# Patient Record
Sex: FEMALE | Race: WHITE | NOT HISPANIC OR LATINO | Employment: OTHER | URBAN - METROPOLITAN AREA
[De-identification: names, ages, dates, MRNs, and addresses within clinical notes are randomized per-mention and may not be internally consistent; named-entity substitution may affect disease eponyms.]

---

## 2017-01-01 ENCOUNTER — CONSULT (OUTPATIENT)
Dept: RADIATION ONCOLOGY | Facility: CLINIC | Age: 82
End: 2017-01-01

## 2017-01-01 ENCOUNTER — APPOINTMENT (OUTPATIENT)
Dept: ONCOLOGY | Facility: HOSPITAL | Age: 82
End: 2017-01-01

## 2017-01-01 ENCOUNTER — INFUSION (OUTPATIENT)
Dept: ONCOLOGY | Facility: HOSPITAL | Age: 82
End: 2017-01-01

## 2017-01-01 ENCOUNTER — TELEPHONE (OUTPATIENT)
Dept: INTERVENTIONAL RADIOLOGY/VASCULAR | Facility: HOSPITAL | Age: 82
End: 2017-01-01

## 2017-01-01 ENCOUNTER — OFFICE VISIT (OUTPATIENT)
Dept: PSYCHIATRY | Facility: HOSPITAL | Age: 82
End: 2017-01-01

## 2017-01-01 ENCOUNTER — OFFICE VISIT (OUTPATIENT)
Dept: ONCOLOGY | Facility: CLINIC | Age: 82
End: 2017-01-01

## 2017-01-01 ENCOUNTER — LAB (OUTPATIENT)
Dept: LAB | Facility: HOSPITAL | Age: 82
End: 2017-01-01

## 2017-01-01 ENCOUNTER — APPOINTMENT (OUTPATIENT)
Dept: GENERAL RADIOLOGY | Facility: HOSPITAL | Age: 82
End: 2017-01-01

## 2017-01-01 ENCOUNTER — TELEPHONE (OUTPATIENT)
Dept: ONCOLOGY | Facility: HOSPITAL | Age: 82
End: 2017-01-01

## 2017-01-01 ENCOUNTER — LAB (OUTPATIENT)
Dept: OTHER | Facility: HOSPITAL | Age: 82
End: 2017-01-01

## 2017-01-01 ENCOUNTER — APPOINTMENT (OUTPATIENT)
Dept: ONCOLOGY | Facility: CLINIC | Age: 82
End: 2017-01-01

## 2017-01-01 ENCOUNTER — HOSPITAL ENCOUNTER (OUTPATIENT)
Dept: ULTRASOUND IMAGING | Facility: HOSPITAL | Age: 82
Discharge: HOME | End: 2017-10-19
Attending: INTERNAL MEDICINE | Admitting: INTERNAL MEDICINE

## 2017-01-01 ENCOUNTER — TELEPHONE (OUTPATIENT)
Dept: PSYCHIATRY | Facility: HOSPITAL | Age: 82
End: 2017-01-01

## 2017-01-01 ENCOUNTER — HOSPITAL ENCOUNTER (OUTPATIENT)
Dept: ULTRASOUND IMAGING | Facility: HOSPITAL | Age: 82
Discharge: HOME | End: 2017-09-21
Attending: INTERNAL MEDICINE

## 2017-01-01 ENCOUNTER — TELEPHONE (OUTPATIENT)
Dept: GENERAL RADIOLOGY | Facility: HOSPITAL | Age: 82
End: 2017-01-01

## 2017-01-01 ENCOUNTER — DOCUMENTATION (OUTPATIENT)
Dept: ONCOLOGY | Facility: CLINIC | Age: 82
End: 2017-01-01

## 2017-01-01 ENCOUNTER — OFFICE VISIT (OUTPATIENT)
Dept: INTERNAL MEDICINE | Facility: CLINIC | Age: 82
End: 2017-01-01

## 2017-01-01 ENCOUNTER — APPOINTMENT (OUTPATIENT)
Dept: CT IMAGING | Facility: HOSPITAL | Age: 82
End: 2017-01-01

## 2017-01-01 ENCOUNTER — APPOINTMENT (OUTPATIENT)
Dept: RADIATION ONCOLOGY | Facility: HOSPITAL | Age: 82
End: 2017-01-01

## 2017-01-01 ENCOUNTER — APPOINTMENT (OUTPATIENT)
Dept: ULTRASOUND IMAGING | Facility: HOSPITAL | Age: 82
End: 2017-01-01

## 2017-01-01 ENCOUNTER — TELEPHONE (OUTPATIENT)
Dept: ONCOLOGY | Facility: CLINIC | Age: 82
End: 2017-01-01

## 2017-01-01 ENCOUNTER — ANESTHESIA EVENT (OUTPATIENT)
Dept: PERIOP | Facility: HOSPITAL | Age: 82
End: 2017-01-01

## 2017-01-01 ENCOUNTER — APPOINTMENT (OUTPATIENT)
Dept: OTHER | Facility: HOSPITAL | Age: 82
End: 2017-01-01

## 2017-01-01 ENCOUNTER — APPOINTMENT (OUTPATIENT)
Dept: ULTRASOUND IMAGING | Facility: HOSPITAL | Age: 82
End: 2017-01-01
Attending: UROLOGY

## 2017-01-01 ENCOUNTER — HOSPITAL ENCOUNTER (INPATIENT)
Facility: HOSPITAL | Age: 82
LOS: 3 days | End: 2017-12-21
Attending: EMERGENCY MEDICINE | Admitting: HOSPITALIST

## 2017-01-01 ENCOUNTER — HOSPITAL ENCOUNTER (OUTPATIENT)
Dept: ULTRASOUND IMAGING | Facility: HOSPITAL | Age: 82
Discharge: HOME | End: 2017-09-12
Attending: INTERNAL MEDICINE

## 2017-01-01 ENCOUNTER — RADIATION ONCOLOGY WEEKLY ASSESSMENT (OUTPATIENT)
Dept: RADIATION ONCOLOGY | Facility: CLINIC | Age: 82
End: 2017-01-01

## 2017-01-01 ENCOUNTER — LAB (OUTPATIENT)
Dept: ONCOLOGY | Facility: HOSPITAL | Age: 82
End: 2017-01-01

## 2017-01-01 ENCOUNTER — APPOINTMENT (OUTPATIENT)
Dept: CARDIOLOGY | Facility: HOSPITAL | Age: 82
End: 2017-01-01
Attending: INTERNAL MEDICINE

## 2017-01-01 ENCOUNTER — HOSPITAL ENCOUNTER (OUTPATIENT)
Dept: NUCLEAR MEDICINE | Facility: HOSPITAL | Age: 82
Discharge: HOME | End: 2017-03-17
Attending: INTERNAL MEDICINE

## 2017-01-01 ENCOUNTER — APPOINTMENT (OUTPATIENT)
Dept: GENERAL RADIOLOGY | Facility: HOSPITAL | Age: 82
End: 2017-01-01
Attending: UROLOGY

## 2017-01-01 ENCOUNTER — HOSPITAL ENCOUNTER (OUTPATIENT)
Dept: NUCLEAR MEDICINE | Facility: HOSPITAL | Age: 82
Discharge: HOME | End: 2017-06-07
Attending: INTERNAL MEDICINE

## 2017-01-01 ENCOUNTER — APPOINTMENT (OUTPATIENT)
Dept: ULTRASOUND IMAGING | Facility: HOSPITAL | Age: 82
End: 2017-01-01
Attending: INTERNAL MEDICINE

## 2017-01-01 ENCOUNTER — APPOINTMENT (OUTPATIENT)
Dept: LAB | Facility: HOSPITAL | Age: 82
End: 2017-01-01

## 2017-01-01 ENCOUNTER — ANESTHESIA (OUTPATIENT)
Dept: PERIOP | Facility: HOSPITAL | Age: 82
End: 2017-01-01

## 2017-01-01 ENCOUNTER — APPOINTMENT (OUTPATIENT)
Dept: MAMMOGRAPHY | Facility: HOSPITAL | Age: 82
End: 2017-01-01
Attending: INTERNAL MEDICINE

## 2017-01-01 ENCOUNTER — APPOINTMENT (OUTPATIENT)
Dept: GENERAL RADIOLOGY | Facility: HOSPITAL | Age: 82
End: 2017-01-01
Attending: INTERNAL MEDICINE

## 2017-01-01 ENCOUNTER — DOCUMENTATION (OUTPATIENT)
Dept: PSYCHIATRY | Facility: HOSPITAL | Age: 82
End: 2017-01-01

## 2017-01-01 ENCOUNTER — HOSPITAL ENCOUNTER (OUTPATIENT)
Dept: BONE DENSITY | Facility: HOSPITAL | Age: 82
Discharge: HOME | End: 2017-01-17
Attending: INTERNAL MEDICINE | Admitting: INTERNAL MEDICINE

## 2017-01-01 ENCOUNTER — HOSPITAL ENCOUNTER (OUTPATIENT)
Dept: ULTRASOUND IMAGING | Facility: HOSPITAL | Age: 82
Discharge: HOME | End: 2017-08-15
Attending: INTERNAL MEDICINE

## 2017-01-01 ENCOUNTER — TRANSCRIBE ORDERS (OUTPATIENT)
Dept: ADMINISTRATIVE | Facility: HOSPITAL | Age: 82
End: 2017-01-01

## 2017-01-01 ENCOUNTER — HOSPITAL ENCOUNTER (OUTPATIENT)
Dept: ULTRASOUND IMAGING | Facility: HOSPITAL | Age: 82
Discharge: HOME | End: 2017-08-22
Attending: INTERNAL MEDICINE | Admitting: INTERNAL MEDICINE

## 2017-01-01 ENCOUNTER — RESULTS ENCOUNTER (OUTPATIENT)
Dept: ONCOLOGY | Facility: CLINIC | Age: 82
End: 2017-01-01

## 2017-01-01 ENCOUNTER — OFFICE VISIT (OUTPATIENT)
Dept: PSYCHIATRY | Facility: HOSPITAL | Age: 82
End: 2017-01-01
Attending: INTERNAL MEDICINE

## 2017-01-01 ENCOUNTER — HOSPITAL ENCOUNTER (OUTPATIENT)
Dept: MRI IMAGING | Facility: HOSPITAL | Age: 82
Discharge: HOME | End: 2017-11-17
Attending: INTERNAL MEDICINE | Admitting: INTERNAL MEDICINE

## 2017-01-01 ENCOUNTER — HOSPITAL ENCOUNTER (OUTPATIENT)
Dept: ULTRASOUND IMAGING | Facility: HOSPITAL | Age: 82
Discharge: HOME | End: 2017-08-09
Attending: INTERNAL MEDICINE | Admitting: INTERNAL MEDICINE

## 2017-01-01 ENCOUNTER — HOSPITAL ENCOUNTER (OUTPATIENT)
Dept: ULTRASOUND IMAGING | Facility: HOSPITAL | Age: 82
Discharge: HOME | End: 2017-08-29
Attending: INTERNAL MEDICINE | Admitting: INTERNAL MEDICINE

## 2017-01-01 ENCOUNTER — HOSPITAL ENCOUNTER (OUTPATIENT)
Dept: ULTRASOUND IMAGING | Facility: HOSPITAL | Age: 82
Discharge: HOME | End: 2017-09-05
Attending: INTERNAL MEDICINE

## 2017-01-01 ENCOUNTER — APPOINTMENT (OUTPATIENT)
Dept: CARDIOLOGY | Facility: HOSPITAL | Age: 82
End: 2017-01-01
Attending: EMERGENCY MEDICINE

## 2017-01-01 ENCOUNTER — HOSPITAL ENCOUNTER (OUTPATIENT)
Dept: GENERAL RADIOLOGY | Facility: HOSPITAL | Age: 82
Discharge: HOME | End: 2017-08-15
Attending: INTERNAL MEDICINE | Admitting: INTERNAL MEDICINE

## 2017-01-01 ENCOUNTER — APPOINTMENT (OUTPATIENT)
Dept: CT IMAGING | Facility: HOSPITAL | Age: 82
End: 2017-01-01
Attending: INTERNAL MEDICINE

## 2017-01-01 ENCOUNTER — HOSPITAL ENCOUNTER (INPATIENT)
Facility: HOSPITAL | Age: 82
LOS: 10 days | Discharge: HOSPICE | End: 2017-09-11
Attending: INTERNAL MEDICINE | Admitting: INTERNAL MEDICINE

## 2017-01-01 ENCOUNTER — HOSPITAL ENCOUNTER (INPATIENT)
Facility: HOSPITAL | Age: 82
LOS: 4 days | Discharge: HOME | End: 2017-08-05
Attending: EMERGENCY MEDICINE | Admitting: INTERNAL MEDICINE

## 2017-01-01 ENCOUNTER — HOSPITAL ENCOUNTER (OUTPATIENT)
Dept: MRI IMAGING | Facility: HOSPITAL | Age: 82
Discharge: HOME | End: 2017-11-15
Attending: INTERNAL MEDICINE | Admitting: INTERNAL MEDICINE

## 2017-01-01 ENCOUNTER — HOSPITAL ENCOUNTER (OUTPATIENT)
Dept: ULTRASOUND IMAGING | Facility: HOSPITAL | Age: 82
Discharge: HOME | End: 2017-12-13
Attending: INTERNAL MEDICINE | Admitting: INTERNAL MEDICINE

## 2017-01-01 VITALS
TEMPERATURE: 97.7 F | DIASTOLIC BLOOD PRESSURE: 77 MMHG | BODY MASS INDEX: 23.39 KG/M2 | SYSTOLIC BLOOD PRESSURE: 149 MMHG | RESPIRATION RATE: 20 BRPM | HEART RATE: 100 BPM | WEIGHT: 132 LBS | HEIGHT: 63 IN | OXYGEN SATURATION: 97 %

## 2017-01-01 VITALS
WEIGHT: 127 LBS | BODY MASS INDEX: 23.37 KG/M2 | HEIGHT: 62 IN | SYSTOLIC BLOOD PRESSURE: 146 MMHG | DIASTOLIC BLOOD PRESSURE: 75 MMHG | HEART RATE: 84 BPM | OXYGEN SATURATION: 97 % | RESPIRATION RATE: 16 BRPM | TEMPERATURE: 98.6 F

## 2017-01-01 VITALS — WEIGHT: 127.4 LBS | BODY MASS INDEX: 23.15 KG/M2

## 2017-01-01 VITALS
TEMPERATURE: 97 F | DIASTOLIC BLOOD PRESSURE: 72 MMHG | HEIGHT: 63 IN | BODY MASS INDEX: 22.5 KG/M2 | OXYGEN SATURATION: 95 % | WEIGHT: 127 LBS | HEART RATE: 114 BPM | RESPIRATION RATE: 16 BRPM | SYSTOLIC BLOOD PRESSURE: 136 MMHG

## 2017-01-01 VITALS
OXYGEN SATURATION: 96 % | WEIGHT: 133.1 LBS | HEIGHT: 62 IN | SYSTOLIC BLOOD PRESSURE: 126 MMHG | RESPIRATION RATE: 16 BRPM | HEART RATE: 84 BPM | BODY MASS INDEX: 24.49 KG/M2 | TEMPERATURE: 98.5 F | DIASTOLIC BLOOD PRESSURE: 68 MMHG

## 2017-01-01 VITALS
TEMPERATURE: 97.2 F | SYSTOLIC BLOOD PRESSURE: 137 MMHG | WEIGHT: 121 LBS | DIASTOLIC BLOOD PRESSURE: 79 MMHG | BODY MASS INDEX: 21.44 KG/M2 | HEIGHT: 63 IN | OXYGEN SATURATION: 94 % | HEART RATE: 112 BPM | RESPIRATION RATE: 18 BRPM

## 2017-01-01 VITALS
HEART RATE: 112 BPM | SYSTOLIC BLOOD PRESSURE: 148 MMHG | OXYGEN SATURATION: 97 % | TEMPERATURE: 98 F | DIASTOLIC BLOOD PRESSURE: 83 MMHG | RESPIRATION RATE: 18 BRPM

## 2017-01-01 VITALS
SYSTOLIC BLOOD PRESSURE: 128 MMHG | OXYGEN SATURATION: 98 % | BODY MASS INDEX: 23.92 KG/M2 | TEMPERATURE: 98.4 F | WEIGHT: 130 LBS | RESPIRATION RATE: 16 BRPM | DIASTOLIC BLOOD PRESSURE: 68 MMHG | HEART RATE: 96 BPM | HEIGHT: 62 IN

## 2017-01-01 VITALS
TEMPERATURE: 97.8 F | HEIGHT: 63 IN | OXYGEN SATURATION: 95 % | HEART RATE: 110 BPM | RESPIRATION RATE: 18 BRPM | SYSTOLIC BLOOD PRESSURE: 143 MMHG | WEIGHT: 135 LBS | BODY MASS INDEX: 23.92 KG/M2 | DIASTOLIC BLOOD PRESSURE: 70 MMHG

## 2017-01-01 VITALS
HEART RATE: 96 BPM | TEMPERATURE: 98.1 F | TEMPERATURE: 95.7 F | SYSTOLIC BLOOD PRESSURE: 143 MMHG | WEIGHT: 120 LBS | HEART RATE: 114 BPM | WEIGHT: 127.8 LBS | RESPIRATION RATE: 16 BRPM | RESPIRATION RATE: 16 BRPM | OXYGEN SATURATION: 98 % | HEIGHT: 62 IN | BODY MASS INDEX: 22.08 KG/M2 | SYSTOLIC BLOOD PRESSURE: 110 MMHG | DIASTOLIC BLOOD PRESSURE: 60 MMHG | BODY MASS INDEX: 23.52 KG/M2 | HEIGHT: 62 IN | DIASTOLIC BLOOD PRESSURE: 87 MMHG

## 2017-01-01 VITALS
BODY MASS INDEX: 23.69 KG/M2 | SYSTOLIC BLOOD PRESSURE: 131 MMHG | HEIGHT: 63 IN | DIASTOLIC BLOOD PRESSURE: 68 MMHG | OXYGEN SATURATION: 96 % | TEMPERATURE: 98.3 F | RESPIRATION RATE: 16 BRPM | HEART RATE: 92 BPM | WEIGHT: 133.7 LBS

## 2017-01-01 VITALS
TEMPERATURE: 97.4 F | SYSTOLIC BLOOD PRESSURE: 120 MMHG | HEIGHT: 63 IN | RESPIRATION RATE: 16 BRPM | HEART RATE: 113 BPM | DIASTOLIC BLOOD PRESSURE: 75 MMHG | BODY MASS INDEX: 19.21 KG/M2 | WEIGHT: 108.4 LBS | OXYGEN SATURATION: 93 %

## 2017-01-01 VITALS
SYSTOLIC BLOOD PRESSURE: 162 MMHG | HEART RATE: 102 BPM | OXYGEN SATURATION: 96 % | TEMPERATURE: 97.4 F | WEIGHT: 114 LBS | DIASTOLIC BLOOD PRESSURE: 71 MMHG | BODY MASS INDEX: 20.72 KG/M2

## 2017-01-01 VITALS
OXYGEN SATURATION: 75 % | TEMPERATURE: 99.5 F | DIASTOLIC BLOOD PRESSURE: 50 MMHG | HEIGHT: 64 IN | SYSTOLIC BLOOD PRESSURE: 72 MMHG | WEIGHT: 115.5 LBS | BODY MASS INDEX: 19.72 KG/M2

## 2017-01-01 VITALS
HEART RATE: 88 BPM | SYSTOLIC BLOOD PRESSURE: 188 MMHG | DIASTOLIC BLOOD PRESSURE: 98 MMHG | BODY MASS INDEX: 21.05 KG/M2 | WEIGHT: 118.8 LBS | TEMPERATURE: 97.5 F | HEIGHT: 63 IN | RESPIRATION RATE: 12 BRPM | OXYGEN SATURATION: 96 %

## 2017-01-01 VITALS
BODY MASS INDEX: 24.48 KG/M2 | DIASTOLIC BLOOD PRESSURE: 78 MMHG | RESPIRATION RATE: 18 BRPM | TEMPERATURE: 98.3 F | OXYGEN SATURATION: 96 % | SYSTOLIC BLOOD PRESSURE: 145 MMHG | OXYGEN SATURATION: 97 % | SYSTOLIC BLOOD PRESSURE: 132 MMHG | HEART RATE: 126 BPM | DIASTOLIC BLOOD PRESSURE: 73 MMHG | HEART RATE: 102 BPM | WEIGHT: 133 LBS | HEIGHT: 62 IN | RESPIRATION RATE: 12 BRPM

## 2017-01-01 VITALS
TEMPERATURE: 98.3 F | BODY MASS INDEX: 23 KG/M2 | WEIGHT: 125 LBS | HEART RATE: 109 BPM | SYSTOLIC BLOOD PRESSURE: 135 MMHG | OXYGEN SATURATION: 96 % | DIASTOLIC BLOOD PRESSURE: 56 MMHG | HEIGHT: 62 IN | RESPIRATION RATE: 16 BRPM

## 2017-01-01 VITALS
RESPIRATION RATE: 18 BRPM | OXYGEN SATURATION: 97 % | SYSTOLIC BLOOD PRESSURE: 133 MMHG | DIASTOLIC BLOOD PRESSURE: 65 MMHG | TEMPERATURE: 97.5 F | HEART RATE: 98 BPM

## 2017-01-01 VITALS
OXYGEN SATURATION: 97 % | WEIGHT: 109.2 LBS | HEART RATE: 121 BPM | SYSTOLIC BLOOD PRESSURE: 136 MMHG | BODY MASS INDEX: 19.59 KG/M2 | DIASTOLIC BLOOD PRESSURE: 83 MMHG | TEMPERATURE: 98.5 F

## 2017-01-01 VITALS
RESPIRATION RATE: 18 BRPM | HEART RATE: 92 BPM | DIASTOLIC BLOOD PRESSURE: 76 MMHG | SYSTOLIC BLOOD PRESSURE: 141 MMHG | TEMPERATURE: 97.8 F

## 2017-01-01 VITALS
DIASTOLIC BLOOD PRESSURE: 70 MMHG | HEART RATE: 103 BPM | HEIGHT: 62 IN | BODY MASS INDEX: 21.35 KG/M2 | OXYGEN SATURATION: 94 % | TEMPERATURE: 98.3 F | SYSTOLIC BLOOD PRESSURE: 156 MMHG | WEIGHT: 116 LBS

## 2017-01-01 VITALS
OXYGEN SATURATION: 99 % | RESPIRATION RATE: 16 BRPM | WEIGHT: 133.6 LBS | SYSTOLIC BLOOD PRESSURE: 126 MMHG | HEIGHT: 62 IN | TEMPERATURE: 98.3 F | DIASTOLIC BLOOD PRESSURE: 72 MMHG | HEART RATE: 108 BPM | BODY MASS INDEX: 24.59 KG/M2

## 2017-01-01 VITALS
HEIGHT: 62 IN | HEART RATE: 99 BPM | OXYGEN SATURATION: 99 % | DIASTOLIC BLOOD PRESSURE: 68 MMHG | WEIGHT: 114 LBS | SYSTOLIC BLOOD PRESSURE: 167 MMHG | BODY MASS INDEX: 20.98 KG/M2 | TEMPERATURE: 97.9 F

## 2017-01-01 VITALS
RESPIRATION RATE: 16 BRPM | SYSTOLIC BLOOD PRESSURE: 130 MMHG | WEIGHT: 114.8 LBS | HEIGHT: 62 IN | DIASTOLIC BLOOD PRESSURE: 70 MMHG | HEART RATE: 96 BPM | TEMPERATURE: 99.1 F | BODY MASS INDEX: 21.12 KG/M2

## 2017-01-01 VITALS
WEIGHT: 128.8 LBS | RESPIRATION RATE: 18 BRPM | BODY MASS INDEX: 22.82 KG/M2 | DIASTOLIC BLOOD PRESSURE: 72 MMHG | TEMPERATURE: 97.6 F | HEIGHT: 63 IN | HEART RATE: 97 BPM | OXYGEN SATURATION: 100 % | SYSTOLIC BLOOD PRESSURE: 142 MMHG

## 2017-01-01 VITALS — BODY MASS INDEX: 20.66 KG/M2 | WEIGHT: 114.8 LBS

## 2017-01-01 VITALS
BODY MASS INDEX: 27.54 KG/M2 | HEART RATE: 110 BPM | WEIGHT: 119 LBS | TEMPERATURE: 97.7 F | HEIGHT: 55 IN | OXYGEN SATURATION: 97 % | SYSTOLIC BLOOD PRESSURE: 153 MMHG | DIASTOLIC BLOOD PRESSURE: 75 MMHG

## 2017-01-01 VITALS
OXYGEN SATURATION: 98 % | BODY MASS INDEX: 23.74 KG/M2 | TEMPERATURE: 98.3 F | HEIGHT: 62 IN | WEIGHT: 129 LBS | SYSTOLIC BLOOD PRESSURE: 143 MMHG | RESPIRATION RATE: 18 BRPM | HEART RATE: 89 BPM | DIASTOLIC BLOOD PRESSURE: 75 MMHG

## 2017-01-01 VITALS
TEMPERATURE: 97.9 F | OXYGEN SATURATION: 92 % | WEIGHT: 124 LBS | HEIGHT: 62 IN | BODY MASS INDEX: 22.82 KG/M2 | SYSTOLIC BLOOD PRESSURE: 143 MMHG | HEART RATE: 103 BPM | DIASTOLIC BLOOD PRESSURE: 98 MMHG | RESPIRATION RATE: 18 BRPM

## 2017-01-01 VITALS
HEIGHT: 63 IN | BODY MASS INDEX: 21.97 KG/M2 | DIASTOLIC BLOOD PRESSURE: 85 MMHG | WEIGHT: 124 LBS | RESPIRATION RATE: 16 BRPM | OXYGEN SATURATION: 97 % | SYSTOLIC BLOOD PRESSURE: 137 MMHG | TEMPERATURE: 97.4 F | HEART RATE: 121 BPM

## 2017-01-01 VITALS
HEIGHT: 62 IN | RESPIRATION RATE: 16 BRPM | HEART RATE: 86 BPM | DIASTOLIC BLOOD PRESSURE: 70 MMHG | BODY MASS INDEX: 23.48 KG/M2 | WEIGHT: 127.6 LBS | SYSTOLIC BLOOD PRESSURE: 122 MMHG | TEMPERATURE: 97.4 F

## 2017-01-01 VITALS
WEIGHT: 118 LBS | TEMPERATURE: 98.2 F | BODY MASS INDEX: 21.71 KG/M2 | HEART RATE: 105 BPM | OXYGEN SATURATION: 96 % | HEIGHT: 62 IN | RESPIRATION RATE: 16 BRPM | DIASTOLIC BLOOD PRESSURE: 72 MMHG | SYSTOLIC BLOOD PRESSURE: 146 MMHG

## 2017-01-01 VITALS — WEIGHT: 129 LBS | HEIGHT: 62 IN | WEIGHT: 133.8 LBS | BODY MASS INDEX: 23.74 KG/M2 | BODY MASS INDEX: 23.7 KG/M2

## 2017-01-01 VITALS
OXYGEN SATURATION: 98 % | RESPIRATION RATE: 20 BRPM | TEMPERATURE: 97 F | DIASTOLIC BLOOD PRESSURE: 76 MMHG | SYSTOLIC BLOOD PRESSURE: 148 MMHG | HEART RATE: 91 BPM

## 2017-01-01 VITALS
BODY MASS INDEX: 23.57 KG/M2 | SYSTOLIC BLOOD PRESSURE: 145 MMHG | DIASTOLIC BLOOD PRESSURE: 63 MMHG | WEIGHT: 133 LBS | TEMPERATURE: 97.6 F | HEART RATE: 96 BPM | OXYGEN SATURATION: 93 % | HEIGHT: 63 IN

## 2017-01-01 VITALS
TEMPERATURE: 96.1 F | DIASTOLIC BLOOD PRESSURE: 72 MMHG | OXYGEN SATURATION: 96 % | HEIGHT: 63 IN | RESPIRATION RATE: 20 BRPM | WEIGHT: 106 LBS | BODY MASS INDEX: 18.78 KG/M2 | HEART RATE: 120 BPM | SYSTOLIC BLOOD PRESSURE: 118 MMHG

## 2017-01-01 VITALS
WEIGHT: 108.6 LBS | TEMPERATURE: 97.7 F | DIASTOLIC BLOOD PRESSURE: 69 MMHG | SYSTOLIC BLOOD PRESSURE: 122 MMHG | HEART RATE: 128 BPM | BODY MASS INDEX: 19.48 KG/M2

## 2017-01-01 VITALS
BODY MASS INDEX: 20.91 KG/M2 | HEIGHT: 62 IN | DIASTOLIC BLOOD PRESSURE: 78 MMHG | WEIGHT: 113.6 LBS | SYSTOLIC BLOOD PRESSURE: 140 MMHG | OXYGEN SATURATION: 97 % | TEMPERATURE: 97.6 F | HEART RATE: 103 BPM

## 2017-01-01 VITALS — SYSTOLIC BLOOD PRESSURE: 130 MMHG | HEART RATE: 144 BPM | TEMPERATURE: 102 F | DIASTOLIC BLOOD PRESSURE: 63 MMHG

## 2017-01-01 VITALS — HEART RATE: 112 BPM | SYSTOLIC BLOOD PRESSURE: 151 MMHG | DIASTOLIC BLOOD PRESSURE: 93 MMHG

## 2017-01-01 VITALS — DIASTOLIC BLOOD PRESSURE: 71 MMHG | HEART RATE: 83 BPM | TEMPERATURE: 98.4 F | SYSTOLIC BLOOD PRESSURE: 114 MMHG

## 2017-01-01 VITALS — DIASTOLIC BLOOD PRESSURE: 83 MMHG | SYSTOLIC BLOOD PRESSURE: 146 MMHG | HEART RATE: 66 BPM | TEMPERATURE: 97.5 F

## 2017-01-01 VITALS — WEIGHT: 132.6 LBS | BODY MASS INDEX: 23.49 KG/M2

## 2017-01-01 DIAGNOSIS — C80.0 CARCINOMATOSIS (HCC): ICD-10-CM

## 2017-01-01 DIAGNOSIS — T45.1X5A ANEMIA ASSOCIATED WITH CHEMOTHERAPY: ICD-10-CM

## 2017-01-01 DIAGNOSIS — J18.9 PNEUMONIA OF BOTH LOWER LOBES DUE TO INFECTIOUS ORGANISM: ICD-10-CM

## 2017-01-01 DIAGNOSIS — R18.0 ASCITES, MALIGNANT: ICD-10-CM

## 2017-01-01 DIAGNOSIS — C50.919 BREAST CANCER METASTASIZED TO BONE, UNSPECIFIED LATERALITY (HCC): Primary | ICD-10-CM

## 2017-01-01 DIAGNOSIS — Z78.0 POST-MENOPAUSE: ICD-10-CM

## 2017-01-01 DIAGNOSIS — D64.81 ANEMIA ASSOCIATED WITH CHEMOTHERAPY: ICD-10-CM

## 2017-01-01 DIAGNOSIS — D63.1 ANEMIA OF CHRONIC RENAL FAILURE, STAGE 3 (MODERATE) (HCC): Primary | ICD-10-CM

## 2017-01-01 DIAGNOSIS — C50.919 BREAST CANCER METASTASIZED TO BONE, UNSPECIFIED LATERALITY (HCC): ICD-10-CM

## 2017-01-01 DIAGNOSIS — C79.51 BREAST CANCER METASTASIZED TO BONE, UNSPECIFIED LATERALITY (HCC): Primary | ICD-10-CM

## 2017-01-01 DIAGNOSIS — C76.2 ABDOMINAL CARCINOMATOSIS (HCC): ICD-10-CM

## 2017-01-01 DIAGNOSIS — D63.0 ANEMIA IN NEOPLASTIC DISEASE: ICD-10-CM

## 2017-01-01 DIAGNOSIS — N18.30 ANEMIA OF CHRONIC RENAL FAILURE, STAGE 3 (MODERATE) (HCC): Primary | ICD-10-CM

## 2017-01-01 DIAGNOSIS — C50.912 BREAST CANCER METASTASIZED TO BONE, LEFT (HCC): ICD-10-CM

## 2017-01-01 DIAGNOSIS — C79.51 BREAST CANCER METASTASIZED TO BONE, UNSPECIFIED LATERALITY (HCC): ICD-10-CM

## 2017-01-01 DIAGNOSIS — R11.10 VOMITING, INTRACTABILITY OF VOMITING NOT SPECIFIED, PRESENCE OF NAUSEA NOT SPECIFIED, UNSPECIFIED VOMITING TYPE: ICD-10-CM

## 2017-01-01 DIAGNOSIS — C79.51 BREAST CANCER METASTASIZED TO BONE, LEFT (HCC): Primary | ICD-10-CM

## 2017-01-01 DIAGNOSIS — E87.0 HYPERNATREMIA: ICD-10-CM

## 2017-01-01 DIAGNOSIS — E86.0 DEHYDRATION: Primary | ICD-10-CM

## 2017-01-01 DIAGNOSIS — D64.81 ANEMIA ASSOCIATED WITH CHEMOTHERAPY: Primary | ICD-10-CM

## 2017-01-01 DIAGNOSIS — C50.912 BREAST CANCER METASTASIZED TO BONE, LEFT (HCC): Primary | ICD-10-CM

## 2017-01-01 DIAGNOSIS — D63.1 ANEMIA OF CHRONIC RENAL FAILURE, STAGE 3 (MODERATE) (HCC): ICD-10-CM

## 2017-01-01 DIAGNOSIS — N18.30 ANEMIA OF CHRONIC RENAL FAILURE, STAGE 3 (MODERATE) (HCC): ICD-10-CM

## 2017-01-01 DIAGNOSIS — T45.1X5A CHEMOTHERAPY INDUCED NEUTROPENIA (HCC): ICD-10-CM

## 2017-01-01 DIAGNOSIS — C79.51 CARCINOMA OF BREAST METASTATIC TO BONE, UNSPECIFIED LATERALITY (HCC): Primary | ICD-10-CM

## 2017-01-01 DIAGNOSIS — E86.0 MILD DEHYDRATION: ICD-10-CM

## 2017-01-01 DIAGNOSIS — D63.0 ANEMIA IN NEOPLASTIC DISEASE: Primary | ICD-10-CM

## 2017-01-01 DIAGNOSIS — C79.51 CARCINOMA OF BREAST METASTATIC TO BONE, UNSPECIFIED LATERALITY (HCC): ICD-10-CM

## 2017-01-01 DIAGNOSIS — C50.919 CARCINOMA OF BREAST METASTATIC TO BONE, UNSPECIFIED LATERALITY (HCC): ICD-10-CM

## 2017-01-01 DIAGNOSIS — C50.919 CARCINOMA OF BREAST METASTATIC TO BONE, UNSPECIFIED LATERALITY (HCC): Primary | ICD-10-CM

## 2017-01-01 DIAGNOSIS — R18.0 MALIGNANT ASCITES: ICD-10-CM

## 2017-01-01 DIAGNOSIS — E86.0 MILD DEHYDRATION: Primary | ICD-10-CM

## 2017-01-01 DIAGNOSIS — I10 ESSENTIAL HYPERTENSION: ICD-10-CM

## 2017-01-01 DIAGNOSIS — H53.8 BLURRY VISION, RIGHT EYE: ICD-10-CM

## 2017-01-01 DIAGNOSIS — R53.1 GENERALIZED WEAKNESS: ICD-10-CM

## 2017-01-01 DIAGNOSIS — N18.9 RENAL FAILURE (ARF), ACUTE ON CHRONIC (HCC): ICD-10-CM

## 2017-01-01 DIAGNOSIS — C50.919 METASTATIC BREAST CANCER: ICD-10-CM

## 2017-01-01 DIAGNOSIS — N17.9 ACUTE KIDNEY INJURY (HCC): ICD-10-CM

## 2017-01-01 DIAGNOSIS — G89.3 CANCER ASSOCIATED PAIN: ICD-10-CM

## 2017-01-01 DIAGNOSIS — E86.0 DEHYDRATION: ICD-10-CM

## 2017-01-01 DIAGNOSIS — R63.0 POOR APPETITE: ICD-10-CM

## 2017-01-01 DIAGNOSIS — R18.0 ASCITES, MALIGNANT: Primary | ICD-10-CM

## 2017-01-01 DIAGNOSIS — R51.9 HEADACHE AROUND THE EYES: ICD-10-CM

## 2017-01-01 DIAGNOSIS — T50.903A: ICD-10-CM

## 2017-01-01 DIAGNOSIS — Z12.31 VISIT FOR SCREENING MAMMOGRAM: Primary | ICD-10-CM

## 2017-01-01 DIAGNOSIS — F32.1 MODERATE SINGLE CURRENT EPISODE OF MAJOR DEPRESSIVE DISORDER (HCC): ICD-10-CM

## 2017-01-01 DIAGNOSIS — N18.30 CHRONIC KIDNEY DISEASE, STAGE 3 (HCC): ICD-10-CM

## 2017-01-01 DIAGNOSIS — Z45.2 ENCOUNTER FOR ADJUSTMENT OR MANAGEMENT OF VASCULAR ACCESS DEVICE: Primary | ICD-10-CM

## 2017-01-01 DIAGNOSIS — R60.1 GENERALIZED EDEMA: ICD-10-CM

## 2017-01-01 DIAGNOSIS — C79.51 CARCINOMA OF LEFT BREAST METASTATIC TO BONE (HCC): ICD-10-CM

## 2017-01-01 DIAGNOSIS — C79.51 CARCINOMA OF LEFT BREAST METASTATIC TO BONE (HCC): Primary | ICD-10-CM

## 2017-01-01 DIAGNOSIS — Z45.2 ENCOUNTER FOR FITTING AND ADJUSTMENT OF VASCULAR CATHETER: Primary | ICD-10-CM

## 2017-01-01 DIAGNOSIS — T45.1X5A ANEMIA ASSOCIATED WITH CHEMOTHERAPY: Primary | ICD-10-CM

## 2017-01-01 DIAGNOSIS — D64.9 ANEMIA, UNSPECIFIED TYPE: ICD-10-CM

## 2017-01-01 DIAGNOSIS — F33.1 MODERATE EPISODE OF RECURRENT MAJOR DEPRESSIVE DISORDER (HCC): ICD-10-CM

## 2017-01-01 DIAGNOSIS — R53.0 NEOPLASTIC MALIGNANT RELATED FATIGUE: ICD-10-CM

## 2017-01-01 DIAGNOSIS — E78.49 OTHER HYPERLIPIDEMIA: ICD-10-CM

## 2017-01-01 DIAGNOSIS — F33.1 MAJOR DEPRESSIVE DISORDER, RECURRENT EPISODE, MODERATE (HCC): ICD-10-CM

## 2017-01-01 DIAGNOSIS — Z71.89 ENCOUNTER FOR MEDICATION REVIEW AND COUNSELING: Primary | ICD-10-CM

## 2017-01-01 DIAGNOSIS — N17.9 ACUTE KIDNEY INJURY (HCC): Primary | ICD-10-CM

## 2017-01-01 DIAGNOSIS — C50.912 CARCINOMA OF LEFT BREAST METASTATIC TO BONE (HCC): ICD-10-CM

## 2017-01-01 DIAGNOSIS — D64.9 ANEMIA, UNSPECIFIED TYPE: Primary | ICD-10-CM

## 2017-01-01 DIAGNOSIS — C79.51 BREAST CANCER METASTASIZED TO BONE, LEFT (HCC): ICD-10-CM

## 2017-01-01 DIAGNOSIS — M79.605 PAIN IN BOTH LOWER EXTREMITIES: ICD-10-CM

## 2017-01-01 DIAGNOSIS — E87.6 HYPOKALEMIA: ICD-10-CM

## 2017-01-01 DIAGNOSIS — R53.0 NEOPLASTIC MALIGNANT RELATED FATIGUE: Primary | ICD-10-CM

## 2017-01-01 DIAGNOSIS — H53.8 BLURRY VISION, RIGHT EYE: Primary | ICD-10-CM

## 2017-01-01 DIAGNOSIS — N18.30 STAGE 3 CHRONIC KIDNEY DISEASE (HCC): ICD-10-CM

## 2017-01-01 DIAGNOSIS — M79.604 PAIN IN BOTH LOWER EXTREMITIES: ICD-10-CM

## 2017-01-01 DIAGNOSIS — N17.9 RENAL FAILURE (ARF), ACUTE ON CHRONIC (HCC): ICD-10-CM

## 2017-01-01 DIAGNOSIS — R53.81 PHYSICAL DECONDITIONING: ICD-10-CM

## 2017-01-01 DIAGNOSIS — R50.9 FEVER, UNSPECIFIED FEVER CAUSE: ICD-10-CM

## 2017-01-01 DIAGNOSIS — D70.1 CHEMOTHERAPY INDUCED NEUTROPENIA (HCC): ICD-10-CM

## 2017-01-01 DIAGNOSIS — R68.89 RIGORS: Primary | ICD-10-CM

## 2017-01-01 DIAGNOSIS — B34.9 VIRAL ILLNESS: ICD-10-CM

## 2017-01-01 DIAGNOSIS — R11.2 NAUSEA AND VOMITING, INTRACTABILITY OF VOMITING NOT SPECIFIED, UNSPECIFIED VOMITING TYPE: ICD-10-CM

## 2017-01-01 DIAGNOSIS — K56.600 PARTIAL SMALL BOWEL OBSTRUCTION (HCC): Primary | ICD-10-CM

## 2017-01-01 DIAGNOSIS — B37.0 ORAL CANDIDA: ICD-10-CM

## 2017-01-01 DIAGNOSIS — C50.912 CARCINOMA OF LEFT BREAST METASTATIC TO BONE (HCC): Primary | ICD-10-CM

## 2017-01-01 DIAGNOSIS — R00.0 TACHYCARDIA: ICD-10-CM

## 2017-01-01 LAB
ABO + RH BLD: NORMAL
ABO GROUP BLD: NORMAL
ACANTHOCYTES BLD QL SMEAR: ABNORMAL
ALBUMIN SERPL-MCNC: 1.2 G/DL (ref 3.5–5.2)
ALBUMIN SERPL-MCNC: 1.3 G/DL (ref 3.5–5.2)
ALBUMIN SERPL-MCNC: 1.5 G/DL (ref 3.5–5.2)
ALBUMIN SERPL-MCNC: 1.6 G/DL (ref 3.5–5.2)
ALBUMIN SERPL-MCNC: 1.6 G/DL (ref 3.5–5.2)
ALBUMIN SERPL-MCNC: 1.7 G/DL (ref 3.5–5.2)
ALBUMIN SERPL-MCNC: 1.8 G/DL (ref 3.5–5.2)
ALBUMIN SERPL-MCNC: 1.9 G/DL (ref 3.5–5.2)
ALBUMIN SERPL-MCNC: 2 G/DL (ref 3.5–5.2)
ALBUMIN SERPL-MCNC: 2.3 G/DL (ref 3.5–5.2)
ALBUMIN SERPL-MCNC: 2.4 G/DL (ref 3.5–5.2)
ALBUMIN SERPL-MCNC: 2.4 G/DL (ref 3.5–5.2)
ALBUMIN SERPL-MCNC: 2.5 G/DL (ref 3.5–5.2)
ALBUMIN SERPL-MCNC: 2.5 G/DL (ref 3.5–5.2)
ALBUMIN SERPL-MCNC: 2.9 G/DL (ref 3.5–5.2)
ALBUMIN SERPL-MCNC: 3 G/DL (ref 3.5–5.2)
ALBUMIN SERPL-MCNC: 3.3 G/DL (ref 3.5–5.2)
ALBUMIN SERPL-MCNC: 3.4 G/DL (ref 3.5–5.2)
ALBUMIN SERPL-MCNC: 3.5 G/DL (ref 3.5–5.2)
ALBUMIN SERPL-MCNC: 3.8 G/DL (ref 3.5–5.2)
ALBUMIN SERPL-MCNC: 3.8 G/DL (ref 3.5–5.2)
ALBUMIN SERPL-MCNC: 4 G/DL (ref 3.5–5.2)
ALBUMIN SERPL-MCNC: 4 G/DL (ref 3.5–5.2)
ALBUMIN SERPL-MCNC: 4.1 G/DL (ref 3.5–5.2)
ALBUMIN SERPL-MCNC: 4.2 G/DL (ref 3.5–5.2)
ALBUMIN/GLOB SERPL: 0.4 G/DL
ALBUMIN/GLOB SERPL: 0.6 G/DL (ref 1.1–2.4)
ALBUMIN/GLOB SERPL: 0.7 G/DL
ALBUMIN/GLOB SERPL: 0.7 G/DL (ref 1.1–2.4)
ALBUMIN/GLOB SERPL: 0.8 G/DL
ALBUMIN/GLOB SERPL: 0.8 G/DL (ref 1.1–2.4)
ALBUMIN/GLOB SERPL: 0.8 G/DL (ref 1.1–2.4)
ALBUMIN/GLOB SERPL: 0.9 G/DL
ALBUMIN/GLOB SERPL: 0.9 G/DL
ALBUMIN/GLOB SERPL: 0.9 G/DL (ref 1.1–2.4)
ALBUMIN/GLOB SERPL: 1 G/DL
ALBUMIN/GLOB SERPL: 1.1 G/DL
ALBUMIN/GLOB SERPL: 1.1 G/DL
ALBUMIN/GLOB SERPL: 1.4 G/DL
ALBUMIN/GLOB SERPL: 1.5 G/DL
ALBUMIN/GLOB SERPL: 1.5 G/DL
ALBUMIN/GLOB SERPL: 1.6 G/DL
ALP SERPL-CCNC: 104 U/L (ref 39–117)
ALP SERPL-CCNC: 105 U/L (ref 38–116)
ALP SERPL-CCNC: 111 U/L (ref 38–116)
ALP SERPL-CCNC: 125 U/L (ref 39–117)
ALP SERPL-CCNC: 140 U/L (ref 39–117)
ALP SERPL-CCNC: 166 U/L (ref 39–117)
ALP SERPL-CCNC: 207 U/L (ref 39–117)
ALP SERPL-CCNC: 214 U/L (ref 39–117)
ALP SERPL-CCNC: 216 U/L (ref 39–117)
ALP SERPL-CCNC: 245 U/L (ref 39–117)
ALP SERPL-CCNC: 255 U/L (ref 39–117)
ALP SERPL-CCNC: 322 U/L (ref 39–117)
ALP SERPL-CCNC: 66 U/L (ref 39–117)
ALP SERPL-CCNC: 72 U/L (ref 39–117)
ALP SERPL-CCNC: 79 U/L (ref 39–117)
ALP SERPL-CCNC: 81 U/L (ref 39–117)
ALP SERPL-CCNC: 83 U/L (ref 38–116)
ALP SERPL-CCNC: 87 U/L (ref 39–117)
ALP SERPL-CCNC: 92 U/L (ref 39–117)
ALP SERPL-CCNC: 94 U/L (ref 38–116)
ALP SERPL-CCNC: 95 U/L (ref 39–117)
ALP SERPL-CCNC: 99 U/L (ref 38–116)
ALT SERPL W P-5'-P-CCNC: 10 U/L (ref 1–33)
ALT SERPL W P-5'-P-CCNC: 11 U/L (ref 1–33)
ALT SERPL W P-5'-P-CCNC: 12 U/L (ref 1–33)
ALT SERPL W P-5'-P-CCNC: 13 U/L (ref 1–33)
ALT SERPL W P-5'-P-CCNC: 14 U/L (ref 0–33)
ALT SERPL W P-5'-P-CCNC: 14 U/L (ref 0–33)
ALT SERPL W P-5'-P-CCNC: 14 U/L (ref 1–33)
ALT SERPL W P-5'-P-CCNC: 14 U/L (ref 1–33)
ALT SERPL W P-5'-P-CCNC: 15 U/L (ref 1–33)
ALT SERPL W P-5'-P-CCNC: 15 U/L (ref 1–33)
ALT SERPL W P-5'-P-CCNC: 16 U/L (ref 0–33)
ALT SERPL W P-5'-P-CCNC: 16 U/L (ref 1–33)
ALT SERPL W P-5'-P-CCNC: 18 U/L (ref 1–33)
ALT SERPL W P-5'-P-CCNC: 27 U/L (ref 0–33)
ALT SERPL W P-5'-P-CCNC: 8 U/L (ref 1–33)
ALT SERPL W P-5'-P-CCNC: 9 U/L (ref 0–33)
ALT SERPL W P-5'-P-CCNC: 9 U/L (ref 1–33)
ALT SERPL W P-5'-P-CCNC: 9 U/L (ref 1–33)
AMORPH URATE CRY URNS QL MICRO: ABNORMAL /HPF
AMORPH URATE CRY URNS QL MICRO: ABNORMAL /HPF
ANION GAP SERPL CALCULATED.3IONS-SCNC: 10 MMOL/L
ANION GAP SERPL CALCULATED.3IONS-SCNC: 10.3 MMOL/L
ANION GAP SERPL CALCULATED.3IONS-SCNC: 10.5 MMOL/L
ANION GAP SERPL CALCULATED.3IONS-SCNC: 10.6 MMOL/L
ANION GAP SERPL CALCULATED.3IONS-SCNC: 11 MMOL/L
ANION GAP SERPL CALCULATED.3IONS-SCNC: 11.4 MMOL/L
ANION GAP SERPL CALCULATED.3IONS-SCNC: 11.6 MMOL/L
ANION GAP SERPL CALCULATED.3IONS-SCNC: 12.3 MMOL/L
ANION GAP SERPL CALCULATED.3IONS-SCNC: 12.4 MMOL/L
ANION GAP SERPL CALCULATED.3IONS-SCNC: 12.4 MMOL/L
ANION GAP SERPL CALCULATED.3IONS-SCNC: 12.5 MMOL/L
ANION GAP SERPL CALCULATED.3IONS-SCNC: 12.6 MMOL/L
ANION GAP SERPL CALCULATED.3IONS-SCNC: 12.9 MMOL/L
ANION GAP SERPL CALCULATED.3IONS-SCNC: 12.9 MMOL/L
ANION GAP SERPL CALCULATED.3IONS-SCNC: 13 MMOL/L
ANION GAP SERPL CALCULATED.3IONS-SCNC: 13.1 MMOL/L
ANION GAP SERPL CALCULATED.3IONS-SCNC: 13.3 MMOL/L
ANION GAP SERPL CALCULATED.3IONS-SCNC: 13.4 MMOL/L
ANION GAP SERPL CALCULATED.3IONS-SCNC: 13.6 MMOL/L
ANION GAP SERPL CALCULATED.3IONS-SCNC: 13.7 MMOL/L
ANION GAP SERPL CALCULATED.3IONS-SCNC: 14.2 MMOL/L
ANION GAP SERPL CALCULATED.3IONS-SCNC: 14.3 MMOL/L
ANION GAP SERPL CALCULATED.3IONS-SCNC: 14.3 MMOL/L
ANION GAP SERPL CALCULATED.3IONS-SCNC: 14.7 MMOL/L
ANION GAP SERPL CALCULATED.3IONS-SCNC: 15 MMOL/L
ANION GAP SERPL CALCULATED.3IONS-SCNC: 15.1 MMOL/L
ANION GAP SERPL CALCULATED.3IONS-SCNC: 15.1 MMOL/L
ANION GAP SERPL CALCULATED.3IONS-SCNC: 15.3 MMOL/L
ANION GAP SERPL CALCULATED.3IONS-SCNC: 15.3 MMOL/L
ANION GAP SERPL CALCULATED.3IONS-SCNC: 15.5 MMOL/L
ANION GAP SERPL CALCULATED.3IONS-SCNC: 15.5 MMOL/L
ANION GAP SERPL CALCULATED.3IONS-SCNC: 15.6 MMOL/L
ANION GAP SERPL CALCULATED.3IONS-SCNC: 15.9 MMOL/L
ANION GAP SERPL CALCULATED.3IONS-SCNC: 16.4 MMOL/L
ANION GAP SERPL CALCULATED.3IONS-SCNC: 16.5 MMOL/L
ANION GAP SERPL CALCULATED.3IONS-SCNC: 18 MMOL/L
ANION GAP SERPL CALCULATED.3IONS-SCNC: 8.6 MMOL/L
ANION GAP SERPL CALCULATED.3IONS-SCNC: 9.2 MMOL/L
ANISOCYTOSIS BLD QL: ABNORMAL
ANISOCYTOSIS BLD QL: NORMAL
APTT PPP: 26.3 SECONDS (ref 22.7–35.4)
AST SERPL-CCNC: 31 U/L (ref 0–32)
AST SERPL-CCNC: 41 U/L (ref 1–32)
AST SERPL-CCNC: 45 U/L (ref 0–32)
AST SERPL-CCNC: 47 U/L (ref 1–32)
AST SERPL-CCNC: 48 U/L (ref 1–32)
AST SERPL-CCNC: 49 U/L (ref 1–32)
AST SERPL-CCNC: 49 U/L (ref 1–32)
AST SERPL-CCNC: 51 U/L (ref 1–32)
AST SERPL-CCNC: 53 U/L (ref 1–32)
AST SERPL-CCNC: 54 U/L (ref 0–32)
AST SERPL-CCNC: 56 U/L (ref 0–32)
AST SERPL-CCNC: 57 U/L (ref 1–32)
AST SERPL-CCNC: 59 U/L (ref 1–32)
AST SERPL-CCNC: 61 U/L (ref 0–32)
AST SERPL-CCNC: 65 U/L (ref 1–32)
AST SERPL-CCNC: 65 U/L (ref 1–32)
AST SERPL-CCNC: 67 U/L (ref 1–32)
AST SERPL-CCNC: 68 U/L (ref 1–32)
AST SERPL-CCNC: 70 U/L (ref 1–32)
AST SERPL-CCNC: 80 U/L (ref 1–32)
AST SERPL-CCNC: 83 U/L (ref 1–32)
AST SERPL-CCNC: 84 U/L (ref 1–32)
BACTERIA SPEC AEROBE CULT: ABNORMAL
BACTERIA SPEC AEROBE CULT: ABNORMAL
BACTERIA SPEC AEROBE CULT: NORMAL
BACTERIA UR QL AUTO: ABNORMAL /HPF
BASOPHILS # BLD AUTO: 0 10*3/MM3 (ref 0–0.2)
BASOPHILS # BLD AUTO: 0.01 10*3/MM3 (ref 0–0.1)
BASOPHILS # BLD AUTO: 0.01 10*3/MM3 (ref 0–0.2)
BASOPHILS # BLD AUTO: 0.02 10*3/MM3 (ref 0–0.1)
BASOPHILS # BLD AUTO: 0.02 10*3/MM3 (ref 0–0.2)
BASOPHILS # BLD AUTO: 0.03 10*3/MM3 (ref 0–0.1)
BASOPHILS # BLD AUTO: 0.03 10*3/MM3 (ref 0–0.2)
BASOPHILS # BLD AUTO: 0.04 10*3/MM3 (ref 0–0.1)
BASOPHILS # BLD AUTO: 0.04 10*3/MM3 (ref 0–0.2)
BASOPHILS # BLD AUTO: 0.07 10*3/MM3 (ref 0–0.2)
BASOPHILS # BLD MANUAL: 0.06 10*3/MM3 (ref 0–0.2)
BASOPHILS NFR BLD AUTO: 0 % (ref 0–1.5)
BASOPHILS NFR BLD AUTO: 0.1 % (ref 0–1.1)
BASOPHILS NFR BLD AUTO: 0.1 % (ref 0–1.5)
BASOPHILS NFR BLD AUTO: 0.1 % (ref 0–1.5)
BASOPHILS NFR BLD AUTO: 0.2 % (ref 0–1.1)
BASOPHILS NFR BLD AUTO: 0.2 % (ref 0–1.5)
BASOPHILS NFR BLD AUTO: 0.2 % (ref 0–1.5)
BASOPHILS NFR BLD AUTO: 0.3 % (ref 0–1.1)
BASOPHILS NFR BLD AUTO: 0.3 % (ref 0–1.5)
BASOPHILS NFR BLD AUTO: 0.4 % (ref 0–1.1)
BASOPHILS NFR BLD AUTO: 0.4 % (ref 0–1.5)
BASOPHILS NFR BLD AUTO: 0.5 % (ref 0–1.1)
BASOPHILS NFR BLD AUTO: 0.5 % (ref 0–1.1)
BASOPHILS NFR BLD AUTO: 0.5 % (ref 0–1.5)
BASOPHILS NFR BLD AUTO: 0.6 % (ref 0–1.5)
BASOPHILS NFR BLD AUTO: 0.6 % (ref 0–1.5)
BASOPHILS NFR BLD AUTO: 0.7 % (ref 0–1.1)
BASOPHILS NFR BLD AUTO: 0.7 % (ref 0–1.5)
BASOPHILS NFR BLD AUTO: 0.8 % (ref 0–1.5)
BASOPHILS NFR BLD AUTO: 0.9 % (ref 0–1.5)
BASOPHILS NFR BLD AUTO: 0.9 % (ref 0–1.5)
BASOPHILS NFR BLD AUTO: 1 % (ref 0–2)
BASOPHILS NFR BLD AUTO: 1.6 % (ref 0–1.1)
BH BB BLOOD EXPIRATION DATE: NORMAL
BH BB BLOOD TYPE BARCODE: 5100
BH BB DISPENSE STATUS: NORMAL
BH BB PRODUCT CODE: NORMAL
BH BB UNIT NUMBER: NORMAL
BH CV ECHO MEAS - ACS: 1.4 CM
BH CV ECHO MEAS - AO MEAN PG (FULL): 3 MMHG
BH CV ECHO MEAS - AO MEAN PG: 6 MMHG
BH CV ECHO MEAS - AO ROOT AREA (BSA CORRECTED): 1.6
BH CV ECHO MEAS - AO ROOT AREA: 5.3 CM^2
BH CV ECHO MEAS - AO ROOT DIAM: 2.6 CM
BH CV ECHO MEAS - AO V2 MEAN: 113 CM/SEC
BH CV ECHO MEAS - AO V2 VTI: 28 CM
BH CV ECHO MEAS - AVA(I,A): 2.3 CM^2
BH CV ECHO MEAS - AVA(I,D): 2.3 CM^2
BH CV ECHO MEAS - BSA(HAYCOCK): 1.6 M^2
BH CV ECHO MEAS - BSA: 1.6 M^2
BH CV ECHO MEAS - BZI_BMI: 24.5 KILOGRAMS/M^2
BH CV ECHO MEAS - BZI_METRIC_HEIGHT: 157.5 CM
BH CV ECHO MEAS - BZI_METRIC_WEIGHT: 60.8 KG
BH CV ECHO MEAS - CONTRAST EF (2CH): 75 ML/M^2
BH CV ECHO MEAS - CONTRAST EF 4CH: 63.5 ML/M^2
BH CV ECHO MEAS - EDV(CUBED): 74.1 ML
BH CV ECHO MEAS - EDV(MOD-SP2): 84 ML
BH CV ECHO MEAS - EDV(MOD-SP4): 85 ML
BH CV ECHO MEAS - EDV(TEICH): 78.6 ML
BH CV ECHO MEAS - EF(CUBED): 87.5 %
BH CV ECHO MEAS - EF(MOD-SP2): 75 %
BH CV ECHO MEAS - EF(MOD-SP4): 63.5 %
BH CV ECHO MEAS - EF(TEICH): 81.7 %
BH CV ECHO MEAS - ESV(CUBED): 9.3 ML
BH CV ECHO MEAS - ESV(MOD-SP2): 21 ML
BH CV ECHO MEAS - ESV(MOD-SP4): 31 ML
BH CV ECHO MEAS - ESV(TEICH): 14.4 ML
BH CV ECHO MEAS - FS: 50 %
BH CV ECHO MEAS - IVS/LVPW: 1.1
BH CV ECHO MEAS - IVSD: 0.9 CM
BH CV ECHO MEAS - LAT PEAK E' VEL: 13 CM/SEC
BH CV ECHO MEAS - LV DIASTOLIC VOL/BSA (35-75): 52.7 ML/M^2
BH CV ECHO MEAS - LV MASS(C)D: 109.8 GRAMS
BH CV ECHO MEAS - LV MASS(C)DI: 68.1 GRAMS/M^2
BH CV ECHO MEAS - LV MEAN PG: 3 MMHG
BH CV ECHO MEAS - LV SYSTOLIC VOL/BSA (12-30): 19.2 ML/M^2
BH CV ECHO MEAS - LV V1 MEAN: 79.5 CM/SEC
BH CV ECHO MEAS - LV V1 VTI: 22.9 CM
BH CV ECHO MEAS - LVIDD: 4.2 CM
BH CV ECHO MEAS - LVIDS: 2.1 CM
BH CV ECHO MEAS - LVLD AP2: 7.5 CM
BH CV ECHO MEAS - LVLD AP4: 7.2 CM
BH CV ECHO MEAS - LVLS AP2: 6 CM
BH CV ECHO MEAS - LVLS AP4: 5.8 CM
BH CV ECHO MEAS - LVOT AREA (M): 2.8 CM^2
BH CV ECHO MEAS - LVOT AREA: 2.8 CM^2
BH CV ECHO MEAS - LVOT DIAM: 1.9 CM
BH CV ECHO MEAS - LVPWD: 0.8 CM
BH CV ECHO MEAS - MED PEAK E' VEL: 6 CM/SEC
BH CV ECHO MEAS - MV A DUR: 0.08 SEC
BH CV ECHO MEAS - MV A MAX VEL: 113 CM/SEC
BH CV ECHO MEAS - MV DEC SLOPE: 356 CM/SEC^2
BH CV ECHO MEAS - MV DEC TIME: 0.14 SEC
BH CV ECHO MEAS - MV E MAX VEL: 75.5 CM/SEC
BH CV ECHO MEAS - MV E/A: 0.67
BH CV ECHO MEAS - MV MEAN PG: 3 MMHG
BH CV ECHO MEAS - MV P1/2T MAX VEL: 84.2 CM/SEC
BH CV ECHO MEAS - MV P1/2T: 69.3 MSEC
BH CV ECHO MEAS - MV V2 MEAN: 73.6 CM/SEC
BH CV ECHO MEAS - MV V2 VTI: 16.8 CM
BH CV ECHO MEAS - MVA P1/2T LCG: 2.6 CM^2
BH CV ECHO MEAS - MVA(P1/2T): 3.2 CM^2
BH CV ECHO MEAS - MVA(VTI): 3.9 CM^2
BH CV ECHO MEAS - PA ACC SLOPE: 0 CM/SEC^2
BH CV ECHO MEAS - PA ACC TIME: 0.06 SEC
BH CV ECHO MEAS - PA MAX PG (FULL): 2.6 MMHG
BH CV ECHO MEAS - PA MAX PG: 5.2 MMHG
BH CV ECHO MEAS - PA PR(ACCEL): 53.8 MMHG
BH CV ECHO MEAS - PA V2 MAX: 114 CM/SEC
BH CV ECHO MEAS - PULM A REVS DUR: 0.07 SEC
BH CV ECHO MEAS - PULM A REVS VEL: 44.9 CM/SEC
BH CV ECHO MEAS - PULM DIAS VEL: 88.4 CM/SEC
BH CV ECHO MEAS - PULM S/D: 0.77
BH CV ECHO MEAS - PULM SYS VEL: 68.1 CM/SEC
BH CV ECHO MEAS - PVA(V,A): 1.1 CM^2
BH CV ECHO MEAS - PVA(V,D): 1.1 CM^2
BH CV ECHO MEAS - QP/QS: 0.35
BH CV ECHO MEAS - RAP SYSTOLE: 3 MMHG
BH CV ECHO MEAS - RV MAX PG: 2.6 MMHG
BH CV ECHO MEAS - RV MEAN PG: 1 MMHG
BH CV ECHO MEAS - RV V1 MAX: 81.2 CM/SEC
BH CV ECHO MEAS - RV V1 MEAN: 53.3 CM/SEC
BH CV ECHO MEAS - RV V1 VTI: 14.8 CM
BH CV ECHO MEAS - RVOT AREA: 1.5 CM^2
BH CV ECHO MEAS - RVOT DIAM: 1.4 CM
BH CV ECHO MEAS - RVSP: 33 MMHG
BH CV ECHO MEAS - SI(AO): 92.2 ML/M^2
BH CV ECHO MEAS - SI(CUBED): 40.2 ML/M^2
BH CV ECHO MEAS - SI(LVOT): 40.3 ML/M^2
BH CV ECHO MEAS - SI(MOD-SP2): 39.1 ML/M^2
BH CV ECHO MEAS - SI(MOD-SP4): 33.5 ML/M^2
BH CV ECHO MEAS - SI(TEICH): 39.8 ML/M^2
BH CV ECHO MEAS - SUP REN AO DIAM: 1.55 CM
BH CV ECHO MEAS - SV(AO): 148.7 ML
BH CV ECHO MEAS - SV(CUBED): 64.8 ML
BH CV ECHO MEAS - SV(LVOT): 64.9 ML
BH CV ECHO MEAS - SV(MOD-SP2): 63 ML
BH CV ECHO MEAS - SV(MOD-SP4): 54 ML
BH CV ECHO MEAS - SV(RVOT): 22.8 ML
BH CV ECHO MEAS - SV(TEICH): 64.2 ML
BH CV ECHO MEAS - TAPSE (>1.6): 1.2 CM2
BH CV ECHO MEAS - TR MAX VEL: 288 CM/SEC
BH CV LOWER ARTERIAL LEFT ABI RATIO: 1.05
BH CV LOWER ARTERIAL LEFT DORSALIS PEDIS SYS MAX: 136 MMHG
BH CV LOWER ARTERIAL LEFT GREAT TOE SYS MAX: 85 MMHG
BH CV LOWER ARTERIAL LEFT POST TIBIAL SYS MAX: 134 MMHG
BH CV LOWER ARTERIAL LEFT TBI RATIO: 0.66
BH CV LOWER ARTERIAL RIGHT ABI RATIO: 1.18
BH CV LOWER ARTERIAL RIGHT DORSALIS PEDIS SYS MAX: 142 MMHG
BH CV LOWER ARTERIAL RIGHT GREAT TOE SYS MAX: 73 MMHG
BH CV LOWER ARTERIAL RIGHT POST TIBIAL SYS MAX: 152 MMHG
BH CV LOWER ARTERIAL RIGHT TBI RATIO: 0.57
BH CV LOWER VASCULAR LEFT COMMON FEMORAL AUGMENT: NORMAL
BH CV LOWER VASCULAR LEFT COMMON FEMORAL COMPETENT: NORMAL
BH CV LOWER VASCULAR LEFT COMMON FEMORAL COMPRESS: NORMAL
BH CV LOWER VASCULAR LEFT COMMON FEMORAL PHASIC: NORMAL
BH CV LOWER VASCULAR LEFT COMMON FEMORAL SPONT: NORMAL
BH CV LOWER VASCULAR LEFT DISTAL FEMORAL COMPRESS: NORMAL
BH CV LOWER VASCULAR LEFT GASTRONEMIUS COMPRESS: NORMAL
BH CV LOWER VASCULAR LEFT GREATER SAPH AK COMPRESS: NORMAL
BH CV LOWER VASCULAR LEFT GREATER SAPH BK COMPRESS: NORMAL
BH CV LOWER VASCULAR LEFT MID FEMORAL AUGMENT: NORMAL
BH CV LOWER VASCULAR LEFT MID FEMORAL COMPETENT: NORMAL
BH CV LOWER VASCULAR LEFT MID FEMORAL COMPRESS: NORMAL
BH CV LOWER VASCULAR LEFT MID FEMORAL PHASIC: NORMAL
BH CV LOWER VASCULAR LEFT MID FEMORAL SPONT: NORMAL
BH CV LOWER VASCULAR LEFT PERONEAL COMPRESS: NORMAL
BH CV LOWER VASCULAR LEFT POPLITEAL AUGMENT: NORMAL
BH CV LOWER VASCULAR LEFT POPLITEAL COMPETENT: NORMAL
BH CV LOWER VASCULAR LEFT POPLITEAL COMPRESS: NORMAL
BH CV LOWER VASCULAR LEFT POPLITEAL PHASIC: NORMAL
BH CV LOWER VASCULAR LEFT POPLITEAL SPONT: NORMAL
BH CV LOWER VASCULAR LEFT POSTERIOR TIBIAL COMPRESS: NORMAL
BH CV LOWER VASCULAR LEFT PROXIMAL FEMORAL COMPRESS: NORMAL
BH CV LOWER VASCULAR LEFT SAPHENOFEMORAL JUNCTION AUGMENT: NORMAL
BH CV LOWER VASCULAR LEFT SAPHENOFEMORAL JUNCTION COMPETENT: NORMAL
BH CV LOWER VASCULAR LEFT SAPHENOFEMORAL JUNCTION COMPRESS: NORMAL
BH CV LOWER VASCULAR LEFT SAPHENOFEMORAL JUNCTION PHASIC: NORMAL
BH CV LOWER VASCULAR LEFT SAPHENOFEMORAL JUNCTION SPONT: NORMAL
BH CV LOWER VASCULAR RIGHT COMMON FEMORAL AUGMENT: NORMAL
BH CV LOWER VASCULAR RIGHT COMMON FEMORAL COMPETENT: NORMAL
BH CV LOWER VASCULAR RIGHT COMMON FEMORAL COMPRESS: NORMAL
BH CV LOWER VASCULAR RIGHT COMMON FEMORAL PHASIC: NORMAL
BH CV LOWER VASCULAR RIGHT COMMON FEMORAL SPONT: NORMAL
BH CV LOWER VASCULAR RIGHT DISTAL FEMORAL COMPRESS: NORMAL
BH CV LOWER VASCULAR RIGHT GASTRONEMIUS COMPRESS: NORMAL
BH CV LOWER VASCULAR RIGHT GREATER SAPH AK COMPRESS: NORMAL
BH CV LOWER VASCULAR RIGHT GREATER SAPH BK COMPRESS: NORMAL
BH CV LOWER VASCULAR RIGHT MID FEMORAL AUGMENT: NORMAL
BH CV LOWER VASCULAR RIGHT MID FEMORAL COMPETENT: NORMAL
BH CV LOWER VASCULAR RIGHT MID FEMORAL COMPRESS: NORMAL
BH CV LOWER VASCULAR RIGHT MID FEMORAL PHASIC: NORMAL
BH CV LOWER VASCULAR RIGHT MID FEMORAL SPONT: NORMAL
BH CV LOWER VASCULAR RIGHT PERONEAL COMPRESS: NORMAL
BH CV LOWER VASCULAR RIGHT POPLITEAL AUGMENT: NORMAL
BH CV LOWER VASCULAR RIGHT POPLITEAL COMPETENT: NORMAL
BH CV LOWER VASCULAR RIGHT POPLITEAL COMPRESS: NORMAL
BH CV LOWER VASCULAR RIGHT POPLITEAL PHASIC: NORMAL
BH CV LOWER VASCULAR RIGHT POPLITEAL SPONT: NORMAL
BH CV LOWER VASCULAR RIGHT POSTERIOR TIBIAL COMPRESS: NORMAL
BH CV LOWER VASCULAR RIGHT PROXIMAL FEMORAL COMPRESS: NORMAL
BH CV LOWER VASCULAR RIGHT SAPHENOFEMORAL JUNCTION AUGMENT: NORMAL
BH CV LOWER VASCULAR RIGHT SAPHENOFEMORAL JUNCTION COMPETENT: NORMAL
BH CV LOWER VASCULAR RIGHT SAPHENOFEMORAL JUNCTION COMPRESS: NORMAL
BH CV LOWER VASCULAR RIGHT SAPHENOFEMORAL JUNCTION PHASIC: NORMAL
BH CV LOWER VASCULAR RIGHT SAPHENOFEMORAL JUNCTION SPONT: NORMAL
BH CV XLRA - RV BASE: 2.5 CM
BH CV XLRA - TDI S': 17 CM/SEC
BILIRUB SERPL-MCNC: 0.2 MG/DL (ref 0.1–1.2)
BILIRUB SERPL-MCNC: 0.3 MG/DL (ref 0.1–1.2)
BILIRUB SERPL-MCNC: 0.5 MG/DL (ref 0.1–1.2)
BILIRUB SERPL-MCNC: 0.8 MG/DL (ref 0.1–1.2)
BILIRUB UR QL STRIP: NEGATIVE
BLD GP AB SCN SERPL QL: NEGATIVE
BUN BLD-MCNC: 14 MG/DL (ref 8–23)
BUN BLD-MCNC: 15 MG/DL (ref 8–23)
BUN BLD-MCNC: 17 MG/DL (ref 8–23)
BUN BLD-MCNC: 17 MG/DL (ref 8–23)
BUN BLD-MCNC: 18 MG/DL (ref 8–23)
BUN BLD-MCNC: 19 MG/DL (ref 6–20)
BUN BLD-MCNC: 20 MG/DL (ref 6–20)
BUN BLD-MCNC: 20 MG/DL (ref 8–23)
BUN BLD-MCNC: 21 MG/DL (ref 8–23)
BUN BLD-MCNC: 21 MG/DL (ref 8–23)
BUN BLD-MCNC: 22 MG/DL (ref 8–23)
BUN BLD-MCNC: 23 MG/DL (ref 8–23)
BUN BLD-MCNC: 24 MG/DL (ref 8–23)
BUN BLD-MCNC: 26 MG/DL (ref 6–20)
BUN BLD-MCNC: 26 MG/DL (ref 8–23)
BUN BLD-MCNC: 27 MG/DL (ref 8–23)
BUN BLD-MCNC: 27 MG/DL (ref 8–23)
BUN BLD-MCNC: 28 MG/DL (ref 8–23)
BUN BLD-MCNC: 29 MG/DL (ref 8–23)
BUN BLD-MCNC: 29 MG/DL (ref 8–23)
BUN BLD-MCNC: 30 MG/DL (ref 8–23)
BUN BLD-MCNC: 31 MG/DL (ref 8–23)
BUN BLD-MCNC: 31 MG/DL (ref 8–23)
BUN BLD-MCNC: 32 MG/DL (ref 8–23)
BUN BLD-MCNC: 34 MG/DL (ref 6–20)
BUN BLD-MCNC: 35 MG/DL (ref 8–23)
BUN BLD-MCNC: 37 MG/DL (ref 6–20)
BUN BLD-MCNC: 37 MG/DL (ref 8–23)
BUN BLD-MCNC: 40 MG/DL (ref 8–23)
BUN BLD-MCNC: 41 MG/DL (ref 8–23)
BUN BLD-MCNC: 43 MG/DL (ref 8–23)
BUN BLD-MCNC: 43 MG/DL (ref 8–23)
BUN BLD-MCNC: 44 MG/DL (ref 8–23)
BUN BLD-MCNC: 46 MG/DL (ref 8–23)
BUN BLD-MCNC: 49 MG/DL (ref 8–23)
BUN BLD-MCNC: 52 MG/DL (ref 8–23)
BUN BLD-MCNC: 53 MG/DL (ref 8–23)
BUN BLD-MCNC: 54 MG/DL (ref 8–23)
BUN/CREAT SERPL: 10.2 (ref 7.3–30)
BUN/CREAT SERPL: 10.7 (ref 7–25)
BUN/CREAT SERPL: 12.7 (ref 7.3–30)
BUN/CREAT SERPL: 13.5 (ref 7–25)
BUN/CREAT SERPL: 13.6 (ref 7–25)
BUN/CREAT SERPL: 14.3 (ref 7–25)
BUN/CREAT SERPL: 14.5 (ref 7–25)
BUN/CREAT SERPL: 14.5 (ref 7–25)
BUN/CREAT SERPL: 14.6 (ref 7–25)
BUN/CREAT SERPL: 14.7 (ref 7–25)
BUN/CREAT SERPL: 14.8 (ref 7–25)
BUN/CREAT SERPL: 14.8 (ref 7–25)
BUN/CREAT SERPL: 15 (ref 7–25)
BUN/CREAT SERPL: 15.5 (ref 7–25)
BUN/CREAT SERPL: 16 (ref 7–25)
BUN/CREAT SERPL: 16.1 (ref 7–25)
BUN/CREAT SERPL: 16.2 (ref 7–25)
BUN/CREAT SERPL: 16.3 (ref 7–25)
BUN/CREAT SERPL: 16.4 (ref 7–25)
BUN/CREAT SERPL: 16.5 (ref 7.3–30)
BUN/CREAT SERPL: 16.9 (ref 7–25)
BUN/CREAT SERPL: 16.9 (ref 7–25)
BUN/CREAT SERPL: 17.2 (ref 7–25)
BUN/CREAT SERPL: 17.2 (ref 7–25)
BUN/CREAT SERPL: 17.4 (ref 7–25)
BUN/CREAT SERPL: 17.4 (ref 7–25)
BUN/CREAT SERPL: 17.5 (ref 7–25)
BUN/CREAT SERPL: 17.7 (ref 7.3–30)
BUN/CREAT SERPL: 18.1 (ref 7–25)
BUN/CREAT SERPL: 18.7 (ref 7–25)
BUN/CREAT SERPL: 18.8 (ref 7–25)
BUN/CREAT SERPL: 20.3 (ref 7–25)
BUN/CREAT SERPL: 21.9 (ref 7.3–30)
BUN/CREAT SERPL: 22.2 (ref 7–25)
BUN/CREAT SERPL: 27.3 (ref 7–25)
BUN/CREAT SERPL: 28 (ref 7–25)
BUN/CREAT SERPL: 8 (ref 7–25)
BUN/CREAT SERPL: 8.4 (ref 7–25)
BUN/CREAT SERPL: 9.7 (ref 7–25)
BUN/CREAT SERPL: 9.8 (ref 7–25)
BUN/CREAT SERPL: 9.9 (ref 7–25)
C DIFF TOX GENS STL QL NAA+PROBE: NEGATIVE
CALCIUM SPEC-SCNC: 5.9 MG/DL (ref 8.6–10.5)
CALCIUM SPEC-SCNC: 6.4 MG/DL (ref 8.6–10.5)
CALCIUM SPEC-SCNC: 6.4 MG/DL (ref 8.6–10.5)
CALCIUM SPEC-SCNC: 6.5 MG/DL (ref 8.6–10.5)
CALCIUM SPEC-SCNC: 6.7 MG/DL (ref 8.6–10.5)
CALCIUM SPEC-SCNC: 6.8 MG/DL (ref 8.6–10.5)
CALCIUM SPEC-SCNC: 6.9 MG/DL (ref 8.6–10.5)
CALCIUM SPEC-SCNC: 6.9 MG/DL (ref 8.6–10.5)
CALCIUM SPEC-SCNC: 7.1 MG/DL (ref 8.6–10.5)
CALCIUM SPEC-SCNC: 7.2 MG/DL (ref 8.5–10.2)
CALCIUM SPEC-SCNC: 7.2 MG/DL (ref 8.6–10.5)
CALCIUM SPEC-SCNC: 7.4 MG/DL (ref 8.6–10.5)
CALCIUM SPEC-SCNC: 7.5 MG/DL (ref 8.6–10.5)
CALCIUM SPEC-SCNC: 7.6 MG/DL (ref 8.5–10.2)
CALCIUM SPEC-SCNC: 7.6 MG/DL (ref 8.6–10.5)
CALCIUM SPEC-SCNC: 7.7 MG/DL (ref 8.6–10.5)
CALCIUM SPEC-SCNC: 7.8 MG/DL (ref 8.5–10.2)
CALCIUM SPEC-SCNC: 7.8 MG/DL (ref 8.6–10.5)
CALCIUM SPEC-SCNC: 7.8 MG/DL (ref 8.6–10.5)
CALCIUM SPEC-SCNC: 7.9 MG/DL (ref 8.5–10.2)
CALCIUM SPEC-SCNC: 8 MG/DL (ref 8.6–10.5)
CALCIUM SPEC-SCNC: 8.2 MG/DL (ref 8.6–10.5)
CALCIUM SPEC-SCNC: 8.5 MG/DL (ref 8.6–10.5)
CALCIUM SPEC-SCNC: 8.6 MG/DL (ref 8.6–10.5)
CALCIUM SPEC-SCNC: 8.8 MG/DL (ref 8.6–10.5)
CALCIUM SPEC-SCNC: 8.9 MG/DL (ref 8.6–10.5)
CALCIUM SPEC-SCNC: 8.9 MG/DL (ref 8.6–10.5)
CALCIUM SPEC-SCNC: 9 MG/DL (ref 8.5–10.2)
CALCIUM SPEC-SCNC: 9 MG/DL (ref 8.6–10.5)
CALCIUM SPEC-SCNC: 9 MG/DL (ref 8.6–10.5)
CALCIUM SPEC-SCNC: 9.1 MG/DL (ref 8.6–10.5)
CANCER AG15-3 SERPL-ACNC: 36.8 U/ML (ref 0–25)
CANCER AG15-3 SERPL-ACNC: 39.3 U/ML (ref 0–25)
CANCER AG15-3 SERPL-ACNC: 53.4 U/ML (ref 0–25)
CANCER AG15-3 SERPL-ACNC: 62.8 U/ML
CANCER AG15-3 SERPL-ACNC: 68.6 U/ML
CANCER AG15-3 SERPL-ACNC: 68.7 U/ML (ref 0–25)
CANCER AG15-3 SERPL-ACNC: 77.7 U/ML
CANCER AG15-3 SERPL-ACNC: 79.3 U/ML (ref 0–25)
CHLORIDE SERPL-SCNC: 100 MMOL/L (ref 98–107)
CHLORIDE SERPL-SCNC: 100 MMOL/L (ref 98–107)
CHLORIDE SERPL-SCNC: 102 MMOL/L (ref 98–107)
CHLORIDE SERPL-SCNC: 103 MMOL/L (ref 98–107)
CHLORIDE SERPL-SCNC: 104 MMOL/L (ref 98–107)
CHLORIDE SERPL-SCNC: 105 MMOL/L (ref 98–107)
CHLORIDE SERPL-SCNC: 105 MMOL/L (ref 98–107)
CHLORIDE SERPL-SCNC: 106 MMOL/L (ref 98–107)
CHLORIDE SERPL-SCNC: 108 MMOL/L (ref 98–107)
CHLORIDE SERPL-SCNC: 108 MMOL/L (ref 98–107)
CHLORIDE SERPL-SCNC: 109 MMOL/L (ref 98–107)
CHLORIDE SERPL-SCNC: 109 MMOL/L (ref 98–107)
CHLORIDE SERPL-SCNC: 110 MMOL/L (ref 98–107)
CHLORIDE SERPL-SCNC: 111 MMOL/L (ref 98–107)
CHLORIDE SERPL-SCNC: 111 MMOL/L (ref 98–107)
CHLORIDE SERPL-SCNC: 112 MMOL/L (ref 98–107)
CHLORIDE SERPL-SCNC: 113 MMOL/L (ref 98–107)
CHLORIDE SERPL-SCNC: 113 MMOL/L (ref 98–107)
CHLORIDE SERPL-SCNC: 114 MMOL/L (ref 98–107)
CHLORIDE SERPL-SCNC: 114 MMOL/L (ref 98–107)
CHLORIDE SERPL-SCNC: 115 MMOL/L (ref 98–107)
CHLORIDE SERPL-SCNC: 115 MMOL/L (ref 98–107)
CHLORIDE SERPL-SCNC: 117 MMOL/L (ref 98–107)
CHLORIDE SERPL-SCNC: 119 MMOL/L (ref 98–107)
CHLORIDE SERPL-SCNC: 120 MMOL/L (ref 98–107)
CHLORIDE SERPL-SCNC: 120 MMOL/L (ref 98–107)
CHLORIDE SERPL-SCNC: 121 MMOL/L (ref 98–107)
CHLORIDE SERPL-SCNC: 121 MMOL/L (ref 98–107)
CHLORIDE SERPL-SCNC: 122 MMOL/L (ref 98–107)
CHLORIDE SERPL-SCNC: 123 MMOL/L (ref 98–107)
CHLORIDE SERPL-SCNC: 124 MMOL/L (ref 98–107)
CHLORIDE SERPL-SCNC: 126 MMOL/L (ref 98–107)
CHLORIDE SERPL-SCNC: 129 MMOL/L (ref 98–107)
CHLORIDE SERPL-SCNC: 98 MMOL/L (ref 98–107)
CHLORIDE SERPL-SCNC: 99 MMOL/L (ref 98–107)
CHLORIDE UR-SCNC: 23 MMOL/L
CK SERPL-CCNC: 101 U/L (ref 20–180)
CLARITY UR: ABNORMAL
CLARITY UR: CLEAR
CLUMPED PLATELETS: PRESENT
CO2 SERPL-SCNC: 14.3 MMOL/L (ref 22–29)
CO2 SERPL-SCNC: 15.4 MMOL/L (ref 22–29)
CO2 SERPL-SCNC: 17 MMOL/L (ref 22–29)
CO2 SERPL-SCNC: 17.1 MMOL/L (ref 22–29)
CO2 SERPL-SCNC: 17.1 MMOL/L (ref 22–29)
CO2 SERPL-SCNC: 17.3 MMOL/L (ref 22–29)
CO2 SERPL-SCNC: 17.3 MMOL/L (ref 22–29)
CO2 SERPL-SCNC: 17.4 MMOL/L (ref 22–29)
CO2 SERPL-SCNC: 17.4 MMOL/L (ref 22–29)
CO2 SERPL-SCNC: 17.5 MMOL/L (ref 22–29)
CO2 SERPL-SCNC: 18.1 MMOL/L (ref 22–29)
CO2 SERPL-SCNC: 18.4 MMOL/L (ref 22–29)
CO2 SERPL-SCNC: 18.4 MMOL/L (ref 22–29)
CO2 SERPL-SCNC: 18.7 MMOL/L (ref 22–29)
CO2 SERPL-SCNC: 18.7 MMOL/L (ref 22–29)
CO2 SERPL-SCNC: 19 MMOL/L (ref 22–29)
CO2 SERPL-SCNC: 19.4 MMOL/L (ref 22–29)
CO2 SERPL-SCNC: 19.5 MMOL/L (ref 22–29)
CO2 SERPL-SCNC: 19.6 MMOL/L (ref 22–29)
CO2 SERPL-SCNC: 19.9 MMOL/L (ref 22–29)
CO2 SERPL-SCNC: 21.3 MMOL/L (ref 22–29)
CO2 SERPL-SCNC: 22 MMOL/L (ref 22–29)
CO2 SERPL-SCNC: 22.4 MMOL/L (ref 22–29)
CO2 SERPL-SCNC: 22.5 MMOL/L (ref 22–29)
CO2 SERPL-SCNC: 22.5 MMOL/L (ref 22–29)
CO2 SERPL-SCNC: 22.8 MMOL/L (ref 22–29)
CO2 SERPL-SCNC: 22.9 MMOL/L (ref 22–29)
CO2 SERPL-SCNC: 23.4 MMOL/L (ref 22–29)
CO2 SERPL-SCNC: 23.5 MMOL/L (ref 22–29)
CO2 SERPL-SCNC: 24.7 MMOL/L (ref 22–29)
CO2 SERPL-SCNC: 24.7 MMOL/L (ref 22–29)
CO2 SERPL-SCNC: 24.9 MMOL/L (ref 22–29)
CO2 SERPL-SCNC: 25.4 MMOL/L (ref 22–29)
CO2 SERPL-SCNC: 25.7 MMOL/L (ref 22–29)
CO2 SERPL-SCNC: 25.8 MMOL/L (ref 22–29)
CO2 SERPL-SCNC: 26 MMOL/L (ref 22–29)
CO2 SERPL-SCNC: 26.4 MMOL/L (ref 22–29)
CO2 SERPL-SCNC: 26.6 MMOL/L (ref 22–29)
CO2 SERPL-SCNC: 26.7 MMOL/L (ref 22–29)
CO2 SERPL-SCNC: 26.7 MMOL/L (ref 22–29)
CO2 SERPL-SCNC: 27 MMOL/L (ref 22–29)
COLOR UR: YELLOW
CREAT BLD-MCNC: 0.99 MG/DL (ref 0.57–1)
CREAT BLD-MCNC: 1.07 MG/DL (ref 0.57–1)
CREAT BLD-MCNC: 1.17 MG/DL (ref 0.57–1)
CREAT BLD-MCNC: 1.17 MG/DL (ref 0.57–1)
CREAT BLD-MCNC: 1.18 MG/DL (ref 0.57–1)
CREAT BLD-MCNC: 1.23 MG/DL (ref 0.57–1)
CREAT BLD-MCNC: 1.24 MG/DL (ref 0.57–1)
CREAT BLD-MCNC: 1.25 MG/DL (ref 0.57–1)
CREAT BLD-MCNC: 1.47 MG/DL (ref 0.6–1.1)
CREAT BLD-MCNC: 1.51 MG/DL (ref 0.57–1)
CREAT BLD-MCNC: 1.55 MG/DL (ref 0.6–1.1)
CREAT BLD-MCNC: 1.57 MG/DL (ref 0.6–1.1)
CREAT BLD-MCNC: 1.67 MG/DL (ref 0.57–1)
CREAT BLD-MCNC: 1.72 MG/DL (ref 0.57–1)
CREAT BLD-MCNC: 1.76 MG/DL (ref 0.57–1)
CREAT BLD-MCNC: 1.76 MG/DL (ref 0.57–1)
CREAT BLD-MCNC: 1.78 MG/DL (ref 0.57–1)
CREAT BLD-MCNC: 1.79 MG/DL (ref 0.57–1)
CREAT BLD-MCNC: 1.8 MG/DL (ref 0.57–1)
CREAT BLD-MCNC: 1.83 MG/DL (ref 0.57–1)
CREAT BLD-MCNC: 1.87 MG/DL (ref 0.57–1)
CREAT BLD-MCNC: 1.87 MG/DL (ref 0.6–1.1)
CREAT BLD-MCNC: 1.93 MG/DL (ref 0.57–1)
CREAT BLD-MCNC: 1.96 MG/DL (ref 0.57–1)
CREAT BLD-MCNC: 1.99 MG/DL (ref 0.57–1)
CREAT BLD-MCNC: 2.11 MG/DL (ref 0.57–1)
CREAT BLD-MCNC: 2.21 MG/DL (ref 0.57–1)
CREAT BLD-MCNC: 2.24 MG/DL (ref 0.6–1.1)
CREAT BLD-MCNC: 2.29 MG/DL (ref 0.57–1)
CREAT BLD-MCNC: 2.3 MG/DL (ref 0.57–1)
CREAT BLD-MCNC: 2.35 MG/DL (ref 0.57–1)
CREAT BLD-MCNC: 2.37 MG/DL (ref 0.57–1)
CREAT BLD-MCNC: 2.55 MG/DL (ref 0.57–1)
CREAT BLD-MCNC: 2.58 MG/DL (ref 0.57–1)
CREAT BLD-MCNC: 2.58 MG/DL (ref 0.57–1)
CREAT BLD-MCNC: 2.87 MG/DL (ref 0.57–1)
CREAT BLD-MCNC: 2.94 MG/DL (ref 0.57–1)
CREAT BLD-MCNC: 2.96 MG/DL (ref 0.57–1)
CREAT BLD-MCNC: 2.96 MG/DL (ref 0.57–1)
CREAT BLD-MCNC: 2.98 MG/DL (ref 0.57–1)
CREAT BLD-MCNC: 3.04 MG/DL (ref 0.57–1)
CREAT BLD-MCNC: 3.13 MG/DL (ref 0.57–1)
CREAT BLD-MCNC: 3.17 MG/DL (ref 0.57–1)
CREAT BLD-MCNC: 3.32 MG/DL (ref 0.57–1)
CREAT UR-MCNC: 28.5 MG/DL
CYTO UR: NORMAL
D-LACTATE SERPL-SCNC: 1.3 MMOL/L (ref 0.5–2)
D-LACTATE SERPL-SCNC: 1.6 MMOL/L (ref 0.5–2)
DACRYOCYTES BLD QL SMEAR: ABNORMAL
DACRYOCYTES BLD QL SMEAR: NORMAL
DEPRECATED RDW RBC AUTO: 59.7 FL (ref 37–49)
DEPRECATED RDW RBC AUTO: 60.3 FL (ref 37–49)
DEPRECATED RDW RBC AUTO: 60.8 FL (ref 37–49)
DEPRECATED RDW RBC AUTO: 64.6 FL (ref 37–54)
DEPRECATED RDW RBC AUTO: 69 FL (ref 37–54)
DEPRECATED RDW RBC AUTO: 69.1 FL (ref 37–54)
DEPRECATED RDW RBC AUTO: 69.5 FL (ref 37–49)
DEPRECATED RDW RBC AUTO: 69.9 FL (ref 37–49)
DEPRECATED RDW RBC AUTO: 70.1 FL (ref 37–54)
DEPRECATED RDW RBC AUTO: 70.6 FL (ref 37–54)
DEPRECATED RDW RBC AUTO: 71.5 FL (ref 37–54)
DEPRECATED RDW RBC AUTO: 71.5 FL (ref 37–54)
DEPRECATED RDW RBC AUTO: 72 FL (ref 37–54)
DEPRECATED RDW RBC AUTO: 73.5 FL (ref 37–54)
DEPRECATED RDW RBC AUTO: 73.9 FL (ref 37–54)
DEPRECATED RDW RBC AUTO: 73.9 FL (ref 37–54)
DEPRECATED RDW RBC AUTO: 75.2 FL (ref 37–54)
DEPRECATED RDW RBC AUTO: 75.4 FL (ref 37–54)
DEPRECATED RDW RBC AUTO: 76.3 FL (ref 37–54)
DEPRECATED RDW RBC AUTO: 77.7 FL (ref 37–54)
DEPRECATED RDW RBC AUTO: 78.1 FL (ref 37–54)
DEPRECATED RDW RBC AUTO: 79.7 FL (ref 37–54)
DEPRECATED RDW RBC AUTO: 80.1 FL (ref 37–54)
DEPRECATED RDW RBC AUTO: 80.8 FL (ref 37–54)
DEPRECATED RDW RBC AUTO: 80.8 FL (ref 37–54)
DEPRECATED RDW RBC AUTO: 81 FL (ref 37–54)
DEPRECATED RDW RBC AUTO: 81.1 FL (ref 37–54)
DEPRECATED RDW RBC AUTO: 82.2 FL (ref 37–54)
DEPRECATED RDW RBC AUTO: 82.3 FL (ref 37–49)
DEPRECATED RDW RBC AUTO: 82.6 FL (ref 37–54)
DEPRECATED RDW RBC AUTO: 83.3 FL (ref 37–54)
DEPRECATED RDW RBC AUTO: 84.8 FL (ref 37–54)
DEPRECATED RDW RBC AUTO: 85.5 FL (ref 37–54)
DEPRECATED RDW RBC AUTO: 86 FL (ref 37–54)
DEPRECATED RDW RBC AUTO: 87.1 FL (ref 37–54)
DEPRECATED RDW RBC AUTO: 87.4 FL (ref 37–49)
DEPRECATED RDW RBC AUTO: 88 FL (ref 37–54)
DEPRECATED RDW RBC AUTO: 88.2 FL (ref 37–49)
DEPRECATED RDW RBC AUTO: 88.2 FL (ref 37–54)
E/E' RATIO: 9
ELLIPTOCYTES BLD QL SMEAR: ABNORMAL
ELLIPTOCYTES BLD QL SMEAR: NORMAL
ELLIPTOCYTES BLD QL SMEAR: NORMAL
EOSINOPHIL # BLD AUTO: 0 10*3/MM3 (ref 0–0.7)
EOSINOPHIL # BLD AUTO: 0.01 10*3/MM3 (ref 0–0.36)
EOSINOPHIL # BLD AUTO: 0.02 10*3/MM3 (ref 0–0.36)
EOSINOPHIL # BLD AUTO: 0.02 10*3/MM3 (ref 0–0.7)
EOSINOPHIL # BLD AUTO: 0.02 10*3/MM3 (ref 0–0.7)
EOSINOPHIL # BLD AUTO: 0.03 10*3/MM3 (ref 0–0.7)
EOSINOPHIL # BLD AUTO: 0.03 10*3/MM3 (ref 0–0.7)
EOSINOPHIL # BLD AUTO: 0.04 10*3/MM3 (ref 0–0.7)
EOSINOPHIL # BLD AUTO: 0.05 10*3/MM3 (ref 0–0.7)
EOSINOPHIL # BLD AUTO: 0.06 10*3/MM3 (ref 0–0.7)
EOSINOPHIL # BLD AUTO: 0.07 10*3/MM3 (ref 0–0.7)
EOSINOPHIL # BLD AUTO: 0.09 10*3/MM3 (ref 0–0.36)
EOSINOPHIL # BLD AUTO: 0.09 10*3/MM3 (ref 0–0.36)
EOSINOPHIL # BLD AUTO: 0.09 10*3/MM3 (ref 0–0.7)
EOSINOPHIL # BLD AUTO: 0.11 10*3/MM3 (ref 0–0.36)
EOSINOPHIL # BLD AUTO: 0.12 10*3/MM3 (ref 0–0.7)
EOSINOPHIL # BLD AUTO: 0.14 10*3/MM3 (ref 0–0.7)
EOSINOPHIL # BLD AUTO: 0.14 10*3/MM3 (ref 0–0.7)
EOSINOPHIL # BLD AUTO: 0.15 10*3/MM3 (ref 0–0.7)
EOSINOPHIL # BLD AUTO: 0.16 10*3/MM3 (ref 0–0.7)
EOSINOPHIL # BLD AUTO: 0.18 10*3/MM3 (ref 0–0.7)
EOSINOPHIL # BLD AUTO: 0.19 10*3/MM3 (ref 0–0.7)
EOSINOPHIL # BLD AUTO: 0.19 10*3/MM3 (ref 0–0.7)
EOSINOPHIL # BLD AUTO: 0.2 10*3/MM3 (ref 0–0.36)
EOSINOPHIL # BLD AUTO: 0.31 10*3/MM3 (ref 0–0.7)
EOSINOPHIL # BLD MANUAL: 0.03 10*3/MM3 (ref 0–0.7)
EOSINOPHIL NFR BLD AUTO: 0 % (ref 0.3–6.2)
EOSINOPHIL NFR BLD AUTO: 0.1 % (ref 1–5)
EOSINOPHIL NFR BLD AUTO: 0.2 % (ref 1–5)
EOSINOPHIL NFR BLD AUTO: 0.3 % (ref 0.3–6.2)
EOSINOPHIL NFR BLD AUTO: 0.3 % (ref 1–5)
EOSINOPHIL NFR BLD AUTO: 0.4 % (ref 0.3–6.2)
EOSINOPHIL NFR BLD AUTO: 0.4 % (ref 0.3–6.2)
EOSINOPHIL NFR BLD AUTO: 0.5 % (ref 0.3–6.2)
EOSINOPHIL NFR BLD AUTO: 0.5 % (ref 0.3–6.2)
EOSINOPHIL NFR BLD AUTO: 0.5 % (ref 1–5)
EOSINOPHIL NFR BLD AUTO: 0.7 % (ref 0.3–6.2)
EOSINOPHIL NFR BLD AUTO: 0.8 % (ref 0.3–6.2)
EOSINOPHIL NFR BLD AUTO: 0.8 % (ref 1–5)
EOSINOPHIL NFR BLD AUTO: 0.9 % (ref 0.3–6.2)
EOSINOPHIL NFR BLD AUTO: 1 % (ref 0.3–6.2)
EOSINOPHIL NFR BLD AUTO: 1.3 % (ref 0.3–6.2)
EOSINOPHIL NFR BLD AUTO: 1.4 % (ref 1–5)
EOSINOPHIL NFR BLD AUTO: 1.6 % (ref 0.3–6.2)
EOSINOPHIL NFR BLD AUTO: 1.7 % (ref 0.3–6.2)
EOSINOPHIL NFR BLD AUTO: 1.9 % (ref 1–5)
EOSINOPHIL NFR BLD AUTO: 2 % (ref 0.3–6.2)
EOSINOPHIL NFR BLD AUTO: 2.5 % (ref 0.3–6.2)
EOSINOPHIL NFR BLD AUTO: 2.5 % (ref 0.3–6.2)
EOSINOPHIL NFR BLD AUTO: 2.6 % (ref 0.3–6.2)
EOSINOPHIL NFR BLD AUTO: 3.1 % (ref 0.3–6.2)
EOSINOPHIL NFR BLD AUTO: 3.3 % (ref 0.3–6.2)
EOSINOPHIL NFR BLD AUTO: 3.3 % (ref 1–5)
EOSINOPHIL NFR BLD AUTO: 3.8 % (ref 0.3–6.2)
EOSINOPHIL NFR BLD AUTO: 3.8 % (ref 0.3–6.2)
EOSINOPHIL NFR BLD AUTO: 4.2 % (ref 0.3–6.2)
EOSINOPHIL NFR BLD AUTO: 4.6 % (ref 0.3–6.2)
EOSINOPHIL NFR BLD MANUAL: 1 % (ref 0–7)
ERYTHROCYTE [DISTWIDTH] IN BLOOD BY AUTOMATED COUNT: 16.9 % (ref 11.7–14.5)
ERYTHROCYTE [DISTWIDTH] IN BLOOD BY AUTOMATED COUNT: 17.3 % (ref 11.7–13)
ERYTHROCYTE [DISTWIDTH] IN BLOOD BY AUTOMATED COUNT: 17.4 % (ref 11.7–14.5)
ERYTHROCYTE [DISTWIDTH] IN BLOOD BY AUTOMATED COUNT: 17.9 % (ref 11.7–14.5)
ERYTHROCYTE [DISTWIDTH] IN BLOOD BY AUTOMATED COUNT: 18.7 % (ref 11.7–14.5)
ERYTHROCYTE [DISTWIDTH] IN BLOOD BY AUTOMATED COUNT: 19.7 % (ref 11.7–13)
ERYTHROCYTE [DISTWIDTH] IN BLOOD BY AUTOMATED COUNT: 19.9 % (ref 11.7–13)
ERYTHROCYTE [DISTWIDTH] IN BLOOD BY AUTOMATED COUNT: 20.2 % (ref 11.7–13)
ERYTHROCYTE [DISTWIDTH] IN BLOOD BY AUTOMATED COUNT: 20.5 % (ref 11.7–13)
ERYTHROCYTE [DISTWIDTH] IN BLOOD BY AUTOMATED COUNT: 20.6 % (ref 11.7–13)
ERYTHROCYTE [DISTWIDTH] IN BLOOD BY AUTOMATED COUNT: 20.6 % (ref 11.7–13)
ERYTHROCYTE [DISTWIDTH] IN BLOOD BY AUTOMATED COUNT: 20.7 % (ref 11.7–13)
ERYTHROCYTE [DISTWIDTH] IN BLOOD BY AUTOMATED COUNT: 20.8 % (ref 11.7–13)
ERYTHROCYTE [DISTWIDTH] IN BLOOD BY AUTOMATED COUNT: 20.9 % (ref 11.7–13)
ERYTHROCYTE [DISTWIDTH] IN BLOOD BY AUTOMATED COUNT: 20.9 % (ref 11.7–14.5)
ERYTHROCYTE [DISTWIDTH] IN BLOOD BY AUTOMATED COUNT: 21 % (ref 11.7–13)
ERYTHROCYTE [DISTWIDTH] IN BLOOD BY AUTOMATED COUNT: 21 % (ref 11.7–13)
ERYTHROCYTE [DISTWIDTH] IN BLOOD BY AUTOMATED COUNT: 21.2 % (ref 11.7–13)
ERYTHROCYTE [DISTWIDTH] IN BLOOD BY AUTOMATED COUNT: 21.3 % (ref 11.7–13)
ERYTHROCYTE [DISTWIDTH] IN BLOOD BY AUTOMATED COUNT: 21.3 % (ref 11.7–13)
ERYTHROCYTE [DISTWIDTH] IN BLOOD BY AUTOMATED COUNT: 21.6 % (ref 11.7–13)
ERYTHROCYTE [DISTWIDTH] IN BLOOD BY AUTOMATED COUNT: 21.7 % (ref 11.7–13)
ERYTHROCYTE [DISTWIDTH] IN BLOOD BY AUTOMATED COUNT: 21.7 % (ref 11.7–13)
ERYTHROCYTE [DISTWIDTH] IN BLOOD BY AUTOMATED COUNT: 21.7 % (ref 11.7–14.5)
ERYTHROCYTE [DISTWIDTH] IN BLOOD BY AUTOMATED COUNT: 21.8 % (ref 11.7–13)
ERYTHROCYTE [DISTWIDTH] IN BLOOD BY AUTOMATED COUNT: 21.9 % (ref 11.7–13)
ERYTHROCYTE [DISTWIDTH] IN BLOOD BY AUTOMATED COUNT: 22.1 % (ref 11.7–13)
ERYTHROCYTE [DISTWIDTH] IN BLOOD BY AUTOMATED COUNT: 22.2 % (ref 11.7–13)
ERYTHROCYTE [DISTWIDTH] IN BLOOD BY AUTOMATED COUNT: 22.2 % (ref 11.7–13)
ERYTHROCYTE [DISTWIDTH] IN BLOOD BY AUTOMATED COUNT: 22.3 % (ref 11.7–13)
ERYTHROCYTE [DISTWIDTH] IN BLOOD BY AUTOMATED COUNT: 22.5 % (ref 11.7–13)
ERYTHROCYTE [DISTWIDTH] IN BLOOD BY AUTOMATED COUNT: 22.6 % (ref 11.7–13)
ERYTHROCYTE [DISTWIDTH] IN BLOOD BY AUTOMATED COUNT: 22.6 % (ref 11.7–13)
ERYTHROCYTE [DISTWIDTH] IN BLOOD BY AUTOMATED COUNT: 22.7 % (ref 11.7–13)
ERYTHROCYTE [DISTWIDTH] IN BLOOD BY AUTOMATED COUNT: 22.7 % (ref 11.7–13)
ERYTHROCYTE [DISTWIDTH] IN BLOOD BY AUTOMATED COUNT: 22.8 % (ref 11.7–13)
ERYTHROCYTE [DISTWIDTH] IN BLOOD BY AUTOMATED COUNT: 23.7 % (ref 11.7–13)
ERYTHROCYTE [DISTWIDTH] IN BLOOD BY AUTOMATED COUNT: 24 % (ref 11.7–14.5)
ERYTHROCYTE [DISTWIDTH] IN BLOOD BY AUTOMATED COUNT: 24.2 % (ref 11.7–13)
ERYTHROCYTE [DISTWIDTH] IN BLOOD BY AUTOMATED COUNT: 24.6 % (ref 11.7–13)
ERYTHROCYTE [DISTWIDTH] IN BLOOD BY AUTOMATED COUNT: 25 % (ref 11.7–14.5)
FERRITIN SERPL-MCNC: 3011.4 NG/ML (ref 13–150)
FERRITIN SERPL-MCNC: 3555.5 NG/ML (ref 13–150)
FOLATE SERPL-MCNC: >20 NG/ML (ref 4.78–24.2)
FOLATE SERPL-MCNC: >20 NG/ML (ref 4.78–24.2)
GFR SERPL CREATININE-BSD FRML MDRD: 13 ML/MIN/1.73
GFR SERPL CREATININE-BSD FRML MDRD: 14 ML/MIN/1.73
GFR SERPL CREATININE-BSD FRML MDRD: 14 ML/MIN/1.73
GFR SERPL CREATININE-BSD FRML MDRD: 15 ML/MIN/1.73
GFR SERPL CREATININE-BSD FRML MDRD: 16 ML/MIN/1.73
GFR SERPL CREATININE-BSD FRML MDRD: 18 ML/MIN/1.73
GFR SERPL CREATININE-BSD FRML MDRD: 20 ML/MIN/1.73
GFR SERPL CREATININE-BSD FRML MDRD: 21 ML/MIN/1.73
GFR SERPL CREATININE-BSD FRML MDRD: 23 ML/MIN/1.73
GFR SERPL CREATININE-BSD FRML MDRD: 24 ML/MIN/1.73
GFR SERPL CREATININE-BSD FRML MDRD: 25 ML/MIN/1.73
GFR SERPL CREATININE-BSD FRML MDRD: 25 ML/MIN/1.73
GFR SERPL CREATININE-BSD FRML MDRD: 26 ML/MIN/1.73
GFR SERPL CREATININE-BSD FRML MDRD: 26 ML/MIN/1.73
GFR SERPL CREATININE-BSD FRML MDRD: 27 ML/MIN/1.73
GFR SERPL CREATININE-BSD FRML MDRD: 28 ML/MIN/1.73
GFR SERPL CREATININE-BSD FRML MDRD: 28 ML/MIN/1.73
GFR SERPL CREATININE-BSD FRML MDRD: 29 ML/MIN/1.73
GFR SERPL CREATININE-BSD FRML MDRD: 30 ML/MIN/1.73
GFR SERPL CREATININE-BSD FRML MDRD: 32 ML/MIN/1.73
GFR SERPL CREATININE-BSD FRML MDRD: 32 ML/MIN/1.73
GFR SERPL CREATININE-BSD FRML MDRD: 33 ML/MIN/1.73
GFR SERPL CREATININE-BSD FRML MDRD: 34 ML/MIN/1.73
GFR SERPL CREATININE-BSD FRML MDRD: 41 ML/MIN/1.73
GFR SERPL CREATININE-BSD FRML MDRD: 42 ML/MIN/1.73
GFR SERPL CREATININE-BSD FRML MDRD: 42 ML/MIN/1.73
GFR SERPL CREATININE-BSD FRML MDRD: 44 ML/MIN/1.73
GFR SERPL CREATININE-BSD FRML MDRD: 45 ML/MIN/1.73
GFR SERPL CREATININE-BSD FRML MDRD: 45 ML/MIN/1.73
GFR SERPL CREATININE-BSD FRML MDRD: 49 ML/MIN/1.73
GFR SERPL CREATININE-BSD FRML MDRD: 54 ML/MIN/1.73
GLOBULIN UR ELPH-MCNC: 2.4 GM/DL
GLOBULIN UR ELPH-MCNC: 2.5 GM/DL
GLOBULIN UR ELPH-MCNC: 2.6 GM/DL
GLOBULIN UR ELPH-MCNC: 2.7 GM/DL
GLOBULIN UR ELPH-MCNC: 2.8 GM/DL
GLOBULIN UR ELPH-MCNC: 2.9 GM/DL
GLOBULIN UR ELPH-MCNC: 2.9 GM/DL
GLOBULIN UR ELPH-MCNC: 2.9 GM/DL (ref 1.8–3.5)
GLOBULIN UR ELPH-MCNC: 3 GM/DL
GLOBULIN UR ELPH-MCNC: 3 GM/DL (ref 1.8–3.5)
GLOBULIN UR ELPH-MCNC: 3.1 GM/DL
GLOBULIN UR ELPH-MCNC: 3.1 GM/DL (ref 1.8–3.5)
GLUCOSE BLD-MCNC: 100 MG/DL (ref 65–99)
GLUCOSE BLD-MCNC: 101 MG/DL (ref 74–124)
GLUCOSE BLD-MCNC: 102 MG/DL (ref 65–99)
GLUCOSE BLD-MCNC: 102 MG/DL (ref 65–99)
GLUCOSE BLD-MCNC: 104 MG/DL (ref 65–99)
GLUCOSE BLD-MCNC: 104 MG/DL (ref 65–99)
GLUCOSE BLD-MCNC: 105 MG/DL (ref 65–99)
GLUCOSE BLD-MCNC: 106 MG/DL (ref 65–99)
GLUCOSE BLD-MCNC: 106 MG/DL (ref 65–99)
GLUCOSE BLD-MCNC: 109 MG/DL (ref 65–99)
GLUCOSE BLD-MCNC: 110 MG/DL (ref 65–99)
GLUCOSE BLD-MCNC: 111 MG/DL (ref 65–99)
GLUCOSE BLD-MCNC: 112 MG/DL (ref 65–99)
GLUCOSE BLD-MCNC: 120 MG/DL (ref 65–99)
GLUCOSE BLD-MCNC: 120 MG/DL (ref 65–99)
GLUCOSE BLD-MCNC: 125 MG/DL (ref 65–99)
GLUCOSE BLD-MCNC: 131 MG/DL (ref 65–99)
GLUCOSE BLD-MCNC: 138 MG/DL (ref 65–99)
GLUCOSE BLD-MCNC: 139 MG/DL (ref 74–124)
GLUCOSE BLD-MCNC: 143 MG/DL (ref 65–99)
GLUCOSE BLD-MCNC: 143 MG/DL (ref 65–99)
GLUCOSE BLD-MCNC: 147 MG/DL (ref 65–99)
GLUCOSE BLD-MCNC: 162 MG/DL (ref 65–99)
GLUCOSE BLD-MCNC: 164 MG/DL (ref 65–99)
GLUCOSE BLD-MCNC: 165 MG/DL (ref 74–124)
GLUCOSE BLD-MCNC: 174 MG/DL (ref 65–99)
GLUCOSE BLD-MCNC: 177 MG/DL (ref 65–99)
GLUCOSE BLD-MCNC: 198 MG/DL (ref 65–99)
GLUCOSE BLD-MCNC: 218 MG/DL (ref 65–99)
GLUCOSE BLD-MCNC: 54 MG/DL (ref 65–99)
GLUCOSE BLD-MCNC: 73 MG/DL (ref 65–99)
GLUCOSE BLD-MCNC: 76 MG/DL (ref 65–99)
GLUCOSE BLD-MCNC: 78 MG/DL (ref 65–99)
GLUCOSE BLD-MCNC: 78 MG/DL (ref 65–99)
GLUCOSE BLD-MCNC: 80 MG/DL (ref 74–124)
GLUCOSE BLD-MCNC: 81 MG/DL (ref 65–99)
GLUCOSE BLD-MCNC: 81 MG/DL (ref 74–124)
GLUCOSE BLD-MCNC: 82 MG/DL (ref 65–99)
GLUCOSE BLD-MCNC: 86 MG/DL (ref 65–99)
GLUCOSE BLD-MCNC: 87 MG/DL (ref 65–99)
GLUCOSE BLD-MCNC: 90 MG/DL (ref 65–99)
GLUCOSE BLD-MCNC: 94 MG/DL (ref 65–99)
GLUCOSE BLDC GLUCOMTR-MCNC: 103 MG/DL (ref 70–130)
GLUCOSE BLDC GLUCOMTR-MCNC: 104 MG/DL (ref 70–130)
GLUCOSE BLDC GLUCOMTR-MCNC: 50 MG/DL (ref 70–130)
GLUCOSE BLDC GLUCOMTR-MCNC: 54 MG/DL (ref 70–130)
GLUCOSE BLDC GLUCOMTR-MCNC: 62 MG/DL (ref 70–130)
GLUCOSE BLDC GLUCOMTR-MCNC: 64 MG/DL (ref 70–130)
GLUCOSE BLDC GLUCOMTR-MCNC: 64 MG/DL (ref 70–130)
GLUCOSE BLDC GLUCOMTR-MCNC: 76 MG/DL (ref 70–130)
GLUCOSE BLDC GLUCOMTR-MCNC: 83 MG/DL (ref 70–130)
GLUCOSE BLDC GLUCOMTR-MCNC: 95 MG/DL (ref 70–130)
GLUCOSE UR STRIP-MCNC: NEGATIVE MG/DL
HCT VFR BLD AUTO: 24.4 % (ref 35.6–45.5)
HCT VFR BLD AUTO: 26.6 % (ref 35.6–45.5)
HCT VFR BLD AUTO: 26.7 % (ref 35.6–45.5)
HCT VFR BLD AUTO: 27.2 % (ref 35.6–45.5)
HCT VFR BLD AUTO: 27.3 % (ref 35.6–45.5)
HCT VFR BLD AUTO: 27.5 % (ref 35.6–45.5)
HCT VFR BLD AUTO: 27.7 % (ref 35.6–45.5)
HCT VFR BLD AUTO: 27.8 % (ref 34–45)
HCT VFR BLD AUTO: 27.9 % (ref 35.6–45.5)
HCT VFR BLD AUTO: 28.2 % (ref 34–45)
HCT VFR BLD AUTO: 28.2 % (ref 35.6–45.5)
HCT VFR BLD AUTO: 28.4 % (ref 34–45)
HCT VFR BLD AUTO: 28.4 % (ref 35.6–45.5)
HCT VFR BLD AUTO: 28.5 % (ref 35.6–45.5)
HCT VFR BLD AUTO: 28.8 % (ref 35.6–45.5)
HCT VFR BLD AUTO: 28.8 % (ref 35.6–45.5)
HCT VFR BLD AUTO: 28.9 % (ref 35.6–45.5)
HCT VFR BLD AUTO: 29.1 % (ref 35.6–45.5)
HCT VFR BLD AUTO: 29.1 % (ref 35.6–45.5)
HCT VFR BLD AUTO: 29.4 % (ref 35.6–45.5)
HCT VFR BLD AUTO: 29.7 % (ref 35.6–45.5)
HCT VFR BLD AUTO: 30.1 % (ref 35.6–45.5)
HCT VFR BLD AUTO: 30.2 % (ref 35.6–45.5)
HCT VFR BLD AUTO: 30.3 % (ref 35.6–45.5)
HCT VFR BLD AUTO: 30.4 % (ref 35.6–45.5)
HCT VFR BLD AUTO: 30.5 % (ref 35.6–45.5)
HCT VFR BLD AUTO: 30.7 % (ref 35.6–45.5)
HCT VFR BLD AUTO: 30.7 % (ref 35.6–45.5)
HCT VFR BLD AUTO: 30.8 % (ref 35.6–45.5)
HCT VFR BLD AUTO: 30.9 % (ref 35.6–45.5)
HCT VFR BLD AUTO: 31.8 % (ref 35.6–45.5)
HCT VFR BLD AUTO: 31.9 % (ref 35.6–45.5)
HCT VFR BLD AUTO: 32.1 % (ref 35.6–45.5)
HCT VFR BLD AUTO: 32.3 % (ref 35.6–45.5)
HCT VFR BLD AUTO: 33 % (ref 34–45)
HCT VFR BLD AUTO: 33 % (ref 35.6–45.5)
HCT VFR BLD AUTO: 33.3 % (ref 34–45)
HCT VFR BLD AUTO: 33.5 % (ref 35.6–45.5)
HCT VFR BLD AUTO: 34.3 % (ref 34–45)
HCT VFR BLD AUTO: 35.3 % (ref 34–45)
HCT VFR BLD AUTO: 35.7 % (ref 35.6–45.5)
HCT VFR BLD AUTO: 36.7 % (ref 35.6–45.5)
HCT VFR BLD AUTO: 37 % (ref 34–45)
HGB BLD-MCNC: 10 G/DL (ref 11.5–14.9)
HGB BLD-MCNC: 10.2 G/DL (ref 11.5–14.9)
HGB BLD-MCNC: 10.2 G/DL (ref 11.9–15.5)
HGB BLD-MCNC: 10.3 G/DL (ref 11.9–15.5)
HGB BLD-MCNC: 10.4 G/DL (ref 11.5–14.9)
HGB BLD-MCNC: 10.8 G/DL (ref 11.5–14.9)
HGB BLD-MCNC: 10.9 G/DL (ref 11.9–15.5)
HGB BLD-MCNC: 11.2 G/DL (ref 11.9–15.5)
HGB BLD-MCNC: 11.5 G/DL (ref 11.5–14.9)
HGB BLD-MCNC: 7.3 G/DL (ref 11.9–15.5)
HGB BLD-MCNC: 7.9 G/DL (ref 11.9–15.5)
HGB BLD-MCNC: 8 G/DL (ref 11.9–15.5)
HGB BLD-MCNC: 8 G/DL (ref 11.9–15.5)
HGB BLD-MCNC: 8.1 G/DL (ref 11.9–15.5)
HGB BLD-MCNC: 8.2 G/DL (ref 11.9–15.5)
HGB BLD-MCNC: 8.2 G/DL (ref 11.9–15.5)
HGB BLD-MCNC: 8.3 G/DL (ref 11.5–14.9)
HGB BLD-MCNC: 8.3 G/DL (ref 11.9–15.5)
HGB BLD-MCNC: 8.4 G/DL (ref 11.9–15.5)
HGB BLD-MCNC: 8.4 G/DL (ref 11.9–15.5)
HGB BLD-MCNC: 8.6 G/DL (ref 11.5–14.9)
HGB BLD-MCNC: 8.6 G/DL (ref 11.9–15.5)
HGB BLD-MCNC: 8.7 G/DL (ref 11.9–15.5)
HGB BLD-MCNC: 8.8 G/DL (ref 11.5–14.9)
HGB BLD-MCNC: 8.8 G/DL (ref 11.9–15.5)
HGB BLD-MCNC: 8.8 G/DL (ref 11.9–15.5)
HGB BLD-MCNC: 9 G/DL (ref 11.9–15.5)
HGB BLD-MCNC: 9.1 G/DL (ref 11.9–15.5)
HGB BLD-MCNC: 9.4 G/DL (ref 11.9–15.5)
HGB BLD-MCNC: 9.4 G/DL (ref 11.9–15.5)
HGB BLD-MCNC: 9.5 G/DL (ref 11.9–15.5)
HGB BLD-MCNC: 9.6 G/DL (ref 11.9–15.5)
HGB BLD-MCNC: 9.6 G/DL (ref 11.9–15.5)
HGB BLD-MCNC: 9.7 G/DL (ref 11.9–15.5)
HGB BLD-MCNC: 9.7 G/DL (ref 11.9–15.5)
HGB UR QL STRIP.AUTO: ABNORMAL
HOLD SPECIMEN: NORMAL
HYALINE CASTS UR QL AUTO: ABNORMAL /LPF
HYPOCHROMIA BLD QL: ABNORMAL
HYPOCHROMIA BLD QL: NORMAL
IMM GRANULOCYTES # BLD: 0.03 10*3/MM3 (ref 0–0.03)
IMM GRANULOCYTES # BLD: 0.03 10*3/MM3 (ref 0–0.03)
IMM GRANULOCYTES # BLD: 0.04 10*3/MM3 (ref 0–0.03)
IMM GRANULOCYTES # BLD: 0.04 10*3/MM3 (ref 0–0.03)
IMM GRANULOCYTES # BLD: 0.05 10*3/MM3 (ref 0–0.03)
IMM GRANULOCYTES # BLD: 0.06 10*3/MM3 (ref 0–0.03)
IMM GRANULOCYTES # BLD: 0.07 10*3/MM3 (ref 0–0.03)
IMM GRANULOCYTES # BLD: 0.08 10*3/MM3 (ref 0–0.03)
IMM GRANULOCYTES # BLD: 0.08 10*3/MM3 (ref 0–0.03)
IMM GRANULOCYTES # BLD: 0.09 10*3/MM3 (ref 0–0.03)
IMM GRANULOCYTES # BLD: 0.1 10*3/MM3 (ref 0–0.03)
IMM GRANULOCYTES # BLD: 0.11 10*3/MM3 (ref 0–0.03)
IMM GRANULOCYTES # BLD: 0.13 10*3/MM3 (ref 0–0.03)
IMM GRANULOCYTES # BLD: 0.13 10*3/MM3 (ref 0–0.03)
IMM GRANULOCYTES # BLD: 0.14 10*3/MM3 (ref 0–0.03)
IMM GRANULOCYTES # BLD: 0.15 10*3/MM3 (ref 0–0.03)
IMM GRANULOCYTES # BLD: 0.17 10*3/MM3 (ref 0–0.03)
IMM GRANULOCYTES # BLD: 0.19 10*3/MM3 (ref 0–0.03)
IMM GRANULOCYTES # BLD: 0.21 10*3/MM3 (ref 0–0.03)
IMM GRANULOCYTES # BLD: 0.28 10*3/MM3 (ref 0–0.03)
IMM GRANULOCYTES # BLD: 0.31 10*3/MM3 (ref 0–0.03)
IMM GRANULOCYTES # BLD: 0.32 10*3/MM3 (ref 0–0.03)
IMM GRANULOCYTES # BLD: 0.34 10*3/MM3 (ref 0–0.03)
IMM GRANULOCYTES NFR BLD: 0.5 % (ref 0–0.5)
IMM GRANULOCYTES NFR BLD: 0.5 % (ref 0–0.5)
IMM GRANULOCYTES NFR BLD: 0.7 % (ref 0–0.5)
IMM GRANULOCYTES NFR BLD: 0.7 % (ref 0–0.5)
IMM GRANULOCYTES NFR BLD: 0.9 % (ref 0–0.5)
IMM GRANULOCYTES NFR BLD: 0.9 % (ref 0–0.5)
IMM GRANULOCYTES NFR BLD: 1 % (ref 0–0.5)
IMM GRANULOCYTES NFR BLD: 1.1 % (ref 0–0.5)
IMM GRANULOCYTES NFR BLD: 1.1 % (ref 0–0.5)
IMM GRANULOCYTES NFR BLD: 1.2 % (ref 0–0.5)
IMM GRANULOCYTES NFR BLD: 1.2 % (ref 0–0.5)
IMM GRANULOCYTES NFR BLD: 1.3 % (ref 0–0.5)
IMM GRANULOCYTES NFR BLD: 1.4 % (ref 0–0.5)
IMM GRANULOCYTES NFR BLD: 1.5 % (ref 0–0.5)
IMM GRANULOCYTES NFR BLD: 1.6 % (ref 0–0.5)
IMM GRANULOCYTES NFR BLD: 1.7 % (ref 0–0.5)
IMM GRANULOCYTES NFR BLD: 1.8 % (ref 0–0.5)
IMM GRANULOCYTES NFR BLD: 1.9 % (ref 0–0.5)
IMM GRANULOCYTES NFR BLD: 1.9 % (ref 0–0.5)
IMM GRANULOCYTES NFR BLD: 2 % (ref 0–0.5)
IMM GRANULOCYTES NFR BLD: 2.8 % (ref 0–0.5)
IMM GRANULOCYTES NFR BLD: 3 % (ref 0–0.5)
IMM GRANULOCYTES NFR BLD: 3.3 % (ref 0–0.5)
IMM GRANULOCYTES NFR BLD: 3.4 % (ref 0–0.5)
IMM GRANULOCYTES NFR BLD: 3.6 % (ref 0–0.5)
IMM GRANULOCYTES NFR BLD: 4.3 % (ref 0–0.5)
IMM GRANULOCYTES NFR BLD: 4.5 % (ref 0–0.5)
IMM GRANULOCYTES NFR BLD: 7.5 % (ref 0–0.5)
INR PPP: 1.1 (ref 0.8–1.2)
INR PPP: 1.14 (ref 0.9–1.1)
INR PPP: 1.18 (ref 0.9–1.1)
INR PPP: 1.27 (ref 0.9–1.1)
INR PPP: 1.31 (ref 0.9–1.1)
IRON 24H UR-MRATE: 153 MCG/DL (ref 37–145)
IRON 24H UR-MRATE: 43 MCG/DL (ref 37–145)
IRON SATN MFR SERPL: 29 % (ref 20–50)
IRON SATN MFR SERPL: 45 % (ref 20–50)
KETONES UR QL STRIP: ABNORMAL
KETONES UR QL STRIP: NEGATIVE
LAB AP CASE REPORT: NORMAL
LEFT ATRIUM VOLUME INDEX: 24 ML/M2
LEUKOCYTE ESTERASE UR QL STRIP.AUTO: ABNORMAL
LEUKOCYTE ESTERASE UR QL STRIP.AUTO: NEGATIVE
LEUKOCYTE ESTERASE UR QL STRIP.AUTO: NEGATIVE
LIPASE SERPL-CCNC: 19 U/L (ref 13–60)
LYMPHOCYTES # BLD AUTO: 0.21 10*3/MM3 (ref 0.9–4.8)
LYMPHOCYTES # BLD AUTO: 0.43 10*3/MM3 (ref 1–3.5)
LYMPHOCYTES # BLD AUTO: 0.57 10*3/MM3 (ref 1–3.5)
LYMPHOCYTES # BLD AUTO: 0.59 10*3/MM3 (ref 0.9–4.8)
LYMPHOCYTES # BLD AUTO: 0.61 10*3/MM3 (ref 0.9–4.8)
LYMPHOCYTES # BLD AUTO: 0.61 10*3/MM3 (ref 0.9–4.8)
LYMPHOCYTES # BLD AUTO: 0.67 10*3/MM3 (ref 1–3.5)
LYMPHOCYTES # BLD AUTO: 0.75 10*3/MM3 (ref 0.9–4.8)
LYMPHOCYTES # BLD AUTO: 0.77 10*3/MM3 (ref 0.9–4.8)
LYMPHOCYTES # BLD AUTO: 0.77 10*3/MM3 (ref 1–3.5)
LYMPHOCYTES # BLD AUTO: 0.84 10*3/MM3 (ref 0.9–4.8)
LYMPHOCYTES # BLD AUTO: 0.86 10*3/MM3 (ref 0.9–4.8)
LYMPHOCYTES # BLD AUTO: 0.9 10*3/MM3 (ref 0.9–4.8)
LYMPHOCYTES # BLD AUTO: 0.93 10*3/MM3 (ref 0.9–4.8)
LYMPHOCYTES # BLD AUTO: 0.95 10*3/MM3 (ref 0.9–4.8)
LYMPHOCYTES # BLD AUTO: 0.95 10*3/MM3 (ref 1–3.5)
LYMPHOCYTES # BLD AUTO: 0.97 10*3/MM3 (ref 0.9–4.8)
LYMPHOCYTES # BLD AUTO: 0.97 10*3/MM3 (ref 0.9–4.8)
LYMPHOCYTES # BLD AUTO: 1.03 10*3/MM3 (ref 0.9–4.8)
LYMPHOCYTES # BLD AUTO: 1.04 10*3/MM3 (ref 0.9–4.8)
LYMPHOCYTES # BLD AUTO: 1.05 10*3/MM3 (ref 0.9–4.8)
LYMPHOCYTES # BLD AUTO: 1.06 10*3/MM3 (ref 0.9–4.8)
LYMPHOCYTES # BLD AUTO: 1.07 10*3/MM3 (ref 0.9–4.8)
LYMPHOCYTES # BLD AUTO: 1.07 10*3/MM3 (ref 0.9–4.8)
LYMPHOCYTES # BLD AUTO: 1.1 10*3/MM3 (ref 1–3.5)
LYMPHOCYTES # BLD AUTO: 1.13 10*3/MM3 (ref 0.9–4.8)
LYMPHOCYTES # BLD AUTO: 1.15 10*3/MM3 (ref 0.9–4.8)
LYMPHOCYTES # BLD AUTO: 1.28 10*3/MM3 (ref 0.9–4.8)
LYMPHOCYTES # BLD AUTO: 1.28 10*3/MM3 (ref 0.9–4.8)
LYMPHOCYTES # BLD AUTO: 1.28 10*3/MM3 (ref 1–3.5)
LYMPHOCYTES # BLD AUTO: 1.31 10*3/MM3 (ref 0.9–4.8)
LYMPHOCYTES # BLD AUTO: 1.32 10*3/MM3 (ref 0.9–4.8)
LYMPHOCYTES # BLD AUTO: 1.33 10*3/MM3 (ref 0.9–4.8)
LYMPHOCYTES # BLD AUTO: 1.33 10*3/MM3 (ref 0.9–4.8)
LYMPHOCYTES # BLD AUTO: 1.43 10*3/MM3 (ref 0.9–4.8)
LYMPHOCYTES # BLD AUTO: 1.74 10*3/MM3 (ref 1–3.5)
LYMPHOCYTES # BLD MANUAL: 0.44 10*3/MM3 (ref 0.9–4.8)
LYMPHOCYTES # BLD MANUAL: 0.57 10*3/MM3 (ref 0.9–4.8)
LYMPHOCYTES # BLD MANUAL: 0.61 10*3/MM3 (ref 0.8–7)
LYMPHOCYTES # BLD MANUAL: 0.73 10*3/MM3 (ref 0.9–4.8)
LYMPHOCYTES # BLD MANUAL: 0.96 10*3/MM3 (ref 0.8–7)
LYMPHOCYTES # BLD MANUAL: 1.38 10*3/MM3 (ref 0.9–4.8)
LYMPHOCYTES NFR BLD AUTO: 11.8 % (ref 19.6–45.3)
LYMPHOCYTES NFR BLD AUTO: 12.2 % (ref 19.6–45.3)
LYMPHOCYTES NFR BLD AUTO: 12.2 % (ref 19.6–45.3)
LYMPHOCYTES NFR BLD AUTO: 13.6 % (ref 19.6–45.3)
LYMPHOCYTES NFR BLD AUTO: 14.2 % (ref 19.6–45.3)
LYMPHOCYTES NFR BLD AUTO: 14.6 % (ref 19.6–45.3)
LYMPHOCYTES NFR BLD AUTO: 14.7 % (ref 19.6–45.3)
LYMPHOCYTES NFR BLD AUTO: 15.5 % (ref 19.6–45.3)
LYMPHOCYTES NFR BLD AUTO: 16 % (ref 19.6–45.3)
LYMPHOCYTES NFR BLD AUTO: 16.1 % (ref 20–49)
LYMPHOCYTES NFR BLD AUTO: 16.5 % (ref 20–49)
LYMPHOCYTES NFR BLD AUTO: 16.6 % (ref 19.6–45.3)
LYMPHOCYTES NFR BLD AUTO: 17.1 % (ref 19.6–45.3)
LYMPHOCYTES NFR BLD AUTO: 17.4 % (ref 19.6–45.3)
LYMPHOCYTES NFR BLD AUTO: 17.6 % (ref 19.6–45.3)
LYMPHOCYTES NFR BLD AUTO: 18 % (ref 20–49)
LYMPHOCYTES NFR BLD AUTO: 18.5 % (ref 19.6–45.3)
LYMPHOCYTES NFR BLD AUTO: 19 % (ref 19.6–45.3)
LYMPHOCYTES NFR BLD AUTO: 20.1 % (ref 19.6–45.3)
LYMPHOCYTES NFR BLD AUTO: 20.2 % (ref 19.6–45.3)
LYMPHOCYTES NFR BLD AUTO: 20.4 % (ref 20–49)
LYMPHOCYTES NFR BLD AUTO: 21 % (ref 19.6–45.3)
LYMPHOCYTES NFR BLD AUTO: 21.4 % (ref 19.6–45.3)
LYMPHOCYTES NFR BLD AUTO: 22.5 % (ref 19.6–45.3)
LYMPHOCYTES NFR BLD AUTO: 22.5 % (ref 20–49)
LYMPHOCYTES NFR BLD AUTO: 23.3 % (ref 20–49)
LYMPHOCYTES NFR BLD AUTO: 23.4 % (ref 19.6–45.3)
LYMPHOCYTES NFR BLD AUTO: 25.2 % (ref 19.6–45.3)
LYMPHOCYTES NFR BLD AUTO: 25.2 % (ref 19.6–45.3)
LYMPHOCYTES NFR BLD AUTO: 27.4 % (ref 19.6–45.3)
LYMPHOCYTES NFR BLD AUTO: 27.6 % (ref 19.6–45.3)
LYMPHOCYTES NFR BLD AUTO: 29.2 % (ref 19.6–45.3)
LYMPHOCYTES NFR BLD AUTO: 4.6 % (ref 19.6–45.3)
LYMPHOCYTES NFR BLD AUTO: 6.9 % (ref 20–49)
LYMPHOCYTES NFR BLD AUTO: 7 % (ref 20–49)
LYMPHOCYTES NFR BLD AUTO: 7.7 % (ref 19.6–45.3)
LYMPHOCYTES NFR BLD MANUAL: 10 % (ref 0–12)
LYMPHOCYTES NFR BLD MANUAL: 11 % (ref 19.6–45.3)
LYMPHOCYTES NFR BLD MANUAL: 12 % (ref 19.6–45.3)
LYMPHOCYTES NFR BLD MANUAL: 13 % (ref 0–12)
LYMPHOCYTES NFR BLD MANUAL: 15 % (ref 24–44)
LYMPHOCYTES NFR BLD MANUAL: 17 % (ref 19.6–45.3)
LYMPHOCYTES NFR BLD MANUAL: 2 % (ref 5–12)
LYMPHOCYTES NFR BLD MANUAL: 23 % (ref 24–44)
LYMPHOCYTES NFR BLD MANUAL: 3 % (ref 5–12)
LYMPHOCYTES NFR BLD MANUAL: 7 % (ref 19.6–45.3)
LYMPHOCYTES NFR BLD MANUAL: 7 % (ref 5–12)
LYMPHOCYTES NFR BLD MANUAL: 8 % (ref 5–12)
Lab: NORMAL
MACROCYTES BLD QL SMEAR: ABNORMAL
MACROCYTES BLD QL SMEAR: NORMAL
MACROCYTES BLD QL SMEAR: NORMAL
MAGNESIUM SERPL-MCNC: 1.7 MG/DL (ref 1.8–2.5)
MAGNESIUM SERPL-MCNC: 2 MG/DL (ref 1.8–2.5)
MAGNESIUM SERPL-MCNC: 2.1 MG/DL (ref 1.6–2.4)
MAGNESIUM SERPL-MCNC: 2.1 MG/DL (ref 1.6–2.4)
MAGNESIUM SERPL-MCNC: 2.1 MG/DL (ref 1.8–2.5)
MAGNESIUM SERPL-MCNC: 2.2 MG/DL (ref 1.6–2.4)
MAGNESIUM SERPL-MCNC: 2.8 MG/DL (ref 1.8–2.5)
MAGNESIUM SERPL-MCNC: 3 MG/DL (ref 1.6–2.4)
MAGNESIUM SERPL-MCNC: 3.2 MG/DL (ref 1.6–2.4)
MCH RBC QN AUTO: 28.5 PG (ref 26.9–32)
MCH RBC QN AUTO: 28.7 PG (ref 26.9–32)
MCH RBC QN AUTO: 28.8 PG (ref 26.9–32)
MCH RBC QN AUTO: 28.9 PG (ref 26.9–32)
MCH RBC QN AUTO: 29 PG (ref 26.9–32)
MCH RBC QN AUTO: 29 PG (ref 27–33)
MCH RBC QN AUTO: 29.1 PG (ref 26.9–32)
MCH RBC QN AUTO: 29.1 PG (ref 26.9–32)
MCH RBC QN AUTO: 29.1 PG (ref 27–33)
MCH RBC QN AUTO: 29.3 PG (ref 26.9–32)
MCH RBC QN AUTO: 29.3 PG (ref 26.9–32)
MCH RBC QN AUTO: 29.3 PG (ref 27–33)
MCH RBC QN AUTO: 29.3 PG (ref 27–33)
MCH RBC QN AUTO: 29.4 PG (ref 26.9–32)
MCH RBC QN AUTO: 29.4 PG (ref 26.9–32)
MCH RBC QN AUTO: 29.5 PG (ref 26.9–32)
MCH RBC QN AUTO: 29.6 PG (ref 26.9–32)
MCH RBC QN AUTO: 29.6 PG (ref 26.9–32)
MCH RBC QN AUTO: 29.7 PG (ref 27–33)
MCH RBC QN AUTO: 29.7 PG (ref 27–33)
MCH RBC QN AUTO: 29.8 PG (ref 26.9–32)
MCH RBC QN AUTO: 30 PG (ref 26.9–32)
MCH RBC QN AUTO: 30.4 PG (ref 26.9–32)
MCH RBC QN AUTO: 30.4 PG (ref 26.9–32)
MCH RBC QN AUTO: 30.7 PG (ref 26.9–32)
MCH RBC QN AUTO: 31.1 PG (ref 27–33)
MCH RBC QN AUTO: 31.3 PG (ref 26.9–32)
MCH RBC QN AUTO: 31.3 PG (ref 27–33)
MCH RBC QN AUTO: 31.5 PG (ref 26.9–32)
MCH RBC QN AUTO: 31.6 PG (ref 26.9–32)
MCH RBC QN AUTO: 31.9 PG (ref 26.9–32)
MCH RBC QN AUTO: 32 PG (ref 26.9–32)
MCH RBC QN AUTO: 32.7 PG (ref 26.9–32)
MCH RBC QN AUTO: 32.7 PG (ref 26.9–32)
MCH RBC QN AUTO: 33 PG (ref 26.9–32)
MCH RBC QN AUTO: 33.2 PG (ref 26.9–32)
MCHC RBC AUTO-ENTMCNC: 27.5 G/DL (ref 32.4–36.3)
MCHC RBC AUTO-ENTMCNC: 28.2 G/DL (ref 32.4–36.3)
MCHC RBC AUTO-ENTMCNC: 28.5 G/DL (ref 32.4–36.3)
MCHC RBC AUTO-ENTMCNC: 28.6 G/DL (ref 32.4–36.3)
MCHC RBC AUTO-ENTMCNC: 28.7 G/DL (ref 32.4–36.3)
MCHC RBC AUTO-ENTMCNC: 28.9 G/DL (ref 32–35)
MCHC RBC AUTO-ENTMCNC: 29 G/DL (ref 32.4–36.3)
MCHC RBC AUTO-ENTMCNC: 29.2 G/DL (ref 32.4–36.3)
MCHC RBC AUTO-ENTMCNC: 29.3 G/DL (ref 32.4–36.3)
MCHC RBC AUTO-ENTMCNC: 29.4 G/DL (ref 32.4–36.3)
MCHC RBC AUTO-ENTMCNC: 29.6 G/DL (ref 32.4–36.3)
MCHC RBC AUTO-ENTMCNC: 29.7 G/DL (ref 32.4–36.3)
MCHC RBC AUTO-ENTMCNC: 29.8 G/DL (ref 32.4–36.3)
MCHC RBC AUTO-ENTMCNC: 29.9 G/DL (ref 32.4–36.3)
MCHC RBC AUTO-ENTMCNC: 29.9 G/DL (ref 32–35)
MCHC RBC AUTO-ENTMCNC: 30 G/DL (ref 32.4–36.3)
MCHC RBC AUTO-ENTMCNC: 30.1 G/DL (ref 32.4–36.3)
MCHC RBC AUTO-ENTMCNC: 30.2 G/DL (ref 32.4–36.3)
MCHC RBC AUTO-ENTMCNC: 30.3 G/DL (ref 32–35)
MCHC RBC AUTO-ENTMCNC: 30.4 G/DL (ref 32.4–36.3)
MCHC RBC AUTO-ENTMCNC: 30.5 G/DL (ref 32.4–36.3)
MCHC RBC AUTO-ENTMCNC: 30.5 G/DL (ref 32–35)
MCHC RBC AUTO-ENTMCNC: 30.6 G/DL (ref 32.4–36.3)
MCHC RBC AUTO-ENTMCNC: 30.9 G/DL (ref 32.4–36.3)
MCHC RBC AUTO-ENTMCNC: 30.9 G/DL (ref 32.4–36.3)
MCHC RBC AUTO-ENTMCNC: 31 G/DL (ref 32.4–36.3)
MCHC RBC AUTO-ENTMCNC: 31 G/DL (ref 32–35)
MCHC RBC AUTO-ENTMCNC: 31.1 G/DL (ref 32.4–36.3)
MCHC RBC AUTO-ENTMCNC: 31.1 G/DL (ref 32–35)
MCHC RBC AUTO-ENTMCNC: 31.2 G/DL (ref 32.4–36.3)
MCHC RBC AUTO-ENTMCNC: 31.2 G/DL (ref 32–35)
MCHC RBC AUTO-ENTMCNC: 31.4 G/DL (ref 32.4–36.3)
MCHC RBC AUTO-ENTMCNC: 31.5 G/DL (ref 32.4–36.3)
MCHC RBC AUTO-ENTMCNC: 31.5 G/DL (ref 32–35)
MCHC RBC AUTO-ENTMCNC: 31.6 G/DL (ref 32.4–36.3)
MCHC RBC AUTO-ENTMCNC: 32.6 G/DL (ref 32.4–36.3)
MCHC RBC AUTO-ENTMCNC: 32.7 G/DL (ref 32.4–36.3)
MCV RBC AUTO: 100 FL (ref 80.5–98.2)
MCV RBC AUTO: 100.4 FL (ref 83–97)
MCV RBC AUTO: 101.1 FL (ref 80.5–98.2)
MCV RBC AUTO: 101.3 FL (ref 80.5–98.2)
MCV RBC AUTO: 101.7 FL (ref 80.5–98.2)
MCV RBC AUTO: 101.8 FL (ref 80.5–98.2)
MCV RBC AUTO: 102.4 FL (ref 80.5–98.2)
MCV RBC AUTO: 102.5 FL (ref 83–97)
MCV RBC AUTO: 102.6 FL (ref 80.5–98.2)
MCV RBC AUTO: 102.6 FL (ref 80.5–98.2)
MCV RBC AUTO: 102.6 FL (ref 83–97)
MCV RBC AUTO: 103.6 FL (ref 80.5–98.2)
MCV RBC AUTO: 103.7 FL (ref 80.5–98.2)
MCV RBC AUTO: 105.6 FL (ref 80.5–98.2)
MCV RBC AUTO: 105.9 FL (ref 80.5–98.2)
MCV RBC AUTO: 93 FL (ref 83–97)
MCV RBC AUTO: 93.3 FL (ref 83–97)
MCV RBC AUTO: 93.4 FL (ref 83–97)
MCV RBC AUTO: 93.8 FL (ref 80.5–98.2)
MCV RBC AUTO: 94.2 FL (ref 80.5–98.2)
MCV RBC AUTO: 94.4 FL (ref 80.5–98.2)
MCV RBC AUTO: 95.5 FL (ref 80.5–98.2)
MCV RBC AUTO: 96.3 FL (ref 80.5–98.2)
MCV RBC AUTO: 96.6 FL (ref 80.5–98.2)
MCV RBC AUTO: 97.1 FL (ref 80.5–98.2)
MCV RBC AUTO: 97.4 FL (ref 80.5–98.2)
MCV RBC AUTO: 97.9 FL (ref 83–97)
MCV RBC AUTO: 98 FL (ref 80.5–98.2)
MCV RBC AUTO: 98.1 FL (ref 80.5–98.2)
MCV RBC AUTO: 98.2 FL (ref 80.5–98.2)
MCV RBC AUTO: 98.2 FL (ref 83–97)
MCV RBC AUTO: 98.4 FL (ref 80.5–98.2)
MCV RBC AUTO: 98.6 FL (ref 80.5–98.2)
MCV RBC AUTO: 99 FL (ref 80.5–98.2)
MCV RBC AUTO: 99.1 FL (ref 80.5–98.2)
MCV RBC AUTO: 99.3 FL (ref 80.5–98.2)
MCV RBC AUTO: 99.6 FL (ref 80.5–98.2)
METAMYELOCYTES NFR BLD MANUAL: 2 % (ref 0–0)
METAMYELOCYTES NFR BLD MANUAL: 2 % (ref 0–0)
METAMYELOCYTES NFR BLD MANUAL: 3 % (ref 0–0)
METAMYELOCYTES NFR BLD MANUAL: 3 % (ref 0–0)
MONOCYTES # BLD AUTO: 0.1 10*3/MM3 (ref 0.25–0.8)
MONOCYTES # BLD AUTO: 0.12 10*3/MM3 (ref 0.2–1.2)
MONOCYTES # BLD AUTO: 0.24 10*3/MM3 (ref 0.2–1.2)
MONOCYTES # BLD AUTO: 0.25 10*3/MM3 (ref 0.2–1.2)
MONOCYTES # BLD AUTO: 0.33 10*3/MM3 (ref 0.2–1.2)
MONOCYTES # BLD AUTO: 0.35 10*3/MM3 (ref 0–1)
MONOCYTES # BLD AUTO: 0.38 10*3/MM3 (ref 0.2–1.2)
MONOCYTES # BLD AUTO: 0.41 10*3/MM3 (ref 0.25–0.8)
MONOCYTES # BLD AUTO: 0.44 10*3/MM3 (ref 0.2–1.2)
MONOCYTES # BLD AUTO: 0.45 10*3/MM3 (ref 0.2–1.2)
MONOCYTES # BLD AUTO: 0.47 10*3/MM3 (ref 0.2–1.2)
MONOCYTES # BLD AUTO: 0.47 10*3/MM3 (ref 0.2–1.2)
MONOCYTES # BLD AUTO: 0.48 10*3/MM3 (ref 0.2–1.2)
MONOCYTES # BLD AUTO: 0.5 10*3/MM3 (ref 0.2–1.2)
MONOCYTES # BLD AUTO: 0.52 10*3/MM3 (ref 0.2–1.2)
MONOCYTES # BLD AUTO: 0.52 10*3/MM3 (ref 0.2–1.2)
MONOCYTES # BLD AUTO: 0.54 10*3/MM3 (ref 0.25–0.8)
MONOCYTES # BLD AUTO: 0.57 10*3/MM3 (ref 0.25–0.8)
MONOCYTES # BLD AUTO: 0.57 10*3/MM3 (ref 0.25–0.8)
MONOCYTES # BLD AUTO: 0.57 10*3/MM3 (ref 0.2–1.2)
MONOCYTES # BLD AUTO: 0.58 10*3/MM3 (ref 0.25–0.8)
MONOCYTES # BLD AUTO: 0.6 10*3/MM3 (ref 0.2–1.2)
MONOCYTES # BLD AUTO: 0.61 10*3/MM3 (ref 0.2–1.2)
MONOCYTES # BLD AUTO: 0.64 10*3/MM3 (ref 0.2–1.2)
MONOCYTES # BLD AUTO: 0.64 10*3/MM3 (ref 0–1)
MONOCYTES # BLD AUTO: 0.65 10*3/MM3 (ref 0.2–1.2)
MONOCYTES # BLD AUTO: 0.65 10*3/MM3 (ref 0.2–1.2)
MONOCYTES # BLD AUTO: 0.68 10*3/MM3 (ref 0.2–1.2)
MONOCYTES # BLD AUTO: 0.69 10*3/MM3 (ref 0.2–1.2)
MONOCYTES # BLD AUTO: 0.7 10*3/MM3 (ref 0.25–0.8)
MONOCYTES # BLD AUTO: 0.73 10*3/MM3 (ref 0.2–1.2)
MONOCYTES # BLD AUTO: 0.74 10*3/MM3 (ref 0.2–1.2)
MONOCYTES # BLD AUTO: 0.8 10*3/MM3 (ref 0.2–1.2)
MONOCYTES # BLD AUTO: 0.82 10*3/MM3 (ref 0.2–1.2)
MONOCYTES # BLD AUTO: 0.83 10*3/MM3 (ref 0.2–1.2)
MONOCYTES # BLD AUTO: 0.83 10*3/MM3 (ref 0.2–1.2)
MONOCYTES # BLD AUTO: 0.85 10*3/MM3 (ref 0.2–1.2)
MONOCYTES # BLD AUTO: 0.9 10*3/MM3 (ref 0.2–1.2)
MONOCYTES # BLD AUTO: 1.01 10*3/MM3 (ref 0.25–0.8)
MONOCYTES # BLD AUTO: 1.01 10*3/MM3 (ref 0.2–1.2)
MONOCYTES NFR BLD AUTO: 10 % (ref 5–12)
MONOCYTES NFR BLD AUTO: 10.3 % (ref 5–12)
MONOCYTES NFR BLD AUTO: 10.5 % (ref 5–12)
MONOCYTES NFR BLD AUTO: 11.2 % (ref 4–12)
MONOCYTES NFR BLD AUTO: 11.4 % (ref 5–12)
MONOCYTES NFR BLD AUTO: 11.7 % (ref 5–12)
MONOCYTES NFR BLD AUTO: 12.1 % (ref 5–12)
MONOCYTES NFR BLD AUTO: 12.2 % (ref 5–12)
MONOCYTES NFR BLD AUTO: 12.3 % (ref 5–12)
MONOCYTES NFR BLD AUTO: 12.4 % (ref 5–12)
MONOCYTES NFR BLD AUTO: 12.6 % (ref 5–12)
MONOCYTES NFR BLD AUTO: 12.6 % (ref 5–12)
MONOCYTES NFR BLD AUTO: 13.4 % (ref 5–12)
MONOCYTES NFR BLD AUTO: 14.2 % (ref 5–12)
MONOCYTES NFR BLD AUTO: 14.3 % (ref 5–12)
MONOCYTES NFR BLD AUTO: 15.1 % (ref 5–12)
MONOCYTES NFR BLD AUTO: 15.4 % (ref 5–12)
MONOCYTES NFR BLD AUTO: 15.4 % (ref 5–12)
MONOCYTES NFR BLD AUTO: 17 % (ref 5–12)
MONOCYTES NFR BLD AUTO: 17.6 % (ref 5–12)
MONOCYTES NFR BLD AUTO: 17.9 % (ref 5–12)
MONOCYTES NFR BLD AUTO: 4.7 % (ref 5–12)
MONOCYTES NFR BLD AUTO: 5.2 % (ref 4–12)
MONOCYTES NFR BLD AUTO: 5.5 % (ref 5–12)
MONOCYTES NFR BLD AUTO: 6.4 % (ref 5–12)
MONOCYTES NFR BLD AUTO: 6.5 % (ref 4–12)
MONOCYTES NFR BLD AUTO: 6.8 % (ref 5–12)
MONOCYTES NFR BLD AUTO: 7.6 % (ref 4–12)
MONOCYTES NFR BLD AUTO: 8 % (ref 5–12)
MONOCYTES NFR BLD AUTO: 8 % (ref 5–12)
MONOCYTES NFR BLD AUTO: 8.4 % (ref 5–12)
MONOCYTES NFR BLD AUTO: 9.2 % (ref 4–12)
MONOCYTES NFR BLD AUTO: 9.3 % (ref 5–12)
MONOCYTES NFR BLD AUTO: 9.5 % (ref 4–12)
MONOCYTES NFR BLD AUTO: 9.9 % (ref 4–12)
MONOCYTES NFR BLD AUTO: 9.9 % (ref 4–12)
MYELOCYTES NFR BLD MANUAL: 2 % (ref 0–0)
NEUTROPHILS # BLD AUTO: 1.31 10*3/MM3 (ref 1.5–7)
NEUTROPHILS # BLD AUTO: 1.6 10*3/MM3 (ref 1.9–8.1)
NEUTROPHILS # BLD AUTO: 1.68 10*3/MM3 (ref 1.9–8.1)
NEUTROPHILS # BLD AUTO: 1.96 10*3/MM3 (ref 1.9–8.1)
NEUTROPHILS # BLD AUTO: 2.16 10*3/MM3 (ref 1.9–8.1)
NEUTROPHILS # BLD AUTO: 2.3 10*3/MM3 (ref 1.9–8.1)
NEUTROPHILS # BLD AUTO: 2.39 10*3/MM3 (ref 1.9–8.1)
NEUTROPHILS # BLD AUTO: 2.52 10*3/MM3 (ref 1.9–8.1)
NEUTROPHILS # BLD AUTO: 2.56 10*3/MM3 (ref 1.9–8.1)
NEUTROPHILS # BLD AUTO: 2.7 10*3/MM3 (ref 1.9–8.1)
NEUTROPHILS # BLD AUTO: 2.73 10*3/MM3 (ref 1.5–7)
NEUTROPHILS # BLD AUTO: 2.74 10*3/MM3 (ref 1.9–8.1)
NEUTROPHILS # BLD AUTO: 2.99 10*3/MM3 (ref 1.9–8.1)
NEUTROPHILS # BLD AUTO: 3.4 10*3/MM3 (ref 1.9–8.1)
NEUTROPHILS # BLD AUTO: 3.55 10*3/MM3 (ref 1.9–8.1)
NEUTROPHILS # BLD AUTO: 3.58 10*3/MM3 (ref 1.9–8.1)
NEUTROPHILS # BLD AUTO: 3.59 10*3/MM3 (ref 1.9–8.1)
NEUTROPHILS # BLD AUTO: 3.61 10*3/MM3 (ref 1.9–8.1)
NEUTROPHILS # BLD AUTO: 4.01 10*3/MM3 (ref 1.5–7)
NEUTROPHILS # BLD AUTO: 4.05 10*3/MM3 (ref 1.9–8.1)
NEUTROPHILS # BLD AUTO: 4.06 10*3/MM3 (ref 1.9–8.1)
NEUTROPHILS # BLD AUTO: 4.07 10*3/MM3 (ref 1.5–7)
NEUTROPHILS # BLD AUTO: 4.08 10*3/MM3 (ref 1.5–7)
NEUTROPHILS # BLD AUTO: 4.28 10*3/MM3 (ref 1.9–8.1)
NEUTROPHILS # BLD AUTO: 4.5 10*3/MM3 (ref 1.9–8.1)
NEUTROPHILS # BLD AUTO: 4.53 10*3/MM3 (ref 1.9–8.1)
NEUTROPHILS # BLD AUTO: 4.74 10*3/MM3 (ref 1.9–8.1)
NEUTROPHILS # BLD AUTO: 4.94 10*3/MM3 (ref 1.9–8.1)
NEUTROPHILS # BLD AUTO: 4.96 10*3/MM3 (ref 1.5–7)
NEUTROPHILS # BLD AUTO: 5.2 10*3/MM3 (ref 1.9–8.1)
NEUTROPHILS # BLD AUTO: 5.2 10*3/MM3 (ref 1.9–8.1)
NEUTROPHILS # BLD AUTO: 5.29 10*3/MM3 (ref 1.9–8.1)
NEUTROPHILS # BLD AUTO: 5.3 10*3/MM3 (ref 1.9–8.1)
NEUTROPHILS # BLD AUTO: 5.32 10*3/MM3 (ref 1.9–8.1)
NEUTROPHILS # BLD AUTO: 5.45 10*3/MM3 (ref 1.9–8.1)
NEUTROPHILS # BLD AUTO: 5.54 10*3/MM3 (ref 1.9–8.1)
NEUTROPHILS # BLD AUTO: 5.68 10*3/MM3 (ref 1.9–8.1)
NEUTROPHILS # BLD AUTO: 5.91 10*3/MM3 (ref 1.9–8.1)
NEUTROPHILS # BLD AUTO: 6.13 10*3/MM3 (ref 1.9–8.1)
NEUTROPHILS # BLD AUTO: 6.27 10*3/MM3 (ref 1.9–8.1)
NEUTROPHILS # BLD AUTO: 7.01 10*3/MM3 (ref 1.5–7)
NEUTROPHILS # BLD AUTO: 8.93 10*3/MM3 (ref 1.5–7)
NEUTROPHILS NFR BLD AUTO: 51.8 % (ref 42.7–76)
NEUTROPHILS NFR BLD AUTO: 52 % (ref 42.7–76)
NEUTROPHILS NFR BLD AUTO: 52.3 % (ref 42.7–76)
NEUTROPHILS NFR BLD AUTO: 54.1 % (ref 42.7–76)
NEUTROPHILS NFR BLD AUTO: 54.6 % (ref 42.7–76)
NEUTROPHILS NFR BLD AUTO: 56.1 % (ref 42.7–76)
NEUTROPHILS NFR BLD AUTO: 56.2 % (ref 42.7–76)
NEUTROPHILS NFR BLD AUTO: 58.9 % (ref 42.7–76)
NEUTROPHILS NFR BLD AUTO: 61 % (ref 42.7–76)
NEUTROPHILS NFR BLD AUTO: 61.3 % (ref 42.7–76)
NEUTROPHILS NFR BLD AUTO: 63.8 % (ref 42.7–76)
NEUTROPHILS NFR BLD AUTO: 63.9 % (ref 42.7–76)
NEUTROPHILS NFR BLD AUTO: 64.5 % (ref 42.7–76)
NEUTROPHILS NFR BLD AUTO: 65.1 % (ref 39–75)
NEUTROPHILS NFR BLD AUTO: 65.7 % (ref 39–75)
NEUTROPHILS NFR BLD AUTO: 65.8 % (ref 39–75)
NEUTROPHILS NFR BLD AUTO: 66.4 % (ref 39–75)
NEUTROPHILS NFR BLD AUTO: 68.1 % (ref 42.7–76)
NEUTROPHILS NFR BLD AUTO: 68.6 % (ref 39–75)
NEUTROPHILS NFR BLD AUTO: 68.7 % (ref 42.7–76)
NEUTROPHILS NFR BLD AUTO: 69.3 % (ref 42.7–76)
NEUTROPHILS NFR BLD AUTO: 69.3 % (ref 42.7–76)
NEUTROPHILS NFR BLD AUTO: 70.3 % (ref 42.7–76)
NEUTROPHILS NFR BLD AUTO: 70.7 % (ref 39–75)
NEUTROPHILS NFR BLD AUTO: 70.7 % (ref 42.7–76)
NEUTROPHILS NFR BLD AUTO: 71.6 % (ref 42.7–76)
NEUTROPHILS NFR BLD AUTO: 71.7 % (ref 42.7–76)
NEUTROPHILS NFR BLD AUTO: 71.9 % (ref 42.7–76)
NEUTROPHILS NFR BLD AUTO: 74.1 % (ref 42.7–76)
NEUTROPHILS NFR BLD AUTO: 75.1 % (ref 42.7–76)
NEUTROPHILS NFR BLD AUTO: 75.3 % (ref 42.7–76)
NEUTROPHILS NFR BLD AUTO: 80.5 % (ref 42.7–76)
NEUTROPHILS NFR BLD AUTO: 80.8 % (ref 42.7–76)
NEUTROPHILS NFR BLD AUTO: 81 % (ref 39–75)
NEUTROPHILS NFR BLD AUTO: 85 % (ref 39–75)
NEUTROPHILS NFR BLD AUTO: 88.4 % (ref 42.7–76)
NEUTROPHILS NFR BLD MANUAL: 50 % (ref 42.7–76)
NEUTROPHILS NFR BLD MANUAL: 74 % (ref 42.7–76)
NEUTROPHILS NFR BLD MANUAL: 75 % (ref 42.7–76)
NEUTROPHILS NFR BLD MANUAL: 77 % (ref 42.7–76)
NEUTROPHILS NFR BLD MANUAL: 80 % (ref 42.7–76)
NEUTROPHILS NFR BLD MANUAL: 89 % (ref 42.7–76)
NEUTS BAND NFR BLD MANUAL: 10 % (ref 0–5)
NITRITE UR QL STRIP: NEGATIVE
NRBC BLD MANUAL-RTO: 0 /100 WBC (ref 0–0)
NRBC BLD MANUAL-RTO: 0.3 /100 WBC (ref 0–0)
NRBC BLD MANUAL-RTO: 0.3 /100 WBC (ref 0–0)
NRBC BLD MANUAL-RTO: 0.4 /100 WBC (ref 0–0)
NRBC BLD MANUAL-RTO: 0.5 /100 WBC (ref 0–0)
NRBC BLD MANUAL-RTO: 0.6 /100 WBC (ref 0–0)
NRBC BLD MANUAL-RTO: 0.7 /100 WBC (ref 0–0)
NRBC BLD MANUAL-RTO: 0.8 /100 WBC (ref 0–0)
NRBC BLD MANUAL-RTO: 0.9 /100 WBC (ref 0–0)
NRBC BLD MANUAL-RTO: 0.9 /100 WBC (ref 0–0)
NRBC BLD MANUAL-RTO: 1 /100 WBC (ref 0–0)
NRBC BLD MANUAL-RTO: 1.1 /100 WBC (ref 0–0)
NRBC BLD MANUAL-RTO: 1.2 /100 WBC (ref 0–0)
NRBC BLD MANUAL-RTO: 2.6 /100 WBC (ref 0–0)
NRBC BLD MANUAL-RTO: 2.7 /100 WBC (ref 0–0)
NRBC BLD MANUAL-RTO: 3.9 /100 WBC (ref 0–0)
NRBC SPEC MANUAL: 1 /100 WBC (ref 0–0)
NRBC SPEC MANUAL: 1 /100 WBC (ref 0–0)
NRBC SPEC MANUAL: 2 /100 WBC (ref 0–0)
NRBC SPEC MANUAL: 3 /100 WBC (ref 0–0)
NRBC SPEC MANUAL: 4 /100 WBC (ref 0–0)
NT-PROBNP SERPL-MCNC: 1288 PG/ML (ref 0–1800)
OSMOLALITY UR: 185 MOSM/KG
OVALOCYTES BLD QL SMEAR: ABNORMAL
PATH REPORT.FINAL DX SPEC: NORMAL
PATH REPORT.GROSS SPEC: NORMAL
PH UR STRIP.AUTO: 5.5 [PH] (ref 5–8)
PH UR STRIP.AUTO: 6 [PH] (ref 5–8)
PH UR STRIP.AUTO: <=5 [PH] (ref 5–8)
PHOSPHATE SERPL-MCNC: 2.4 MG/DL (ref 2.5–4.5)
PHOSPHATE SERPL-MCNC: 2.5 MG/DL (ref 2.5–4.5)
PHOSPHATE SERPL-MCNC: 2.9 MG/DL (ref 2.5–4.5)
PHOSPHATE SERPL-MCNC: 2.9 MG/DL (ref 2.5–4.5)
PHOSPHATE SERPL-MCNC: 3 MG/DL (ref 2.5–4.5)
PHOSPHATE SERPL-MCNC: 3.1 MG/DL (ref 2.5–4.5)
PHOSPHATE SERPL-MCNC: 3.1 MG/DL (ref 2.5–4.5)
PHOSPHATE SERPL-MCNC: 3.3 MG/DL (ref 2.5–4.5)
PHOSPHATE SERPL-MCNC: 3.3 MG/DL (ref 2.5–4.5)
PHOSPHATE SERPL-MCNC: 3.4 MG/DL (ref 2.5–4.5)
PHOSPHATE SERPL-MCNC: 3.4 MG/DL (ref 2.5–4.5)
PHOSPHATE SERPL-MCNC: 3.6 MG/DL (ref 2.5–4.5)
PLAT MORPH BLD: NORMAL
PLATELET # BLD AUTO: 173 10*3/MM3 (ref 140–500)
PLATELET # BLD AUTO: 175 10*3/MM3 (ref 150–375)
PLATELET # BLD AUTO: 176 10*3/MM3 (ref 140–500)
PLATELET # BLD AUTO: 181 10*3/MM3 (ref 140–500)
PLATELET # BLD AUTO: 182 10*3/MM3 (ref 150–375)
PLATELET # BLD AUTO: 190 10*3/MM3 (ref 140–500)
PLATELET # BLD AUTO: 201 10*3/MM3 (ref 140–500)
PLATELET # BLD AUTO: 205 10*3/MM3 (ref 140–500)
PLATELET # BLD AUTO: 205 10*3/MM3 (ref 140–500)
PLATELET # BLD AUTO: 211 10*3/MM3 (ref 140–500)
PLATELET # BLD AUTO: 223 10*3/MM3 (ref 140–500)
PLATELET # BLD AUTO: 253 10*3/MM3 (ref 150–375)
PLATELET # BLD AUTO: 260 10*3/MM3 (ref 150–375)
PLATELET # BLD AUTO: 261 10*3/MM3 (ref 140–500)
PLATELET # BLD AUTO: 264 10*3/MM3 (ref 140–500)
PLATELET # BLD AUTO: 266 10*3/MM3 (ref 140–500)
PLATELET # BLD AUTO: 273 10*3/MM3 (ref 150–375)
PLATELET # BLD AUTO: 282 10*3/MM3 (ref 140–500)
PLATELET # BLD AUTO: 290 10*3/MM3 (ref 140–500)
PLATELET # BLD AUTO: 290 10*3/MM3 (ref 140–500)
PLATELET # BLD AUTO: 293 10*3/MM3 (ref 150–375)
PLATELET # BLD AUTO: 294 10*3/MM3 (ref 150–375)
PLATELET # BLD AUTO: 295 10*3/MM3 (ref 140–500)
PLATELET # BLD AUTO: 297 10*3/MM3 (ref 140–500)
PLATELET # BLD AUTO: 299 10*3/MM3 (ref 140–500)
PLATELET # BLD AUTO: 300 10*3/MM3 (ref 140–500)
PLATELET # BLD AUTO: 314 10*3/MM3 (ref 140–500)
PLATELET # BLD AUTO: 316 10*3/MM3 (ref 140–500)
PLATELET # BLD AUTO: 317 10*3/MM3 (ref 140–500)
PLATELET # BLD AUTO: 317 10*3/MM3 (ref 140–500)
PLATELET # BLD AUTO: 324 10*3/MM3 (ref 140–500)
PLATELET # BLD AUTO: 325 10*3/MM3 (ref 140–500)
PLATELET # BLD AUTO: 330 10*3/MM3 (ref 140–500)
PLATELET # BLD AUTO: 331 10*3/MM3 (ref 140–500)
PLATELET # BLD AUTO: 331 10*3/MM3 (ref 140–500)
PLATELET # BLD AUTO: 332 10*3/MM3 (ref 140–500)
PLATELET # BLD AUTO: 337 10*3/MM3 (ref 140–500)
PLATELET # BLD AUTO: 340 10*3/MM3 (ref 140–500)
PLATELET # BLD AUTO: 349 10*3/MM3 (ref 140–500)
PLATELET # BLD AUTO: 351 10*3/MM3 (ref 140–500)
PLATELET # BLD AUTO: 357 10*3/MM3 (ref 140–500)
PLATELET # BLD AUTO: 362 10*3/MM3 (ref 150–375)
PLATELET # BLD AUTO: 366 10*3/MM3 (ref 140–500)
PMV BLD AUTO: 10 FL (ref 6–12)
PMV BLD AUTO: 10.2 FL (ref 6–12)
PMV BLD AUTO: 10.4 FL (ref 6–12)
PMV BLD AUTO: 10.4 FL (ref 8.9–12.1)
PMV BLD AUTO: 10.5 FL (ref 6–12)
PMV BLD AUTO: 10.5 FL (ref 8.9–12.1)
PMV BLD AUTO: 10.6 FL (ref 6–12)
PMV BLD AUTO: 10.6 FL (ref 6–12)
PMV BLD AUTO: 10.7 FL (ref 8.9–12.1)
PMV BLD AUTO: 10.8 FL (ref 6–12)
PMV BLD AUTO: 10.9 FL (ref 6–12)
PMV BLD AUTO: 11 FL (ref 6–12)
PMV BLD AUTO: 11 FL (ref 8.9–12.1)
PMV BLD AUTO: 11.4 FL (ref 6–12)
PMV BLD AUTO: 9 FL (ref 6–12)
PMV BLD AUTO: 9.4 FL (ref 6–12)
PMV BLD AUTO: 9.4 FL (ref 6–12)
PMV BLD AUTO: 9.5 FL (ref 6–12)
PMV BLD AUTO: 9.5 FL (ref 8.9–12.1)
PMV BLD AUTO: 9.6 FL (ref 6–12)
PMV BLD AUTO: 9.7 FL (ref 6–12)
PMV BLD AUTO: 9.8 FL (ref 6–12)
PMV BLD AUTO: 9.8 FL (ref 8.9–12.1)
PMV BLD AUTO: 9.8 FL (ref 8.9–12.1)
PMV BLD AUTO: 9.9 FL (ref 6–12)
PMV BLD AUTO: 9.9 FL (ref 8.9–12.1)
POLYCHROMASIA BLD QL SMEAR: ABNORMAL
POLYCHROMASIA BLD QL SMEAR: ABNORMAL
POLYCHROMASIA BLD QL SMEAR: NORMAL
POTASSIUM BLD-SCNC: 3.6 MMOL/L (ref 3.5–5.2)
POTASSIUM BLD-SCNC: 3.7 MMOL/L (ref 3.5–5.2)
POTASSIUM BLD-SCNC: 3.7 MMOL/L (ref 3.5–5.2)
POTASSIUM BLD-SCNC: 3.8 MMOL/L (ref 3.5–5.2)
POTASSIUM BLD-SCNC: 3.9 MMOL/L (ref 3.5–5.2)
POTASSIUM BLD-SCNC: 4 MMOL/L (ref 3.5–5.2)
POTASSIUM BLD-SCNC: 4.1 MMOL/L (ref 3.5–5.2)
POTASSIUM BLD-SCNC: 4.2 MMOL/L (ref 3.5–5.2)
POTASSIUM BLD-SCNC: 4.2 MMOL/L (ref 3.5–5.2)
POTASSIUM BLD-SCNC: 4.3 MMOL/L (ref 3.5–5.2)
POTASSIUM BLD-SCNC: 4.3 MMOL/L (ref 3.5–5.2)
POTASSIUM BLD-SCNC: 4.4 MMOL/L (ref 3.5–5.2)
POTASSIUM BLD-SCNC: 4.5 MMOL/L (ref 3.5–4.7)
POTASSIUM BLD-SCNC: 4.5 MMOL/L (ref 3.5–5.2)
POTASSIUM BLD-SCNC: 4.5 MMOL/L (ref 3.5–5.2)
POTASSIUM BLD-SCNC: 4.6 MMOL/L (ref 3.5–4.7)
POTASSIUM BLD-SCNC: 4.6 MMOL/L (ref 3.5–5.2)
POTASSIUM BLD-SCNC: 4.7 MMOL/L (ref 3.5–4.7)
POTASSIUM BLD-SCNC: 4.7 MMOL/L (ref 3.5–5.2)
POTASSIUM BLD-SCNC: 4.7 MMOL/L (ref 3.5–5.2)
POTASSIUM BLD-SCNC: 4.9 MMOL/L (ref 3.5–5.2)
POTASSIUM BLD-SCNC: 5 MMOL/L (ref 3.5–5.2)
POTASSIUM BLD-SCNC: 5 MMOL/L (ref 3.5–5.2)
POTASSIUM BLD-SCNC: 5.1 MMOL/L (ref 3.5–4.7)
POTASSIUM BLD-SCNC: 5.1 MMOL/L (ref 3.5–5.2)
POTASSIUM BLD-SCNC: 5.2 MMOL/L (ref 3.5–4.7)
POTASSIUM BLD-SCNC: 5.3 MMOL/L (ref 3.5–5.2)
POTASSIUM BLD-SCNC: 5.6 MMOL/L (ref 3.5–5.2)
POTASSIUM BLD-SCNC: 6.7 MMOL/L (ref 3.5–5.2)
PROT SERPL-MCNC: 4 G/DL (ref 6–8.5)
PROT SERPL-MCNC: 4.3 G/DL (ref 6–8.5)
PROT SERPL-MCNC: 4.5 G/DL (ref 6–8.5)
PROT SERPL-MCNC: 5.1 G/DL (ref 6.3–8)
PROT SERPL-MCNC: 5.2 G/DL (ref 6.3–8)
PROT SERPL-MCNC: 5.2 G/DL (ref 6–8.5)
PROT SERPL-MCNC: 5.4 G/DL (ref 6.3–8)
PROT SERPL-MCNC: 5.4 G/DL (ref 6–8.5)
PROT SERPL-MCNC: 5.5 G/DL (ref 6.3–8)
PROT SERPL-MCNC: 5.9 G/DL (ref 6–8.5)
PROT SERPL-MCNC: 6 G/DL (ref 6.3–8)
PROT SERPL-MCNC: 6 G/DL (ref 6–8.5)
PROT SERPL-MCNC: 6.3 G/DL (ref 6–8.5)
PROT SERPL-MCNC: 6.4 G/DL (ref 6–8.5)
PROT SERPL-MCNC: 6.5 G/DL (ref 6–8.5)
PROT SERPL-MCNC: 6.6 G/DL (ref 6–8.5)
PROT SERPL-MCNC: 6.6 G/DL (ref 6–8.5)
PROT SERPL-MCNC: 6.7 G/DL (ref 6–8.5)
PROT SERPL-MCNC: 6.8 G/DL (ref 6–8.5)
PROT SERPL-MCNC: 7.1 G/DL (ref 6–8.5)
PROT UR QL STRIP: ABNORMAL
PROTHROMBIN TIME: 13.7 SECONDS (ref 12.8–15.2)
PROTHROMBIN TIME: 14.2 SECONDS (ref 11.7–14.2)
PROTHROMBIN TIME: 14.5 SECONDS (ref 11.7–14.2)
PROTHROMBIN TIME: 15.4 SECONDS (ref 11.7–14.2)
PROTHROMBIN TIME: 15.8 SECONDS (ref 11.7–14.2)
RBC # BLD AUTO: 2.48 10*6/MM3 (ref 3.9–5.2)
RBC # BLD AUTO: 2.63 10*6/MM3 (ref 3.9–5.2)
RBC # BLD AUTO: 2.64 10*6/MM3 (ref 3.9–5.2)
RBC # BLD AUTO: 2.7 10*6/MM3 (ref 3.9–5.2)
RBC # BLD AUTO: 2.74 10*6/MM3 (ref 3.9–5.2)
RBC # BLD AUTO: 2.75 10*6/MM3 (ref 3.9–5)
RBC # BLD AUTO: 2.75 10*6/MM3 (ref 3.9–5.2)
RBC # BLD AUTO: 2.76 10*6/MM3 (ref 3.9–5.2)
RBC # BLD AUTO: 2.78 10*6/MM3 (ref 3.9–5.2)
RBC # BLD AUTO: 2.81 10*6/MM3 (ref 3.9–5.2)
RBC # BLD AUTO: 2.81 10*6/MM3 (ref 3.9–5.2)
RBC # BLD AUTO: 2.83 10*6/MM3 (ref 3.9–5)
RBC # BLD AUTO: 2.83 10*6/MM3 (ref 3.9–5)
RBC # BLD AUTO: 2.83 10*6/MM3 (ref 3.9–5.2)
RBC # BLD AUTO: 2.85 10*6/MM3 (ref 3.9–5.2)
RBC # BLD AUTO: 2.86 10*6/MM3 (ref 3.9–5.2)
RBC # BLD AUTO: 2.87 10*6/MM3 (ref 3.9–5.2)
RBC # BLD AUTO: 2.87 10*6/MM3 (ref 3.9–5.2)
RBC # BLD AUTO: 2.91 10*6/MM3 (ref 3.9–5.2)
RBC # BLD AUTO: 2.91 10*6/MM3 (ref 3.9–5.2)
RBC # BLD AUTO: 2.95 10*6/MM3 (ref 3.9–5.2)
RBC # BLD AUTO: 2.97 10*6/MM3 (ref 3.9–5.2)
RBC # BLD AUTO: 2.97 10*6/MM3 (ref 3.9–5.2)
RBC # BLD AUTO: 2.99 10*6/MM3 (ref 3.9–5.2)
RBC # BLD AUTO: 3 10*6/MM3 (ref 3.9–5.2)
RBC # BLD AUTO: 3.05 10*6/MM3 (ref 3.9–5.2)
RBC # BLD AUTO: 3.05 10*6/MM3 (ref 3.9–5.2)
RBC # BLD AUTO: 3.07 10*6/MM3 (ref 3.9–5.2)
RBC # BLD AUTO: 3.13 10*6/MM3 (ref 3.9–5.2)
RBC # BLD AUTO: 3.13 10*6/MM3 (ref 3.9–5.2)
RBC # BLD AUTO: 3.19 10*6/MM3 (ref 3.9–5.2)
RBC # BLD AUTO: 3.22 10*6/MM3 (ref 3.9–5.2)
RBC # BLD AUTO: 3.23 10*6/MM3 (ref 3.9–5.2)
RBC # BLD AUTO: 3.33 10*6/MM3 (ref 3.9–5.2)
RBC # BLD AUTO: 3.37 10*6/MM3 (ref 3.9–5)
RBC # BLD AUTO: 3.44 10*6/MM3 (ref 3.9–5)
RBC # BLD AUTO: 3.45 10*6/MM3 (ref 3.9–5.2)
RBC # BLD AUTO: 3.52 10*6/MM3 (ref 3.9–5.2)
RBC # BLD AUTO: 3.57 10*6/MM3 (ref 3.9–5)
RBC # BLD AUTO: 3.69 10*6/MM3 (ref 3.9–5)
RBC # BLD AUTO: 3.79 10*6/MM3 (ref 3.9–5.2)
RBC # BLD AUTO: 3.8 10*6/MM3 (ref 3.9–5.2)
RBC # BLD AUTO: 3.96 10*6/MM3 (ref 3.9–5)
RBC # UR: ABNORMAL /HPF
REF LAB TEST METHOD: ABNORMAL
RH BLD: POSITIVE
SCAN SLIDE: NORMAL
SCHISTOCYTES BLD QL SMEAR: ABNORMAL
SCHISTOCYTES BLD QL SMEAR: NORMAL
SODIUM BLD-SCNC: 138 MMOL/L (ref 136–145)
SODIUM BLD-SCNC: 139 MMOL/L (ref 134–145)
SODIUM BLD-SCNC: 139 MMOL/L (ref 136–145)
SODIUM BLD-SCNC: 139 MMOL/L (ref 136–145)
SODIUM BLD-SCNC: 140 MMOL/L (ref 134–145)
SODIUM BLD-SCNC: 140 MMOL/L (ref 136–145)
SODIUM BLD-SCNC: 140 MMOL/L (ref 136–145)
SODIUM BLD-SCNC: 141 MMOL/L (ref 136–145)
SODIUM BLD-SCNC: 142 MMOL/L (ref 136–145)
SODIUM BLD-SCNC: 143 MMOL/L (ref 134–145)
SODIUM BLD-SCNC: 143 MMOL/L (ref 136–145)
SODIUM BLD-SCNC: 143 MMOL/L (ref 136–145)
SODIUM BLD-SCNC: 145 MMOL/L (ref 136–145)
SODIUM BLD-SCNC: 145 MMOL/L (ref 136–145)
SODIUM BLD-SCNC: 146 MMOL/L (ref 136–145)
SODIUM BLD-SCNC: 146 MMOL/L (ref 136–145)
SODIUM BLD-SCNC: 147 MMOL/L (ref 134–145)
SODIUM BLD-SCNC: 147 MMOL/L (ref 136–145)
SODIUM BLD-SCNC: 148 MMOL/L (ref 136–145)
SODIUM BLD-SCNC: 149 MMOL/L (ref 136–145)
SODIUM BLD-SCNC: 150 MMOL/L (ref 136–145)
SODIUM BLD-SCNC: 152 MMOL/L (ref 136–145)
SODIUM BLD-SCNC: 153 MMOL/L (ref 136–145)
SODIUM BLD-SCNC: 154 MMOL/L (ref 136–145)
SODIUM BLD-SCNC: 155 MMOL/L (ref 134–145)
SODIUM BLD-SCNC: 156 MMOL/L (ref 136–145)
SODIUM BLD-SCNC: 156 MMOL/L (ref 136–145)
SODIUM BLD-SCNC: 157 MMOL/L (ref 136–145)
SODIUM BLD-SCNC: 158 MMOL/L (ref 136–145)
SODIUM BLD-SCNC: 159 MMOL/L (ref 136–145)
SODIUM UR-SCNC: <20 MMOL/L
SP GR UR STRIP: 1.01 (ref 1–1.03)
SP GR UR STRIP: 1.02 (ref 1–1.03)
SPHEROCYTES BLD QL SMEAR: ABNORMAL
SPHEROCYTES BLD QL SMEAR: ABNORMAL
SQUAMOUS #/AREA URNS HPF: ABNORMAL /HPF
STOMATOCYTES BLD QL SMEAR: NORMAL
TIBC SERPL-MCNC: 146 MCG/DL (ref 298–536)
TIBC SERPL-MCNC: 341 MCG/DL (ref 298–536)
TRANSFERRIN SERPL-MCNC: 229 MG/DL (ref 200–360)
TRANSFERRIN SERPL-MCNC: 98 MG/DL (ref 200–360)
TROPONIN T SERPL-MCNC: <0.01 NG/ML (ref 0–0.03)
UNIT  ABO: NORMAL
UNIT  RH: NORMAL
UPPER ARTERIAL RIGHT ARM BRACHIAL SYS MAX: 129 MMHG
UROBILINOGEN UR QL STRIP: ABNORMAL
VARIANT LYMPHS NFR BLD MANUAL: 3 % (ref 0–5)
VIT B12 BLD-MCNC: 656 PG/ML (ref 211–946)
VIT B12 BLD-MCNC: >2000 PG/ML (ref 211–946)
WBC MORPH BLD: NORMAL
WBC NRBC COR # BLD: 1.91 10*3/MM3 (ref 4–10)
WBC NRBC COR # BLD: 11.02 10*3/MM3 (ref 4–10)
WBC NRBC COR # BLD: 2.67 10*3/MM3 (ref 4.5–10.7)
WBC NRBC COR # BLD: 2.85 10*3/MM3 (ref 4.5–10.7)
WBC NRBC COR # BLD: 3.79 10*3/MM3 (ref 4.5–10.7)
WBC NRBC COR # BLD: 4.15 10*3/MM3 (ref 4.5–10.7)
WBC NRBC COR # BLD: 4.15 10*3/MM3 (ref 4–10)
WBC NRBC COR # BLD: 4.25 10*3/MM3 (ref 4.5–10.7)
WBC NRBC COR # BLD: 4.25 10*3/MM3 (ref 4.5–10.7)
WBC NRBC COR # BLD: 4.31 10*3/MM3 (ref 4.5–10.7)
WBC NRBC COR # BLD: 4.41 10*3/MM3 (ref 4.5–10.7)
WBC NRBC COR # BLD: 4.56 10*3/MM3 (ref 4.5–10.7)
WBC NRBC COR # BLD: 4.58 10*3/MM3 (ref 4.5–10.7)
WBC NRBC COR # BLD: 4.79 10*3/MM3 (ref 4.5–10.7)
WBC NRBC COR # BLD: 4.82 10*3/MM3 (ref 4.5–10.7)
WBC NRBC COR # BLD: 5.01 10*3/MM3 (ref 4.5–10.7)
WBC NRBC COR # BLD: 5.09 10*3/MM3 (ref 4.5–10.7)
WBC NRBC COR # BLD: 5.26 10*3/MM3 (ref 4.5–10.7)
WBC NRBC COR # BLD: 5.27 10*3/MM3 (ref 4.5–10.7)
WBC NRBC COR # BLD: 5.65 10*3/MM3 (ref 4.5–10.7)
WBC NRBC COR # BLD: 5.77 10*3/MM3 (ref 4–10)
WBC NRBC COR # BLD: 5.83 10*3/MM3 (ref 4.5–10.7)
WBC NRBC COR # BLD: 5.95 10*3/MM3 (ref 4.5–10.7)
WBC NRBC COR # BLD: 6.01 10*3/MM3 (ref 4.5–10.7)
WBC NRBC COR # BLD: 6.1 10*3/MM3 (ref 4–10)
WBC NRBC COR # BLD: 6.22 10*3/MM3 (ref 4.5–10.7)
WBC NRBC COR # BLD: 6.26 10*3/MM3 (ref 4–10)
WBC NRBC COR # BLD: 6.35 10*3/MM3 (ref 4.5–10.7)
WBC NRBC COR # BLD: 6.4 10*3/MM3 (ref 4.5–10.7)
WBC NRBC COR # BLD: 6.6 10*3/MM3 (ref 4.5–10.7)
WBC NRBC COR # BLD: 6.65 10*3/MM3 (ref 4.5–10.7)
WBC NRBC COR # BLD: 6.91 10*3/MM3 (ref 4.5–10.7)
WBC NRBC COR # BLD: 7.03 10*3/MM3 (ref 4.5–10.7)
WBC NRBC COR # BLD: 7.04 10*3/MM3 (ref 4.5–10.7)
WBC NRBC COR # BLD: 7.36 10*3/MM3 (ref 4.5–10.7)
WBC NRBC COR # BLD: 7.36 10*3/MM3 (ref 4.5–10.7)
WBC NRBC COR # BLD: 7.47 10*3/MM3 (ref 4–10)
WBC NRBC COR # BLD: 7.5 10*3/MM3 (ref 4.5–10.7)
WBC NRBC COR # BLD: 7.62 10*3/MM3 (ref 4.5–10.7)
WBC NRBC COR # BLD: 7.72 10*3/MM3 (ref 4.5–10.7)
WBC NRBC COR # BLD: 8.14 10*3/MM3 (ref 4.5–10.7)
WBC NRBC COR # BLD: 8.23 10*3/MM3 (ref 4.5–10.7)
WBC NRBC COR # BLD: 8.26 10*3/MM3 (ref 4–10)
WBC UR QL AUTO: ABNORMAL /HPF

## 2017-01-01 PROCEDURE — 36430 TRANSFUSION BLD/BLD COMPNT: CPT

## 2017-01-01 PROCEDURE — 85007 BL SMEAR W/DIFF WBC COUNT: CPT | Performed by: INTERNAL MEDICINE

## 2017-01-01 PROCEDURE — 77290 THER RAD SIMULAJ FIELD CPLX: CPT | Performed by: RADIOLOGY

## 2017-01-01 PROCEDURE — 82746 ASSAY OF FOLIC ACID SERUM: CPT | Performed by: INTERNAL MEDICINE

## 2017-01-01 PROCEDURE — 85025 COMPLETE CBC W/AUTO DIFF WBC: CPT | Performed by: INTERNAL MEDICINE

## 2017-01-01 PROCEDURE — 86901 BLOOD TYPING SEROLOGIC RH(D): CPT | Performed by: NURSE PRACTITIONER

## 2017-01-01 PROCEDURE — 96365 THER/PROPH/DIAG IV INF INIT: CPT | Performed by: INTERNAL MEDICINE

## 2017-01-01 PROCEDURE — 83735 ASSAY OF MAGNESIUM: CPT | Performed by: INTERNAL MEDICINE

## 2017-01-01 PROCEDURE — G0463 HOSPITAL OUTPT CLINIC VISIT: HCPCS | Performed by: NURSE PRACTITIONER

## 2017-01-01 PROCEDURE — 99231 SBSQ HOSP IP/OBS SF/LOW 25: CPT | Performed by: INTERNAL MEDICINE

## 2017-01-01 PROCEDURE — 63710000001 PREDNISONE PER 5 MG: Performed by: INTERNAL MEDICINE

## 2017-01-01 PROCEDURE — 99214 OFFICE O/P EST MOD 30 MIN: CPT | Performed by: NURSE PRACTITIONER

## 2017-01-01 PROCEDURE — 25010000002 ZOLEDRONIC ACID PER 1 MG: Performed by: INTERNAL MEDICINE

## 2017-01-01 PROCEDURE — 0W9G3ZZ DRAINAGE OF PERITONEAL CAVITY, PERCUTANEOUS APPROACH: ICD-10-PCS | Performed by: RADIOLOGY

## 2017-01-01 PROCEDURE — 25010000002 HEPARIN (PORCINE) PER 1000 UNITS: Performed by: INTERNAL MEDICINE

## 2017-01-01 PROCEDURE — 84484 ASSAY OF TROPONIN QUANT: CPT | Performed by: EMERGENCY MEDICINE

## 2017-01-01 PROCEDURE — 25010000002 DIPHENHYDRAMINE PER 50 MG: Performed by: NURSE PRACTITIONER

## 2017-01-01 PROCEDURE — 80053 COMPREHEN METABOLIC PANEL: CPT

## 2017-01-01 PROCEDURE — 77336 RADIATION PHYSICS CONSULT: CPT | Performed by: RADIOLOGY

## 2017-01-01 PROCEDURE — 25010000002 ONDANSETRON PER 1 MG: Performed by: INTERNAL MEDICINE

## 2017-01-01 PROCEDURE — 80053 COMPREHEN METABOLIC PANEL: CPT | Performed by: INTERNAL MEDICINE

## 2017-01-01 PROCEDURE — 63710000001 DIPHENHYDRAMINE PER 50 MG: Performed by: NURSE PRACTITIONER

## 2017-01-01 PROCEDURE — 76942 ECHO GUIDE FOR BIOPSY: CPT

## 2017-01-01 PROCEDURE — 86923 COMPATIBILITY TEST ELECTRIC: CPT

## 2017-01-01 PROCEDURE — 97162 PT EVAL MOD COMPLEX 30 MIN: CPT

## 2017-01-01 PROCEDURE — 36416 COLLJ CAPILLARY BLOOD SPEC: CPT | Performed by: INTERNAL MEDICINE

## 2017-01-01 PROCEDURE — P9016 RBC LEUKOCYTES REDUCED: HCPCS

## 2017-01-01 PROCEDURE — 84466 ASSAY OF TRANSFERRIN: CPT | Performed by: INTERNAL MEDICINE

## 2017-01-01 PROCEDURE — 36415 COLL VENOUS BLD VENIPUNCTURE: CPT | Performed by: INTERNAL MEDICINE

## 2017-01-01 PROCEDURE — 77412 RADIATION TX DELIVERY LVL 3: CPT | Performed by: RADIOLOGY

## 2017-01-01 PROCEDURE — 77334 RADIATION TREATMENT AID(S): CPT | Performed by: RADIOLOGY

## 2017-01-01 PROCEDURE — A9503 TC99M MEDRONATE: HCPCS | Performed by: INTERNAL MEDICINE

## 2017-01-01 PROCEDURE — 85730 THROMBOPLASTIN TIME PARTIAL: CPT | Performed by: EMERGENCY MEDICINE

## 2017-01-01 PROCEDURE — 80048 BASIC METABOLIC PNL TOTAL CA: CPT | Performed by: INTERNAL MEDICINE

## 2017-01-01 PROCEDURE — 25010000002 ONDANSETRON PER 1 MG: Performed by: HOSPITALIST

## 2017-01-01 PROCEDURE — 99215 OFFICE O/P EST HI 40 MIN: CPT | Performed by: INTERNAL MEDICINE

## 2017-01-01 PROCEDURE — 85610 PROTHROMBIN TIME: CPT | Performed by: INTERNAL MEDICINE

## 2017-01-01 PROCEDURE — 86000 AGGLUTININS FEBRILE ANTIGEN: CPT

## 2017-01-01 PROCEDURE — BT14ZZZ FLUOROSCOPY OF KIDNEYS, URETERS AND BLADDER: ICD-10-PCS | Performed by: UROLOGY

## 2017-01-01 PROCEDURE — 86850 RBC ANTIBODY SCREEN: CPT

## 2017-01-01 PROCEDURE — 87046 STOOL CULTR AEROBIC BACT EA: CPT | Performed by: INTERNAL MEDICINE

## 2017-01-01 PROCEDURE — 81001 URINALYSIS AUTO W/SCOPE: CPT | Performed by: INTERNAL MEDICINE

## 2017-01-01 PROCEDURE — 77280 THER RAD SIMULAJ FIELD SMPL: CPT | Performed by: RADIOLOGY

## 2017-01-01 PROCEDURE — 96372 THER/PROPH/DIAG INJ SC/IM: CPT

## 2017-01-01 PROCEDURE — C8929 TTE W OR WO FOL WCON,DOPPLER: HCPCS

## 2017-01-01 PROCEDURE — 93970 EXTREMITY STUDY: CPT

## 2017-01-01 PROCEDURE — 80069 RENAL FUNCTION PANEL: CPT | Performed by: INTERNAL MEDICINE

## 2017-01-01 PROCEDURE — 82550 ASSAY OF CK (CPK): CPT | Performed by: EMERGENCY MEDICINE

## 2017-01-01 PROCEDURE — 87040 BLOOD CULTURE FOR BACTERIA: CPT | Performed by: EMERGENCY MEDICINE

## 2017-01-01 PROCEDURE — 73030 X-RAY EXAM OF SHOULDER: CPT

## 2017-01-01 PROCEDURE — 25010000002 ENOXAPARIN PER 10 MG: Performed by: INTERNAL MEDICINE

## 2017-01-01 PROCEDURE — 0 TECHNETIUM MEDRONATE KIT: Performed by: INTERNAL MEDICINE

## 2017-01-01 PROCEDURE — 78306 BONE IMAGING WHOLE BODY: CPT

## 2017-01-01 PROCEDURE — 25010000002 DEXAMETHASONE PER 1 MG: Performed by: INTERNAL MEDICINE

## 2017-01-01 PROCEDURE — 77014 CHG CT GUIDANCE RADIATION THERAPY FLDS PLACEMENT: CPT | Performed by: RADIOLOGY

## 2017-01-01 PROCEDURE — 76700 US EXAM ABDOM COMPLETE: CPT

## 2017-01-01 PROCEDURE — 25010000002 GEMCITABINE 200 MG/5.26ML SOLUTION 26.3 ML VIAL: Performed by: INTERNAL MEDICINE

## 2017-01-01 PROCEDURE — 85025 COMPLETE CBC W/AUTO DIFF WBC: CPT

## 2017-01-01 PROCEDURE — 99232 SBSQ HOSP IP/OBS MODERATE 35: CPT | Performed by: INTERNAL MEDICINE

## 2017-01-01 PROCEDURE — 93010 ELECTROCARDIOGRAM REPORT: CPT | Performed by: INTERNAL MEDICINE

## 2017-01-01 PROCEDURE — 96413 CHEMO IV INFUSION 1 HR: CPT | Performed by: NURSE PRACTITIONER

## 2017-01-01 PROCEDURE — 63510000001 EPOETIN ALFA PER 1000 UNITS: Performed by: INTERNAL MEDICINE

## 2017-01-01 PROCEDURE — 36415 COLL VENOUS BLD VENIPUNCTURE: CPT

## 2017-01-01 PROCEDURE — 85610 PROTHROMBIN TIME: CPT | Performed by: EMERGENCY MEDICINE

## 2017-01-01 PROCEDURE — 25010000002 LEVOFLOXACIN PER 250 MG: Performed by: NURSE PRACTITIONER

## 2017-01-01 PROCEDURE — 81001 URINALYSIS AUTO W/SCOPE: CPT | Performed by: EMERGENCY MEDICINE

## 2017-01-01 PROCEDURE — 84100 ASSAY OF PHOSPHORUS: CPT | Performed by: INTERNAL MEDICINE

## 2017-01-01 PROCEDURE — 0 DIATRIZOATE MEGLUMINE & SODIUM PER 1 ML: Performed by: INTERNAL MEDICINE

## 2017-01-01 PROCEDURE — A9577 INJ MULTIHANCE: HCPCS | Performed by: INTERNAL MEDICINE

## 2017-01-01 PROCEDURE — 80053 COMPREHEN METABOLIC PANEL: CPT | Performed by: HOSPITALIST

## 2017-01-01 PROCEDURE — 82436 ASSAY OF URINE CHLORIDE: CPT | Performed by: INTERNAL MEDICINE

## 2017-01-01 PROCEDURE — 0 GADOBENATE DIMEGLUMINE 529 MG/ML SOLUTION: Performed by: INTERNAL MEDICINE

## 2017-01-01 PROCEDURE — 96361 HYDRATE IV INFUSION ADD-ON: CPT | Performed by: NURSE PRACTITIONER

## 2017-01-01 PROCEDURE — 83690 ASSAY OF LIPASE: CPT | Performed by: EMERGENCY MEDICINE

## 2017-01-01 PROCEDURE — 85025 COMPLETE CBC W/AUTO DIFF WBC: CPT | Performed by: NURSE PRACTITIONER

## 2017-01-01 PROCEDURE — 86900 BLOOD TYPING SEROLOGIC ABO: CPT

## 2017-01-01 PROCEDURE — 86900 BLOOD TYPING SEROLOGIC ABO: CPT | Performed by: INTERNAL MEDICINE

## 2017-01-01 PROCEDURE — 25010000002 ONDANSETRON PER 1 MG: Performed by: ANESTHESIOLOGY

## 2017-01-01 PROCEDURE — 97116 GAIT TRAINING THERAPY: CPT | Performed by: PHYSICAL THERAPIST

## 2017-01-01 PROCEDURE — 99285 EMERGENCY DEPT VISIT HI MDM: CPT

## 2017-01-01 PROCEDURE — 86300 IMMUNOASSAY TUMOR CA 15-3: CPT | Performed by: INTERNAL MEDICINE

## 2017-01-01 PROCEDURE — 25010000002 HYDROMORPHONE 1 MG/ML SOLUTION: Performed by: HOSPITALIST

## 2017-01-01 PROCEDURE — 25010000002 DEXAMETHASONE PER 1 MG: Performed by: HOSPITALIST

## 2017-01-01 PROCEDURE — 86901 BLOOD TYPING SEROLOGIC RH(D): CPT | Performed by: EMERGENCY MEDICINE

## 2017-01-01 PROCEDURE — 77427 RADIATION TX MANAGEMENT X5: CPT | Performed by: RADIOLOGY

## 2017-01-01 PROCEDURE — 25010000002 MIDAZOLAM PER 1 MG

## 2017-01-01 PROCEDURE — 83540 ASSAY OF IRON: CPT | Performed by: INTERNAL MEDICINE

## 2017-01-01 PROCEDURE — 83880 ASSAY OF NATRIURETIC PEPTIDE: CPT | Performed by: EMERGENCY MEDICINE

## 2017-01-01 PROCEDURE — 99214 OFFICE O/P EST MOD 30 MIN: CPT | Performed by: INTERNAL MEDICINE

## 2017-01-01 PROCEDURE — 83735 ASSAY OF MAGNESIUM: CPT

## 2017-01-01 PROCEDURE — 25010000002 HYDROMORPHONE PER 4 MG: Performed by: HOSPITALIST

## 2017-01-01 PROCEDURE — 81003 URINALYSIS AUTO W/O SCOPE: CPT | Performed by: EMERGENCY MEDICINE

## 2017-01-01 PROCEDURE — 86901 BLOOD TYPING SEROLOGIC RH(D): CPT | Performed by: INTERNAL MEDICINE

## 2017-01-01 PROCEDURE — 25010000002 LORAZEPAM PER 2 MG: Performed by: HOSPITALIST

## 2017-01-01 PROCEDURE — 96375 TX/PRO/DX INJ NEW DRUG ADDON: CPT | Performed by: INTERNAL MEDICINE

## 2017-01-01 PROCEDURE — 80053 COMPREHEN METABOLIC PANEL: CPT | Performed by: EMERGENCY MEDICINE

## 2017-01-01 PROCEDURE — 96375 TX/PRO/DX INJ NEW DRUG ADDON: CPT | Performed by: NURSE PRACTITIONER

## 2017-01-01 PROCEDURE — 84300 ASSAY OF URINE SODIUM: CPT | Performed by: INTERNAL MEDICINE

## 2017-01-01 PROCEDURE — 77001 FLUOROGUIDE FOR VEIN DEVICE: CPT

## 2017-01-01 PROCEDURE — C1769 GUIDE WIRE: HCPCS | Performed by: UROLOGY

## 2017-01-01 PROCEDURE — 25010000002 DEXAMETHASONE SODIUM PHOSPHATE 10 MG/ML SOLUTION 1 ML VIAL: Performed by: INTERNAL MEDICINE

## 2017-01-01 PROCEDURE — 96360 HYDRATION IV INFUSION INIT: CPT | Performed by: INTERNAL MEDICINE

## 2017-01-01 PROCEDURE — 77300 RADIATION THERAPY DOSE PLAN: CPT | Performed by: RADIOLOGY

## 2017-01-01 PROCEDURE — 86850 RBC ANTIBODY SCREEN: CPT | Performed by: NURSE PRACTITIONER

## 2017-01-01 PROCEDURE — 96365 THER/PROPH/DIAG IV INF INIT: CPT

## 2017-01-01 PROCEDURE — 97161 PT EVAL LOW COMPLEX 20 MIN: CPT | Performed by: PHYSICAL THERAPIST

## 2017-01-01 PROCEDURE — 96375 TX/PRO/DX INJ NEW DRUG ADDON: CPT

## 2017-01-01 PROCEDURE — 80048 BASIC METABOLIC PNL TOTAL CA: CPT | Performed by: HOSPITALIST

## 2017-01-01 PROCEDURE — 25010000002 PACLITAXEL PER 30 MG: Performed by: INTERNAL MEDICINE

## 2017-01-01 PROCEDURE — 25010000002 ONDANSETRON PER 1 MG: Performed by: EMERGENCY MEDICINE

## 2017-01-01 PROCEDURE — 77417 THER RADIOLOGY PORT IMAGE(S): CPT | Performed by: RADIOLOGY

## 2017-01-01 PROCEDURE — 96367 TX/PROPH/DG ADDL SEQ IV INF: CPT

## 2017-01-01 PROCEDURE — 87899 AGENT NOS ASSAY W/OPTIC: CPT | Performed by: INTERNAL MEDICINE

## 2017-01-01 PROCEDURE — 25010000002 GEMCITABINE 200 MG/5.26ML SOLUTION 5.26 ML VIAL: Performed by: INTERNAL MEDICINE

## 2017-01-01 PROCEDURE — 99203 OFFICE O/P NEW LOW 30 MIN: CPT | Performed by: RADIOLOGY

## 2017-01-01 PROCEDURE — 88305 TISSUE EXAM BY PATHOLOGIST: CPT | Performed by: INTERNAL MEDICINE

## 2017-01-01 PROCEDURE — 86850 RBC ANTIBODY SCREEN: CPT | Performed by: INTERNAL MEDICINE

## 2017-01-01 PROCEDURE — 97110 THERAPEUTIC EXERCISES: CPT

## 2017-01-01 PROCEDURE — C1758 CATHETER, URETERAL: HCPCS | Performed by: UROLOGY

## 2017-01-01 PROCEDURE — 99231 SBSQ HOSP IP/OBS SF/LOW 25: CPT | Performed by: SURGERY

## 2017-01-01 PROCEDURE — 77080 DXA BONE DENSITY AXIAL: CPT

## 2017-01-01 PROCEDURE — G6002 STEREOSCOPIC X-RAY GUIDANCE: HCPCS | Performed by: RADIOLOGY

## 2017-01-01 PROCEDURE — 82728 ASSAY OF FERRITIN: CPT | Performed by: INTERNAL MEDICINE

## 2017-01-01 PROCEDURE — 88342 IMHCHEM/IMCYTCHM 1ST ANTB: CPT | Performed by: INTERNAL MEDICINE

## 2017-01-01 PROCEDURE — 25010000002 PIPERACILLIN SOD-TAZOBACTAM PER 1 G: Performed by: INTERNAL MEDICINE

## 2017-01-01 PROCEDURE — 96361 HYDRATE IV INFUSION ADD-ON: CPT

## 2017-01-01 PROCEDURE — 77263 THER RADIOLOGY TX PLNG CPLX: CPT | Performed by: RADIOLOGY

## 2017-01-01 PROCEDURE — 25010000002 DEXAMETHASONE SODIUM PHOSPHATE 10 MG/ML SOLUTION 1 ML VIAL: Performed by: NURSE PRACTITIONER

## 2017-01-01 PROCEDURE — 71020 HC CHEST PA AND LATERAL: CPT

## 2017-01-01 PROCEDURE — G0463 HOSPITAL OUTPT CLINIC VISIT: HCPCS | Performed by: RADIOLOGY

## 2017-01-01 PROCEDURE — 93306 TTE W/DOPPLER COMPLETE: CPT | Performed by: INTERNAL MEDICINE

## 2017-01-01 PROCEDURE — 25010000002 FENTANYL CITRATE (PF) 100 MCG/2ML SOLUTION: Performed by: ANESTHESIOLOGY

## 2017-01-01 PROCEDURE — 25010000002 DIPHENHYDRAMINE PER 50 MG: Performed by: INTERNAL MEDICINE

## 2017-01-01 PROCEDURE — 82570 ASSAY OF URINE CREATININE: CPT | Performed by: INTERNAL MEDICINE

## 2017-01-01 PROCEDURE — 76775 US EXAM ABDO BACK WALL LIM: CPT

## 2017-01-01 PROCEDURE — 82607 VITAMIN B-12: CPT | Performed by: INTERNAL MEDICINE

## 2017-01-01 PROCEDURE — 88112 CYTOPATH CELL ENHANCE TECH: CPT | Performed by: INTERNAL MEDICINE

## 2017-01-01 PROCEDURE — 86900 BLOOD TYPING SEROLOGIC ABO: CPT | Performed by: EMERGENCY MEDICINE

## 2017-01-01 PROCEDURE — 84100 ASSAY OF PHOSPHORUS: CPT

## 2017-01-01 PROCEDURE — 87040 BLOOD CULTURE FOR BACTERIA: CPT | Performed by: INTERNAL MEDICINE

## 2017-01-01 PROCEDURE — 96413 CHEMO IV INFUSION 1 HR: CPT | Performed by: INTERNAL MEDICINE

## 2017-01-01 PROCEDURE — 86850 RBC ANTIBODY SCREEN: CPT | Performed by: EMERGENCY MEDICINE

## 2017-01-01 PROCEDURE — 96523 IRRIG DRUG DELIVERY DEVICE: CPT | Performed by: NURSE PRACTITIONER

## 2017-01-01 PROCEDURE — 87045 FECES CULTURE AEROBIC BACT: CPT | Performed by: INTERNAL MEDICINE

## 2017-01-01 PROCEDURE — 85007 BL SMEAR W/DIFF WBC COUNT: CPT

## 2017-01-01 PROCEDURE — 84466 ASSAY OF TRANSFERRIN: CPT

## 2017-01-01 PROCEDURE — 83540 ASSAY OF IRON: CPT

## 2017-01-01 PROCEDURE — 25010000002 EPOETIN ALFA PER 1000 UNITS: Performed by: INTERNAL MEDICINE

## 2017-01-01 PROCEDURE — 82962 GLUCOSE BLOOD TEST: CPT

## 2017-01-01 PROCEDURE — 87086 URINE CULTURE/COLONY COUNT: CPT | Performed by: EMERGENCY MEDICINE

## 2017-01-01 PROCEDURE — 83935 ASSAY OF URINE OSMOLALITY: CPT | Performed by: INTERNAL MEDICINE

## 2017-01-01 PROCEDURE — 85025 COMPLETE CBC W/AUTO DIFF WBC: CPT | Performed by: EMERGENCY MEDICINE

## 2017-01-01 PROCEDURE — 86900 BLOOD TYPING SEROLOGIC ABO: CPT | Performed by: NURSE PRACTITIONER

## 2017-01-01 PROCEDURE — 25010000002 DEXAMETHASONE PER 1 MG: Performed by: ANESTHESIOLOGY

## 2017-01-01 PROCEDURE — 96523 IRRIG DRUG DELIVERY DEVICE: CPT | Performed by: INTERNAL MEDICINE

## 2017-01-01 PROCEDURE — 93005 ELECTROCARDIOGRAM TRACING: CPT | Performed by: EMERGENCY MEDICINE

## 2017-01-01 PROCEDURE — 96361 HYDRATE IV INFUSION ADD-ON: CPT | Performed by: INTERNAL MEDICINE

## 2017-01-01 PROCEDURE — 0 IOPAMIDOL PER 1 ML: Performed by: UROLOGY

## 2017-01-01 PROCEDURE — 0T788DZ DILATION OF BILATERAL URETERS WITH INTRALUMINAL DEVICE, VIA NATURAL OR ARTIFICIAL OPENING ENDOSCOPIC: ICD-10-PCS | Performed by: UROLOGY

## 2017-01-01 PROCEDURE — 25010000002 PERFLUTREN (DEFINITY) 8.476 MG IN SODIUM CHLORIDE 10 ML INJECTION: Performed by: INTERNAL MEDICINE

## 2017-01-01 PROCEDURE — 93000 ELECTROCARDIOGRAM COMPLETE: CPT | Performed by: INTERNAL MEDICINE

## 2017-01-01 PROCEDURE — 82728 ASSAY OF FERRITIN: CPT

## 2017-01-01 PROCEDURE — 25010000002 LORAZEPAM PER 2 MG: Performed by: INTERNAL MEDICINE

## 2017-01-01 PROCEDURE — 25010000002 PROPOFOL 10 MG/ML EMULSION: Performed by: ANESTHESIOLOGY

## 2017-01-01 PROCEDURE — 70553 MRI BRAIN STEM W/O & W/DYE: CPT

## 2017-01-01 PROCEDURE — C2617 STENT, NON-COR, TEM W/O DEL: HCPCS | Performed by: UROLOGY

## 2017-01-01 PROCEDURE — 83605 ASSAY OF LACTIC ACID: CPT | Performed by: EMERGENCY MEDICINE

## 2017-01-01 PROCEDURE — 25010000002 PACLITAXEL PER 30 MG: Performed by: NURSE PRACTITIONER

## 2017-01-01 PROCEDURE — 81003 URINALYSIS AUTO W/O SCOPE: CPT | Performed by: INTERNAL MEDICINE

## 2017-01-01 PROCEDURE — 63710000001 DIPHENHYDRAMINE PER 50 MG: Performed by: INTERNAL MEDICINE

## 2017-01-01 PROCEDURE — 74176 CT ABD & PELVIS W/O CONTRAST: CPT

## 2017-01-01 PROCEDURE — 96374 THER/PROPH/DIAG INJ IV PUSH: CPT | Performed by: INTERNAL MEDICINE

## 2017-01-01 PROCEDURE — 99215 OFFICE O/P EST HI 40 MIN: CPT | Performed by: NURSE PRACTITIONER

## 2017-01-01 PROCEDURE — 71250 CT THORAX DX C-: CPT

## 2017-01-01 PROCEDURE — 70543 MRI ORBT/FAC/NCK W/O &W/DYE: CPT

## 2017-01-01 PROCEDURE — 99221 1ST HOSP IP/OBS SF/LOW 40: CPT | Performed by: NURSE PRACTITIONER

## 2017-01-01 PROCEDURE — 88341 IMHCHEM/IMCYTCHM EA ADD ANTB: CPT | Performed by: INTERNAL MEDICINE

## 2017-01-01 PROCEDURE — 93923 UPR/LXTR ART STDY 3+ LVLS: CPT

## 2017-01-01 PROCEDURE — 97110 THERAPEUTIC EXERCISES: CPT | Performed by: PHYSICAL THERAPIST

## 2017-01-01 PROCEDURE — 99212-NC PR NO CHARGE CBC OFFICE OUTPATIENT VISIT 10 MINUTES: Performed by: NURSE PRACTITIONER

## 2017-01-01 PROCEDURE — 96360 HYDRATION IV INFUSION INIT: CPT | Performed by: NURSE PRACTITIONER

## 2017-01-01 PROCEDURE — 76937 US GUIDE VASCULAR ACCESS: CPT

## 2017-01-01 PROCEDURE — 99221 1ST HOSP IP/OBS SF/LOW 40: CPT | Performed by: SURGERY

## 2017-01-01 PROCEDURE — 83735 ASSAY OF MAGNESIUM: CPT | Performed by: HOSPITALIST

## 2017-01-01 PROCEDURE — 87493 C DIFF AMPLIFIED PROBE: CPT | Performed by: INTERNAL MEDICINE

## 2017-01-01 PROCEDURE — 77295 3-D RADIOTHERAPY PLAN: CPT | Performed by: RADIOLOGY

## 2017-01-01 PROCEDURE — 85027 COMPLETE CBC AUTOMATED: CPT | Performed by: HOSPITALIST

## 2017-01-01 PROCEDURE — 96415 CHEMO IV INFUSION ADDL HR: CPT | Performed by: INTERNAL MEDICINE

## 2017-01-01 PROCEDURE — 99284 EMERGENCY DEPT VISIT MOD MDM: CPT

## 2017-01-01 PROCEDURE — 25010000002 SUCCINYLCHOLINE PER 20 MG: Performed by: ANESTHESIOLOGY

## 2017-01-01 PROCEDURE — 93005 ELECTROCARDIOGRAM TRACING: CPT | Performed by: HOSPITALIST

## 2017-01-01 PROCEDURE — 99223 1ST HOSP IP/OBS HIGH 75: CPT | Performed by: INTERNAL MEDICINE

## 2017-01-01 PROCEDURE — 74420 UROGRAPHY RTRGR +-KUB: CPT

## 2017-01-01 PROCEDURE — 99222 1ST HOSP IP/OBS MODERATE 55: CPT | Performed by: INTERNAL MEDICINE

## 2017-01-01 PROCEDURE — 86901 BLOOD TYPING SEROLOGIC RH(D): CPT

## 2017-01-01 DEVICE — URETERAL STENT
Type: IMPLANTABLE DEVICE | Site: URETER | Status: FUNCTIONAL
Brand: CONTOUR™

## 2017-01-01 RX ORDER — SODIUM CHLORIDE 9 MG/ML
250 INJECTION, SOLUTION INTRAVENOUS ONCE
Status: CANCELLED | OUTPATIENT
Start: 2017-01-01

## 2017-01-01 RX ORDER — SODIUM CHLORIDE 450 MG/100ML
100 INJECTION, SOLUTION INTRAVENOUS CONTINUOUS
Status: DISCONTINUED | OUTPATIENT
Start: 2017-01-01 | End: 2017-01-01

## 2017-01-01 RX ORDER — LIDOCAINE HYDROCHLORIDE 10 MG/ML
20 INJECTION, SOLUTION INFILTRATION; PERINEURAL ONCE
Status: COMPLETED | OUTPATIENT
Start: 2017-01-01 | End: 2017-01-01

## 2017-01-01 RX ORDER — PREDNISONE 1 MG/1
5 TABLET ORAL DAILY
Status: ON HOLD | COMMUNITY
End: 2017-01-01 | Stop reason: SDUPTHER

## 2017-01-01 RX ORDER — BISACODYL 5 MG/1
10 TABLET, DELAYED RELEASE ORAL ONCE
Status: COMPLETED | OUTPATIENT
Start: 2017-01-01 | End: 2017-01-01

## 2017-01-01 RX ORDER — SENNA AND DOCUSATE SODIUM 50; 8.6 MG/1; MG/1
2 TABLET, FILM COATED ORAL 2 TIMES DAILY
Status: DISCONTINUED | OUTPATIENT
Start: 2017-01-01 | End: 2017-01-01 | Stop reason: HOSPADM

## 2017-01-01 RX ORDER — SENNOSIDES 8.6 MG
1 TABLET ORAL NIGHTLY
Status: DISCONTINUED | OUTPATIENT
Start: 2017-01-01 | End: 2017-01-01

## 2017-01-01 RX ORDER — PROMETHAZINE HYDROCHLORIDE 6.25 MG/5ML
12.5 SYRUP ORAL EVERY 4 HOURS PRN
Status: CANCELLED | OUTPATIENT
Start: 2017-01-01

## 2017-01-01 RX ORDER — FENTANYL CITRATE 50 UG/ML
INJECTION, SOLUTION INTRAMUSCULAR; INTRAVENOUS
Status: DISPENSED
Start: 2017-01-01 | End: 2017-01-01

## 2017-01-01 RX ORDER — PROMETHAZINE HYDROCHLORIDE 25 MG/1
25 TABLET ORAL ONCE AS NEEDED
Status: DISCONTINUED | OUTPATIENT
Start: 2017-01-01 | End: 2017-01-01

## 2017-01-01 RX ORDER — SODIUM CHLORIDE 9 MG/ML
75 INJECTION, SOLUTION INTRAVENOUS CONTINUOUS
Status: DISCONTINUED | OUTPATIENT
Start: 2017-01-01 | End: 2017-01-01

## 2017-01-01 RX ORDER — ACETAMINOPHEN 500 MG
1000 TABLET ORAL EVERY 6 HOURS PRN
COMMUNITY

## 2017-01-01 RX ORDER — LORAZEPAM 2 MG/ML
0.5 INJECTION INTRAMUSCULAR
Status: CANCELLED | OUTPATIENT
Start: 2017-01-01 | End: 2017-12-31

## 2017-01-01 RX ORDER — SODIUM CHLORIDE 9 MG/ML
500 INJECTION, SOLUTION INTRAVENOUS ONCE
Status: COMPLETED | OUTPATIENT
Start: 2017-01-01 | End: 2017-01-01

## 2017-01-01 RX ORDER — DIPHENHYDRAMINE HCL 25 MG
25 CAPSULE ORAL ONCE
Status: COMPLETED | OUTPATIENT
Start: 2017-01-01 | End: 2017-01-01

## 2017-01-01 RX ORDER — HYDROCODONE BITARTRATE AND ACETAMINOPHEN 5; 325 MG/1; MG/1
1 TABLET ORAL EVERY 6 HOURS PRN
Qty: 90 TABLET | Refills: 0 | Status: SHIPPED | OUTPATIENT
Start: 2017-01-01

## 2017-01-01 RX ORDER — PROPOFOL 10 MG/ML
VIAL (ML) INTRAVENOUS AS NEEDED
Status: DISCONTINUED | OUTPATIENT
Start: 2017-01-01 | End: 2017-01-01 | Stop reason: SURG

## 2017-01-01 RX ORDER — LOSARTAN POTASSIUM 100 MG/1
100 TABLET ORAL DAILY
COMMUNITY

## 2017-01-01 RX ORDER — LORAZEPAM 2 MG/ML
1 INJECTION INTRAMUSCULAR
Status: DISCONTINUED | OUTPATIENT
Start: 2017-01-01 | End: 2017-01-01 | Stop reason: HOSPADM

## 2017-01-01 RX ORDER — ASPIRIN 81 MG/1
81 TABLET ORAL DAILY
Status: DISCONTINUED | OUTPATIENT
Start: 2017-01-01 | End: 2017-01-01 | Stop reason: HOSPADM

## 2017-01-01 RX ORDER — LORAZEPAM 2 MG/ML
2 INJECTION INTRAMUSCULAR
Status: CANCELLED | OUTPATIENT
Start: 2017-01-01 | End: 2017-12-31

## 2017-01-01 RX ORDER — POLYETHYLENE GLYCOL 3350 17 G/17G
17 POWDER, FOR SOLUTION ORAL DAILY
Status: DISCONTINUED | OUTPATIENT
Start: 2017-01-01 | End: 2017-01-01 | Stop reason: HOSPADM

## 2017-01-01 RX ORDER — PROMETHAZINE HYDROCHLORIDE 25 MG/ML
12.5 INJECTION, SOLUTION INTRAMUSCULAR; INTRAVENOUS ONCE AS NEEDED
Status: DISCONTINUED | OUTPATIENT
Start: 2017-01-01 | End: 2017-01-01

## 2017-01-01 RX ORDER — OXYCODONE AND ACETAMINOPHEN 7.5; 325 MG/1; MG/1
1 TABLET ORAL ONCE AS NEEDED
Status: DISCONTINUED | OUTPATIENT
Start: 2017-01-01 | End: 2017-01-01

## 2017-01-01 RX ORDER — SODIUM CHLORIDE 9 MG/ML
125 INJECTION, SOLUTION INTRAVENOUS CONTINUOUS
Status: DISCONTINUED | OUTPATIENT
Start: 2017-01-01 | End: 2017-01-01

## 2017-01-01 RX ORDER — DIPHENOXYLATE HYDROCHLORIDE AND ATROPINE SULFATE 2.5; .025 MG/1; MG/1
1 TABLET ORAL 4 TIMES DAILY PRN
Qty: 30 TABLET | Refills: 0 | OUTPATIENT
Start: 2017-01-01 | End: 2017-01-01 | Stop reason: SDUPTHER

## 2017-01-01 RX ORDER — DEXAMETHASONE 4 MG/1
4 TABLET ORAL 2 TIMES DAILY WITH MEALS
Qty: 60 TABLET | Refills: 1 | Status: SHIPPED | OUTPATIENT
Start: 2017-01-01

## 2017-01-01 RX ORDER — GLYCOPYRROLATE 0.2 MG/ML
0.4 INJECTION INTRAMUSCULAR; INTRAVENOUS
Status: CANCELLED | OUTPATIENT
Start: 2017-01-01

## 2017-01-01 RX ORDER — DIPHENOXYLATE HYDROCHLORIDE AND ATROPINE SULFATE 2.5; .025 MG/1; MG/1
TABLET ORAL
Qty: 60 TABLET | Refills: 0 | OUTPATIENT
Start: 2017-01-01 | End: 2017-01-01

## 2017-01-01 RX ORDER — SODIUM CHLORIDE 9 MG/ML
1000 INJECTION, SOLUTION INTRAVENOUS ONCE
Status: CANCELLED
Start: 2017-01-01 | End: 2017-01-01

## 2017-01-01 RX ORDER — MORPHINE SULFATE 20 MG/ML
5 SOLUTION ORAL
Status: CANCELLED | OUTPATIENT
Start: 2017-01-01 | End: 2017-12-31

## 2017-01-01 RX ORDER — DEXTROSE AND SODIUM CHLORIDE 5; .45 G/100ML; G/100ML
50 INJECTION, SOLUTION INTRAVENOUS CONTINUOUS
Status: DISCONTINUED | OUTPATIENT
Start: 2017-01-01 | End: 2017-01-01

## 2017-01-01 RX ORDER — GLYCOPYRROLATE 0.2 MG/ML
0.2 INJECTION INTRAMUSCULAR; INTRAVENOUS
Status: CANCELLED | OUTPATIENT
Start: 2017-01-01

## 2017-01-01 RX ORDER — CAPECITABINE 500 MG/1
TABLET, FILM COATED ORAL
Qty: 84 TABLET | Refills: 2 | Status: SHIPPED | OUTPATIENT
Start: 2017-01-01 | End: 2017-01-01 | Stop reason: HOSPADM

## 2017-01-01 RX ORDER — PREDNISONE 10 MG/1
5 TABLET ORAL DAILY
COMMUNITY
End: 2017-01-01 | Stop reason: ALTCHOICE

## 2017-01-01 RX ORDER — SODIUM CHLORIDE 9 MG/ML
250 INJECTION, SOLUTION INTRAVENOUS AS NEEDED
Status: DISCONTINUED | OUTPATIENT
Start: 2017-01-01 | End: 2017-01-01 | Stop reason: HOSPADM

## 2017-01-01 RX ORDER — ACETAMINOPHEN 325 MG/1
325 TABLET ORAL ONCE
Status: CANCELLED | OUTPATIENT
Start: 2017-01-01 | End: 2017-01-01

## 2017-01-01 RX ORDER — SODIUM BICARBONATE 650 MG/1
650 TABLET ORAL 2 TIMES DAILY
Status: DISCONTINUED | OUTPATIENT
Start: 2017-01-01 | End: 2017-01-01

## 2017-01-01 RX ORDER — POLYETHYLENE GLYCOL 3350 17 G/17G
17 POWDER, FOR SOLUTION ORAL DAILY
Qty: 119 G | Refills: 3 | Status: SHIPPED | OUTPATIENT
Start: 2017-01-01 | End: 2017-01-01

## 2017-01-01 RX ORDER — SODIUM CHLORIDE 0.9 % (FLUSH) 0.9 %
1-10 SYRINGE (ML) INJECTION AS NEEDED
Status: DISCONTINUED | OUTPATIENT
Start: 2017-01-01 | End: 2017-01-01 | Stop reason: HOSPADM

## 2017-01-01 RX ORDER — DIPHENHYDRAMINE HCL 25 MG
25 CAPSULE ORAL ONCE
Status: CANCELLED | OUTPATIENT
Start: 2017-01-01 | End: 2017-01-01

## 2017-01-01 RX ORDER — DIPHENHYDRAMINE HYDROCHLORIDE 50 MG/ML
12.5 INJECTION INTRAMUSCULAR; INTRAVENOUS
Status: DISCONTINUED | OUTPATIENT
Start: 2017-01-01 | End: 2017-01-01

## 2017-01-01 RX ORDER — SUCRALFATE ORAL 1 G/10ML
1 SUSPENSION ORAL EVERY 6 HOURS SCHEDULED
Status: DISCONTINUED | OUTPATIENT
Start: 2017-01-01 | End: 2017-01-01 | Stop reason: HOSPADM

## 2017-01-01 RX ORDER — ACETAMINOPHEN 325 MG/1
650 TABLET ORAL EVERY 4 HOURS PRN
Status: DISCONTINUED | OUTPATIENT
Start: 2017-01-01 | End: 2017-01-01 | Stop reason: HOSPADM

## 2017-01-01 RX ORDER — MORPHINE SULFATE 2 MG/ML
2 INJECTION, SOLUTION INTRAMUSCULAR; INTRAVENOUS
Status: CANCELLED | OUTPATIENT
Start: 2017-01-01 | End: 2017-12-31

## 2017-01-01 RX ORDER — ONDANSETRON 2 MG/ML
4 INJECTION INTRAMUSCULAR; INTRAVENOUS EVERY 4 HOURS PRN
Status: DISCONTINUED | OUTPATIENT
Start: 2017-01-01 | End: 2017-01-01 | Stop reason: HOSPADM

## 2017-01-01 RX ORDER — SODIUM CHLORIDE 9 MG/ML
500 INJECTION, SOLUTION INTRAVENOUS ONCE
Status: CANCELLED
Start: 2017-01-01 | End: 2017-01-01

## 2017-01-01 RX ORDER — LOSARTAN POTASSIUM 100 MG/1
100 TABLET ORAL DAILY
Qty: 90 TABLET | Refills: 3 | Status: ON HOLD | OUTPATIENT
Start: 2017-01-01 | End: 2017-01-01

## 2017-01-01 RX ORDER — AMOXICILLIN AND CLAVULANATE POTASSIUM 500; 125 MG/1; MG/1
1 TABLET, FILM COATED ORAL EVERY 12 HOURS SCHEDULED
Status: DISCONTINUED | OUTPATIENT
Start: 2017-01-01 | End: 2017-01-01 | Stop reason: HOSPADM

## 2017-01-01 RX ORDER — PROMETHAZINE HYDROCHLORIDE 25 MG/ML
12.5 INJECTION, SOLUTION INTRAMUSCULAR; INTRAVENOUS EVERY 4 HOURS PRN
Status: CANCELLED | OUTPATIENT
Start: 2017-01-01

## 2017-01-01 RX ORDER — ARMODAFINIL 50 MG/1
50 TABLET ORAL DAILY
Qty: 30 TABLET | Refills: 1 | OUTPATIENT
Start: 2017-01-01 | End: 2017-01-01

## 2017-01-01 RX ORDER — TEMAZEPAM 15 MG/1
15 CAPSULE ORAL NIGHTLY PRN
Status: DISCONTINUED | OUTPATIENT
Start: 2017-01-01 | End: 2017-01-01 | Stop reason: HOSPADM

## 2017-01-01 RX ORDER — SODIUM CHLORIDE 9 MG/ML
250 INJECTION, SOLUTION INTRAVENOUS ONCE
Status: COMPLETED | OUTPATIENT
Start: 2017-01-01 | End: 2017-01-01

## 2017-01-01 RX ORDER — SODIUM CHLORIDE 9 MG/ML
250 INJECTION, SOLUTION INTRAVENOUS AS NEEDED
Status: CANCELLED | OUTPATIENT
Start: 2017-01-01

## 2017-01-01 RX ORDER — FAMOTIDINE 10 MG/ML
20 INJECTION, SOLUTION INTRAVENOUS EVERY 12 HOURS SCHEDULED
Status: DISCONTINUED | OUTPATIENT
Start: 2017-01-01 | End: 2017-01-01 | Stop reason: DRUGHIGH

## 2017-01-01 RX ORDER — LIDOCAINE HYDROCHLORIDE 10 MG/ML
10 INJECTION, SOLUTION INFILTRATION; PERINEURAL ONCE
Status: COMPLETED | OUTPATIENT
Start: 2017-01-01 | End: 2017-01-01

## 2017-01-01 RX ORDER — ACETAMINOPHEN 325 MG/1
650 TABLET ORAL ONCE
Status: CANCELLED | OUTPATIENT
Start: 2017-01-01 | End: 2017-01-01

## 2017-01-01 RX ORDER — AMLODIPINE BESYLATE 5 MG/1
5 TABLET ORAL
COMMUNITY
Start: 2016-03-04 | End: 2017-01-01

## 2017-01-01 RX ORDER — ACETAMINOPHEN 325 MG/1
650 TABLET ORAL ONCE
Status: DISCONTINUED | OUTPATIENT
Start: 2017-01-01 | End: 2017-01-01 | Stop reason: HOSPADM

## 2017-01-01 RX ORDER — SODIUM CHLORIDE 9 MG/ML
75 INJECTION, SOLUTION INTRAVENOUS CONTINUOUS
Status: DISCONTINUED | OUTPATIENT
Start: 2017-01-01 | End: 2017-01-01 | Stop reason: HOSPADM

## 2017-01-01 RX ORDER — SENNOSIDES 8.6 MG
1 TABLET ORAL NIGHTLY
Status: DISCONTINUED | OUTPATIENT
Start: 2017-01-01 | End: 2017-01-01 | Stop reason: HOSPADM

## 2017-01-01 RX ORDER — DIPHENHYDRAMINE HCL 25 MG
25 CAPSULE ORAL ONCE
Status: DISCONTINUED | OUTPATIENT
Start: 2017-01-01 | End: 2017-01-01 | Stop reason: HOSPADM

## 2017-01-01 RX ORDER — MORPHINE SULFATE 20 MG/ML
10 SOLUTION ORAL
Status: CANCELLED | OUTPATIENT
Start: 2017-01-01 | End: 2017-12-31

## 2017-01-01 RX ORDER — LORAZEPAM 2 MG/ML
1 CONCENTRATE ORAL
Status: CANCELLED | OUTPATIENT
Start: 2017-01-01 | End: 2017-12-31

## 2017-01-01 RX ORDER — SODIUM CHLORIDE 9 MG/ML
1000 INJECTION, SOLUTION INTRAVENOUS ONCE
Status: COMPLETED | OUTPATIENT
Start: 2017-01-01 | End: 2017-01-01

## 2017-01-01 RX ORDER — AMOXICILLIN AND CLAVULANATE POTASSIUM 875; 125 MG/1; MG/1
1 TABLET, FILM COATED ORAL 2 TIMES DAILY
Qty: 6 TABLET | Refills: 0 | Status: SHIPPED | OUTPATIENT
Start: 2017-01-01 | End: 2017-01-01

## 2017-01-01 RX ORDER — HYDROCODONE BITARTRATE AND ACETAMINOPHEN 5; 325 MG/1; MG/1
1 TABLET ORAL EVERY 6 HOURS PRN
Qty: 90 TABLET | Refills: 0 | Status: SHIPPED | OUTPATIENT
Start: 2017-01-01 | End: 2017-01-01 | Stop reason: SDUPTHER

## 2017-01-01 RX ORDER — LORAZEPAM 2 MG/ML
0.5 CONCENTRATE ORAL
Status: CANCELLED | OUTPATIENT
Start: 2017-01-01 | End: 2017-12-31

## 2017-01-01 RX ORDER — BUPROPION HYDROCHLORIDE 75 MG/1
75 TABLET ORAL 2 TIMES DAILY
Status: DISCONTINUED | OUTPATIENT
Start: 2017-01-01 | End: 2017-01-01

## 2017-01-01 RX ORDER — HYDROCODONE BITARTRATE AND ACETAMINOPHEN 7.5; 325 MG/1; MG/1
1 TABLET ORAL ONCE AS NEEDED
Status: DISCONTINUED | OUTPATIENT
Start: 2017-01-01 | End: 2017-01-01

## 2017-01-01 RX ORDER — PROMETHAZINE HYDROCHLORIDE 12.5 MG/1
12.5 TABLET ORAL EVERY 4 HOURS PRN
Status: CANCELLED | OUTPATIENT
Start: 2017-01-01

## 2017-01-01 RX ORDER — DEXAMETHASONE SODIUM PHOSPHATE 4 MG/ML
4 INJECTION, SOLUTION INTRA-ARTICULAR; INTRALESIONAL; INTRAMUSCULAR; INTRAVENOUS; SOFT TISSUE EVERY 12 HOURS
Status: DISCONTINUED | OUTPATIENT
Start: 2017-01-01 | End: 2017-01-01

## 2017-01-01 RX ORDER — DEXTROSE AND SODIUM CHLORIDE 5; .2 G/100ML; G/100ML
125 INJECTION, SOLUTION INTRAVENOUS CONTINUOUS
Status: DISCONTINUED | OUTPATIENT
Start: 2017-01-01 | End: 2017-01-01

## 2017-01-01 RX ORDER — CHLORAL HYDRATE 500 MG
1000 CAPSULE ORAL DAILY
COMMUNITY

## 2017-01-01 RX ORDER — SODIUM CHLORIDE 0.9 % (FLUSH) 0.9 %
10 SYRINGE (ML) INJECTION AS NEEDED
Status: DISCONTINUED | OUTPATIENT
Start: 2017-01-01 | End: 2017-01-01 | Stop reason: HOSPADM

## 2017-01-01 RX ORDER — CAPECITABINE 500 MG/1
TABLET, FILM COATED ORAL
Qty: 56 TABLET | Refills: 3 | Status: SHIPPED | OUTPATIENT
Start: 2017-01-01 | End: 2017-01-01 | Stop reason: DRUGHIGH

## 2017-01-01 RX ORDER — PREDNISONE 10 MG/1
TABLET ORAL
Qty: 30 TABLET | Refills: 0 | Status: ON HOLD | OUTPATIENT
Start: 2017-01-01 | End: 2017-01-01 | Stop reason: SDUPTHER

## 2017-01-01 RX ORDER — PANTOPRAZOLE SODIUM 40 MG/1
40 TABLET, DELAYED RELEASE ORAL DAILY
Qty: 30 TABLET | Refills: 5 | Status: SHIPPED | OUTPATIENT
Start: 2017-01-01

## 2017-01-01 RX ORDER — ARMODAFINIL 50 MG/1
50 TABLET ORAL DAILY
Qty: 7 TABLET | Refills: 0 | OUTPATIENT
Start: 2017-01-01 | End: 2017-01-01

## 2017-01-01 RX ORDER — HYDROCODONE BITARTRATE AND ACETAMINOPHEN 5; 325 MG/1; MG/1
1 TABLET ORAL EVERY 6 HOURS PRN
Status: DISCONTINUED | OUTPATIENT
Start: 2017-01-01 | End: 2017-01-01 | Stop reason: HOSPADM

## 2017-01-01 RX ORDER — FENTANYL CITRATE 50 UG/ML
50 INJECTION, SOLUTION INTRAMUSCULAR; INTRAVENOUS
Status: DISCONTINUED | OUTPATIENT
Start: 2017-01-01 | End: 2017-01-01

## 2017-01-01 RX ORDER — LORAZEPAM 1 MG/1
1 TABLET ORAL
Status: CANCELLED | OUTPATIENT
Start: 2017-01-01 | End: 2017-12-31

## 2017-01-01 RX ORDER — ARMODAFINIL 50 MG/1
50 TABLET ORAL DAILY
Qty: 7 TABLET | Refills: 0 | Status: CANCELLED | OUTPATIENT
Start: 2017-01-01 | End: 2017-01-01

## 2017-01-01 RX ORDER — ACETAMINOPHEN 325 MG/1
650 TABLET ORAL EVERY 4 HOURS PRN
Status: CANCELLED | OUTPATIENT
Start: 2017-01-01

## 2017-01-01 RX ORDER — FAMOTIDINE 10 MG/ML
20 INJECTION, SOLUTION INTRAVENOUS ONCE
Status: CANCELLED | OUTPATIENT
Start: 2017-01-01

## 2017-01-01 RX ORDER — CALCIUM CARBONATE 200(500)MG
2 TABLET,CHEWABLE ORAL 3 TIMES DAILY
Status: DISCONTINUED | OUTPATIENT
Start: 2017-01-01 | End: 2017-01-01 | Stop reason: HOSPADM

## 2017-01-01 RX ORDER — SODIUM BICARBONATE 650 MG/1
650 TABLET ORAL 3 TIMES DAILY
Status: DISCONTINUED | OUTPATIENT
Start: 2017-01-01 | End: 2017-01-01

## 2017-01-01 RX ORDER — CAPECITABINE 500 MG/1
TABLET, FILM COATED ORAL
Qty: 70 TABLET | Refills: 2 | Status: SHIPPED | OUTPATIENT
Start: 2017-01-01 | End: 2017-01-01 | Stop reason: SDUPTHER

## 2017-01-01 RX ORDER — DEXTROSE AND SODIUM CHLORIDE 5; .45 G/100ML; G/100ML
75 INJECTION, SOLUTION INTRAVENOUS CONTINUOUS
Status: DISCONTINUED | OUTPATIENT
Start: 2017-01-01 | End: 2017-01-01

## 2017-01-01 RX ORDER — LOSARTAN POTASSIUM 100 MG/1
TABLET ORAL
Refills: 3 | COMMUNITY
Start: 2017-01-01 | End: 2017-01-01

## 2017-01-01 RX ORDER — PROMETHAZINE HYDROCHLORIDE 25 MG/1
12.5 TABLET ORAL ONCE AS NEEDED
Status: DISCONTINUED | OUTPATIENT
Start: 2017-01-01 | End: 2017-01-01

## 2017-01-01 RX ORDER — ATORVASTATIN CALCIUM 10 MG/1
10 TABLET, FILM COATED ORAL DAILY
Status: DISCONTINUED | OUTPATIENT
Start: 2017-01-01 | End: 2017-01-01

## 2017-01-01 RX ORDER — PROMETHAZINE HYDROCHLORIDE 25 MG/1
12.5 SUPPOSITORY RECTAL EVERY 4 HOURS PRN
Status: CANCELLED | OUTPATIENT
Start: 2017-01-01

## 2017-01-01 RX ORDER — ONDANSETRON 2 MG/ML
4 INJECTION INTRAMUSCULAR; INTRAVENOUS ONCE
Status: COMPLETED | OUTPATIENT
Start: 2017-01-01 | End: 2017-01-01

## 2017-01-01 RX ORDER — LORAZEPAM 2 MG/ML
1 INJECTION INTRAMUSCULAR
Status: CANCELLED | OUTPATIENT
Start: 2017-01-01 | End: 2017-12-31

## 2017-01-01 RX ORDER — SENNOSIDES 8.6 MG
1 TABLET ORAL NIGHTLY
COMMUNITY
End: 2017-01-01 | Stop reason: HOSPADM

## 2017-01-01 RX ORDER — LEVOFLOXACIN 5 MG/ML
500 INJECTION, SOLUTION INTRAVENOUS ONCE
Status: COMPLETED | OUTPATIENT
Start: 2017-01-01 | End: 2017-01-01

## 2017-01-01 RX ORDER — PROMETHAZINE HYDROCHLORIDE 25 MG/1
25 SUPPOSITORY RECTAL ONCE AS NEEDED
Status: DISCONTINUED | OUTPATIENT
Start: 2017-01-01 | End: 2017-01-01

## 2017-01-01 RX ORDER — EPHEDRINE SULFATE 50 MG/ML
5 INJECTION, SOLUTION INTRAVENOUS ONCE AS NEEDED
Status: DISCONTINUED | OUTPATIENT
Start: 2017-01-01 | End: 2017-01-01

## 2017-01-01 RX ORDER — DEXAMETHASONE SODIUM PHOSPHATE 10 MG/ML
INJECTION INTRAMUSCULAR; INTRAVENOUS AS NEEDED
Status: DISCONTINUED | OUTPATIENT
Start: 2017-01-01 | End: 2017-01-01 | Stop reason: SURG

## 2017-01-01 RX ORDER — NALOXONE HCL 0.4 MG/ML
0.2 VIAL (ML) INJECTION AS NEEDED
Status: DISCONTINUED | OUTPATIENT
Start: 2017-01-01 | End: 2017-01-01

## 2017-01-01 RX ORDER — FAMOTIDINE 10 MG/ML
10 INJECTION, SOLUTION INTRAVENOUS EVERY 12 HOURS SCHEDULED
Status: DISCONTINUED | OUTPATIENT
Start: 2017-01-01 | End: 2017-01-01

## 2017-01-01 RX ORDER — FENTANYL CITRATE 50 UG/ML
INJECTION, SOLUTION INTRAMUSCULAR; INTRAVENOUS AS NEEDED
Status: DISCONTINUED | OUTPATIENT
Start: 2017-01-01 | End: 2017-01-01 | Stop reason: SURG

## 2017-01-01 RX ORDER — PREDNISONE 1 MG/1
5 TABLET ORAL DAILY
Status: DISCONTINUED | OUTPATIENT
Start: 2017-01-01 | End: 2017-01-01 | Stop reason: HOSPADM

## 2017-01-01 RX ORDER — MIDAZOLAM HYDROCHLORIDE 1 MG/ML
1 INJECTION INTRAMUSCULAR; INTRAVENOUS
Status: DISCONTINUED | OUTPATIENT
Start: 2017-01-01 | End: 2017-01-01 | Stop reason: HOSPADM

## 2017-01-01 RX ORDER — DIPHENOXYLATE HYDROCHLORIDE AND ATROPINE SULFATE 2.5; .025 MG/1; MG/1
1 TABLET ORAL 4 TIMES DAILY PRN
Status: DISCONTINUED | OUTPATIENT
Start: 2017-01-01 | End: 2017-01-01 | Stop reason: HOSPADM

## 2017-01-01 RX ORDER — ACETAMINOPHEN 650 MG/1
650 SUPPOSITORY RECTAL EVERY 4 HOURS PRN
Status: CANCELLED | OUTPATIENT
Start: 2017-01-01

## 2017-01-01 RX ORDER — ACETAMINOPHEN 325 MG/1
325 TABLET ORAL ONCE
Status: COMPLETED | OUTPATIENT
Start: 2017-01-01 | End: 2017-01-01

## 2017-01-01 RX ORDER — CALCITRIOL 0.5 UG/1
0.5 CAPSULE, LIQUID FILLED ORAL DAILY
Status: DISCONTINUED | OUTPATIENT
Start: 2017-01-01 | End: 2017-01-01

## 2017-01-01 RX ORDER — LORAZEPAM 0.5 MG/1
0.5 TABLET ORAL
Status: CANCELLED | OUTPATIENT
Start: 2017-01-01 | End: 2017-12-31

## 2017-01-01 RX ORDER — LORAZEPAM 1 MG/1
2 TABLET ORAL
Status: CANCELLED | OUTPATIENT
Start: 2017-01-01 | End: 2017-12-31

## 2017-01-01 RX ORDER — ONDANSETRON 4 MG/1
4 TABLET, FILM COATED ORAL EVERY 8 HOURS PRN
Status: DISCONTINUED | OUTPATIENT
Start: 2017-01-01 | End: 2017-01-01 | Stop reason: HOSPADM

## 2017-01-01 RX ORDER — SUCCINYLCHOLINE CHLORIDE 20 MG/ML
INJECTION INTRAMUSCULAR; INTRAVENOUS AS NEEDED
Status: DISCONTINUED | OUTPATIENT
Start: 2017-01-01 | End: 2017-01-01 | Stop reason: SURG

## 2017-01-01 RX ORDER — DEXTROSE, SODIUM CHLORIDE, AND POTASSIUM CHLORIDE 5; .45; .15 G/100ML; G/100ML; G/100ML
75 INJECTION INTRAVENOUS CONTINUOUS
Status: DISCONTINUED | OUTPATIENT
Start: 2017-01-01 | End: 2017-01-01

## 2017-01-01 RX ORDER — NALOXONE HCL 0.4 MG/ML
0.4 VIAL (ML) INJECTION
Status: DISCONTINUED | OUTPATIENT
Start: 2017-01-01 | End: 2017-01-01 | Stop reason: HOSPADM

## 2017-01-01 RX ORDER — ATROPINE SULFATE 10 MG/ML
2 SOLUTION/ DROPS OPHTHALMIC 2 TIMES DAILY PRN
Status: CANCELLED | OUTPATIENT
Start: 2017-01-01

## 2017-01-01 RX ORDER — TC 99M MEDRONATE 20 MG/10ML
21.8 INJECTION, POWDER, LYOPHILIZED, FOR SOLUTION INTRAVENOUS
Status: COMPLETED | OUTPATIENT
Start: 2017-01-01 | End: 2017-01-01

## 2017-01-01 RX ORDER — AMOXICILLIN AND CLAVULANATE POTASSIUM 500; 125 MG/1; MG/1
1 TABLET, FILM COATED ORAL EVERY 12 HOURS SCHEDULED
Qty: 8 TABLET | Refills: 0 | Status: SHIPPED | OUTPATIENT
Start: 2017-01-01 | End: 2017-01-01

## 2017-01-01 RX ORDER — FAMOTIDINE 10 MG/ML
INJECTION, SOLUTION INTRAVENOUS
Status: COMPLETED
Start: 2017-01-01 | End: 2017-01-01

## 2017-01-01 RX ORDER — DEXAMETHASONE 4 MG/1
4 TABLET ORAL DAILY
COMMUNITY
End: 2017-01-01

## 2017-01-01 RX ORDER — FAMOTIDINE 10 MG/ML
20 INJECTION, SOLUTION INTRAVENOUS ONCE
Status: COMPLETED | OUTPATIENT
Start: 2017-01-01 | End: 2017-01-01

## 2017-01-01 RX ORDER — DEXAMETHASONE SODIUM PHOSPHATE 4 MG/ML
2 INJECTION, SOLUTION INTRA-ARTICULAR; INTRALESIONAL; INTRAMUSCULAR; INTRAVENOUS; SOFT TISSUE EVERY 12 HOURS
Status: CANCELLED | OUTPATIENT
Start: 2017-01-01

## 2017-01-01 RX ORDER — LOSARTAN POTASSIUM AND HYDROCHLOROTHIAZIDE 12.5; 1 MG/1; MG/1
1 TABLET ORAL DAILY
COMMUNITY
End: 2017-01-01 | Stop reason: HOSPADM

## 2017-01-01 RX ORDER — LEVOFLOXACIN 500 MG/1
500 TABLET, FILM COATED ORAL DAILY
Qty: 7 TABLET | Refills: 0 | Status: SHIPPED | OUTPATIENT
Start: 2017-01-01 | End: 2017-01-01

## 2017-01-01 RX ORDER — SCOLOPAMINE TRANSDERMAL SYSTEM 1 MG/1
1 PATCH, EXTENDED RELEASE TRANSDERMAL
Status: CANCELLED | OUTPATIENT
Start: 2017-01-01

## 2017-01-01 RX ORDER — TEMAZEPAM 7.5 MG/1
7.5 CAPSULE ORAL ONCE
Status: COMPLETED | OUTPATIENT
Start: 2017-01-01 | End: 2017-01-01

## 2017-01-01 RX ORDER — HYDRALAZINE HYDROCHLORIDE 20 MG/ML
5 INJECTION INTRAMUSCULAR; INTRAVENOUS
Status: DISCONTINUED | OUTPATIENT
Start: 2017-01-01 | End: 2017-01-01

## 2017-01-01 RX ORDER — LORAZEPAM 2 MG/ML
2 CONCENTRATE ORAL
Status: CANCELLED | OUTPATIENT
Start: 2017-01-01 | End: 2017-12-31

## 2017-01-01 RX ORDER — ACETAMINOPHEN 325 MG/1
650 TABLET ORAL ONCE
Status: COMPLETED | OUTPATIENT
Start: 2017-01-01 | End: 2017-01-01

## 2017-01-01 RX ORDER — HYDROMORPHONE HYDROCHLORIDE 1 MG/ML
0.5 INJECTION, SOLUTION INTRAMUSCULAR; INTRAVENOUS; SUBCUTANEOUS
Status: DISCONTINUED | OUTPATIENT
Start: 2017-01-01 | End: 2017-01-01 | Stop reason: HOSPADM

## 2017-01-01 RX ORDER — DEXTROSE MONOHYDRATE 50 MG/ML
75 INJECTION, SOLUTION INTRAVENOUS CONTINUOUS
Status: ACTIVE | OUTPATIENT
Start: 2017-01-01 | End: 2017-01-01

## 2017-01-01 RX ORDER — LORAZEPAM 2 MG/ML
0.5 INJECTION INTRAMUSCULAR EVERY 4 HOURS PRN
Status: DISCONTINUED | OUTPATIENT
Start: 2017-01-01 | End: 2017-01-01 | Stop reason: HOSPADM

## 2017-01-01 RX ORDER — FLUMAZENIL 0.1 MG/ML
0.2 INJECTION INTRAVENOUS AS NEEDED
Status: DISCONTINUED | OUTPATIENT
Start: 2017-01-01 | End: 2017-01-01

## 2017-01-01 RX ORDER — LABETALOL HYDROCHLORIDE 5 MG/ML
5 INJECTION, SOLUTION INTRAVENOUS
Status: DISCONTINUED | OUTPATIENT
Start: 2017-01-01 | End: 2017-01-01

## 2017-01-01 RX ORDER — HEPARIN SODIUM 5000 [USP'U]/ML
5000 INJECTION, SOLUTION INTRAVENOUS; SUBCUTANEOUS EVERY 8 HOURS SCHEDULED
Status: DISCONTINUED | OUTPATIENT
Start: 2017-01-01 | End: 2017-01-01 | Stop reason: HOSPADM

## 2017-01-01 RX ORDER — OLANZAPINE 2.5 MG/1
2.5 TABLET ORAL NIGHTLY
Status: DISCONTINUED | OUTPATIENT
Start: 2017-01-01 | End: 2017-01-01 | Stop reason: HOSPADM

## 2017-01-01 RX ORDER — EPHEDRINE SULFATE 50 MG/ML
INJECTION, SOLUTION INTRAVENOUS AS NEEDED
Status: DISCONTINUED | OUTPATIENT
Start: 2017-01-01 | End: 2017-01-01 | Stop reason: SURG

## 2017-01-01 RX ORDER — DEXTROSE MONOHYDRATE 50 MG/ML
100 INJECTION, SOLUTION INTRAVENOUS CONTINUOUS
Status: DISCONTINUED | OUTPATIENT
Start: 2017-01-01 | End: 2017-01-01

## 2017-01-01 RX ORDER — LIDOCAINE HYDROCHLORIDE 20 MG/ML
INJECTION, SOLUTION INFILTRATION; PERINEURAL AS NEEDED
Status: DISCONTINUED | OUTPATIENT
Start: 2017-01-01 | End: 2017-01-01 | Stop reason: SURG

## 2017-01-01 RX ORDER — AMLODIPINE BESYLATE 2.5 MG/1
TABLET ORAL
Refills: 3 | COMMUNITY
Start: 2017-01-01 | End: 2017-01-01

## 2017-01-01 RX ORDER — MIDAZOLAM HYDROCHLORIDE 1 MG/ML
2 INJECTION INTRAMUSCULAR; INTRAVENOUS
Status: DISCONTINUED | OUTPATIENT
Start: 2017-01-01 | End: 2017-01-01 | Stop reason: HOSPADM

## 2017-01-01 RX ORDER — DIPHENOXYLATE HYDROCHLORIDE AND ATROPINE SULFATE 2.5; .025 MG/1; MG/1
1 TABLET ORAL 4 TIMES DAILY
Status: DISCONTINUED | OUTPATIENT
Start: 2017-01-01 | End: 2017-01-01

## 2017-01-01 RX ORDER — TC 99M MEDRONATE 20 MG/10ML
24 INJECTION, POWDER, LYOPHILIZED, FOR SOLUTION INTRAVENOUS
Status: COMPLETED | OUTPATIENT
Start: 2017-01-01 | End: 2017-01-01

## 2017-01-01 RX ORDER — ONDANSETRON 4 MG/1
4 TABLET, FILM COATED ORAL EVERY 8 HOURS PRN
Qty: 30 TABLET | Refills: 2 | Status: SHIPPED | OUTPATIENT
Start: 2017-01-01

## 2017-01-01 RX ORDER — BISACODYL 10 MG
10 SUPPOSITORY, RECTAL RECTAL DAILY PRN
Status: DISCONTINUED | OUTPATIENT
Start: 2017-01-01 | End: 2017-01-01 | Stop reason: HOSPADM

## 2017-01-01 RX ORDER — OLANZAPINE 2.5 MG/1
2.5 TABLET ORAL NIGHTLY
Qty: 30 TABLET | Refills: 2 | Status: SHIPPED | OUTPATIENT
Start: 2017-01-01 | End: 2017-01-01

## 2017-01-01 RX ORDER — AMLODIPINE BESYLATE 2.5 MG/1
2.5 TABLET ORAL DAILY
Qty: 90 TABLET | Refills: 3 | Status: ON HOLD | OUTPATIENT
Start: 2017-01-01 | End: 2017-01-01

## 2017-01-01 RX ORDER — ACETAMINOPHEN 160 MG/5ML
650 SOLUTION ORAL EVERY 4 HOURS PRN
Status: CANCELLED | OUTPATIENT
Start: 2017-01-01

## 2017-01-01 RX ORDER — BUPROPION HYDROCHLORIDE 75 MG/1
75 TABLET ORAL 2 TIMES DAILY
Qty: 180 TABLET | Refills: 1 | Status: SHIPPED | OUTPATIENT
Start: 2017-01-01

## 2017-01-01 RX ORDER — PROCHLORPERAZINE MALEATE 10 MG
10 TABLET ORAL EVERY 6 HOURS PRN
Status: DISCONTINUED | OUTPATIENT
Start: 2017-01-01 | End: 2017-01-01 | Stop reason: HOSPADM

## 2017-01-01 RX ORDER — ONDANSETRON 2 MG/ML
4 INJECTION INTRAMUSCULAR; INTRAVENOUS EVERY 6 HOURS PRN
Status: DISCONTINUED | OUTPATIENT
Start: 2017-01-01 | End: 2017-01-01 | Stop reason: HOSPADM

## 2017-01-01 RX ORDER — SODIUM CHLORIDE, SODIUM LACTATE, POTASSIUM CHLORIDE, CALCIUM CHLORIDE 600; 310; 30; 20 MG/100ML; MG/100ML; MG/100ML; MG/100ML
9 INJECTION, SOLUTION INTRAVENOUS CONTINUOUS
Status: DISCONTINUED | OUTPATIENT
Start: 2017-01-01 | End: 2017-01-01

## 2017-01-01 RX ORDER — SODIUM CHLORIDE 0.9 % (FLUSH) 0.9 %
20 SYRINGE (ML) INJECTION AS NEEDED
Status: DISCONTINUED | OUTPATIENT
Start: 2017-01-01 | End: 2017-01-01 | Stop reason: HOSPADM

## 2017-01-01 RX ORDER — ONDANSETRON 2 MG/ML
4 INJECTION INTRAMUSCULAR; INTRAVENOUS ONCE AS NEEDED
Status: DISCONTINUED | OUTPATIENT
Start: 2017-01-01 | End: 2017-01-01

## 2017-01-01 RX ORDER — LORAZEPAM 2 MG/ML
0.25 INJECTION INTRAMUSCULAR
Status: DISCONTINUED | OUTPATIENT
Start: 2017-01-01 | End: 2017-01-01

## 2017-01-01 RX ORDER — MIDAZOLAM HYDROCHLORIDE 1 MG/ML
INJECTION INTRAMUSCULAR; INTRAVENOUS
Status: COMPLETED
Start: 2017-01-01 | End: 2017-01-01

## 2017-01-01 RX ORDER — SODIUM CHLORIDE 9 MG/ML
250 INJECTION, SOLUTION INTRAVENOUS ONCE
Status: DISCONTINUED | OUTPATIENT
Start: 2017-01-01 | End: 2017-01-01 | Stop reason: HOSPADM

## 2017-01-01 RX ORDER — ARMODAFINIL 150 MG/1
150 TABLET ORAL DAILY
Qty: 30 TABLET | Refills: 0 | OUTPATIENT
Start: 2017-01-01 | End: 2017-01-01 | Stop reason: DRUGHIGH

## 2017-01-01 RX ORDER — PROCHLORPERAZINE MALEATE 10 MG
10 TABLET ORAL EVERY 6 HOURS PRN
Qty: 30 TABLET | Refills: 3 | Status: SHIPPED | OUTPATIENT
Start: 2017-01-01

## 2017-01-01 RX ORDER — ONDANSETRON 2 MG/ML
INJECTION INTRAMUSCULAR; INTRAVENOUS AS NEEDED
Status: DISCONTINUED | OUTPATIENT
Start: 2017-01-01 | End: 2017-01-01 | Stop reason: SURG

## 2017-01-01 RX ORDER — DEXTROSE MONOHYDRATE 50 MG/ML
125 INJECTION, SOLUTION INTRAVENOUS CONTINUOUS
Status: DISCONTINUED | OUTPATIENT
Start: 2017-01-01 | End: 2017-01-01 | Stop reason: HOSPADM

## 2017-01-01 RX ORDER — HYDROMORPHONE HYDROCHLORIDE 1 MG/ML
0.5 INJECTION, SOLUTION INTRAMUSCULAR; INTRAVENOUS; SUBCUTANEOUS
Status: DISCONTINUED | OUTPATIENT
Start: 2017-01-01 | End: 2017-01-01

## 2017-01-01 RX ORDER — SENNA AND DOCUSATE SODIUM 50; 8.6 MG/1; MG/1
2 TABLET, FILM COATED ORAL 2 TIMES DAILY
Qty: 120 TABLET | Refills: 2 | Status: SHIPPED | OUTPATIENT
Start: 2017-01-01

## 2017-01-01 RX ORDER — ACETAMINOPHEN 325 MG/1
650 TABLET ORAL EVERY 6 HOURS PRN
Status: DISCONTINUED | OUTPATIENT
Start: 2017-01-01 | End: 2017-01-01 | Stop reason: HOSPADM

## 2017-01-01 RX ADMIN — SUCCINYLCHOLINE CHLORIDE 120 MG: 20 INJECTION, SOLUTION INTRAMUSCULAR; INTRAVENOUS; PARENTERAL at 22:55

## 2017-01-01 RX ADMIN — NYSTATIN 500000 UNITS: 100000 SUSPENSION ORAL at 11:53

## 2017-01-01 RX ADMIN — NYSTATIN 500000 UNITS: 100000 SUSPENSION ORAL at 08:31

## 2017-01-01 RX ADMIN — HEPARIN SODIUM 5000 UNITS: 5000 INJECTION, SOLUTION INTRAVENOUS; SUBCUTANEOUS at 13:30

## 2017-01-01 RX ADMIN — ASPIRIN 81 MG: 81 TABLET ORAL at 08:05

## 2017-01-01 RX ADMIN — HEPARIN SODIUM 5000 UNITS: 5000 INJECTION, SOLUTION INTRAVENOUS; SUBCUTANEOUS at 05:00

## 2017-01-01 RX ADMIN — NYSTATIN 500000 UNITS: 100000 SUSPENSION ORAL at 22:26

## 2017-01-01 RX ADMIN — SUCRALFATE 1 G: 1 SUSPENSION ORAL at 02:31

## 2017-01-01 RX ADMIN — HEPARIN SODIUM 5000 UNITS: 5000 INJECTION, SOLUTION INTRAVENOUS; SUBCUTANEOUS at 07:03

## 2017-01-01 RX ADMIN — AZITHROMYCIN DIHYDRATE 500 MG: 500 INJECTION, POWDER, LYOPHILIZED, FOR SOLUTION INTRAVENOUS at 13:41

## 2017-01-01 RX ADMIN — DEXTROSE MONOHYDRATE 500 ML/HR: 5 INJECTION, SOLUTION INTRAVENOUS at 10:44

## 2017-01-01 RX ADMIN — SODIUM CHLORIDE 1000 ML: 9 INJECTION, SOLUTION INTRAVENOUS at 13:53

## 2017-01-01 RX ADMIN — HEPARIN SODIUM 5000 UNITS: 5000 INJECTION, SOLUTION INTRAVENOUS; SUBCUTANEOUS at 05:36

## 2017-01-01 RX ADMIN — DIPHENHYDRAMINE HYDROCHLORIDE 25 MG: 25 CAPSULE ORAL at 09:06

## 2017-01-01 RX ADMIN — SODIUM CHLORIDE 250 ML: 900 INJECTION, SOLUTION INTRAVENOUS at 13:30

## 2017-01-01 RX ADMIN — HYDROCODONE BITARTRATE AND ACETAMINOPHEN 1 TABLET: 5; 325 TABLET ORAL at 21:36

## 2017-01-01 RX ADMIN — SUCRALFATE 1 G: 1 SUSPENSION ORAL at 11:28

## 2017-01-01 RX ADMIN — Medication 2 TABLET: at 17:37

## 2017-01-01 RX ADMIN — PREDNISONE 5 MG: 5 TABLET ORAL at 10:37

## 2017-01-01 RX ADMIN — ERYTHROPOIETIN 20000 UNITS: 20000 INJECTION, SOLUTION INTRAVENOUS; SUBCUTANEOUS at 13:54

## 2017-01-01 RX ADMIN — ERYTHROPOIETIN 30000 UNITS: 20000 INJECTION, SOLUTION INTRAVENOUS; SUBCUTANEOUS at 14:24

## 2017-01-01 RX ADMIN — SUCRALFATE 1 G: 1 SUSPENSION ORAL at 08:05

## 2017-01-01 RX ADMIN — CALCIUM CARBONATE-VITAMIN D TAB 500 MG-200 UNIT 500 MG: 500-200 TAB at 09:55

## 2017-01-01 RX ADMIN — ONDANSETRON 4 MG: 2 INJECTION INTRAMUSCULAR; INTRAVENOUS at 21:04

## 2017-01-01 RX ADMIN — HYDROCODONE BITARTRATE AND ACETAMINOPHEN 1 TABLET: 5; 325 TABLET ORAL at 15:36

## 2017-01-01 RX ADMIN — ZOLEDRONIC ACID 3 MG: 0.8 INJECTION, SOLUTION, CONCENTRATE INTRAVENOUS at 12:17

## 2017-01-01 RX ADMIN — HEPARIN SODIUM 5000 UNITS: 5000 INJECTION, SOLUTION INTRAVENOUS; SUBCUTANEOUS at 22:53

## 2017-01-01 RX ADMIN — Medication 2 TABLET: at 20:49

## 2017-01-01 RX ADMIN — HYDROMORPHONE HYDROCHLORIDE 0.5 MG: 10 INJECTION INTRAMUSCULAR; INTRAVENOUS; SUBCUTANEOUS at 13:36

## 2017-01-01 RX ADMIN — SODIUM CHLORIDE 125 ML/HR: 9 INJECTION, SOLUTION INTRAVENOUS at 15:37

## 2017-01-01 RX ADMIN — DIPHENHYDRAMINE HYDROCHLORIDE 50 MG: 50 INJECTION, SOLUTION INTRAMUSCULAR; INTRAVENOUS at 10:13

## 2017-01-01 RX ADMIN — GEMCITABINE 1250 MG: 38 INJECTION, SOLUTION INTRAVENOUS at 14:14

## 2017-01-01 RX ADMIN — HYDROMORPHONE HYDROCHLORIDE 0.5 MG: 10 INJECTION INTRAMUSCULAR; INTRAVENOUS; SUBCUTANEOUS at 08:08

## 2017-01-01 RX ADMIN — AZITHROMYCIN DIHYDRATE 500 MG: 500 INJECTION, POWDER, LYOPHILIZED, FOR SOLUTION INTRAVENOUS at 15:58

## 2017-01-01 RX ADMIN — NYSTATIN 500000 UNITS: 100000 SUSPENSION ORAL at 09:09

## 2017-01-01 RX ADMIN — HEPARIN SODIUM 5000 UNITS: 5000 INJECTION, SOLUTION INTRAVENOUS; SUBCUTANEOUS at 05:21

## 2017-01-01 RX ADMIN — NYSTATIN 500000 UNITS: 100000 SUSPENSION ORAL at 17:17

## 2017-01-01 RX ADMIN — DEXAMETHASONE SODIUM PHOSPHATE 4 MG: 4 INJECTION, SOLUTION INTRAMUSCULAR; INTRAVENOUS at 20:32

## 2017-01-01 RX ADMIN — FAMOTIDINE 20 MG: 10 INJECTION, SOLUTION INTRAVENOUS at 10:12

## 2017-01-01 RX ADMIN — SODIUM BICARBONATE 650 MG: 650 TABLET ORAL at 20:49

## 2017-01-01 RX ADMIN — DEXTROSE MONOHYDRATE 75 ML/HR: 50 INJECTION, SOLUTION INTRAVENOUS at 10:51

## 2017-01-01 RX ADMIN — ZOLEDRONIC ACID 3.3 MG: 0.8 INJECTION, SOLUTION, CONCENTRATE INTRAVENOUS at 14:15

## 2017-01-01 RX ADMIN — SODIUM CHLORIDE 1000 ML/HR: 900 INJECTION, SOLUTION INTRAVENOUS at 10:00

## 2017-01-01 RX ADMIN — DEXAMETHASONE SODIUM PHOSPHATE 4 MG: 4 INJECTION, SOLUTION INTRAMUSCULAR; INTRAVENOUS at 21:19

## 2017-01-01 RX ADMIN — ONDANSETRON 4 MG: 2 INJECTION INTRAMUSCULAR; INTRAVENOUS at 13:15

## 2017-01-01 RX ADMIN — PREDNISONE 5 MG: 5 TABLET ORAL at 09:05

## 2017-01-01 RX ADMIN — DEXTROSE MONOHYDRATE 50 ML/HR: 5 INJECTION, SOLUTION INTRAVENOUS at 06:03

## 2017-01-01 RX ADMIN — ASPIRIN 81 MG: 81 TABLET ORAL at 08:39

## 2017-01-01 RX ADMIN — PREDNISONE 5 MG: 5 TABLET ORAL at 08:30

## 2017-01-01 RX ADMIN — SODIUM CHLORIDE 100 ML/HR: 9 INJECTION, SOLUTION INTRAVENOUS at 04:50

## 2017-01-01 RX ADMIN — SODIUM CHLORIDE 500 ML: 900 INJECTION, SOLUTION INTRAVENOUS at 14:21

## 2017-01-01 RX ADMIN — HEPARIN SODIUM 5000 UNITS: 5000 INJECTION, SOLUTION INTRAVENOUS; SUBCUTANEOUS at 14:19

## 2017-01-01 RX ADMIN — ZOLEDRONIC ACID 3 MG: 0.8 INJECTION, SOLUTION, CONCENTRATE INTRAVENOUS at 14:18

## 2017-01-01 RX ADMIN — Medication 20 ML: at 11:35

## 2017-01-01 RX ADMIN — DEXTROSE AND SODIUM CHLORIDE 125 ML/HR: 5; 200 INJECTION, SOLUTION INTRAVENOUS at 06:08

## 2017-01-01 RX ADMIN — FAMOTIDINE 20 MG: 10 INJECTION, SOLUTION INTRAVENOUS at 10:08

## 2017-01-01 RX ADMIN — SODIUM CHLORIDE 250 ML: 900 INJECTION, SOLUTION INTRAVENOUS at 13:37

## 2017-01-01 RX ADMIN — GADOBENATE DIMEGLUMINE 10 ML: 529 INJECTION, SOLUTION INTRAVENOUS at 14:02

## 2017-01-01 RX ADMIN — ASPIRIN 81 MG: 81 TABLET ORAL at 09:10

## 2017-01-01 RX ADMIN — SODIUM CHLORIDE 250 ML: 900 INJECTION, SOLUTION INTRAVENOUS at 12:17

## 2017-01-01 RX ADMIN — SODIUM BICARBONATE 650 MG: 650 TABLET ORAL at 09:54

## 2017-01-01 RX ADMIN — HEPARIN SODIUM 5000 UNITS: 5000 INJECTION, SOLUTION INTRAVENOUS; SUBCUTANEOUS at 07:35

## 2017-01-01 RX ADMIN — OLANZAPINE 2.5 MG: 2.5 TABLET, FILM COATED ORAL at 21:06

## 2017-01-01 RX ADMIN — Medication 10 ML: at 13:35

## 2017-01-01 RX ADMIN — ONDANSETRON 4 MG: 2 INJECTION INTRAMUSCULAR; INTRAVENOUS at 05:31

## 2017-01-01 RX ADMIN — SODIUM CHLORIDE 125 ML/HR: 9 INJECTION, SOLUTION INTRAVENOUS at 03:56

## 2017-01-01 RX ADMIN — ERYTHROPOIETIN 30000 UNITS: 20000 INJECTION, SOLUTION INTRAVENOUS; SUBCUTANEOUS at 13:30

## 2017-01-01 RX ADMIN — ERYTHROPOIETIN 20000 UNITS: 20000 INJECTION, SOLUTION INTRAVENOUS; SUBCUTANEOUS at 13:28

## 2017-01-01 RX ADMIN — TAZOBACTAM SODIUM AND PIPERACILLIN SODIUM 3.38 G: 375; 3 INJECTION, SOLUTION INTRAVENOUS at 00:24

## 2017-01-01 RX ADMIN — ONDANSETRON 4 MG: 2 INJECTION INTRAMUSCULAR; INTRAVENOUS at 23:01

## 2017-01-01 RX ADMIN — Medication 2 TABLET: at 21:30

## 2017-01-01 RX ADMIN — HYDROMORPHONE HYDROCHLORIDE 0.5 MG: 10 INJECTION INTRAMUSCULAR; INTRAVENOUS; SUBCUTANEOUS at 14:36

## 2017-01-01 RX ADMIN — DEXTROSE MONOHYDRATE 100 ML/HR: 5 INJECTION, SOLUTION INTRAVENOUS at 07:36

## 2017-01-01 RX ADMIN — HEPARIN SODIUM 5000 UNITS: 5000 INJECTION, SOLUTION INTRAVENOUS; SUBCUTANEOUS at 06:49

## 2017-01-01 RX ADMIN — HEPARIN SODIUM 5000 UNITS: 5000 INJECTION, SOLUTION INTRAVENOUS; SUBCUTANEOUS at 16:07

## 2017-01-01 RX ADMIN — FAMOTIDINE 20 MG: 10 INJECTION, SOLUTION INTRAVENOUS at 08:12

## 2017-01-01 RX ADMIN — HYDROMORPHONE HYDROCHLORIDE 0.5 MG: 10 INJECTION INTRAMUSCULAR; INTRAVENOUS; SUBCUTANEOUS at 21:04

## 2017-01-01 RX ADMIN — Medication 24 MILLICURIE: at 10:07

## 2017-01-01 RX ADMIN — SUCRALFATE 1 G: 1 SUSPENSION ORAL at 17:21

## 2017-01-01 RX ADMIN — ASPIRIN 81 MG: 81 TABLET ORAL at 10:37

## 2017-01-01 RX ADMIN — ACETAMINOPHEN 325 MG: 325 TABLET ORAL at 09:06

## 2017-01-01 RX ADMIN — TAZOBACTAM SODIUM AND PIPERACILLIN SODIUM 3.38 G: 375; 3 INJECTION, SOLUTION INTRAVENOUS at 16:30

## 2017-01-01 RX ADMIN — DEXTROSE AND SODIUM CHLORIDE 50 ML/HR: 5; 450 INJECTION, SOLUTION INTRAVENOUS at 21:37

## 2017-01-01 RX ADMIN — DOCUSATE SODIUM -SENNOSIDES 2 TABLET: 50; 8.6 TABLET, COATED ORAL at 09:31

## 2017-01-01 RX ADMIN — HEPARIN SODIUM 5000 UNITS: 5000 INJECTION, SOLUTION INTRAVENOUS; SUBCUTANEOUS at 14:46

## 2017-01-01 RX ADMIN — ACETAMINOPHEN 650 MG: 325 TABLET ORAL at 08:27

## 2017-01-01 RX ADMIN — CALCITRIOL 0.5 MCG: 0.5 CAPSULE, LIQUID FILLED ORAL at 09:54

## 2017-01-01 RX ADMIN — PREDNISONE 5 MG: 5 TABLET ORAL at 08:49

## 2017-01-01 RX ADMIN — NYSTATIN 500000 UNITS: 100000 SUSPENSION ORAL at 17:43

## 2017-01-01 RX ADMIN — SODIUM BICARBONATE 650 MG: 650 TABLET ORAL at 18:38

## 2017-01-01 RX ADMIN — SODIUM CHLORIDE 250 ML: 900 INJECTION, SOLUTION INTRAVENOUS at 15:16

## 2017-01-01 RX ADMIN — ASPIRIN 81 MG: 81 TABLET ORAL at 08:52

## 2017-01-01 RX ADMIN — OLANZAPINE 2.5 MG: 2.5 TABLET, FILM COATED ORAL at 22:26

## 2017-01-01 RX ADMIN — DEXTROSE MONOHYDRATE 100 ML/HR: 5 INJECTION, SOLUTION INTRAVENOUS at 21:33

## 2017-01-01 RX ADMIN — TAZOBACTAM SODIUM AND PIPERACILLIN SODIUM 3.38 G: 375; 3 INJECTION, SOLUTION INTRAVENOUS at 17:44

## 2017-01-01 RX ADMIN — DOCUSATE SODIUM -SENNOSIDES 2 TABLET: 50; 8.6 TABLET, COATED ORAL at 18:38

## 2017-01-01 RX ADMIN — SUCRALFATE 1 G: 1 SUSPENSION ORAL at 07:04

## 2017-01-01 RX ADMIN — PREDNISONE 5 MG: 5 TABLET ORAL at 11:53

## 2017-01-01 RX ADMIN — POLYETHYLENE GLYCOL 3350 17 G: 17 POWDER, FOR SOLUTION ORAL at 09:09

## 2017-01-01 RX ADMIN — SODIUM BICARBONATE 650 MG: 650 TABLET ORAL at 18:46

## 2017-01-01 RX ADMIN — FAMOTIDINE 10 MG: 10 INJECTION, SOLUTION INTRAVENOUS at 08:08

## 2017-01-01 RX ADMIN — CALCIUM CARBONATE-VITAMIN D TAB 500 MG-200 UNIT 500 MG: 500-200 TAB at 08:08

## 2017-01-01 RX ADMIN — SUCRALFATE 1 G: 1 SUSPENSION ORAL at 18:00

## 2017-01-01 RX ADMIN — AZITHROMYCIN DIHYDRATE 500 MG: 500 INJECTION, POWDER, LYOPHILIZED, FOR SOLUTION INTRAVENOUS at 16:45

## 2017-01-01 RX ADMIN — NYSTATIN 500000 UNITS: 100000 SUSPENSION ORAL at 12:48

## 2017-01-01 RX ADMIN — MIDAZOLAM 1 MG: 1 INJECTION INTRAMUSCULAR; INTRAVENOUS at 22:44

## 2017-01-01 RX ADMIN — HYDROCODONE BITARTRATE AND ACETAMINOPHEN 1 TABLET: 5; 325 TABLET ORAL at 00:39

## 2017-01-01 RX ADMIN — SODIUM BICARBONATE 650 MG: 650 TABLET ORAL at 08:49

## 2017-01-01 RX ADMIN — HYDROMORPHONE HYDROCHLORIDE 0.5 MG: 10 INJECTION INTRAMUSCULAR; INTRAVENOUS; SUBCUTANEOUS at 05:31

## 2017-01-01 RX ADMIN — NYSTATIN 500000 UNITS: 100000 SUSPENSION ORAL at 21:33

## 2017-01-01 RX ADMIN — ENOXAPARIN SODIUM 30 MG: 30 INJECTION SUBCUTANEOUS at 08:52

## 2017-01-01 RX ADMIN — CALCITRIOL 0.5 MCG: 0.5 CAPSULE, LIQUID FILLED ORAL at 08:39

## 2017-01-01 RX ADMIN — LIDOCAINE HYDROCHLORIDE 17 ML: 10 INJECTION, SOLUTION INFILTRATION; PERINEURAL at 11:30

## 2017-01-01 RX ADMIN — HEPARIN SODIUM 5000 UNITS: 5000 INJECTION, SOLUTION INTRAVENOUS; SUBCUTANEOUS at 22:26

## 2017-01-01 RX ADMIN — BUPROPION HYDROCHLORIDE 75 MG: 75 TABLET, FILM COATED ORAL at 08:57

## 2017-01-01 RX ADMIN — SUCRALFATE 1 G: 1 SUSPENSION ORAL at 06:17

## 2017-01-01 RX ADMIN — SENNOSIDES 8.6 MG: 8.6 TABLET, FILM COATED ORAL at 20:26

## 2017-01-01 RX ADMIN — SUCRALFATE 1 G: 1 SUSPENSION ORAL at 00:41

## 2017-01-01 RX ADMIN — TAZOBACTAM SODIUM AND PIPERACILLIN SODIUM 3.38 G: 375; 3 INJECTION, SOLUTION INTRAVENOUS at 08:05

## 2017-01-01 RX ADMIN — ONDANSETRON 4 MG: 4 TABLET, FILM COATED ORAL at 16:45

## 2017-01-01 RX ADMIN — ONDANSETRON 4 MG: 2 INJECTION INTRAMUSCULAR; INTRAVENOUS at 10:03

## 2017-01-01 RX ADMIN — NYSTATIN 500000 UNITS: 100000 SUSPENSION ORAL at 08:28

## 2017-01-01 RX ADMIN — NYSTATIN 500000 UNITS: 100000 SUSPENSION ORAL at 12:41

## 2017-01-01 RX ADMIN — CALCIUM CARBONATE-VITAMIN D TAB 500 MG-200 UNIT 500 MG: 500-200 TAB at 09:19

## 2017-01-01 RX ADMIN — ZOLEDRONIC ACID 3.3 MG: 0.8 INJECTION, SOLUTION, CONCENTRATE INTRAVENOUS at 13:48

## 2017-01-01 RX ADMIN — FAMOTIDINE 10 MG: 10 INJECTION, SOLUTION INTRAVENOUS at 20:55

## 2017-01-01 RX ADMIN — ERYTHROPOIETIN 27000 UNITS: 20000 INJECTION, SOLUTION INTRAVENOUS; SUBCUTANEOUS at 10:21

## 2017-01-01 RX ADMIN — SODIUM CHLORIDE 250 ML: 900 INJECTION, SOLUTION INTRAVENOUS at 10:11

## 2017-01-01 RX ADMIN — PACLITAXEL 125 MG: 6 INJECTION, SOLUTION INTRAVENOUS at 11:29

## 2017-01-01 RX ADMIN — SODIUM BICARBONATE 650 MG: 650 TABLET ORAL at 17:25

## 2017-01-01 RX ADMIN — ASPIRIN 81 MG: 81 TABLET ORAL at 08:30

## 2017-01-01 RX ADMIN — ERYTHROPOIETIN 25000 UNITS: 20000 INJECTION, SOLUTION INTRAVENOUS; SUBCUTANEOUS at 13:00

## 2017-01-01 RX ADMIN — DIPHENOXYLATE HYDROCHLORIDE AND ATROPINE SULFATE 1 TABLET: 2.5; .025 TABLET ORAL at 08:49

## 2017-01-01 RX ADMIN — DIPHENHYDRAMINE HYDROCHLORIDE 25 MG: 25 CAPSULE ORAL at 07:33

## 2017-01-01 RX ADMIN — FAMOTIDINE 10 MG: 10 INJECTION, SOLUTION INTRAVENOUS at 20:33

## 2017-01-01 RX ADMIN — SODIUM BICARBONATE 650 MG: 650 TABLET ORAL at 10:37

## 2017-01-01 RX ADMIN — DEXTROSE AND SODIUM CHLORIDE 50 ML/HR: 5; 450 INJECTION, SOLUTION INTRAVENOUS at 22:50

## 2017-01-01 RX ADMIN — LORAZEPAM 0.5 MG: 2 INJECTION, SOLUTION INTRAMUSCULAR; INTRAVENOUS at 17:25

## 2017-01-01 RX ADMIN — NYSTATIN 500000 UNITS: 100000 SUSPENSION ORAL at 21:36

## 2017-01-01 RX ADMIN — ONDANSETRON 4 MG: 2 INJECTION INTRAMUSCULAR; INTRAVENOUS at 03:06

## 2017-01-01 RX ADMIN — SENNOSIDES 8.6 MG: 8.6 TABLET, FILM COATED ORAL at 22:15

## 2017-01-01 RX ADMIN — HEPARIN SODIUM 5000 UNITS: 5000 INJECTION, SOLUTION INTRAVENOUS; SUBCUTANEOUS at 05:28

## 2017-01-01 RX ADMIN — BISACODYL 10 MG: 10 SUPPOSITORY RECTAL at 11:02

## 2017-01-01 RX ADMIN — BUPROPION HYDROCHLORIDE 75 MG: 75 TABLET, FILM COATED ORAL at 21:19

## 2017-01-01 RX ADMIN — DEXAMETHASONE SODIUM PHOSPHATE 12 MG: 10 INJECTION, SOLUTION INTRAMUSCULAR; INTRAVENOUS at 10:26

## 2017-01-01 RX ADMIN — DIPHENOXYLATE HYDROCHLORIDE AND ATROPINE SULFATE 1 TABLET: 2.5; .025 TABLET ORAL at 21:23

## 2017-01-01 RX ADMIN — AZITHROMYCIN DIHYDRATE 500 MG: 500 INJECTION, POWDER, LYOPHILIZED, FOR SOLUTION INTRAVENOUS at 15:50

## 2017-01-01 RX ADMIN — Medication 10 ML: at 10:42

## 2017-01-01 RX ADMIN — NYSTATIN 500000 UNITS: 100000 SUSPENSION ORAL at 19:24

## 2017-01-01 RX ADMIN — Medication 10 ML: at 18:55

## 2017-01-01 RX ADMIN — NYSTATIN 500000 UNITS: 100000 SUSPENSION ORAL at 14:35

## 2017-01-01 RX ADMIN — SODIUM CHLORIDE 250 ML: 900 INJECTION, SOLUTION INTRAVENOUS at 12:00

## 2017-01-01 RX ADMIN — ONDANSETRON 4 MG: 2 INJECTION INTRAMUSCULAR; INTRAVENOUS at 08:08

## 2017-01-01 RX ADMIN — ONDANSETRON 4 MG: 2 INJECTION INTRAMUSCULAR; INTRAVENOUS at 14:36

## 2017-01-01 RX ADMIN — ACETAMINOPHEN 650 MG: 325 TABLET ORAL at 16:23

## 2017-01-01 RX ADMIN — ZOLEDRONIC ACID 3.3 MG: 0.8 INJECTION, SOLUTION, CONCENTRATE INTRAVENOUS at 13:46

## 2017-01-01 RX ADMIN — TAZOBACTAM SODIUM AND PIPERACILLIN SODIUM 3.38 G: 375; 3 INJECTION, SOLUTION INTRAVENOUS at 13:35

## 2017-01-01 RX ADMIN — TAZOBACTAM SODIUM AND PIPERACILLIN SODIUM 3.38 G: 375; 3 INJECTION, SOLUTION INTRAVENOUS at 17:18

## 2017-01-01 RX ADMIN — SODIUM CHLORIDE 250 ML: 900 INJECTION, SOLUTION INTRAVENOUS at 11:10

## 2017-01-01 RX ADMIN — Medication 1 TABLET: at 17:12

## 2017-01-01 RX ADMIN — DEXAMETHASONE SODIUM PHOSPHATE 12 MG: 10 INJECTION, SOLUTION INTRAMUSCULAR; INTRAVENOUS at 11:09

## 2017-01-01 RX ADMIN — POTASSIUM CHLORIDE, DEXTROSE MONOHYDRATE AND SODIUM CHLORIDE 75 ML/HR: 150; 5; 450 INJECTION, SOLUTION INTRAVENOUS at 14:30

## 2017-01-01 RX ADMIN — ERYTHROPOIETIN 25000 UNITS: 20000 INJECTION, SOLUTION INTRAVENOUS; SUBCUTANEOUS at 13:59

## 2017-01-01 RX ADMIN — SODIUM BICARBONATE 650 MG: 650 TABLET ORAL at 17:37

## 2017-01-01 RX ADMIN — NYSTATIN 500000 UNITS: 100000 SUSPENSION ORAL at 18:38

## 2017-01-01 RX ADMIN — Medication 2 TABLET: at 22:08

## 2017-01-01 RX ADMIN — DOCUSATE SODIUM -SENNOSIDES 2 TABLET: 50; 8.6 TABLET, COATED ORAL at 18:10

## 2017-01-01 RX ADMIN — NYSTATIN 500000 UNITS: 100000 SUSPENSION ORAL at 08:49

## 2017-01-01 RX ADMIN — NYSTATIN 500000 UNITS: 100000 SUSPENSION ORAL at 21:07

## 2017-01-01 RX ADMIN — DEXAMETHASONE SODIUM PHOSPHATE 4 MG: 4 INJECTION, SOLUTION INTRAMUSCULAR; INTRAVENOUS at 07:01

## 2017-01-01 RX ADMIN — DEXTROSE AND SODIUM CHLORIDE 125 ML/HR: 5; 200 INJECTION, SOLUTION INTRAVENOUS at 21:35

## 2017-01-01 RX ADMIN — TEMAZEPAM 7.5 MG: 7.5 CAPSULE ORAL at 23:07

## 2017-01-01 RX ADMIN — ERYTHROPOIETIN 20000 UNITS: 20000 INJECTION, SOLUTION INTRAVENOUS; SUBCUTANEOUS at 11:34

## 2017-01-01 RX ADMIN — ZOLEDRONIC ACID 3.3 MG: 0.8 INJECTION, SOLUTION, CONCENTRATE INTRAVENOUS at 12:04

## 2017-01-01 RX ADMIN — PACLITAXEL 125 MG: 6 INJECTION, SOLUTION, CONCENTRATE INTRAVENOUS at 11:08

## 2017-01-01 RX ADMIN — PREDNISONE 5 MG: 5 TABLET ORAL at 09:31

## 2017-01-01 RX ADMIN — HEPARIN SODIUM 5000 UNITS: 5000 INJECTION, SOLUTION INTRAVENOUS; SUBCUTANEOUS at 15:02

## 2017-01-01 RX ADMIN — MIDAZOLAM HYDROCHLORIDE 1 MG: 1 INJECTION INTRAMUSCULAR; INTRAVENOUS at 22:44

## 2017-01-01 RX ADMIN — ERYTHROPOIETIN 20000 UNITS: 20000 INJECTION, SOLUTION INTRAVENOUS; SUBCUTANEOUS at 11:11

## 2017-01-01 RX ADMIN — HEPARIN SODIUM 5000 UNITS: 5000 INJECTION, SOLUTION INTRAVENOUS; SUBCUTANEOUS at 06:25

## 2017-01-01 RX ADMIN — NYSTATIN 500000 UNITS: 100000 SUSPENSION ORAL at 09:31

## 2017-01-01 RX ADMIN — SODIUM BICARBONATE 650 MG: 650 TABLET ORAL at 09:10

## 2017-01-01 RX ADMIN — ATORVASTATIN CALCIUM 10 MG: 10 TABLET, FILM COATED ORAL at 09:55

## 2017-01-01 RX ADMIN — ONDANSETRON 4 MG: 2 INJECTION INTRAMUSCULAR; INTRAVENOUS at 20:57

## 2017-01-01 RX ADMIN — Medication 1 TABLET: at 16:30

## 2017-01-01 RX ADMIN — SODIUM CHLORIDE 250 ML: 900 INJECTION, SOLUTION INTRAVENOUS at 15:36

## 2017-01-01 RX ADMIN — EPHEDRINE SULFATE 10 MG: 50 INJECTION INTRAMUSCULAR; INTRAVENOUS; SUBCUTANEOUS at 23:00

## 2017-01-01 RX ADMIN — SENNOSIDES 8.6 MG: 8.6 TABLET, FILM COATED ORAL at 20:01

## 2017-01-01 RX ADMIN — ONDANSETRON HYDROCHLORIDE 4 MG: 2 INJECTION, SOLUTION INTRAVENOUS at 15:01

## 2017-01-01 RX ADMIN — PREDNISONE 5 MG: 5 TABLET ORAL at 08:08

## 2017-01-01 RX ADMIN — SODIUM CHLORIDE 100 ML/HR: 4.5 INJECTION, SOLUTION INTRAVENOUS at 08:09

## 2017-01-01 RX ADMIN — CALCITRIOL 0.5 MCG: 0.5 CAPSULE, LIQUID FILLED ORAL at 08:49

## 2017-01-01 RX ADMIN — NYSTATIN 500000 UNITS: 100000 SUSPENSION ORAL at 17:13

## 2017-01-01 RX ADMIN — DEXTROSE AND SODIUM CHLORIDE 50 ML/HR: 5; 450 INJECTION, SOLUTION INTRAVENOUS at 14:33

## 2017-01-01 RX ADMIN — DOCUSATE SODIUM -SENNOSIDES 2 TABLET: 50; 8.6 TABLET, COATED ORAL at 08:31

## 2017-01-01 RX ADMIN — SODIUM CHLORIDE 125 ML/HR: 9 INJECTION, SOLUTION INTRAVENOUS at 13:30

## 2017-01-01 RX ADMIN — SUCRALFATE 1 G: 1 SUSPENSION ORAL at 06:09

## 2017-01-01 RX ADMIN — Medication 2 TABLET: at 21:33

## 2017-01-01 RX ADMIN — PREDNISONE 5 MG: 5 TABLET ORAL at 08:39

## 2017-01-01 RX ADMIN — NYSTATIN 500000 UNITS: 100000 SUSPENSION ORAL at 14:19

## 2017-01-01 RX ADMIN — POLYETHYLENE GLYCOL 3350 17 G: 17 POWDER, FOR SOLUTION ORAL at 10:36

## 2017-01-01 RX ADMIN — ERYTHROPOIETIN 30000 UNITS: 20000 INJECTION, SOLUTION INTRAVENOUS; SUBCUTANEOUS at 14:10

## 2017-01-01 RX ADMIN — CALCITRIOL 0.5 MCG: 0.5 CAPSULE, LIQUID FILLED ORAL at 08:29

## 2017-01-01 RX ADMIN — ASPIRIN 81 MG: 81 TABLET ORAL at 08:08

## 2017-01-01 RX ADMIN — FAMOTIDINE 10 MG: 10 INJECTION, SOLUTION INTRAVENOUS at 08:57

## 2017-01-01 RX ADMIN — SODIUM CHLORIDE 250 ML: 900 INJECTION, SOLUTION INTRAVENOUS at 11:35

## 2017-01-01 RX ADMIN — NYSTATIN 500000 UNITS: 100000 SUSPENSION ORAL at 09:18

## 2017-01-01 RX ADMIN — HEPARIN SODIUM 5000 UNITS: 5000 INJECTION, SOLUTION INTRAVENOUS; SUBCUTANEOUS at 21:33

## 2017-01-01 RX ADMIN — HEPARIN SODIUM 5000 UNITS: 5000 INJECTION, SOLUTION INTRAVENOUS; SUBCUTANEOUS at 22:15

## 2017-01-01 RX ADMIN — SENNOSIDES 8.6 MG: 8.6 TABLET, FILM COATED ORAL at 20:15

## 2017-01-01 RX ADMIN — Medication 2 TABLET: at 20:50

## 2017-01-01 RX ADMIN — LORAZEPAM 0.25 MG: 2 INJECTION INTRAMUSCULAR; INTRAVENOUS at 02:33

## 2017-01-01 RX ADMIN — Medication 1 TABLET: at 08:30

## 2017-01-01 RX ADMIN — SODIUM BICARBONATE 650 MG: 650 TABLET ORAL at 08:31

## 2017-01-01 RX ADMIN — NYSTATIN 500000 UNITS: 100000 SUSPENSION ORAL at 22:09

## 2017-01-01 RX ADMIN — SODIUM CHLORIDE 250 ML: 900 INJECTION, SOLUTION INTRAVENOUS at 11:31

## 2017-01-01 RX ADMIN — NYSTATIN 500000 UNITS: 100000 SUSPENSION ORAL at 18:10

## 2017-01-01 RX ADMIN — SODIUM CHLORIDE 125 ML/HR: 9 INJECTION, SOLUTION INTRAVENOUS at 19:26

## 2017-01-01 RX ADMIN — DEXTROSE AND SODIUM CHLORIDE 125 ML/HR: 5; 200 INJECTION, SOLUTION INTRAVENOUS at 12:41

## 2017-01-01 RX ADMIN — SODIUM CHLORIDE 1250 MG: 900 INJECTION, SOLUTION INTRAVENOUS at 11:32

## 2017-01-01 RX ADMIN — TAZOBACTAM SODIUM AND PIPERACILLIN SODIUM 3.38 G: 375; 3 INJECTION, SOLUTION INTRAVENOUS at 09:05

## 2017-01-01 RX ADMIN — HYDROCODONE BITARTRATE AND ACETAMINOPHEN 1 TABLET: 5; 325 TABLET ORAL at 09:37

## 2017-01-01 RX ADMIN — NYSTATIN 500000 UNITS: 100000 SUSPENSION ORAL at 17:25

## 2017-01-01 RX ADMIN — NYSTATIN 500000 UNITS: 100000 SUSPENSION ORAL at 20:50

## 2017-01-01 RX ADMIN — ERYTHROPOIETIN 37000 UNITS: 20000 INJECTION, SOLUTION INTRAVENOUS; SUBCUTANEOUS at 13:50

## 2017-01-01 RX ADMIN — SODIUM CHLORIDE 125 ML/HR: 9 INJECTION, SOLUTION INTRAVENOUS at 00:53

## 2017-01-01 RX ADMIN — LIDOCAINE HYDROCHLORIDE 20 ML: 10 INJECTION, SOLUTION INFILTRATION; PERINEURAL at 12:25

## 2017-01-01 RX ADMIN — ASPIRIN 81 MG: 81 TABLET ORAL at 08:53

## 2017-01-01 RX ADMIN — ZOLEDRONIC ACID 3 MG: 0.8 INJECTION, SOLUTION, CONCENTRATE INTRAVENOUS at 10:50

## 2017-01-01 RX ADMIN — SUCRALFATE 1 G: 1 SUSPENSION ORAL at 02:26

## 2017-01-01 RX ADMIN — Medication 2 TABLET: at 17:16

## 2017-01-01 RX ADMIN — Medication 2 TABLET: at 09:54

## 2017-01-01 RX ADMIN — NYSTATIN 500000 UNITS: 100000 SUSPENSION ORAL at 20:49

## 2017-01-01 RX ADMIN — HYDROCODONE BITARTRATE AND ACETAMINOPHEN 1 TABLET: 5; 325 TABLET ORAL at 20:20

## 2017-01-01 RX ADMIN — DEXAMETHASONE SODIUM PHOSPHATE 12 MG: 10 INJECTION, SOLUTION INTRAMUSCULAR; INTRAVENOUS at 10:30

## 2017-01-01 RX ADMIN — Medication 1 TABLET: at 09:32

## 2017-01-01 RX ADMIN — TAZOBACTAM SODIUM AND PIPERACILLIN SODIUM 3.38 G: 375; 3 INJECTION, SOLUTION INTRAVENOUS at 08:10

## 2017-01-01 RX ADMIN — NYSTATIN 500000 UNITS: 100000 SUSPENSION ORAL at 10:36

## 2017-01-01 RX ADMIN — ERYTHROPOIETIN 20000 UNITS: 20000 INJECTION, SOLUTION INTRAVENOUS; SUBCUTANEOUS at 11:55

## 2017-01-01 RX ADMIN — SODIUM BICARBONATE 650 MG: 650 TABLET ORAL at 08:40

## 2017-01-01 RX ADMIN — NYSTATIN 500000 UNITS: 100000 SUSPENSION ORAL at 17:37

## 2017-01-01 RX ADMIN — LIDOCAINE HYDROCHLORIDE 6 ML: 10 INJECTION, SOLUTION INFILTRATION; PERINEURAL at 10:59

## 2017-01-01 RX ADMIN — LORAZEPAM 0.25 MG: 2 INJECTION INTRAMUSCULAR; INTRAVENOUS at 10:08

## 2017-01-01 RX ADMIN — HEPARIN SODIUM 5000 UNITS: 5000 INJECTION, SOLUTION INTRAVENOUS; SUBCUTANEOUS at 06:08

## 2017-01-01 RX ADMIN — CALCIUM CARBONATE-VITAMIN D TAB 500 MG-200 UNIT 500 MG: 500-200 TAB at 08:49

## 2017-01-01 RX ADMIN — LIDOCAINE HYDROCHLORIDE 10 ML: 10 INJECTION, SOLUTION INFILTRATION; PERINEURAL at 15:44

## 2017-01-01 RX ADMIN — BUPROPION HYDROCHLORIDE 75 MG: 75 TABLET, FILM COATED ORAL at 08:12

## 2017-01-01 RX ADMIN — POTASSIUM CHLORIDE, DEXTROSE MONOHYDRATE AND SODIUM CHLORIDE 75 ML/HR: 150; 5; 450 INJECTION, SOLUTION INTRAVENOUS at 02:36

## 2017-01-01 RX ADMIN — DOCUSATE SODIUM -SENNOSIDES 2 TABLET: 50; 8.6 TABLET, COATED ORAL at 09:10

## 2017-01-01 RX ADMIN — SODIUM CHLORIDE 250 ML: 900 INJECTION, SOLUTION INTRAVENOUS at 13:50

## 2017-01-01 RX ADMIN — NYSTATIN 500000 UNITS: 100000 SUSPENSION ORAL at 21:30

## 2017-01-01 RX ADMIN — CALCITRIOL 0.5 MCG: 0.5 CAPSULE, LIQUID FILLED ORAL at 22:09

## 2017-01-01 RX ADMIN — TEMAZEPAM 15 MG: 15 CAPSULE ORAL at 21:51

## 2017-01-01 RX ADMIN — NYSTATIN 500000 UNITS: 100000 SUSPENSION ORAL at 13:30

## 2017-01-01 RX ADMIN — HEPARIN SODIUM 5000 UNITS: 5000 INJECTION, SOLUTION INTRAVENOUS; SUBCUTANEOUS at 21:06

## 2017-01-01 RX ADMIN — LIDOCAINE HYDROCHLORIDE 20 ML: 10 INJECTION, SOLUTION INFILTRATION; PERINEURAL at 15:55

## 2017-01-01 RX ADMIN — SODIUM CHLORIDE 75 ML/HR: 9 INJECTION, SOLUTION INTRAVENOUS at 22:04

## 2017-01-01 RX ADMIN — DIPHENOXYLATE HYDROCHLORIDE AND ATROPINE SULFATE 1 TABLET: 2.5; .025 TABLET ORAL at 21:36

## 2017-01-01 RX ADMIN — POLYETHYLENE GLYCOL 3350 17 G: 17 POWDER, FOR SOLUTION ORAL at 08:31

## 2017-01-01 RX ADMIN — FAMOTIDINE 20 MG: 10 INJECTION, SOLUTION INTRAVENOUS at 21:15

## 2017-01-01 RX ADMIN — ASPIRIN 81 MG: 81 TABLET ORAL at 08:31

## 2017-01-01 RX ADMIN — DIPHENOXYLATE HYDROCHLORIDE AND ATROPINE SULFATE 1 TABLET: 2.5; .025 TABLET ORAL at 22:52

## 2017-01-01 RX ADMIN — NYSTATIN 500000 UNITS: 100000 SUSPENSION ORAL at 22:15

## 2017-01-01 RX ADMIN — HYDROMORPHONE HYDROCHLORIDE 0.5 MG: 10 INJECTION INTRAMUSCULAR; INTRAVENOUS; SUBCUTANEOUS at 03:06

## 2017-01-01 RX ADMIN — FAMOTIDINE 20 MG: 10 INJECTION, SOLUTION INTRAVENOUS at 23:38

## 2017-01-01 RX ADMIN — LEVOFLOXACIN 500 MG: 5 INJECTION, SOLUTION INTRAVENOUS at 16:22

## 2017-01-01 RX ADMIN — SODIUM BICARBONATE 20 ML: 84 INJECTION, SOLUTION INTRAVENOUS at 09:19

## 2017-01-01 RX ADMIN — Medication 1 TABLET: at 09:10

## 2017-01-01 RX ADMIN — ASPIRIN 81 MG: 81 TABLET ORAL at 09:55

## 2017-01-01 RX ADMIN — SODIUM BICARBONATE 650 MG: 650 TABLET ORAL at 17:43

## 2017-01-01 RX ADMIN — Medication 2 TABLET: at 08:49

## 2017-01-01 RX ADMIN — DEXTROSE AND SODIUM CHLORIDE 50 ML/HR: 5; 450 INJECTION, SOLUTION INTRAVENOUS at 06:25

## 2017-01-01 RX ADMIN — Medication 1 TABLET: at 08:40

## 2017-01-01 RX ADMIN — LORAZEPAM 0.25 MG: 2 INJECTION INTRAMUSCULAR; INTRAVENOUS at 16:43

## 2017-01-01 RX ADMIN — DEXTROSE AND SODIUM CHLORIDE 75 ML/HR: 5; 450 INJECTION, SOLUTION INTRAVENOUS at 07:45

## 2017-01-01 RX ADMIN — PREDNISONE 5 MG: 5 TABLET ORAL at 08:52

## 2017-01-01 RX ADMIN — ONDANSETRON 4 MG: 2 INJECTION INTRAMUSCULAR; INTRAVENOUS at 10:46

## 2017-01-01 RX ADMIN — SODIUM CHLORIDE 125 ML/HR: 9 INJECTION, SOLUTION INTRAVENOUS at 03:23

## 2017-01-01 RX ADMIN — PREDNISONE 5 MG: 5 TABLET ORAL at 09:19

## 2017-01-01 RX ADMIN — DIPHENOXYLATE HYDROCHLORIDE AND ATROPINE SULFATE 1 TABLET: 2.5; .025 TABLET ORAL at 14:35

## 2017-01-01 RX ADMIN — HYDROCODONE BITARTRATE AND ACETAMINOPHEN 1 TABLET: 5; 325 TABLET ORAL at 10:43

## 2017-01-01 RX ADMIN — GADOBENATE DIMEGLUMINE 10 ML: 529 INJECTION, SOLUTION INTRAVENOUS at 10:15

## 2017-01-01 RX ADMIN — PREDNISONE 5 MG: 5 TABLET ORAL at 09:55

## 2017-01-01 RX ADMIN — NYSTATIN 500000 UNITS: 100000 SUSPENSION ORAL at 13:27

## 2017-01-01 RX ADMIN — HEPARIN SODIUM 5000 UNITS: 5000 INJECTION, SOLUTION INTRAVENOUS; SUBCUTANEOUS at 13:27

## 2017-01-01 RX ADMIN — HYDROMORPHONE HYDROCHLORIDE 0.5 MG: 10 INJECTION INTRAMUSCULAR; INTRAVENOUS; SUBCUTANEOUS at 11:44

## 2017-01-01 RX ADMIN — ONDANSETRON 4 MG: 2 INJECTION INTRAMUSCULAR; INTRAVENOUS at 20:32

## 2017-01-01 RX ADMIN — ATORVASTATIN CALCIUM 10 MG: 10 TABLET, FILM COATED ORAL at 09:19

## 2017-01-01 RX ADMIN — LIDOCAINE HYDROCHLORIDE 80 MG: 20 INJECTION, SOLUTION INFILTRATION; PERINEURAL at 22:55

## 2017-01-01 RX ADMIN — LIDOCAINE HYDROCHLORIDE 20 ML: 10 INJECTION, SOLUTION INFILTRATION; PERINEURAL at 11:03

## 2017-01-01 RX ADMIN — DEXAMETHASONE SODIUM PHOSPHATE 12 MG: 10 INJECTION INTRAMUSCULAR; INTRAVENOUS at 13:49

## 2017-01-01 RX ADMIN — DIPHENOXYLATE HYDROCHLORIDE AND ATROPINE SULFATE 1 TABLET: 2.5; .025 TABLET ORAL at 17:13

## 2017-01-01 RX ADMIN — Medication 10 ML: at 17:25

## 2017-01-01 RX ADMIN — HEPARIN SODIUM 5000 UNITS: 5000 INJECTION, SOLUTION INTRAVENOUS; SUBCUTANEOUS at 22:09

## 2017-01-01 RX ADMIN — HEPARIN SODIUM 5000 UNITS: 5000 INJECTION, SOLUTION INTRAVENOUS; SUBCUTANEOUS at 05:50

## 2017-01-01 RX ADMIN — DEXAMETHASONE SODIUM PHOSPHATE 4 MG: 4 INJECTION, SOLUTION INTRAMUSCULAR; INTRAVENOUS at 10:46

## 2017-01-01 RX ADMIN — SODIUM CHLORIDE, POTASSIUM CHLORIDE, SODIUM LACTATE AND CALCIUM CHLORIDE 9 ML/HR: 600; 310; 30; 20 INJECTION, SOLUTION INTRAVENOUS at 22:25

## 2017-01-01 RX ADMIN — SODIUM CHLORIDE 100 ML/HR: 9 INJECTION, SOLUTION INTRAVENOUS at 17:50

## 2017-01-01 RX ADMIN — DIPHENOXYLATE HYDROCHLORIDE AND ATROPINE SULFATE 1 TABLET: 2.5; .025 TABLET ORAL at 18:23

## 2017-01-01 RX ADMIN — HEPARIN SODIUM 5000 UNITS: 5000 INJECTION, SOLUTION INTRAVENOUS; SUBCUTANEOUS at 21:36

## 2017-01-01 RX ADMIN — NYSTATIN 500000 UNITS: 100000 SUSPENSION ORAL at 12:23

## 2017-01-01 RX ADMIN — NYSTATIN 500000 UNITS: 100000 SUSPENSION ORAL at 18:23

## 2017-01-01 RX ADMIN — HEPARIN SODIUM 5000 UNITS: 5000 INJECTION, SOLUTION INTRAVENOUS; SUBCUTANEOUS at 15:18

## 2017-01-01 RX ADMIN — ACETAMINOPHEN 650 MG: 325 TABLET ORAL at 18:54

## 2017-01-01 RX ADMIN — PERFLUTREN 3 ML: 6.52 INJECTION, SUSPENSION INTRAVENOUS at 13:44

## 2017-01-01 RX ADMIN — SODIUM CHLORIDE 1000 ML: 900 INJECTION, SOLUTION INTRAVENOUS at 13:32

## 2017-01-01 RX ADMIN — SODIUM BICARBONATE 650 MG: 650 TABLET ORAL at 08:30

## 2017-01-01 RX ADMIN — HEPARIN SODIUM 5000 UNITS: 5000 INJECTION, SOLUTION INTRAVENOUS; SUBCUTANEOUS at 21:30

## 2017-01-01 RX ADMIN — ZOLEDRONIC ACID 3 MG: 4 INJECTION, SOLUTION, CONCENTRATE INTRAVENOUS at 11:35

## 2017-01-01 RX ADMIN — DEXTROSE MONOHYDRATE 50 ML/HR: 5 INJECTION, SOLUTION INTRAVENOUS at 10:17

## 2017-01-01 RX ADMIN — SUCRALFATE 1 G: 1 SUSPENSION ORAL at 17:44

## 2017-01-01 RX ADMIN — HEPARIN SODIUM 5000 UNITS: 5000 INJECTION, SOLUTION INTRAVENOUS; SUBCUTANEOUS at 21:23

## 2017-01-01 RX ADMIN — ENOXAPARIN SODIUM 30 MG: 30 INJECTION SUBCUTANEOUS at 09:05

## 2017-01-01 RX ADMIN — DIATRIZOATE MEGLUMINE AND DIATRIZOATE SODIUM 30 ML: 600; 100 SOLUTION ORAL; RECTAL at 06:13

## 2017-01-01 RX ADMIN — DEXAMETHASONE SODIUM PHOSPHATE 4 MG: 10 INJECTION INTRAMUSCULAR; INTRAVENOUS at 23:01

## 2017-01-01 RX ADMIN — HYDROMORPHONE HYDROCHLORIDE 0.5 MG: 10 INJECTION INTRAMUSCULAR; INTRAVENOUS; SUBCUTANEOUS at 21:09

## 2017-01-01 RX ADMIN — Medication 10 ML: at 08:40

## 2017-01-01 RX ADMIN — ATORVASTATIN CALCIUM 10 MG: 10 TABLET, FILM COATED ORAL at 08:49

## 2017-01-01 RX ADMIN — DIPHENHYDRAMINE HYDROCHLORIDE 25 MG: 50 INJECTION, SOLUTION INTRAMUSCULAR; INTRAVENOUS at 10:08

## 2017-01-01 RX ADMIN — NYSTATIN 500000 UNITS: 100000 SUSPENSION ORAL at 09:55

## 2017-01-01 RX ADMIN — DIPHENOXYLATE HYDROCHLORIDE AND ATROPINE SULFATE 1 TABLET: 2.5; .025 TABLET ORAL at 09:19

## 2017-01-01 RX ADMIN — HYDROMORPHONE HYDROCHLORIDE 0.5 MG: 10 INJECTION INTRAMUSCULAR; INTRAVENOUS; SUBCUTANEOUS at 10:46

## 2017-01-01 RX ADMIN — DIPHENHYDRAMINE HYDROCHLORIDE 25 MG: 25 CAPSULE ORAL at 08:27

## 2017-01-01 RX ADMIN — TAZOBACTAM SODIUM AND PIPERACILLIN SODIUM 3.38 G: 375; 3 INJECTION, SOLUTION INTRAVENOUS at 00:41

## 2017-01-01 RX ADMIN — Medication 2 TABLET: at 21:06

## 2017-01-01 RX ADMIN — ASPIRIN 81 MG: 81 TABLET ORAL at 09:19

## 2017-01-01 RX ADMIN — Medication 1 TABLET: at 18:38

## 2017-01-01 RX ADMIN — ACETAMINOPHEN 325 MG: 325 TABLET ORAL at 07:33

## 2017-01-01 RX ADMIN — Medication 21.8 MILLICURIE: at 11:05

## 2017-01-01 RX ADMIN — ASPIRIN 81 MG: 81 TABLET ORAL at 09:05

## 2017-01-01 RX ADMIN — LIDOCAINE HYDROCHLORIDE 20 ML: 10 INJECTION, SOLUTION INFILTRATION; PERINEURAL at 13:26

## 2017-01-01 RX ADMIN — SODIUM CHLORIDE 500 ML: 900 INJECTION, SOLUTION INTRAVENOUS at 15:20

## 2017-01-01 RX ADMIN — ASPIRIN 81 MG: 81 TABLET ORAL at 09:31

## 2017-01-01 RX ADMIN — TAZOBACTAM SODIUM AND PIPERACILLIN SODIUM 3.38 G: 375; 3 INJECTION, SOLUTION INTRAVENOUS at 04:33

## 2017-01-01 RX ADMIN — SODIUM CHLORIDE 125 ML/HR: 9 INJECTION, SOLUTION INTRAVENOUS at 17:18

## 2017-01-01 RX ADMIN — OLANZAPINE 2.5 MG: 2.5 TABLET, FILM COATED ORAL at 21:30

## 2017-01-01 RX ADMIN — NYSTATIN 500000 UNITS: 100000 SUSPENSION ORAL at 08:39

## 2017-01-01 RX ADMIN — PREDNISONE 5 MG: 5 TABLET ORAL at 09:10

## 2017-01-01 RX ADMIN — Medication 1 TABLET: at 08:29

## 2017-01-01 RX ADMIN — Medication 1 TABLET: at 13:30

## 2017-01-01 RX ADMIN — SODIUM CHLORIDE 125 ML/HR: 9 INJECTION, SOLUTION INTRAVENOUS at 21:52

## 2017-01-01 RX ADMIN — SODIUM BICARBONATE 650 MG: 650 TABLET ORAL at 17:17

## 2017-01-01 RX ADMIN — ENOXAPARIN SODIUM 30 MG: 30 INJECTION SUBCUTANEOUS at 08:05

## 2017-01-01 RX ADMIN — TAZOBACTAM SODIUM AND PIPERACILLIN SODIUM 3.38 G: 375; 3 INJECTION, SOLUTION INTRAVENOUS at 00:52

## 2017-01-01 RX ADMIN — SODIUM CHLORIDE, POTASSIUM CHLORIDE, SODIUM LACTATE AND CALCIUM CHLORIDE: 600; 310; 30; 20 INJECTION, SOLUTION INTRAVENOUS at 22:53

## 2017-01-01 RX ADMIN — DIPHENOXYLATE HYDROCHLORIDE AND ATROPINE SULFATE 1 TABLET: 2.5; .025 TABLET ORAL at 09:54

## 2017-01-01 RX ADMIN — FENTANYL CITRATE 100 MCG: 50 INJECTION INTRAMUSCULAR; INTRAVENOUS at 23:01

## 2017-01-01 RX ADMIN — Medication 10 ML: at 18:46

## 2017-01-01 RX ADMIN — PREDNISONE 5 MG: 5 TABLET ORAL at 08:05

## 2017-01-01 RX ADMIN — NYSTATIN 500000 UNITS: 100000 SUSPENSION ORAL at 17:12

## 2017-01-01 RX ADMIN — NYSTATIN 500000 UNITS: 100000 SUSPENSION ORAL at 08:08

## 2017-01-01 RX ADMIN — TAZOBACTAM SODIUM AND PIPERACILLIN SODIUM 3.38 G: 375; 3 INJECTION, SOLUTION INTRAVENOUS at 17:20

## 2017-01-01 RX ADMIN — ATORVASTATIN CALCIUM 10 MG: 10 TABLET, FILM COATED ORAL at 08:08

## 2017-01-01 RX ADMIN — BISACODYL 10 MG: 5 TABLET, COATED ORAL at 15:02

## 2017-01-01 RX ADMIN — HYDROCODONE BITARTRATE AND ACETAMINOPHEN 1 TABLET: 5; 325 TABLET ORAL at 22:41

## 2017-01-01 RX ADMIN — PROPOFOL 120 MG: 10 INJECTION, EMULSION INTRAVENOUS at 22:55

## 2017-01-10 PROBLEM — Z78.0 POST-MENOPAUSE: Status: ACTIVE | Noted: 2017-01-01

## 2017-02-02 NOTE — TELEPHONE ENCOUNTER
----- Message from Mai Tiwari sent at 2/2/2017 12:37 PM EST -----  Contact: [phone]   Calling to see if any pre medication needed before teeth cleaning ?      Per Dr. Gutierres, augmentin 875 bid x 3 days, day before, day of and day after prior to cleaning.  Called dental office for patient to let them know what we did and Dr. Gutierres was ok but no answer.  Left message with info if they needed to call us back.

## 2017-02-23 NOTE — TELEPHONE ENCOUNTER
Rec Xeloda refill request electronically from [employer]. Per office note from Dr Gutierres on 1/27/17-Pt will continue Xeloda 1000 mg BID 7 days on then 7 days off. Request approved.

## 2017-02-24 NOTE — PROGRESS NOTES
REASON FOR FOLLOW-UP:    1. Metastatic breast cancer, predominantly bone disease, biopsy confirmed triple negative in late October 2016. History of left breast cancer in 2005, had mastectomy, without adjuvant chemotherapy. She was followed by Dr. Romo at [facility], treated on Arimidex for 5 years, finished in 2011.   2. Anemia, secondary to her metastatic disease and chemotherapy, with significantly elevated ferritin level. No evidence of deficiency for B12 or folic acid. She was given 3 units packed RBC in late October 2016 during hospitalization.   3. She was started on Xeloda 1500 mg twice a day on 11/14/2016 for 14 days on and 7 days off. Patient develops significant fatigue, Xeloda was decreased to 1000 mg twice a day from week #2. On 12/05/2016 schedule was changed to 7 days on, 7 days off.   4.  Metastatic bone disease, Zometa was started in November 2016, repeat every 4 weeks.   5.  Cancer-related pain, taking Lortab 5/325 mg prn.   6.  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       HISTORY OF PRESENT ILLNESS:    The patient is a pleasant 82-year-old female with the above-mentioned history, scheduled follow-up and lab review.  She continues on Xeloda 1000 mg twice a day 7 days on and 7 days off.  This is currently her off week of treatment.  She is due today to receive Zometa which she continues to receive monthly.  The patient reports she is feeling well.  She reports her appetite remains increased, she is on prednisone 5 mg daily.  She reports her bowels are moving normally.  She denies any nausea.  She denies any indigestion.  She denies any shortness of breath, chest pain, fever or chills. She denies any skin changes, she has only minor dryness of her fingertips, which is unchanged.  She reports her urine output is normal.  She denies any new pain, or lower extremity swelling. Her energy remains stable. She  continues to have an ECOG performance status of 0-1.    Past Medical History   Diagnosis Date   • Anemia    • Bone metastases    • Breast cancer 2005   • HL (hearing loss)    • Hyperlipidemia    • Hypertension      Past Surgical History   Procedure Laterality Date   • Breast biopsy Left 2005     malignant   • Mastectomy Left 2005        HEMATOLOGIC/ONCOLOGIC HISTORY: Ms. Vora is a 81-year-old  female who presented to the emergency room because of worsening lower back pain. She started having this about a week ago. Severity was 8 out of 10 but then subsided. In this past weekend, she had worsening pain again and was sent to the ER for further evaluation. Prior to this, she was mowing her own grass, lives by herself, with good performance status.      Since admission, she was given Lortab about every 6 hours. Her pain currently is 4 out of 10.      This patient was found to have significant anemia, with hemoglobin 7.0 in the ER. Of note, in the ER there was attempt for transfusion of packed RBC, however she had a fever and the transfusion was abandoned. Yesterday hemoglobin was down to 6.6 and she was given 1 unit packed RBC with no incident. She was given Benadryl 50 mg. This patient has hemoglobin 7.6 this morning. She is scheduled to receive 2 more units packed RBC.        This patient had CT scan of the abdomen and pelvis examination during ER evaluation, which reported diffuse metastatic bone disease involving the spine, in the thoracic spine, lumbar spine and pelvic bones. The patient reports no constipation, no diarrhea, no urinary incontinence.        This patient had history of left breast cancer back in 2005 for which she had left mastectomy. No lymph node involvement. No adjuvant chemotherapy, no radiation therapy. The patient was followed by Dr. Romo at the [facility], and was on Arimidex for 5 years, which stopped in 2011. The patient was dismissed by Dr. Romo afterwards.         The patient will stay with us this time for her oncologic care, as she told me today.      The patient reports weight loss of about 10 pounds since early September 2016. She has decreased appetite. No nausea/vomiting. No vision changes, no hearing changes. No fever, no sweating. Denies melena or hematochezia.        Patient had MRI examination with and without IV contrast for brain, cervical, thoracic and lumbar spin on 10/24/2016. There was no evidence of metastatic brain disease. There are metastatic disease involving the entire spine, no fresh compression fracture.       Patient had a CT-guided bone biopsy on 10/25/2016. G evaluation reported a primary breast cancer. Further testing showed triple negative, ER 0%, HI 0%, HER-2 IHC 2+, however FISH study was negative.      Was started on IV Zometa in early November 2016. She was started on Xeloda 1500 mg twice a day on 11/14/2016 for 14 days on and 7 days off. Patient develops significant fatigue, Xeloda was decreased to 1000 mg twice a day from week #2.        Laboratory study on 12/02/16 showed significant  anemia with hemoglobin 7.8, but a normal WBC 7000 including neutrophils 4800. Her platelets is 367,000. She was given 2 units packed RBC transfusion. Because of poor tolerance, Xeloda was decreased to 1000 mg twice a day 7 days on and 7 days off.  was 30.5 on 12/16/16.      Patient has better tolerance to decrease the Xeloda.  She is more energetic.  However she has worsening anemia, with a hemoglobin in the 9 gram range.  Patient was started on low-dose Procrit 20,000 units every 2 weeks by an of December 2016.       MEDICATIONS: see EMR       ALLERGIES:  No Known Allergies      Past Medical History, Past Surgical History, Social History, Family History have been reviewed and are without significant changes except as mentioned.     I have reviewed the patient's medical history in detail and updated the computerized patient record.    REVIEW OF  "SYSTEMS:  GENERAL: see HPI;    SKIN: No nonhealing lesions.  No rashes.    HEME/LYMPH: No easy bruising, bleeding.  No swollen nodes.    EYES: No vision changes or diplopia.    ENT: No tinnitus, hearing loss, gum bleeding, epistaxis, hoarseness or dysphagia.    RESPIRATORY: No cough, shortness of breath, hemoptysis or wheezing.    CVS: No chest pain, palpitations, orthopnea, dyspnea on exertion.    GI: No melena or hematochezia.  No abdominal pain.  No nausea, vomiting, constipation, diarrhea  : No lower tract obstructive symptoms, dysuria or hematuria.    MUSCULOSKELETAL: No bone pain. No joint stiffness.    NEUROLOGICAL: No global weakness, loss of consciousness or seizures.    PSYCHIATRIC: No increased nervousness, mood changes or depression.        Objective   Vitals:    02/24/17 1301   BP: 145/73  Comment: wrist   Pulse: 102   Resp: 12   Temp: 98.3 °F (36.8 °C)   SpO2: 97%   Weight: 133 lb (60.3 kg)   Height: 62.2\" (158 cm)   PainSc: 0-No pain   ECOG 1        PHYSICAL EXAM:    GENERAL: Well-developed, thin female in no acute distress.    SKIN: Warm, dry without rashes, purpura or petechiae.  She has mild peeling of skin at the tip of her fingers.   HEAD: Normocephalic.  EYES: Pupils equal, round. EOMs intact. Conjunctivae normal.  LYMPHATICS: No cervical, supraclavicular, axillary adenopathy.  CHEST: Lungs clear to auscultation. Good airflow.  CARDIAC: Regular rate and rhythm without murmurs, rubs or gallops. Normal S1,S2.  ABDOMEN: Soft, nontender with no organomegaly or masses. Bowel sounds present.  EXTREMITIES: No clubbing, cyanosis or edema.    NEUROLOGICAL: Cranial Nerves II-XII grossly intact. No focal neurological deficits.  PSYCHIATRIC: Normal affect and mood.        RECENT LABS:  Lab Results   Component Value Date    WBC 5.83 02/24/2017    HGB 9.5 (L) 02/24/2017    HCT 30.3 (L) 02/24/2017    .9 (H) 02/24/2017     02/24/2017     Lab Results   Component Value Date    NEUTROABS 3.55 " 02/24/2017     Lab Results   Component Value Date    GLUCOSE 109 (H) 02/24/2017    BUN 24 (H) 02/24/2017    CREATININE 1.18 (H) 02/24/2017    EGFRIFNONA 44 (L) 02/24/2017    EGFRIFAFRI 65 10/22/2016    BCR 20.3 02/24/2017    K 3.8 02/24/2017    CO2 27.0 02/24/2017    CALCIUM 9.1 02/24/2017    PROTENTOTREF 6.8 10/22/2016    PROTENTOTREF 6.9 10/22/2016    ALBUMIN 4.20 02/24/2017    LABIL2 1.4 02/24/2017    AST 84 (H) 02/24/2017    ALT 12 02/24/2017     Assessment/Plan   1. Metastatic breast cancer, predominantly bone disease, biopsy confirmed triple negative in late October 2016. She was started on Xeloda 1500 mg b.i.d.,on 11/14/16, about 25% dose reduction from calculated dose 2000 mg b.i.d. She was not able to tolerate, one week into the therapy, patient called reporting significant fatigue associated with treatment. We decreased her Xeloda to 1000 mg twice a day and she finished her first cycle (2 weeks) on November 27. Patient continues to have significant fatigue, and laboratory study showed significant worsened hemoglobin is 7.8. She does have lightheadedness although no syncope episodes.    We further decreased her dose to 1000 mg oral twice a day, 7 days on and 7 days off. She has since tolerated better with less fatigue. We'll continue treatment for now.       2. Anemia secondary to chemotherapy, with a background of anemia related to her metastatic breast cancer. Earlier in December 2016 she required 2 units packed RBC transfusion. Improving hemoglobin at 10.5 on December 16, however on 12/30/16 it dropped down to 9.2. After discussion, we initiated her on Procrit injection every 2 weeks.     There is no evidence of deficiency of iron, B12, or folic acid. As a matter of fact, she has significantly elevated ferritin level, which is reactive to her metastatic disease.      3. Metastatic bone disease. She is due for monthly Zometa today and repeat every 4 weeks.  Patient is responding to treatment, as evidenced  by improved alk phosphatase.      4. Pain associated with metastatic bone disease. Patient reports she has no pain, even not using Tylenol and Lortab.       5. Hypokalemia. Improved and normalized with oral KCL supplement. She has since discontinued supplement and potassium remains within normal limits.      6. Poor oral intake and weight loss.  She has stable weight since we started on oral prednisone.  She continues on 5 mg daily          PLAN:   1. Proceed with Zometa 3.3mg today.   2. Continue Xeloda, 1000 mg b.i.d. for 7days on and 7 days off.  Resume next cycle Sunday 2/26/2017.  3.  Continue Procrit at 20,000 units today and repeat every 2 weeks, with CBC monitoring.  4. Continue Prednisone 5 mg oral daily.   5.  Bone scan in 3 weeks to evaluate current disease state.  6.  Follow up with Dr. Gutierres in 4 weeks at which time we will repeat CBC, CMP, CA 15-3 and review bone scan results.  The patient will be due for next month Zometa at that time.      Terra Neal, APRN  02/24/2017

## 2017-03-24 PROBLEM — R50.9 FEVER: Status: ACTIVE | Noted: 2017-01-01

## 2017-03-24 PROBLEM — Z71.89 ENCOUNTER FOR MEDICATION REVIEW AND COUNSELING: Status: ACTIVE | Noted: 2017-01-01

## 2017-03-24 PROBLEM — R11.2 NAUSEA AND VOMITING: Status: ACTIVE | Noted: 2017-01-01

## 2017-03-24 NOTE — PROGRESS NOTES
REASON FOR FOLLOW-UP:    1. Metastatic breast cancer, predominantly bone disease, biopsy confirmed triple negative in late October 2016. History of left breast cancer in 2005, had mastectomy, without adjuvant chemotherapy. She was followed by Dr. Romo at [facility], treated on Arimidex for 5 years, finished in 2011.   2. Anemia, secondary to her metastatic disease and chemotherapy, with significantly elevated ferritin level. No evidence of deficiency for B12 or folic acid. She was given 3 units packed RBC in late October 2016 during hospitalization.   3. She was started on Xeloda 1500 mg twice a day on 11/14/2016 for 14 days on and 7 days off. Patient develops significant fatigue, Xeloda was decreased to 1000 mg twice a day from week #2. On 12/05/2016 schedule was changed to 7 days on, 7 days off.   4. Metastatic bone disease, Zometa was started in November 2016, repeat every 4 weeks.   5. Cancer-related pain, taking Lortab 5/325 mg prn.   6. 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       HISTORY OF PRESENT ILLNESS:   The patient is a pleasant 82-year-old female with the above-mentioned history, scheduled follow-up after her bone scan on March 17, 2017.  Patient is accompanied by her son as always.    This is her off-week for Xeloda treatment.  She continues on Xeloda 1000 mg twice a day 7 days on and 7 days off good tolerance.  She reports improved energy level with Procrit injection.  She denies nausea vomiting. She eats better and inquiries whether we can stop her prednisone.  She is due today to receive Zometa which she continues to receive monthly.  She reports her bowels are moving normally.  She denies any shortness of breath, chest pain, fever or chills. She has skin dryness on her hands. She reports her urine output is normal. She denies any new pain, or lower extremity swelling. Her energy remains stable. She  continues to have an ECOG performance status 1.      Medical History         Past Medical History   Diagnosis Date   • Anemia     • Bone metastases     • Breast cancer 2005   • HL (hearing loss)     • Hyperlipidemia     • Hypertension            Surgical History           Past Surgical History   Procedure Laterality Date   • Breast biopsy Left 2005       malignant   • Mastectomy Left 2005            HEMATOLOGIC/ONCOLOGIC HISTORY: Ms. Vora is a 81-year-old  female who presented to the emergency room because of worsening lower back pain. She started having this about a week ago. Severity was 8 out of 10 but then subsided. In this past weekend, she had worsening pain again and was sent to the ER for further evaluation. Prior to this, she was mowing her own grass, lives by herself, with good performance status.      Since admission, she was given Lortab about every 6 hours. Her pain currently is 4 out of 10.      This patient was found to have significant anemia, with hemoglobin 7.0 in the ER. Of note, in the ER there was attempt for transfusion of packed RBC, however she had a fever and the transfusion was abandoned. Yesterday hemoglobin was down to 6.6 and she was given 1 unit packed RBC with no incident. She was given Benadryl 50 mg. This patient has hemoglobin 7.6 this morning. She is scheduled to receive 2 more units packed RBC.        This patient had CT scan of the abdomen and pelvis examination during ER evaluation, which reported diffuse metastatic bone disease involving the spine, in the thoracic spine, lumbar spine and pelvic bones. The patient reports no constipation, no diarrhea, no urinary incontinence.        This patient had history of left breast cancer back in 2005 for which she had left mastectomy. No lymph node involvement. No adjuvant chemotherapy, no radiation therapy. The patient was followed by Dr. Romo at the [facility], and was on Arimidex for 5 years, which  stopped in 2011. The patient was dismissed by Dr. Romo afterwards.        The patient will stay with us this time for her oncologic care, as she told me today.      The patient reports weight loss of about 10 pounds since early September 2016. She has decreased appetite. No nausea/vomiting. No vision changes, no hearing changes. No fever, no sweating. Denies melena or hematochezia.        Patient had MRI examination with and without IV contrast for brain, cervical, thoracic and lumbar spin on 10/24/2016. There was no evidence of metastatic brain disease. There are metastatic disease involving the entire spine, no fresh compression fracture.       Patient had a CT-guided bone biopsy on 10/25/2016. G evaluation reported a primary breast cancer. Further testing showed triple negative, ER 0%, ND 0%, HER-2 IHC 2+, however FISH study was negative.      Was started on IV Zometa in early November 2016. She was started on Xeloda 1500 mg twice a day on 11/14/2016 for 14 days on and 7 days off. Patient develops significant fatigue, Xeloda was decreased to 1000 mg twice a day from week #2.        Laboratory study on 12/02/16 showed significant  anemia with hemoglobin 7.8, but a normal WBC 7000 including neutrophils 4800. Her platelets is 367,000. She was given 2 units packed RBC transfusion. Because of poor tolerance, Xeloda was decreased to 1000 mg twice a day 7 days on and 7 days off.  was 30.5 on 12/16/16.       Patient has better tolerance to decrease the Xeloda. She is more energetic. However she has worsening anemia, with a hemoglobin in the 9 gram range. Patient was started on low-dose Procrit 20,000 units every 2 weeks by an of December 2016.         MEDICATIONS: see EMR       ALLERGIES:  No Known Allergies      Past Medical History, Past Surgical History, Social History, Family History have been reviewed and are without significant changes except as mentioned.      I have reviewed the patient's medical history  "in detail and updated the computerized patient record.     REVIEW OF SYSTEMS:  GENERAL: see HPI;    SKIN: No nonhealing lesions.  No rashes.   HEME/LYMPH: No easy bruising, bleeding.  No swollen nodes.    EYES: No vision changes or diplopia.    ENT: No tinnitus, hearing loss, gum bleeding, epistaxis, hoarseness or dysphagia.    RESPIRATORY: No cough, shortness of breath, hemoptysis or wheezing.    CVS: No chest pain, palpitations, orthopnea, dyspnea on exertion.    GI: No melena or hematochezia.  No abdominal pain.  No nausea, vomiting, constipation, diarrhea  : No lower tract obstructive symptoms, dysuria or hematuria.    MUSCULOSKELETAL: No bone pain. No joint stiffness.    NEUROLOGICAL: No global weakness, loss of consciousness or seizures.    PSYCHIATRIC: No increased nervousness, mood changes or depression.        Objective   Vitals:    03/24/17 1301   BP: 126/72   Pulse: 108   Resp: 16   Temp: 98.3 °F (36.8 °C)   TempSrc: Oral   SpO2: 99%   Weight: 133 lb 9.6 oz (60.6 kg)   Height: 62.2\" (158 cm)   PainSc: 0-No pain     ECOG 1        PHYSICAL EXAM:    GENERAL: Well-developed, thin female in no acute distress.    SKIN: Warm, dry without rashes, purpura or petechiae. She has mild peeling of skin at the tip of her fingers.   HEAD: Normocephalic.  EYES: Pupils equal, round. EOMs intact. Conjunctivae normal.  LYMPHATICS: No cervical, supraclavicular, axillary adenopathy.  CHEST: Lungs clear to auscultation. Good airflow.  CARDIAC: Regular rate and rhythm without murmurs, rubs or gallops. Normal S1,S2.  ABDOMEN: Soft, nontender with no organomegaly or masses. Bowel sounds present.  EXTREMITIES: No clubbing, cyanosis or edema.    NEUROLOGICAL: Cranial Nerves II-XII grossly intact. No focal neurological deficits.  PSYCHIATRIC: Normal affect and mood.        RECENT LABS:  Lab Results   Component Value Date    WBC 4.58 03/24/2017    HGB 9.1 (L) 03/24/2017    HCT 30.1 (L) 03/24/2017    .6 (H) 03/24/2017    PLT " "351 03/24/2017     Lab Results   Component Value Date    NEUTROABS 4.05 03/24/2017     Lab Results   Component Value Date    GLUCOSE 164 (H) 03/24/2017    BUN 26 (H) 03/24/2017    CREATININE 1.17 (H) 03/24/2017    EGFRIFNONA 45 (L) 03/24/2017    EGFRIFAFRI 65 10/22/2016    BCR 22.2 03/24/2017    K 4.0 03/24/2017    CO2 25.7 03/24/2017    CALCIUM 8.9 03/24/2017    PROTENTOTREF 6.8 10/22/2016    PROTENTOTREF 6.9 10/22/2016    ALBUMIN 4.00 03/24/2017    LABIL2 1.5 03/24/2017    AST 65 (H) 03/24/2017    ALT 10 03/24/2017     Sodium   Date Value Ref Range Status   03/24/2017 140 136 - 145 mmol/L Final     Potassium   Date Value Ref Range Status   03/24/2017 4.0 3.5 - 5.2 mmol/L Final     Total Bilirubin   Date Value Ref Range Status   03/24/2017 0.5 0.1 - 1.2 mg/dL Final     Alkaline Phosphatase   Date Value Ref Range Status   03/24/2017 216 (H) 39 - 117 U/L Final   ]      WHOLE-BODY BONE SCAN: 03/17/2017   COMPARISON: MRIs of the cervical spine and lumbar spine dated  10/24/2016.      FINDINGS: There is intense abnormal increased uptake throughout the  entire spine and pelvic bones and both femurs and humerus bones and both  scapulas and throughout the ribs bilaterally consistent with extensive  osseous metastatic disease producing a \"SuperScan\" appearance. Almost no  activity is evident within the kidneys. The MRI studies also showed  evidence of extensive diffuse osseous metastatic disease.      IMPRESSION:  Extensive diffuse osseous metastatic disease.      Assessment/Plan   1. Metastatic breast cancer, predominantly bone disease, biopsy confirmed triple negative in late October 2016. She was started on Xeloda 1500 mg b.i.d.,on 11/14/16, about 25% dose reduction from calculated dose 2000 mg b.i.d. She was not able to tolerate, one week into the therapy, patient called reporting significant fatigue associated with treatment. We decreased her Xeloda to 1000 mg twice a day and she finished her first cycle (2 weeks) on " November 27. Patient continues to have significant fatigue, and laboratory study showed significant worsened hemoglobin is 7.8. She does have lightheadedness although no syncope episodes.     We further decreased her dose to 1000 mg oral twice a day, 7 days on and 7 days off. She has since tolerated better with less fatigue. she reports that she feels better now, performance status is ECOG 1.  Denies bone pains  No diarrhea no constipation. Her appetite actually has been much better. She asked whether we can stop her prednisone.     I don't have the tumor marker results yet.  The bone scan showed a diffuse metastatic disease in the skeletal. Personally reviewed her bone scan images, compared to previous MRI examination.  Since this is the first time we obtain bone scan, it was not a direct comparison and hard to tell the response.  We'll more relies on the tumor marker which is still pending today.  I will call her when the results came back to decide whether increase her dose to possibly 1500 mg in the morning and 1000 mg in the evening with one week on, and a week off dosing scheduled.        2. Anemia secondary to chemotherapy, with a background of anemia related to her metastatic breast cancer. Patient has be on low-dose Procrit 20,000 units every 2 weeks.  Her hemoglobin today is slightly worsening, we'll increase her dose per protocol.      There is no evidence of deficiency of iron, B12, or folic acid. As a matter of fact, she has significantly elevated ferritin level, which is reactive to her metastatic disease.      3. Metastatic bone disease. She is due for monthly Zometa today and repeat every 4 weeks. Patient is responding to treatment, as evidenced by improved alk phosphatase.      4. Pain associated with metastatic bone disease. Patient reports her pain has resolved. she has no pain, even not using Tylenol and Lortab.         5. Poor oral intake and weight loss. This has much improved.  Patient  inquired whether we can stop her oral oral prednisone. I think it's reasonable to discontinue her low-dose at 5 mg daily.           PLAN:   1. Proceed with Zometa 3.3mg today.   2. Continue Xeloda, 1000 mg b.i.d. for 7days on and 7 days off. Resume next cycle Monday 03/27/2017.  3. Continue Procrit, increase at 25,000 units today and repeat every 2 weeks, with CBC monitoring.  4. Discontinue prednisone 5 mg oral daily.   5. Tumor marker CA-15-3 results will be communicated with the patient once available  6. Follow up with NP in 4 weeks and M.D. in 8 weeks. Repeat labs every 4 weeks CBC, CMP, CA 15-3.        ELVIN FONG M.D., Ph.D.   3/24/2017        Addendum: patient started nausea vomiting close to the time she finishes Zometa.  Patient had previous Zometa infusion multiple times without similar phenomenon.  She also had shoveling.  According to office staff, when she was making appointment she already had symptoms.  We obtained 2 sets of blood culture also urinalysis.  Results are pending.  Subsequently patient had a fever 102 Fahrenheit,tachycardia pulse 144, blood pressure 130/63. We started IV Levaquin 500 mg in office. Tylenol was given. In IV Zofran was given.  I also prescribed oral Levaquin 500 mg daily for 7 days to be started tomorrow on March 25, 2017. We'll arrange patient for a quick follow-up with our practitioner next Monday on March 27th. She will hold Xeloda for next week until further evaluation.     Also discussed that with patient and her person, if she continues to have fever chills or not feeling well, she needs to call us or go to emergency room.  They voiced understanding.      According to her son, they ate lunch in restaurant this noon.  Patient ate salad.  She wasn't feeling bad at all this morning.    ELVIN FONG M.D., Ph.D.    03/24/2017        Dragon disclaimer:  Much of this encounter note is an electronic transcription/translation of spoken language to printed text. The  electronic translation of spoken language may permit erroneous, or at times, nonsensical words or phrases to be inadvertently transcribed; Although I have reviewed the note for such errors, some may still exist.

## 2017-03-24 NOTE — PROGRESS NOTES
Prior to starting Zometa, pt shivering uncontrollably.  Temperature checked and 98.6.  Many blankets wrapped around pt and pt rested during Zometa infusion. At completion of Zometa, pt c/o feeling sick and projectile vomited into trash can.  TAYLOR Ellison at chairside.  Order for Zofran 4mg IV.  Per Dr. Gutierres, blood cultures x2 ordered and normal saline 500cc.      Following completion of IV fluids, blood cultures drawn and urine sent for urinalysis.  Pt still shivering and weak on her feet.  Temperature 102 at recheck. Per v/o, Dr. Gutierres, Levaquin 500mg IV given x 1 dose and oral antibiotics escribed to pt pharmacy.  Pt instructed by Dr. Gutierres to report to ER if symptoms do no improve or worsen.  Pt and son v/u.      At discharge, pt no longer shivering but temp still 102.7.  Pt left with emesis bag and instructed to alternate tylenol and ibuprofen to control fever.

## 2017-03-27 NOTE — PROGRESS NOTES
REASON FOR FOLLOW-UP:    1. Metastatic breast cancer, predominantly bone disease, biopsy confirmed triple negative in late October 2016. History of left breast cancer in 2005, had mastectomy, without adjuvant chemotherapy. She was followed by Dr. Romo at [facility], treated on Arimidex for 5 years, finished in 2011.   2. Anemia, secondary to her metastatic disease and chemotherapy, with significantly elevated ferritin level. No evidence of deficiency for B12 or folic acid. She was given 3 units packed RBC in late October 2016 during hospitalization.   3. She was started on Xeloda 1500 mg twice a day on 11/14/2016 for 14 days on and 7 days off. Patient develops significant fatigue, Xeloda was decreased to 1000 mg twice a day from week #2. On 12/05/2016 schedule was changed to 7 days on, 7 days off.   4. Metastatic bone disease, Zometa was started in November 2016, repeat every 4 weeks.   5. Cancer-related pain, taking Lortab 5/325 mg prn.   6. 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       HISTORY OF PRESENT ILLNESS:    The patient is a pleasant 82-year-old female with the above-mentioned history, who returns today for short-term follow-up regarding her episode of fever and vomiting on Friday, 3/24/2017.  The patient was evaluated by Dr. Gutierres last Friday, continuing on Xeloda 2 tablets twice a day, 7 days on, 7 days off.  She did receive Zometa last Friday.  Following her infusion, she developed significant chills, spiked a temperature 102.8, and vomiting.  We did obtain blood cultures and urine culture, which all reveal no growth.  The patient was given 500 mg Levaquin IV, as well as initiated Levaquin orally on Saturday.  Thankfully, the patient was not neutropenic. The patient reports she continued to have 2 more episodes of vomiting after leaving our office Friday.  She also reports 1 episode of loose bowel movement.  This  episode of fever and vomiting was thought possibly be related to food poisoning?   Returning today, 3/27/2017, the patient is much improved.  She reports her energy is improved over the weekend.  She was able to eat and drink without GI upset.  She remained afebrile after discharge from our office.  She is scheduled to resume her Xeloda today.  Given the slight increase in her tumor marker, we considered dose escalation.  However, given her recent likely viral illness, we will continue current dosing and reevaluate in 2 weeks.    Past Medical History:   Diagnosis Date   • Anemia    • Bone metastases    • Breast cancer 2005   • HL (hearing loss)    • Hyperlipidemia    • Hypertension      Past Surgical History:   Procedure Laterality Date   • BREAST BIOPSY Left 2005    malignant   • MASTECTOMY Left 2005      HEMATOLOGIC/ONCOLOGIC HISTORY: Ms. Vora is a 81-year-old  female who presented to the emergency room because of worsening lower back pain. She started having this about a week ago. Severity was 8 out of 10 but then subsided. In this past weekend, she had worsening pain again and was sent to the ER for further evaluation. Prior to this, she was mowing her own grass, lives by herself, with good performance status.      Since admission, she was given Lortab about every 6 hours. Her pain currently is 4 out of 10.      This patient was found to have significant anemia, with hemoglobin 7.0 in the ER. Of note, in the ER there was attempt for transfusion of packed RBC, however she had a fever and the transfusion was abandoned. Yesterday hemoglobin was down to 6.6 and she was given 1 unit packed RBC with no incident. She was given Benadryl 50 mg. This patient has hemoglobin 7.6 this morning. She is scheduled to receive 2 more units packed RBC.        This patient had CT scan of the abdomen and pelvis examination during ER evaluation, which reported diffuse metastatic bone disease involving the spine, in the  thoracic spine, lumbar spine and pelvic bones. The patient reports no constipation, no diarrhea, no urinary incontinence.        This patient had history of left breast cancer back in 2005 for which she had left mastectomy. No lymph node involvement. No adjuvant chemotherapy, no radiation therapy. The patient was followed by Dr. Romo at the [facility], and was on Arimidex for 5 years, which stopped in 2011. The patient was dismissed by Dr. Romo afterwards.        The patient will stay with us this time for her oncologic care, as she told me today.      The patient reports weight loss of about 10 pounds since early September 2016. She has decreased appetite. No nausea/vomiting. No vision changes, no hearing changes. No fever, no sweating. Denies melena or hematochezia.        Patient had MRI examination with and without IV contrast for brain, cervical, thoracic and lumbar spin on 10/24/2016. There was no evidence of metastatic brain disease. There are metastatic disease involving the entire spine, no fresh compression fracture.       Patient had a CT-guided bone biopsy on 10/25/2016. G evaluation reported a primary breast cancer. Further testing showed triple negative, ER 0%, NV 0%, HER-2 IHC 2+, however FISH study was negative.      Was started on IV Zometa in early November 2016. She was started on Xeloda 1500 mg twice a day on 11/14/2016 for 14 days on and 7 days off. Patient develops significant fatigue, Xeloda was decreased to 1000 mg twice a day from week #2.        Laboratory study on 12/02/16 showed significant  anemia with hemoglobin 7.8, but a normal WBC 7000 including neutrophils 4800. Her platelets is 367,000. She was given 2 units packed RBC transfusion. Because of poor tolerance, Xeloda was decreased to 1000 mg twice a day 7 days on and 7 days off.  was 30.5 on 12/16/16.       Patient has better tolerance to decrease the Xeloda. She is more energetic. However she has  "worsening anemia, with a hemoglobin in the 9 gram range. Patient was started on low-dose Procrit 20,000 units every 2 weeks by an of December 2016.         MEDICATIONS: see EMR       ALLERGIES:  No Known Allergies      Past Medical History, Past Surgical History, Social History, Family History have been reviewed and are without significant changes except as mentioned.      I have reviewed the patient's medical history in detail and updated the computerized patient record.     REVIEW OF SYSTEMS:  GENERAL: see HPI;    SKIN: No nonhealing lesions.  No rashes.   HEME/LYMPH: No easy bruising, bleeding.  No swollen nodes.    EYES: No vision changes or diplopia.    ENT: No tinnitus, hearing loss, gum bleeding, epistaxis, hoarseness or dysphagia.    RESPIRATORY: No cough, shortness of breath, hemoptysis or wheezing.    CVS: No chest pain, palpitations, orthopnea, dyspnea on exertion.    GI: No melena or hematochezia.  No abdominal pain.  No nausea, vomiting, constipation, diarrhea  : No lower tract obstructive symptoms, dysuria or hematuria.    MUSCULOSKELETAL: No bone pain. No joint stiffness.    NEUROLOGICAL: No global weakness, loss of consciousness or seizures.    PSYCHIATRIC: No increased nervousness, mood changes or depression.        Objective   Vitals:    03/27/17 0851   BP: 128/68   Pulse: 96   Resp: 16   Temp: 98.4 °F (36.9 °C)   TempSrc: Oral   SpO2: 98%   Weight: 130 lb (59 kg)   Height: 62.2\" (158 cm)   PainSc: 0-No pain     ECOG 1        PHYSICAL EXAM:    GENERAL: Well-developed, thin female in no acute distress.    SKIN: Warm, dry without rashes, purpura or petechiae. She has mild peeling of skin at the tip of her fingers.   HEAD: Normocephalic.  EYES: Pupils equal, round. EOMs intact. Conjunctivae normal.  CHEST: Lungs clear to auscultation. Good airflow.  CARDIAC: Regular rate and rhythm without murmurs, rubs or gallops. Normal S1,S2.  ABDOMEN: Soft, nontender. Bowel sounds present.  EXTREMITIES: No " clubbing, cyanosis or edema.    NEUROLOGICAL: Cranial Nerves II-XII grossly intact. No focal neurological deficits.  PSYCHIATRIC: Normal affect and mood.        RECENT LABS:  Lab Results   Component Value Date    WBC 6.60 03/27/2017    HGB 9.6 (L) 03/27/2017    HCT 30.4 (L) 03/27/2017    .3 (H) 03/27/2017     03/27/2017     Lab Results   Component Value Date    NEUTROABS 4.50 03/27/2017     Ca15-3 39.3 3/24/2017. Results reviewed with the patient and son today.    Assessment/Plan   1. Metastatic breast cancer, predominantly bone disease, biopsy confirmed triple negative in late October 2016. She was started on Xeloda 1500 mg b.i.d.,on 11/14/16, about 25% dose reduction from calculated dose 2000 mg b.i.d. She was not able to tolerate, one week into the therapy, patient called reporting significant fatigue associated with treatment. We decreased her Xeloda to 1000 mg twice a day and she finished her first cycle (2 weeks) on November 27. Patient continues to have significant fatigue, and laboratory study showed significant worsened hemoglobin is 7.8.   We further decreased her dose to 1000 mg oral twice a day, 7 days on and 7 days off. She has since tolerated better with less fatigue.    CA 15-3 39.3 3/24/2017 compared to 36.8 1/27/2017.  We will increase Xeloda to 3 tablets in the morning, 2 tablets in the evening to begin in approximately 2 weeks.  Given recent viral illness, and slow recovery, the patient will continue current dosing starting today of 2 tablets in the morning, 2 tablets in the evening. We will dose increase when the patient returns 4/7/2017      2. Anemia secondary to chemotherapy, with a background of anemia related to her metastatic breast cancer. Patient has be on low-dose Procrit 20,000 units every 2 weeks.  Her hemoglobin is stable today      There is no evidence of deficiency of iron, B12, or folic acid. As a matter of fact, she has significantly elevated ferritin level, which is  reactive to her metastatic disease.      3. Metastatic bone disease. She is due for monthly Zometa today and repeat every 4 weeks. Patient is responding to treatment, as evidenced by improved alk phosphatase.      4. Pain associated with metastatic bone disease. Patient reports her pain has resolved. she has no pain, even not using Tylenol and Lortab.         5. Poor oral intake and weight loss. This has much improved.  Patient inquired whether we can stop her oral oral prednisone. I think it's reasonable to discontinue her low-dose at 5 mg daily.     6.  Likely viral illness of unknown etiology with fever, vomiting, diarrhea.  Thankfully, the patient remains afebrile for nearly 48 hours, with vomiting and diarrhea resolved.  Given the resolution of her illness, we will resume Xeloda.          PLAN:   1. Resume Xeloda today, 1000 mg twice a day, 7 days on, 7 days off  2.  Return in 2 weeks to continue Procrit.  She will continue to receive 25,000 units every 2 weeks pending her hemoglobin.  3.  Upon return 4/7/2017, the patient will increase Xeloda to 1500 mg in the morning, 1000 mg in the evening beginning Monday, 4/10/2017.  4. Follow up with NP in 4 weeks and M.D. in 8 weeks. Repeat labs every 4 weeks CBC, CMP, CA 15-3.          Terra Neal, APRN  03/27/2017

## 2017-03-28 PROBLEM — I10 ESSENTIAL HYPERTENSION: Status: ACTIVE | Noted: 2017-01-01

## 2017-03-28 NOTE — PROGRESS NOTES
Subjective   Erin Vora is a 82 y.o. female.   She is here to follow-up for medication review and counseling along with anemia chronic renal failure stage III hypertension cancer associated pain from breast cancer metastasized to the bone and hyperlipidemia  History of Present Illness   She is here to follow-up for medication review and counseling for all of her medications along with anemia chronic renal failure stage III hypertension cancer associated pain from metastatic breast cancer to the bone along with hyperlipidemia  The following portions of the patient's history were reviewed and updated as appropriate: allergies, current medications, past family history, past medical history, past social history, past surgical history and problem list.    Review of Systems   Musculoskeletal:        Generalized bone pain   All other systems reviewed and are negative.      Objective   Physical Exam   Constitutional: She is oriented to person, place, and time. She appears well-developed and well-nourished. She is cooperative.   HENT:   Head: Normocephalic and atraumatic.   Right Ear: Hearing, tympanic membrane, external ear and ear canal normal.   Left Ear: Hearing, tympanic membrane, external ear and ear canal normal.   Nose: Nose normal.   Mouth/Throat: Uvula is midline, oropharynx is clear and moist and mucous membranes are normal.   Eyes: Conjunctivae, EOM and lids are normal. Pupils are equal, round, and reactive to light.   Neck: Phonation normal. Neck supple. Carotid bruit is not present.   Cardiovascular: Normal rate, regular rhythm and normal heart sounds.  Exam reveals no gallop and no friction rub.    No murmur heard.  Pulmonary/Chest: Effort normal and breath sounds normal. No respiratory distress.   Abdominal: Soft. Bowel sounds are normal. She exhibits no distension and no mass. There is no hepatosplenomegaly. There is no tenderness. There is no rebound and no guarding. No hernia.   Musculoskeletal: She  exhibits no edema.   Neurological: She is alert and oriented to person, place, and time. Coordination and gait normal.   Skin: Skin is warm and dry.   Psychiatric: She has a normal mood and affect. Her speech is normal and behavior is normal. Judgment and thought content normal.   Nursing note and vitals reviewed.      Assessment/Plan   Diagnoses and all orders for this visit:    Encounter for medication review and counseling    Anemia of chronic renal failure, stage 3 (moderate)    Other hyperlipidemia    Breast cancer metastasized to bone, unspecified laterality    Cancer associated pain    Essential hypertension    Other orders  -     losartan (COZAAR) 100 MG tablet; Take 1 tablet by mouth Daily.  -     amLODIPine (NORVASC) 2.5 MG tablet; Take 1 tablet by mouth Daily.      Encounter for medication review and counseling noted  Anemia of chronic renal failure stage III follow closely  Hyper lipidemia keep LDL less than 70 with proper diet exercise medication  Cancer associated pain noted  Hypertension medication adjustment as needed here

## 2017-03-28 NOTE — PROGRESS NOTES
Subjective   Erin Vora is a 82 y.o. female.   She is here today for medication review and counseling along with anemia chronic renal failure stage III hyperlipidemia breast cancer metastasized to bone and cancer associated pain as well  History of Present Illness   She is here today follow-up for encounter for medication review and counseling to go over all the medicines along with anemia chronic renal failure hyperlipidemia breast cancer metastasized to the bone and cancer associated pain as well as hypertension  The following portions of the patient's history were reviewed and updated as appropriate: allergies, current medications, past family history, past medical history, past social history, past surgical history and problem list.    Review of Systems    Objective   Physical Exam    Assessment/Plan   Diagnoses and all orders for this visit:    Encounter for medication review and counseling    Anemia of chronic renal failure, stage 3 (moderate)    Other hyperlipidemia    Breast cancer metastasized to bone, unspecified laterality    Cancer associated pain    Other orders  -     losartan (COZAAR) 100 MG tablet; Take 1 tablet by mouth Daily.  -     amLODIPine (NORVASC) 2.5 MG tablet; Take 1 tablet by mouth Daily.

## 2017-04-21 NOTE — PROGRESS NOTES
REASON FOR FOLLOW-UP:    1. Metastatic breast cancer, predominantly bone disease, biopsy confirmed triple negative in late October 2016. History of left breast cancer in 2005, had mastectomy, without adjuvant chemotherapy. She was followed by Dr. Romo at [facility], treated on Arimidex for 5 years, finished in 2011.   2. Anemia, secondary to her metastatic disease and chemotherapy, with significantly elevated ferritin level. No evidence of deficiency for B12 or folic acid. She was given 3 units packed RBC in late October 2016 during hospitalization.   3. She was started on Xeloda 1500 mg twice a day on 11/14/2016 for 14 days on and 7 days off. Patient develops significant fatigue, Xeloda was decreased to 1000 mg twice a day from week #2. On 12/05/2016 schedule was changed to 7 days on, 7 days off.   4. Metastatic bone disease, Zometa was started in November 2016, repeat every 4 weeks.   5. Cancer-related pain, taking Lortab 5/325 mg prn.   6. 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       HISTORY OF PRESENT ILLNESS:   The patient is a pleasant 82-year-old female with the above-mentioned history returning today for monthly Zometa. Patient is accompanied by her son.    She comes today on her off week of Xeloda. She is presently taking 1000 mg BID, 7 days on and 7 days off. Due to recent slight elevation of , we will anticipate increasing Xeloda dose to 1500 mg in the morning and 1000 mg in the evening with the next cycle. She has been feeling quite well. Her energy levels have remained stable with no shortness of breath. Stools and urination remain regular. She has no evidence of hand and foot syndrome. She denies any infectious symptoms such as fever, productive cough, or dysuria. Her hydration and nutrition remain adequate. Pain has been well controlled recently and she has not required any medications for relief.  Of  "note, patient's hemoglobin has declined to 8.4 from 9.4 on April 7, 2017. She denies any evidence of bleeding and has actually been \"feeling great today.\"       Medical History         Past Medical History   Diagnosis Date   • Anemia     • Bone metastases     • Breast cancer 2005   • HL (hearing loss)     • Hyperlipidemia     • Hypertension            Surgical History           Past Surgical History   Procedure Laterality Date   • Breast biopsy Left 2005       malignant   • Mastectomy Left 2005            HEMATOLOGIC/ONCOLOGIC HISTORY: Ms. Vora is a 81-year-old  female who presented to the emergency room because of worsening lower back pain. She started having this about a week ago. Severity was 8 out of 10 but then subsided. In this past weekend, she had worsening pain again and was sent to the ER for further evaluation. Prior to this, she was mowing her own grass, lives by herself, with good performance status.      Since admission, she was given Lortab about every 6 hours. Her pain currently is 4 out of 10.      This patient was found to have significant anemia, with hemoglobin 7.0 in the ER. Of note, in the ER there was attempt for transfusion of packed RBC, however she had a fever and the transfusion was abandoned. Yesterday hemoglobin was down to 6.6 and she was given 1 unit packed RBC with no incident. She was given Benadryl 50 mg. This patient has hemoglobin 7.6 this morning. She is scheduled to receive 2 more units packed RBC.        This patient had CT scan of the abdomen and pelvis examination during ER evaluation, which reported diffuse metastatic bone disease involving the spine, in the thoracic spine, lumbar spine and pelvic bones. The patient reports no constipation, no diarrhea, no urinary incontinence.        This patient had history of left breast cancer back in 2005 for which she had left mastectomy. No lymph node involvement. No adjuvant chemotherapy, no radiation therapy. The patient " was followed by Dr. Romo at the [facility] [place], and was on Arimidex for 5 years, which stopped in 2011. The patient was dismissed by Dr. Romo afterwards.        The patient will stay with us this time for her oncologic care, as she told me today.      The patient reports weight loss of about 10 pounds since early September 2016. She has decreased appetite. No nausea/vomiting. No vision changes, no hearing changes. No fever, no sweating. Denies melena or hematochezia.        Patient had MRI examination with and without IV contrast for brain, cervical, thoracic and lumbar spin on 10/24/2016. There was no evidence of metastatic brain disease. There are metastatic disease involving the entire spine, no fresh compression fracture.       Patient had a CT-guided bone biopsy on 10/25/2016. G evaluation reported a primary breast cancer. Further testing showed triple negative, ER 0%, MI 0%, HER-2 IHC 2+, however FISH study was negative.      Was started on IV Zometa in early November 2016. She was started on Xeloda 1500 mg twice a day on 11/14/2016 for 14 days on and 7 days off. Patient develops significant fatigue, Xeloda was decreased to 1000 mg twice a day from week #2.        Laboratory study on 12/02/16 showed significant  anemia with hemoglobin 7.8, but a normal WBC 7000 including neutrophils 4800. Her platelets is 367,000. She was given 2 units packed RBC transfusion. Because of poor tolerance, Xeloda was decreased to 1000 mg twice a day 7 days on and 7 days off.  was 30.5 on 12/16/16.       Patient has better tolerance to decrease the Xeloda. She is more energetic. However she has worsening anemia, with a hemoglobin in the 9 gram range. Patient was started on low-dose Procrit 20,000 units every 2 weeks by an of December 2016.         MEDICATIONS: see EMR       ALLERGIES:  No Known Allergies      Past Medical History, Past Surgical History, Social History, Family History have been reviewed and  "are without significant changes except as mentioned.      I have reviewed the patient's medical history in detail and updated the computerized patient record.     REVIEW OF SYSTEMS:  GENERAL: see HPI;    SKIN: No nonhealing lesions.  No rashes.   HEME/LYMPH: No easy bruising, bleeding.  No swollen nodes.    EYES: No vision changes or diplopia.    ENT: No tinnitus, hearing loss, gum bleeding, epistaxis, hoarseness or dysphagia.    RESPIRATORY: No cough, shortness of breath, hemoptysis or wheezing.    CVS: No chest pain, palpitations, orthopnea, dyspnea on exertion.    GI: No melena or hematochezia.  No abdominal pain.  No nausea, vomiting, constipation, diarrhea  : No lower tract obstructive symptoms, dysuria or hematuria.    MUSCULOSKELETAL: No bone pain. No joint stiffness.    NEUROLOGICAL: No global weakness, loss of consciousness or seizures.    PSYCHIATRIC: No increased nervousness, mood changes or depression.        Objective   Vitals:    04/21/17 1337   BP: 126/68   Pulse: 84   Resp: 16   Temp: 98.5 °F (36.9 °C)   TempSrc: Oral   SpO2: 96%   Weight: 133 lb 1.6 oz (60.4 kg)   Height: 62.2\" (158 cm)   PainSc: 0-No pain     ECOG 1        PHYSICAL EXAM:    GENERAL: Well-developed, thin female in no acute distress.    SKIN: Warm, dry without rashes, purpura or petechiae.   HEAD: Normocephalic.  EYES: Pupils equal, round. EOMs intact. Conjunctivae normal.  CHEST: Lungs clear to auscultation. Good airflow.  CARDIAC: Regular rate and rhythm without murmurs, rubs or gallops. Normal S1,S2.  ABDOMEN: Soft, nontender with no organomegaly or masses. Bowel sounds present.  EXTREMITIES: No clubbing, cyanosis or edema.    NEUROLOGICAL: Cranial Nerves II-XII grossly intact. No focal neurological deficits.  PSYCHIATRIC: Normal affect and mood.        RECENT LABS:  Lab Results   Component Value Date    WBC 3.50 (L) 04/21/2017    HGB 8.4 (L) 04/21/2017    HCT 28.3 (L) 04/21/2017    .2 (H) 04/21/2017     04/21/2017 "     Lab Results   Component Value Date    NEUTROABS 1.82 (L) 04/21/2017     Lab Results   Component Value Date    GLUCOSE 121 (H) 04/21/2017    BUN 19 04/21/2017    CREATININE 1.23 (H) 04/21/2017    EGFRIFNONA 42 (L) 04/21/2017    EGFRIFAFRI 65 10/22/2016    BCR 15.4 04/21/2017    K 4.2 04/21/2017    CO2 25.3 04/21/2017    CALCIUM 8.8 04/21/2017    PROTENTOTREF 6.8 10/22/2016    PROTENTOTREF 6.9 10/22/2016    ALBUMIN 4.10 04/21/2017    LABIL2 2.0 04/21/2017    AST 49 (H) 04/21/2017    ALT 11 04/21/2017     Sodium   Date Value Ref Range Status   04/21/2017 143 136 - 145 mmol/L Final     Potassium   Date Value Ref Range Status   04/21/2017 4.2 3.5 - 5.2 mmol/L Final     Total Bilirubin   Date Value Ref Range Status   04/21/2017 0.3 0.1 - 1.2 mg/dL Final     Alkaline Phosphatase   Date Value Ref Range Status   04/21/2017 246 (H) 39 - 117 U/L Final   ]        Assessment/Plan   1. Metastatic breast cancer, predominantly bone disease, biopsy confirmed triple negative in late October 2016. She was started on Xeloda 1500 mg b.i.d.,on 11/14/16, about 25% dose reduction from calculated dose 2000 mg b.i.d. She was not able to tolerate, one week into the therapy, patient called reporting significant fatigue associated with treatment. We decreased her Xeloda to 1000 mg twice a day and she finished her first cycle (2 weeks) on November 27. Patient continues to have significant fatigue, and laboratory study showed significant worsened hemoglobin is 7.8. She does have lightheadedness although no syncope episodes.      We further decreased her dose to 1000 mg oral twice a day, 7 days on and 7 days off. She has since tolerated better with less fatigue. She actually feels quite well today, despite a one point drop in hemoglobin.    Recent tumor markers revealed a slight elevation of CA 15-3 from 36.8 on 1/27/2017 to 39.3 on 3/24/2017. Given escalation, we will proceed with an increased Xeloda dose to 3 tablets in the morning, and 2  tablets in the evening, starting next week with initiation of her next cycle.     2. Anemia secondary to chemotherapy, with a background of anemia related to her metastatic breast cancer. As outlined above, hemoglobin has declined significantly over the last 2 weeks without evidence of bleeding. Iron studies were obtained today and are adequate to proceed with Procrit 25,000 units today and every other week, thereafter. We will continue to monitor closely.      There is no evidence of deficiency of iron, B12, or folic acid. Her ferritin remains significantly elevated and is reactive to her disease.       3. Metastatic bone disease. She is due today for monthly Zometa today and repeat every 4 weeks. Patient is responding to treatment, as evidenced by improved alk phosphatase.    4. Elevated Creatinine: Patient's creatinine today has increased from 1.17 a month ago, to 1.23 today. I have reviewed this with Dr. Gutierres and we will proceed with Zometa at reduced dose of 3.3 mg. I have encouraged patient to increase hydration over the next several days. She is agreeable.       4. Pain associated with metastatic bone disease. Patient still has no pain and is not requiring any medication at this time.           5. Poor oral intake and weight loss. Weight and appetite remain stable. She has actually gained 3 lbs since her last visit.           PLAN:   1. Proceed with Zometa 3.3mg today.   2. Increase Xeloda dose to 1500 mg in the AM and 1,000 mg in the PM for 7days on and 7 days off. Resume next cycle Monday 4/24/2017.  3. Continue Procrit at 25,000 units today and repeat every 2 weeks, with CBC monitoring..   4. Tumor marker CA-15-3 results are pending today.   6. Follow up with M.D. in 4 weeks. Repeat labs every 4 weeks CBC, CMP, CA 15-3.

## 2017-06-16 NOTE — PROGRESS NOTES
REASON FOR FOLLOW-UP:    1. Metastatic breast cancer, predominantly bone disease, biopsy confirmed triple negative in late October 2016. History of left breast cancer in 2005, had mastectomy, without adjuvant chemotherapy. She was followed by Dr. Romo at [facility], treated on Arimidex for 5 years, finished in 2011.   2. Anemia, secondary to her metastatic disease and chemotherapy, with significantly elevated ferritin level. No evidence of deficiency for B12 or folic acid. She was given 3 units packed RBC in late October 2016 during hospitalization.   3. She was started on Xeloda 1500 mg twice a day on 11/14/2016 for 14 days on and 7 days off. Patient develops significant fatigue, Xeloda was decreased to 1000 mg twice a day from week #2. On 12/05/2016 schedule was changed to 7 days on, 7 days off.   4. Metastatic bone disease, Zometa was started in November 2016, repeat every 4 weeks.   5. Cancer-related pain, taking Lortab 5/325 mg prn.   6. 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   7.  Xeloda increased to 1500 mg in the morning and 1000 mg in the evening 7 days on and 7 days off, started on 4/21/2017.      HISTORY OF PRESENT ILLNESS:   The patient is a pleasant 82-year-old female with the above-mentioned history returning today for monthly Zometa. Patient is accompanied by her son.     Patient reports she is at a baseline condition, has minimum pain, she did take Lortab once a day, usually in the night.  Otherwise no specific complaints.  Overall her energy level is reasonable.  She has fair appetite.  No nausea vomiting.  Her performance status ECOG 1.    Patient had bone scan on 6/7/2017 which showed a stable condition.    She has been feeling quite well. Her energy levels have remained stable with no shortness of breath. Stools and urination remain regular. She has no evidence of hand and foot syndrome. She denies any  infectious symptoms such as fever, productive cough, or dysuria. Her hydration and nutrition remain adequate.     Laboratory study today showed a hemoglobin 9.7, and normal WBC and platelets.        Medical History            Past Medical History   Diagnosis Date   • Anemia      • Bone metastases      • Breast cancer 2005   • HL (hearing loss)      • Hyperlipidemia      • Hypertension                Surgical History                Past Surgical History   Procedure Laterality Date   • Breast biopsy Left 2005         malignant   • Mastectomy Left 2005               HEMATOLOGIC/ONCOLOGIC HISTORY: Ms. Vora is a 81-year-old  female who presented to the emergency room because of worsening lower back pain. She started having this about a week ago. Severity was 8 out of 10 but then subsided. In this past weekend, she had worsening pain again and was sent to the ER for further evaluation. Prior to this, she was mowing her own grass, lives by herself, with good performance status.      This patient was found to have significant anemia, with hemoglobin 7.0 in the ER. Of note, in the ER there was attempt for transfusion of packed RBC, however she had a fever and the transfusion was abandoned. Yesterday hemoglobin was down to 6.6 and she was given 1 unit packed RBC with no incident. She was given Benadryl 50 mg. This patient has hemoglobin 7.6 this morning. She is scheduled to receive 2 more units packed RBC.        This patient had CT scan of the abdomen and pelvis examination during ER evaluation, which reported diffuse metastatic bone disease involving the spine, in the thoracic spine, lumbar spine and pelvic bones. The patient reports no constipation, no diarrhea, no urinary incontinence.        This patient had history of left breast cancer back in 2005 for which she had left mastectomy. No lymph node involvement. No adjuvant chemotherapy, no radiation therapy. The patient was followed by Dr. Romo at the  [facility] [place], and was on Arimidex for 5 years, which stopped in 2011. The patient was dismissed by Dr. Romo afterwards.       The patient reports weight loss of about 10 pounds since early September 2016. She has decreased appetite. No nausea/vomiting. No vision changes, no hearing changes. No fever, no sweating. Denies melena or hematochezia.        Patient had MRI examination with and without IV contrast for brain, cervical, thoracic and lumbar spin on 10/24/2016. There was no evidence of metastatic brain disease. There are metastatic disease involving the entire spine, no fresh compression fracture.       Patient had a CT-guided bone biopsy on 10/25/2016. G evaluation reported a primary breast cancer. Further testing showed triple negative, ER 0%, [place] 0%, HER-2 IHC 2+, however FISH study was negative.      Was started on IV Zometa in early November 2016. She was started on Xeloda 1500 mg twice a day on 11/14/2016 for 14 days on and 7 days off. Patient develops significant fatigue, Xeloda was decreased to 1000 mg twice a day from week #2.        Laboratory study on 12/02/16 showed significant  anemia with hemoglobin 7.8, but a normal WBC 7000 including neutrophils 4800. Her platelets is 367,000. She was given 2 units packed RBC transfusion. Because of poor tolerance, Xeloda was decreased to 1000 mg twice a day 7 days on and 7 days off.  was 30.5 on 12/16/16.       Patient has better tolerance to decrease the Xeloda. She is more energetic. However she has worsening anemia, with a hemoglobin in the 9 gram range. Patient was started on low-dose Procrit 20,000 units every 2 weeks by an of December 2016.     CA 15-3 at 53.4 U/ml on 5/19/17.  Despite increased Xeloda dose, her CA 15-3 continue to increase.  I think we should obtain repeated bone scan for reevaluation.       MEDICATIONS: see EMR       ALLERGIES:  No Known Allergies      Past Medical History, Past Surgical History, Social History,  "Family History have been reviewed and are without significant changes except as mentioned.      I have reviewed the patient's medical history in detail and updated the computerized patient record.      REVIEW OF SYSTEMS:  GENERAL: see HPI;    SKIN: No nonhealing lesions.  No rashes.   HEME/LYMPH: No easy bruising, bleeding.  No swollen nodes.    EYES: No vision changes or diplopia.    ENT: No tinnitus, hearing loss, gum bleeding, epistaxis, hoarseness or dysphagia.    RESPIRATORY: No cough, shortness of breath, hemoptysis or wheezing.    CVS: No chest pain, palpitations, orthopnea, dyspnea on exertion.    GI: No melena or hematochezia.  No abdominal pain.  No nausea, vomiting, constipation, diarrhea  : No lower tract obstructive symptoms, dysuria or hematuria.    MUSCULOSKELETAL: No bone pain. No joint stiffness.    NEUROLOGICAL: No global weakness, loss of consciousness or seizures.    PSYCHIATRIC: No increased nervousness, mood changes or depression.        Objective   Vitals:    06/16/17 1247   BP: 146/75   Pulse: 84   Resp: 16   Temp: 98.6 °F (37 °C)   TempSrc: Oral   SpO2: 97%   Weight: 127 lb (57.6 kg)   Height: 62.2\" (158 cm)   PainSc:   1   ECOG 1        PHYSICAL EXAM:    GENERAL: Well-developed, thin female in no acute distress.    SKIN: Warm, dry without rashes, purpura or petechiae.   HEAD: Normocephalic.  EYES: Pupils equal, round. EOMs intact. Conjunctivae normal.  CHEST: Lungs clear to auscultation. Good airflow.  CARDIAC: Regular rate and rhythm without murmurs, rubs or gallops. Normal S1,S2.  ABDOMEN: Soft, nontender with no organomegaly or masses. Bowel sounds present.  EXTREMITIES: No clubbing, cyanosis or edema.    NEUROLOGICAL: Cranial Nerves II-XII grossly intact. No focal neurological deficits.  PSYCHIATRIC: Normal affect and mood.        RECENT LABS:     Lab Results   Component Value Date    WBC 4.25 (L) 06/16/2017    HGB 9.7 (L) 06/16/2017    HCT 31.8 (L) 06/16/2017    MCV 95.5 06/16/2017 " "    06/16/2017     Lab Results   Component Value Date    NEUTROABS 2.39 06/16/2017     Lab Results   Component Value Date    GLUCOSE 87 06/16/2017    BUN 21 06/16/2017    CREATININE 1.24 (H) 06/16/2017    EGFRIFNONA 42 (L) 06/16/2017    EGFRIFAFRI 65 10/22/2016    BCR 16.9 06/16/2017    K 4.7 06/16/2017    CO2 24.7 06/16/2017    CALCIUM 8.5 (L) 06/16/2017    PROTENTOTREF 6.8 10/22/2016    PROTENTOTREF 6.9 10/22/2016    ALBUMIN 3.80 06/16/2017    LABIL2 1.4 06/16/2017    AST 51 (H) 06/16/2017    ALT 8 06/16/2017     Sodium   Date Value Ref Range Status   06/16/2017 142 136 - 145 mmol/L Final     Total Bilirubin   Date Value Ref Range Status   06/16/2017 0.3 0.1 - 1.2 mg/dL Final     Alkaline Phosphatase   Date Value Ref Range Status   06/16/2017 214 (H) 39 - 117 U/L Final       Tumor marker CA 15-3 is pending today.      Imaging studies: 6/7/2017  NUCLEAR MEDICINE WHOLE BODY BONE SCAN      HISTORY: Female who is 82 years-old, with a history of metastatic  breast cancer with previous \"SuperScan\" status post chemotherapy  presenting for reevaluation.      PROTOCOL: Following the administration of 24 mCi of 99m technetium MDP,  standard whole body imaging was obtained.      COMPARISON: Bone scan of 03/17/2017 and CT abdomen and pelvis of  10/23/2016.      FINDINGS:  1. No significant interval change.  2. Findings remain consistent with diffuse significant metastatic bone  disease with relatively limited renal activity consistent with  \"SuperScan.\"    Assessment/Plan   1. Metastatic breast cancer, predominantly bone disease, biopsy confirmed triple negative in late October 2016. She was started on Xeloda 1500 mg b.i.d.,on 11/14/16, about 25% dose reduction from calculated dose 2000 mg b.i.d. She was not able to tolerate, one week into the therapy, patient called reporting significant fatigue associated with treatment. We decreased her Xeloda to 1000 mg twice a day.  She has since tolerated better with less " fatigue.      Recent tumor markers revealed a slight elevation of CA 15-3 from 36.8 on 1/27/2017 to 39.3 on 3/24/2017. Given escalation, we increased Xeloda dose to 1500 mg in the morning, and 1000 mg in the evening, she tolerated well.     Now because of the elevation of the tumor marker, we don't have today's number yet, patient is willing to increase back to 1500 mg twice a day for one week on and one week off.  Otherwise we'll need to switch to IV chemotherapy, with either weekly Taxol or weekly Gemzar, for 2 weeks on and one week off.  Certainly patient and her her some prefers to increase Xeloda back to 1500 mg twice a day and see if she can tolerates better this time.  I think this is reasonable, considering her tumor marker is still relatively low.      2. Anemia secondary to chemotherapy, with a background of anemia related to her metastatic breast cancer.  She received a packed RBC transfusion in middle May 2017 due to symptomatic anemia.  Hemoglobin now has coming down again.  We'll continue Procrit injection.  There is no evidence of deficiency of iron, B12, or folic acid. Her ferritin remains significantly elevated and is reactive to her disease.       3. Metastatic bone disease. She is due today for Zometa and repeat every 4 weeks.      4. Elevated Creatinine: I have encouraged patient to continue oral hydration.  She voiced understanding.        5.  Patient still has pain and takes Lortab once a day.       6.  Poor oral intake and weight loss.  This is secondary to her malignancy and chemotherapy.  Her weight is slightly down for a few pounds compared to a month ago.  Continue to observe..          PLAN:   1. Proceed with Zometa 3 mg today.   2.  Increase Xeloda dose to 1500 mg in both AM and in PM for 7days on and 7 days off.  I spoke to our pharmacy staff Mrs. Tobin for processing the new order.   3. Continue Procrit at 25,000 units today and repeat every 2 weeks, with CBC monitoring..   4.   Follow up with M.D. in 5 weeks. Repeat labs every 4 weeks CBC, CMP, CA 15-3.        ELVIN FONG M.D., Ph.D.     06/16/2017       Dragon disclaimer:  Much of this encounter note is an electronic transcription/translation of spoken language to printed text. The electronic translation of spoken language may permit erroneous, or at times, nonsensical words or phrases to be inadvertently transcribed; Although I have reviewed the note for such errors, some may still exist.

## 2017-06-19 NOTE — PROGRESS NOTES
Rec VM from Jonah, Pharmacist at [facility] (left 6/16/17 while I was out of the office)-He states that pt has reported a dose increase on her Xeloda. I also rec a VM from Dr Gutierres that he is increasing pts dose to 3 tabs in the am and 3 tabs in the pm 7 days on then 7 days off. I will escribe a new rx to [facility] so they have the correct rx on file and I will follow up with Jonah with a phone call.    [phone]

## 2017-06-21 NOTE — TELEPHONE ENCOUNTER
Patient calling because her pain medicine doesn't seem to be hlping her left leg pain much. She takes 1 lortab every 6 hours or longer. But they only last about 1 hour or so. Reviewed with Dr Gutierres, ok for patient to take 1-2 tabs every 4 hours. Informed patient and she v/u. She will call if this doesn't help.

## 2017-06-30 NOTE — PROGRESS NOTES
"REASON FOR FOLLOW-UP:    1. Metastatic breast cancer, predominantly bone disease, biopsy confirmed triple negative in late October 2016. History of left breast cancer in 2005, had mastectomy, without adjuvant chemotherapy. She was followed by Dr. Romo at [facility], treated on Arimidex for 5 years, finished in 2011.   2. Anemia, secondary to her metastatic disease and chemotherapy, with significantly elevated ferritin level. No evidence of deficiency for B12 or folic acid. She was given 3 units packed RBC in late October 2016 during hospitalization.   3. She was started on Xeloda 1500 mg twice a day on 11/14/2016 for 14 days on and 7 days off. Patient develops significant fatigue, Xeloda was decreased to 1000 mg twice a day from week #2. On 12/05/2016 schedule was changed to 7 days on, 7 days off.   4. Metastatic bone disease, Zometa was started in November 2016, repeat every 4 weeks.   5. Cancer-related pain, taking Lortab 5/325 mg prn.   6. 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   7.  Xeloda increased to 1500 mg in the morning and 1000 mg in the evening 7 days on and 7 days off, started on 4/21/2017.      HISTORY OF PRESENT ILLNESS:   The patient is a pleasant 82-year-old female with the above-mentioned history accompanied by her son, for a triage visit. She has had worsening bilateral lower extremity pain over the last two weeks despite occasional use of hydrocodone. Patient tries to avoid using this often as she feels that it makes her \"foggy\" in the head. Of note, recent PET scan does demonstrate stable extensive bony metastatic disease.     Thankfully, leg pain has resolved over the last few days and she is now primarily utilizing tylenol or ibuprofen for relief.     She continues on Xeloda 1500 mg BID, one week on and one week off. She is currently in her off week and will resume her next cycle on Monday. She has " "maintained overall good tolerance with only mild erythema to her hands and feet, which she has been treating with moisturizing lotions.     Her greatest complaint today is lightheadedness and progressive fatigue. She also notes exertional dyspnea, particularly when climbing stairs. Her head again feels \"foggy.\" She denies headaches, blurred vision, memory loss, or visual disturbances. Her hemoglobin has declined to 8.7 today without evidence of bleeding. Her bowels and urination remain regular. She associated chest pain, or lower extremity edema. No infectious symptoms noted including fever, productive cough, or chills.           Medical History            Past Medical History   Diagnosis Date   • Anemia      • Bone metastases      • Breast cancer 2005   • HL (hearing loss)      • Hyperlipidemia      • Hypertension                Surgical History                Past Surgical History   Procedure Laterality Date   • Breast biopsy Left 2005         malignant   • Mastectomy Left 2005               HEMATOLOGIC/ONCOLOGIC HISTORY: Ms. Vora is a 81-year-old  female who presented to the emergency room because of worsening lower back pain. She started having this about a week ago. Severity was 8 out of 10 but then subsided. In this past weekend, she had worsening pain again and was sent to the ER for further evaluation. Prior to this, she was mowing her own grass, lives by herself, with good performance status.      This patient was found to have significant anemia, with hemoglobin 7.0 in the ER. Of note, in the ER there was attempt for transfusion of packed RBC, however she had a fever and the transfusion was abandoned. Yesterday hemoglobin was down to 6.6 and she was given 1 unit packed RBC with no incident. She was given Benadryl 50 mg. This patient has hemoglobin 7.6 this morning. She is scheduled to receive 2 more units packed RBC.        This patient had CT scan of the abdomen and pelvis examination during ER " evaluation, which reported diffuse metastatic bone disease involving the spine, in the thoracic spine, lumbar spine and pelvic bones. The patient reports no constipation, no diarrhea, no urinary incontinence.        This patient had history of left breast cancer back in 2005 for which she had left mastectomy. No lymph node involvement. No adjuvant chemotherapy, no radiation therapy. The patient was followed by Dr. Romo at the [facility], and was on Arimidex for 5 years, which stopped in 2011. The patient was dismissed by Dr. Romo afterwards.       The patient reports weight loss of about 10 pounds since early September 2016. She has decreased appetite. No nausea/vomiting. No vision changes, no hearing changes. No fever, no sweating. Denies melena or hematochezia.        Patient had MRI examination with and without IV contrast for brain, cervical, thoracic and lumbar spin on 10/24/2016. There was no evidence of metastatic brain disease. There are metastatic disease involving the entire spine, no fresh compression fracture.       Patient had a CT-guided bone biopsy on 10/25/2016. G evaluation reported a primary breast cancer. Further testing showed triple negative, ER 0%, CT 0%, HER-2 IHC 2+, however FISH study was negative.      Was started on IV Zometa in early November 2016. She was started on Xeloda 1500 mg twice a day on 11/14/2016 for 14 days on and 7 days off. Patient develops significant fatigue, Xeloda was decreased to 1000 mg twice a day from week #2.        Laboratory study on 12/02/16 showed significant  anemia with hemoglobin 7.8, but a normal WBC 7000 including neutrophils 4800. Her platelets is 367,000. She was given 2 units packed RBC transfusion. Because of poor tolerance, Xeloda was decreased to 1000 mg twice a day 7 days on and 7 days off.  was 30.5 on 12/16/16.       Patient has better tolerance to decrease the Xeloda. She is more energetic. However she has worsening  "anemia, with a hemoglobin in the 9 gram range. Patient was started on low-dose Procrit 20,000 units every 2 weeks by an of December 2016.     CA 15-3 at 53.4 U/ml on 5/19/17.  Despite increased Xeloda dose, her CA 15-3 continue to increase.  I think we should obtain repeated bone scan for reevaluation.       MEDICATIONS: see EMR       ALLERGIES:  No Known Allergies      Past Medical History, Past Surgical History, Social History, Family History have been reviewed and are without significant changes except as mentioned.      I have reviewed the patient's medical history in detail and updated the computerized patient record.      REVIEW OF SYSTEMS:  GENERAL: see HPI;    SKIN: No nonhealing lesions.  No rashes.   HEME/LYMPH: No easy bruising, bleeding.  No swollen nodes.    EYES: No vision changes or diplopia.    ENT: No tinnitus, hearing loss, gum bleeding, epistaxis, hoarseness or dysphagia.    RESPIRATORY: No cough,, hemoptysis or wheezing. See HPI   CVS: No chest pain, palpitations, orthopnea, See HPI.    GI: No melena or hematochezia.  No abdominal pain.  No nausea, vomiting, constipation, diarrhea  : No lower tract obstructive symptoms, dysuria or hematuria.    MUSCULOSKELETAL: No bone pain. No joint stiffness.    NEUROLOGICAL: No global weakness, loss of consciousness or seizures.    PSYCHIATRIC: No increased nervousness, mood changes or depression.        Objective   Vitals:    06/30/17 1252   BP: 135/56   Pulse: 109   Resp: 16   Temp: 98.3 °F (36.8 °C)   TempSrc: Oral   SpO2: 96%   Weight: 125 lb (56.7 kg)   Height: 62.2\" (158 cm)   PainSc:   3   PainLoc: Leg  Comment: LEFT LEG   ECOG 1        PHYSICAL EXAM:    GENERAL: Well-developed, thin female in no acute distress.    SKIN: Warm, dry without rashes, purpura or petechiae.   HEAD: Normocephalic.  EYES: Pupils equal, round. EOMs intact. Conjunctivae normal.  CHEST: Lungs clear to auscultation. Good airflow.  CARDIAC: Regular rate and rhythm without murmurs, " rubs or gallops. Normal S1,S2.  ABDOMEN: Soft, mild mid epigastric pain with palpation, with no organomegaly or masses. Bowel sounds present.  EXTREMITIES: No clubbing, cyanosis or edema.    NEUROLOGICAL: Cranial Nerves II-XII grossly intact. No focal neurological deficits.  PSYCHIATRIC: Normal affect and mood.        RECENT LABS:     Lab Results   Component Value Date    WBC 5.01 06/30/2017    HGB 8.7 (L) 06/30/2017    HCT 28.8 (L) 06/30/2017    MCV 96.3 06/30/2017     06/30/2017     Lab Results   Component Value Date    NEUTROABS 2.74 06/30/2017     Lab Results   Component Value Date    GLUCOSE 87 06/16/2017    BUN 21 06/16/2017    CREATININE 1.24 (H) 06/16/2017    EGFRIFNONA 42 (L) 06/16/2017    EGFRIFAFRI 65 10/22/2016    BCR 16.9 06/16/2017    K 4.7 06/16/2017    CO2 24.7 06/16/2017    CALCIUM 8.5 (L) 06/16/2017    PROTENTOTREF 6.8 10/22/2016    PROTENTOTREF 6.9 10/22/2016    ALBUMIN 3.80 06/16/2017    LABIL2 1.4 06/16/2017    AST 51 (H) 06/16/2017    ALT 8 06/16/2017     Sodium   Date Value Ref Range Status   06/16/2017 142 136 - 145 mmol/L Final     Total Bilirubin   Date Value Ref Range Status   06/16/2017 0.3 0.1 - 1.2 mg/dL Final     Alkaline Phosphatase   Date Value Ref Range Status   06/16/2017 214 (H) 39 - 117 U/L Final       Assessment/Plan   1. Metastatic breast cancer, predominantly bone disease, biopsy confirmed triple negative in late October 2016. She was started on Xeloda 1500 mg b.i.d.,on 11/14/16, about 25% dose reduction from calculated dose 2000 mg b.i.d. She was not able to tolerate, one week into the therapy, patient called reporting significant fatigue associated with treatment. We decreased her Xeloda to 1000 mg twice a day.  She has since tolerated better with less fatigue.      Recent tumor markers revealed a slight elevation of CA 15-3 from 36.8 on 1/27/2017 to 39.3 on 3/24/2017. Given escalation, we increased Xeloda dose to 1500 mg in the morning, and 1000 mg in the evening,  she tolerated well.     With recent elevation in Ca15-3, patient has increased xeloda dosing to 1500 mg BID, one week on and one week off. She is presently in her off week and will begin her next cycle on Monday, July 1, 2017. She returns today with mild exertional dyspnea, lightheadedness and occasional leg pain, relieved with occasional hydrocodone or ibuprofen. We will proceed with every 2 week Procrit today, as her hemoglobin has declined to 8.7 and will anticipate a blood transfusion this weekend for symptom management. She will return on July 14, 2017 for Zometa and possible Procrit and will see Dr. Gutierres in evaluation on 7/21/2017.      2. Anemia secondary to chemotherapy, with a background of anemia related to her metastatic breast cancer.  Iron studies, B12, and folate levels remain adequate. Her ferritin remains significantly elevated and is reactive to her disease. She will continue every 2 week Procrit injections, due for her next one today. With a symptomatic hemoglobin of 8.7, we will pursue a blood transfusion this weekend.       3. Metastatic bone disease. She is due today for Zometa and repeat every 4 weeks. Her next dose will be due on July 14, 2017.      4. Elevated Creatinine:Patient has been encouraged to increase hydration. Creatinine not obtained today.       5. Cancer related pain: over the last 2 weeks patient has noted significant lower extremity pain. Pain is intermittent in nature without evidence of erythema, warmth, or accompanying dyspnea. Thankfully, pain has slowly resolved over the last several days and she is now only utilizing occasional iburprofen. I have reviewed signs and symptoms for which to call our office, specifically symptoms of DVT/PE and patient has verbalized understanding.      6.  Poor oral intake and weight loss.  This is secondary to her malignancy and chemotherapy.  Her weight is slightly down for a few pounds compared to a month ago.  She is still struggling with  early satiety. We will continue to monitor closely.        PLAN:   1. Continue increased Xeloda dose of 1500 mg in both AM and in PM for 7days on and 7 days off. Her next cycle will begin on Monday, July 1, 2017.   2. Continue Procrit at 25,000 units today and repeat every 2 weeks, with CBC monitoring.  3. Proceed with 2 units PRBCs this weekend for symptomatic anemia  4. Return on 7/14/2017 for next dose of Zometa and possible Procrit.   5. Next MD visit on 7/21/2017 with Dr. Gutierres   6. Patient to call our office with any concerning symptoms such as progressive shortness of breath, chest pain, lower extremity edema, recurrence of severe leg pain, etc. She and her son have verbalized understanding.

## 2017-07-21 NOTE — PROGRESS NOTES
REASON FOR FOLLOW-UP:    1. Metastatic breast cancer, predominantly bone disease, biopsy confirmed triple negative in late October 2016. History of left breast cancer in 2005, had mastectomy, without adjuvant chemotherapy. She was followed by Dr. Romo at [facility], treated on Arimidex for 5 years, finished in 2011.   2. Anemia, secondary to her metastatic disease, with significantly elevated ferritin level. No evidence of deficiency for B12 or folic acid. She was given 3 units packed RBC in late October 2016 during hospitalization.    3. Bone biopsy on 10/25/2016 confirmed metastatic breast cancer, triple negative.  She was started on Xeloda 1500 mg twice a day on 11/14/2016 for 14 days on and 7 days off. Patient develops significant fatigue, Xeloda was decreased to 1000 mg twice a day from week #2. On 12/05/2016 schedule was changed to 7 days on, 7 days off.   4. Metastatic bone disease, Zometa was started in November 2016, repeat every 4 weeks.   5. Cancer-related pain, taking Lortab 5/325 mg prn.   6. 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   7.  Xeloda increased to 1500 mg in the morning and 1000 mg in the evening 7 days on and 7 days off, started on 4/21/2017.    8.  Bone scan on 6/7/2017 reported stable disease.  However her tumor marker was trending up at 68.7 on 6/16/2017.  Xeloda was increased to 1500 mg twice a day.    9.  Anemia secondary to chemotherapy.  She required 2 units PRBC transfusion on 7/2/2017.  Patient will be continued on Procrit injection as needed.       HISTORY OF PRESENT ILLNESS:   The patient is a pleasant 82-year-old female with the above-mentioned history accompanied by her son.  Patient reports she had IV Zometa last week.  She has been taking oral vitamin D and calcium.  Patient reports she uses pain medicine as needed, for bilateral lower extremity pain.  Otherwise patient has no specific  complaints.  Her performance status is ECOG 1.  She denies bone pains.     She continues on Xeloda 1500 mg BID, one week on and one week off. She is currently in her on week day #5.   She has maintained overall good tolerance with only mild erythema to her hands and feet, which she has been treating with moisturizing lotions.     Because of anemia secondary to chemotherapy, and that she was symptomatic with hemoglobin 8.7, dizziness and fatigue, patient was given 2 units PRBC transfusion on 7/2/2017, and posttransfusion hemoglobin input to 10.9 on 7/02/17.  Patient reports significant improvement in energy level afterwards.          Medical History            Past Medical History   Diagnosis Date   • Anemia      • Bone metastases      • Breast cancer 2005   • HL (hearing loss)      • Hyperlipidemia      • Hypertension                Surgical History                Past Surgical History   Procedure Laterality Date   • Breast biopsy Left 2005         malignant   • Mastectomy Left 2005               HEMATOLOGIC/ONCOLOGIC HISTORY: Ms. Vora is a 81-year-old  female who presented to the emergency room because of worsening lower back pain. She started having this about a week ago. Severity was 8 out of 10 but then subsided. In this past weekend, she had worsening pain again and was sent to the ER for further evaluation. Prior to this, she was mowing her own grass, lives by herself, with good performance status.      This patient was found to have significant anemia, with hemoglobin 7.0 in the ER. Of note, in the ER there was attempt for transfusion of packed RBC, however she had a fever and the transfusion was abandoned. Yesterday hemoglobin was down to 6.6 and she was given 1 unit packed RBC with no incident. She was given Benadryl 50 mg. This patient has hemoglobin 7.6 this morning. She is scheduled to receive 2 more units packed RBC.        This patient had CT scan of the abdomen and pelvis examination during  ER evaluation, which reported diffuse metastatic bone disease involving the spine, in the thoracic spine, lumbar spine and pelvic bones. The patient reports no constipation, no diarrhea, no urinary incontinence.        This patient had history of left breast cancer back in 2005 for which she had left mastectomy. No lymph node involvement. No adjuvant chemotherapy, no radiation therapy. The patient was followed by Dr. Romo at the [facility], and was on Arimidex for 5 years, which stopped in 2011. The patient was dismissed by Dr. Romo afterwards.       The patient reports weight loss of about 10 pounds since early September 2016. She has decreased appetite. No nausea/vomiting. No vision changes, no hearing changes. No fever, no sweating. Denies melena or hematochezia.        Patient had MRI examination with and without IV contrast for brain, cervical, thoracic and lumbar spin on 10/24/2016. There was no evidence of metastatic brain disease. There are metastatic disease involving the entire spine, no fresh compression fracture.       Patient had a CT-guided bone biopsy on 10/25/2016. G evaluation reported a primary breast cancer. Further testing showed triple negative, ER 0%, GA 0%, HER-2 IHC 2+, however FISH study was negative.      Was started on IV Zometa in early November 2016. She was started on Xeloda 1500 mg twice a day on 11/14/2016 for 14 days on and 7 days off. Patient develops significant fatigue, Xeloda was decreased to 1000 mg twice a day from week #2.        Laboratory study on 12/02/16 showed significant  anemia with hemoglobin 7.8, but a normal WBC 7000 including neutrophils 4800. Her platelets is 367,000. She was given 2 units packed RBC transfusion. Because of poor tolerance, Xeloda was decreased to 1000 mg twice a day 7 days on and 7 days off.  was 30.5 on 12/16/16.       Patient has better tolerance to decrease the Xeloda. She is more energetic. However she has worsening  "anemia, with a hemoglobin in the 9 gram range. Patient was started on low-dose Procrit 20,000 units every 2 weeks by an of December 2016.     CA 15-3 at 53.4 U/ml on 5/19/17.  Despite increased Xeloda dose, her CA 15-3 continue to increase.      Her bone scan on 6/7/2017 showed a stable disease.  However laboratory study showed a worsening CA-15-3 at 68.7 on 6/16/2017.  Xeloda was increased to 1500 mg twice a day.      MEDICATIONS: see EMR       ALLERGIES:  No Known Allergies      Past Medical History, Past Surgical History, Social History, Family History have been reviewed and are without significant changes except as mentioned.      I have reviewed the patient's medical history in detail and updated the computerized patient record.      REVIEW OF SYSTEMS:  GENERAL: see HPI;    SKIN: No nonhealing lesions.  No rashes.   HEME/LYMPH: No easy bruising, bleeding.  No swollen nodes.    EYES: No vision changes or diplopia.    ENT: No tinnitus, hearing loss, gum bleeding, epistaxis, hoarseness or dysphagia.    RESPIRATORY: No cough,, hemoptysis or wheezing.   CVS: No chest pain, palpitations, orthopnea,   GI: No melena or hematochezia.  No abdominal pain.  No nausea, vomiting, constipation, diarrhea  : No lower tract obstructive symptoms, dysuria or hematuria.    MUSCULOSKELETAL: No bone pain. No joint stiffness.    NEUROLOGICAL: No global weakness, loss of consciousness or seizures.    PSYCHIATRIC: No increased nervousness, mood changes or depression.        Objective   Vitals:    07/21/17 1400   BP: 110/60   Pulse: 96   Resp: 16   Temp: 98.1 °F (36.7 °C)   Weight: 127 lb 12.8 oz (58 kg)   Height: 62.2\" (158 cm)   PainSc: 0-No pain   ECOG 1        PHYSICAL EXAM:    GENERAL: Well-developed, elder thin female in no acute distress.    SKIN: Warm, dry without rashes, purpura or petechiae.   HEAD: Normocephalic.  EYES: Pupils equal, round. EOMs intact. Conjunctivae normal.  CHEST: Lungs clear to auscultation. Good " airflow.  CARDIAC: Regular rate and rhythm without murmurs, rubs or gallops. Normal S1,S2.  ABDOMEN: Soft, no tender, guarding, with no organomegaly or masses. Bowel sounds normal.  EXTREMITIES: No clubbing, cyanosis or edema.    NEUROLOGICAL: Cranial Nerves II-XII grossly intact. No focal neurological deficits.  PSYCHIATRIC: Normal affect and mood.        RECENT LABS:     Lab Results   Component Value Date    WBC 5.77 07/21/2017    HGB 10.4 (L) 07/21/2017    HCT 33.3 (L) 07/21/2017    MCV 93.3 07/21/2017     07/21/2017     Lab Results   Component Value Date    NEUTROABS 4.08 07/21/2017     Lab Results   Component Value Date    GLUCOSE 105 (H) 07/14/2017    BUN 20 07/14/2017    CREATININE 1.25 (H) 07/14/2017    EGFRIFNONA 41 (L) 07/14/2017    EGFRIFAFRI 65 10/22/2016    BCR 16.0 07/14/2017    K 4.9 07/14/2017    CO2 26.7 07/14/2017    CALCIUM 8.6 07/14/2017    PROTENTOTREF 6.8 10/22/2016    PROTENTOTREF 6.9 10/22/2016    ALBUMIN 3.50 07/14/2017    LABIL2 1.5 07/14/2017    AST 47 (H) 07/14/2017    ALT 9 07/14/2017     Sodium   Date Value Ref Range Status   07/14/2017 145 136 - 145 mmol/L Final     Total Bilirubin   Date Value Ref Range Status   07/14/2017 0.3 0.1 - 1.2 mg/dL Final     Alkaline Phosphatase   Date Value Ref Range Status   07/14/2017 166 (H) 39 - 117 U/L Final   ]      Assessment/Plan   1. Metastatic breast cancer, predominantly bone disease, biopsy confirmed triple negative in late October 2016. She was started on Xeloda 1500 mg b.i.d.,on 11/14/16, about 25% dose reduction from calculated dose 2000 mg b.i.d. She was not able to tolerate, one week into the therapy, patient called reporting significant fatigue associated with treatment. We decreased her Xeloda to 1000 mg twice a day.  She has since tolerated better with less fatigue.      Her tumor marker revealed elevation of CA 15-3 from 36.8 on 1/27/2017 to 39.3 on 3/24/2017. Given escalation, we increased Xeloda dose to 1500 mg in the morning,  and 1000 mg in the evening, she tolerated well.   Her Ca15-3 further increased to 68 on 6/16/17,  we increased her xeloda dosing to 1500 mg BID, one week on and one week off.  She tolerated better diss round, she is presently on day #5 of Xeloda.  She has no specific complaints.  She did have some leg pains and takes narcotics as needed, typically once or twice a day.  Currently in office she denies any pains.       2. Anemia secondary to chemotherapy, with a background of anemia related to her metastatic breast cancer.  Iron studies, B12, and folate levels remain adequate. Her ferritin remains significantly elevated and is reactive to her disease.  Recently she required 2 units PRBC transfusion due to symptomatic, in early this month.  Her hemoglobin now has trending down, however does not need Procrit injection today.  I think next week and her hemoglobin will further decrease, and will quantify for Procrit.         3. Metastatic bone disease. She is due for Zometa in 3 weeks and repeat every 4 weeks. Her next dose will be due on 08/11/2017.      4. Elevated Creatinine:Patient has been encouraged to increase hydration.       5. Cancer related pain: over the last several the weeks patient has noted significant lower extremity pain. Pain is intermittent in nature without evidence of erythema, warmth, or accompanying dyspnea. Thankfully, pain has slowly resolved over the last several days and she is now only utilizing occasional iburprofen.  She did request refill of her narcotics..      6.  Poor oral intake and weight loss.  This is secondary to her malignancy and chemotherapy.  Her weight is stable now.  She is still struggling with early satiety.  Her abdomen examination is benign, I do not see any signs of ascites fluid.  We will continue to monitor closely.        PLAN:   1. Continue increased Xeloda dose of 1500 mg BID for 7days on and 7 days off.    2.  Return in 1 week with CBC check and possible Procrit  injection, and then repeat every 2 weeks.    3. Return on 8/11/2017 for next dose of Zometa and possible Procrit. Will check CMP and  that day.    4.  Bone scan in first week of September (7 weeks from now), together with labs for repeated CA 15-3 and a CBC, CMP.  If tumor marker continues to increase, we may obtain CT scan for chest abdomen pelvis.   5.  Due for Zometa again in 7 weeks.   6.  I gave her new prescription today for [place] 90 tabs.   7. Patient will return in weeks with M.D. visit, review bone scan and lab results, to discuss further treatment plan.       ELVIN FONG M.D., Ph.D.   7/21/2017        Dragon disclaimer:  Much of this encounter note is an electronic transcription/translation of spoken language to printed text. The electronic translation of spoken language may permit erroneous, or at times, nonsensical words or phrases to be inadvertently transcribed; Although I have reviewed the note for such errors, some may still exist.

## 2017-07-24 NOTE — PROGRESS NOTES
Rec staff message from Dr Gutierres stating pt may need another refill of her Xeloda. Per [facility] rec a Qty of 42 on 7/17/17. I questioned why she was not sent the full 14 day supply as she takes it 7 days on then 7 days off. They will change the rx to where pt gets a 14 day supply (1 shipment a month).

## 2017-08-01 PROBLEM — J18.9 PNEUMONIA: Status: ACTIVE | Noted: 2017-01-01

## 2017-08-01 PROBLEM — D64.9 ANEMIA: Status: ACTIVE | Noted: 2017-01-01

## 2017-08-01 PROBLEM — N18.30 CHRONIC KIDNEY DISEASE, STAGE 3 (HCC): Status: ACTIVE | Noted: 2017-01-01

## 2017-08-01 NOTE — PLAN OF CARE
Problem: Patient Care Overview (Adult)  Goal: Plan of Care Review  Outcome: Ongoing (interventions implemented as appropriate)    08/01/17 8774   Coping/Psychosocial Response Interventions   Plan Of Care Reviewed With patient   Patient Care Overview   Progress no change   Outcome Evaluation   Outcome Summary/Follow up Plan pt admitted to  with anemia, edema, PNA, ARFand UTI with hx metastatic CA. pt on active chemotherapy- CBC consulted to determine if pt should continue chemo while here for infxn. ST on the monitor. VS otherwise stable. continue to monitor       Goal: Adult Individualization and Mutuality  Outcome: Ongoing (interventions implemented as appropriate)    Problem: Infection, Risk/Actual (Adult)  Goal: Identify Related Risk Factors and Signs and Symptoms  Outcome: Ongoing (interventions implemented as appropriate)  Goal: Infection Prevention/Resolution  Outcome: Ongoing (interventions implemented as appropriate)

## 2017-08-01 NOTE — CONSULTS
[facility] INITIAL INPATIENT CONSULTATION NOTE    REASON FOR CONSULTATION:  Metastatic breast cancer    RECORDS OBTAINED: Records of the patients history including those obtained from the referring provider were reviewed and summarized in detail.    HISTORY OF PRESENT ILLNESS:  Erin Vora is a 82 y.o. female who we are asked to see today in consultation for triple negative metastatic breast cancer. She follows in the CBC office with Dr Gutierres, most recently seen on 7/21/17.  She's been on Xeloda since October 2016 when she was found to have metastatic breast cancer. A few dose adjustments have been made and her current regimen is 1500 mg bid 7 days on, 7 days off since June 16, 2017. She has struggled with anemia due to chemotherapy and is on procrit. She also receives Zometa. Her tolerance of Xeloda is fairly good with only mild erythema in her hands and feet.    She presented to the ED with complaints of increasing abdominal bloating/edema that started two weeks ago and progressed slowly. She complained of LE edema as well but today she notes that's not a large issue for her. She had a bilateral lower extremity doppler performed that was negative for dvt. Her cbc was stable. CMP showed worsening renal function with creatinine of 1.83. CXR concerning for pneumonia. She's been started on antibiotics and fluids. Renal function only minimally improved. Normal bowel movements. Hold Xeloda yesterday, which would have been only her second day of this week's dose. She's afebrile. No new pain. Denies abdominal pain. Poor appetite.     Past Medical   Past Medical History:   Diagnosis Date   • Anemia    • Bone metastases 2016   • Breast cancer 2005   • HL (hearing loss)    • Hyperlipidemia    • Hyperlipidemia    • Hypertension    • Pneumonia 8/1/2017     Social History   Social History     Social History   • Marital status:      Spouse name: N/A   • Number of children: 1   • Years of education: College      Occupational History   • OR Nurse Retired     Social History Main Topics   • Smoking status: Former Smoker     Packs/day: 1.00     Years: 2.00     Types: Cigarettes     Quit date: 1967   • Smokeless tobacco: Not on file      Comment: About 50 years ago   • Alcohol use No   • Drug use: No   • Sexual activity: Defer     Other Topics Concern   • Not on file     Social History Narrative     Family History  Family History   Problem Relation Age of Onset   • Ovarian cancer Sister 84   • Heart failure Sister    • Hypertension Sister    • No Known Problems Mother    • No Known Problems Father    • No Known Problems Brother    • No Known Problems Son    • No Known Problems Daughter    • No Known Problems Maternal Grandmother    • No Known Problems Maternal Grandfather    • No Known Problems Paternal Grandmother    • No Known Problems Paternal Grandfather    • No Known Problems Cousin    • Throat cancer Sister      Medications    Current Facility-Administered Medications:   •  aspirin EC tablet 81 mg, 81 mg, Oral, Daily, Raven Carvajal MD  •  azithromycin (ZITHROMAX) 500 mg 0.9% NaCl (Add-vantage) 250 mL, 500 mg, Intravenous, Q24H, Raven Carvajal MD  •  enoxaparin (LOVENOX) syringe 30 mg, 30 mg, Subcutaneous, Daily, Raven Carvajal MD  •  HYDROcodone-acetaminophen (NORCO) 5-325 MG per tablet 1 tablet, 1 tablet, Oral, Q6H PRN, Raven Carvajal MD, 1 tablet at 08/01/17 2241  •  ondansetron (ZOFRAN) tablet 4 mg, 4 mg, Oral, Q8H PRN, Raven Carvajal MD  •  piperacillin-tazobactam (ZOSYN) 3.375 g in iso-osmotic dextrose 50 ml (premix), 3.375 g, Intravenous, Q8H, Raven Carvajal MD, 3.375 g at 08/02/17 0024  •  predniSONE (DELTASONE) tablet 5 mg, 5 mg, Oral, Daily, Raven Carvajal MD  •  senna (SENOKOT) tablet 8.6 mg, 1 tablet, Oral, Nightly, Raven Carvajal MD, 8.6 mg at 08/01/17 2026  •  [CANCELED] Insert peripheral IV, , , Once **AND** sodium chloride 0.9 % flush 10 mL, 10 mL,  Intravenous, PRN, Janes Hernandez MD  •  sodium chloride 0.9 % infusion, 125 mL/hr, Intravenous, Continuous, Janes Hernandez MD, Last Rate: 125 mL/hr at 08/01/17 2152, 125 mL/hr at 08/01/17 2152  •  sucralfate (CARAFATE) 1 GM/10ML suspension 1 g, 1 g, Oral, Q6H, Raven Carvajal MD, 1 g at 08/02/17 0231  Allergies  No Known Allergies  Review of Systems  Fourteen point review of systems performed.  Positive pertinents identified above in history of presenting illness; all remaining systems negative.       Objective:     Vitals:    08/02/17 0710   BP: 139/76   Pulse: 105   Resp: 16   Temp: 97.6 °F (36.4 °C)   SpO2: 96%     GENERAL:  Edlerly female in no acute distress.   SKIN:  Warm, dry without rashes, purpura or petechiae.  HEAD:  Normocephalic.  EYES:  Pupils equal, round and reactive to light.  EOMs intact.  Conjunctivae normal.  EARS:  Hearing intact.  NOSE:  Septum midline.  No excoriations or nasal discharge.  MOUTH:  Tongue is well-papillated; no stomatitis or ulcers.  Lips normal.  THROAT:  Oropharynx without lesions or exudates.  NECK:  Supple with good range of motion; no thyromegaly  LYMPHATICS:  No cervical, supraclavicular, axillary adenopathy.  CHEST:  Lungs clear to percussion and auscultation. Good airflow.  CARDIAC:  Regular rate and rhythm without murmurs, rubs or gallops. Normal S1,S2.  ABDOMEN:  Soft, + distended, nontender, normal bowel sounds, no hepatosplenomegaly  EXTREMITIES:  No clubbing, cyanosis or edema.  NEUROLOGICAL:  Cranial Nerves II-XII grossly intact.  No focal neurological deficits.  PSYCHIATRIC:  Normal affect and mood.        DIAGNOSTIC DATA:    Results from last 7 days  Lab Units 08/02/17  0344 08/01/17  1105 07/28/17  0953   WBC 10*3/mm3 6.91 7.36 5.65   HEMOGLOBIN g/dL 8.8* 10.2* 9.5*   HEMATOCRIT % 30.7* 33.5* 30.5*   PLATELETS 10*3/mm3 349 331 325       Results from last 7 days  Lab Units 08/02/17  0344 08/01/17  1105   SODIUM mmol/L 147* 147*   POTASSIUM mmol/L 4.3 4.5    CHLORIDE mmol/L 111* 109*   CO2 mmol/L 19.6* 22.9   BUN mg/dL 14 18   CREATININE mg/dL 1.76* 1.83*   CALCIUM mg/dL 7.8* 9.1   BILIRUBIN mg/dL  --  0.3   ALK PHOS U/L  --  125*   ALT (SGPT) U/L  --  15   AST (SGOT) U/L  --  67*   GLUCOSE mg/dL 104* 131*       IMAGING:  Xr Chest 2 View    Result Date: 8/1/2017  Narrative: EMERGENCY TWO-VIEW CHEST  HISTORY: 82-year-old female with chest pain, abdominal pain and tightness x2 weeks progressive x2 days and associated shortness of breath x2 days  COMPARISON: 10/23/2016  FINDINGS: 1. Patchy bilateral pulmonary airspace disease at the left base, right middle and right lower lung consistent with pneumonia and a suspected component of atelectasis. 2. No other change since previous study. 3. Recommend follow-up to confirm resolution.  This report was finalized on 8/1/2017 11:34 AM by Dr. Babak Zabala MD.          Assessment/Plan   Assessment/Plan:   This is a 82 y.o. female with:    1. Metastatic triple negative breast cancer   - Currently on Xeloda 1500 mg bid, 1 wk on/1 wk off   - Recommend holding Xeloda during infection (she was due for day 3 today)   - Will check CA 15-3 given new abd symptoms    2. Anemia due to chemotherapy.    - Transfused in early July and plan to start procrit soon.   - Monitor for now    3. Increasing abdominal girth/edema. Unclear etiology but definitely could be related to progressive breast cancer. I don't suspect related to Xeloda or hypoalbuminemia.   - Agree with abd imaging. Unable to get CT with contrast right now due to MARIA DEL CARMEN, thu abd US ordered in meantime.     4. MARIA DEL CARMEN. Low improvement.   5. Pneumonia. On Zosyn and azithromycin.         Jocelyn Mcarthur MD

## 2017-08-01 NOTE — ED PROVIDER NOTES
" EMERGENCY DEPARTMENT ENCOUNTER    CHIEF COMPLAINT  Chief Complaint: Edema  History given by: Pt, Pt's son  History limited by: Nothing  Room Number: 466/1  PMD: Shivam Llanes MD      HPI:  Pt is a 82 y.o. female who presents complaining of worsening bilateral leg, bilateral feet, abd edema onset two weeks ago. Pt also complains of SOA and sore throat but denies fever, chills, CP, or any other symptoms at this time. Pt denies hx of blood clots. Has had a mild cough that is dry.     Duration:  Two weeks  Onset: gradual  Timing: constant  Location: bilateral leg, bilateral feet, abd  Radiation: none  Quality: \"worsening\"  Intensity/Severity: moderate  Progression: worsening  Associated Symptoms: SOA, sore throat  Aggravating Factors: none  Alleviating Factors: none  Previous Episodes: none  Treatment before arrival: Pt received no treatment PTA.    PAST MEDICAL HISTORY  Active Ambulatory Problems     Diagnosis Date Noted   • Breast cancer metastasized to bone 11/04/2016   • Anemia in neoplastic disease 11/04/2016   • Cancer associated pain 11/04/2016   • Anemia associated with chemotherapy 12/02/2016   • Weight loss due to medication 12/02/2016   • Poor appetite 12/02/2016   • Hypokalemia 12/02/2016   • Hyperlipidemia 12/04/2016   • Hearing loss 12/04/2016   • Medicare annual wellness visit, initial 12/22/2016   • Anemia of chronic renal failure, stage 3 (moderate) 12/30/2016   • Post-menopause 01/10/2017   • Encounter for medication review and counseling 03/24/2017   • Fever 03/24/2017   • Nausea and vomiting 03/24/2017   • Essential hypertension 03/28/2017     Resolved Ambulatory Problems     Diagnosis Date Noted   • Metastasis to bone 10/23/2016     Past Medical History:   Diagnosis Date   • Anemia    • Bone metastases 2016   • Breast cancer 2005   • HL (hearing loss)    • Hyperlipidemia    • Hyperlipidemia    • Hypertension        PAST SURGICAL HISTORY  Past Surgical History:   Procedure Laterality Date   • " BREAST BIOPSY Left 2005    malignant   • MASTECTOMY Left 2005       FAMILY HISTORY  Family History   Problem Relation Age of Onset   • Ovarian cancer Sister 84   • Heart failure Sister    • Hypertension Sister    • No Known Problems Mother    • No Known Problems Father    • No Known Problems Brother    • No Known Problems Son    • No Known Problems Daughter    • No Known Problems Maternal Grandmother    • No Known Problems Maternal Grandfather    • No Known Problems Paternal Grandmother    • No Known Problems Paternal Grandfather    • No Known Problems Cousin    • Throat cancer Sister        SOCIAL HISTORY  Social History     Social History   • Marital status:      Spouse name: N/A   • Number of children: 1   • Years of education: College     Occupational History   • OR Nurse Retired     Social History Main Topics   • Smoking status: Former Smoker     Packs/day: 1.00     Years: 2.00     Types: Cigarettes     Quit date: 1967   • Smokeless tobacco: Not on file      Comment: About 50 years ago   • Alcohol use No   • Drug use: No   • Sexual activity: Defer     Other Topics Concern   • Not on file     Social History Narrative       ALLERGIES  Review of patient's allergies indicates no known allergies.    REVIEW OF SYSTEMS  Review of Systems   Constitutional: Negative for fever.   HENT: Positive for sore throat.    Eyes: Negative.    Respiratory: Positive for shortness of breath. Negative for cough.    Cardiovascular: Negative for chest pain.   Gastrointestinal: Negative for abdominal pain, diarrhea and vomiting.   Genitourinary: Negative for dysuria.   Musculoskeletal: Negative for neck pain.        Pt complains of bilateral leg, bilateral feet, and abd edema.   Skin: Negative for rash.   Allergic/Immunologic: Negative.    Neurological: Negative for numbness and headaches.   Hematological: Negative.    Psychiatric/Behavioral: Negative.    All other systems reviewed and are negative.      PHYSICAL EXAM  ED Triage  Vitals   Temp Heart Rate Resp BP SpO2   08/01/17 1030 08/01/17 1030 08/01/17 1030 08/01/17 1043 08/01/17 1030   98.8 °F (37.1 °C) 125 20 146/70 98 %      Temp src Heart Rate Source Patient Position BP Location FiO2 (%)   08/01/17 1030 08/01/17 1030 08/01/17 1043 08/01/17 1043 --   Tympanic Monitor Sitting Right arm        Physical Exam   Constitutional: She is oriented to person, place, and time and well-developed, well-nourished, and in no distress. No distress.   Elderly and frail   HENT:   Head: Normocephalic and atraumatic.   Eyes: EOM are normal. Pupils are equal, round, and reactive to light.   Neck: Normal range of motion. Neck supple.   Cardiovascular: Regular rhythm and normal heart sounds.  Tachycardia present.    HR is 115-125.   Pulmonary/Chest: She is in respiratory distress (Has mild tachypnea). She has decreased breath sounds. She has rales (bilateral).   Abdominal: Soft. She exhibits no distension. There is no tenderness. There is no rebound and no guarding.   Musculoskeletal: Normal range of motion. She exhibits edema (has 1 Plus edema to lower extremities bilaterally).   Neurological: She is alert and oriented to person, place, and time. She has normal sensation and normal strength.   Pt has intact distal pulses. Has generalized weakness. No focal weakness. .   Skin: Skin is warm and dry. No rash noted.   Psychiatric: Mood and affect normal.   Pt appears frail, weak, tired, and chronically ill.    Nursing note and vitals reviewed.      LAB RESULTS  Lab Results (last 24 hours)     Procedure Component Value Units Date/Time    CBC & Differential [[id number]] Collected:  08/01/17 1105    Specimen:  Blood Updated:  08/01/17 1116    Narrative:       The following orders were created for panel order CBC & Differential.  Procedure                               Abnormality         Status                     ---------                               -----------         ------                     CBC Auto  Differential[[id number]]        Abnormal            Final result                 Please view results for these tests on the individual orders.    Comprehensive Metabolic Panel [[id number]]  (Abnormal) Collected:  08/01/17 1105    Specimen:  Blood Updated:  08/01/17 1146     Glucose 131 (H) mg/dL      BUN 18 mg/dL      Creatinine 1.83 (H) mg/dL      Sodium 147 (H) mmol/L      Potassium 4.5 mmol/L      Chloride 109 (H) mmol/L      CO2 22.9 mmol/L      Calcium 9.1 mg/dL      Total Protein 6.3 g/dL      Albumin 3.30 (L) g/dL      ALT (SGPT) 15 U/L      AST (SGOT) 67 (H) U/L      Alkaline Phosphatase 125 (H) U/L      Total Bilirubin 0.3 mg/dL      eGFR Non African Amer 27 (L) mL/min/1.73      Globulin 3.0 gm/dL      A/G Ratio 1.1 g/dL      BUN/Creatinine Ratio 9.8     Anion Gap 15.1 mmol/L     Narrative:       The MDRD GFR formula is only valid for adults with stable renal function between ages 18 and 70.    Protime-INR [[id number]]  (Abnormal) Collected:  08/01/17 1105    Specimen:  Blood Updated:  08/01/17 1124     Protime 15.4 (H) Seconds      INR 1.27 (H)    BNP [[id number]]  (Normal) Collected:  08/01/17 1105    Specimen:  Blood Updated:  08/01/17 1151     proBNP 1288.0 pg/mL     Narrative:       Among patients with dyspnea, NT-proBNP is highly sensitive for the detection of acute congestive heart failure. In addition NT-proBNP of <300 pg/ml effectively rules out acute congestive heart failure with 99% negative predictive value.    CK [[id number]]  (Normal) Collected:  08/01/17 1105    Specimen:  Blood Updated:  08/01/17 1146     Creatine Kinase 101 U/L     Troponin [[id number]]  (Normal) Collected:  08/01/17 1105    Specimen:  Blood Updated:  08/01/17 1151     Troponin T <0.010 ng/mL     Narrative:       Troponin T Reference Ranges:  Less than 0.03 ng/mL:    Negative for AMI  0.03 to 0.09 ng/mL:      Indeterminant for AMI  Greater than 0.09 ng/mL: Positive for AMI    CBC Auto Differential [[id number]]  (Abnormal)  Collected:  08/01/17 1105    Specimen:  Blood Updated:  08/01/17 1116     WBC 7.36 10*3/mm3      RBC 3.45 (L) 10*6/mm3      Hemoglobin 10.2 (L) g/dL      Hematocrit 33.5 (L) %      MCV 97.1 fL      MCH 29.6 pg      MCHC 30.4 (L) g/dL      RDW 23.7 (H) %      RDW-SD 81.0 (H) fl      MPV 9.4 fL      Platelets 331 10*3/mm3      Neutrophil % 74.1 %      Lymphocyte % 12.2 (L) %      Monocyte % 12.2 (H) %      Eosinophil % 0.7 %      Basophil % 0.3 %      Immature Grans % 0.5 %      Neutrophils, Absolute 5.45 10*3/mm3      Lymphocytes, Absolute 0.90 10*3/mm3      Monocytes, Absolute 0.90 10*3/mm3      Eosinophils, Absolute 0.05 10*3/mm3      Basophils, Absolute 0.02 10*3/mm3      Immature Grans, Absolute 0.04 (H) 10*3/mm3     Urinalysis With / Culture If Indicated [[id number]]  (Abnormal) Collected:  08/01/17 1133    Specimen:  Urine from Urine, Clean Catch Updated:  08/01/17 1204     Color, UA Yellow     Appearance, UA Cloudy (A)     pH, UA 5.5     Specific Gravity, UA 1.012     Glucose, UA Negative     Ketones, UA Negative     Bilirubin, UA Negative     Blood, UA Small (1+) (A)     Protein, UA 30 mg/dL (1+) (A)     Leuk Esterase, UA Negative     Nitrite, UA Negative     Urobilinogen, UA 0.2 E.U./dL    Urinalysis, Microscopic Only [[id number]]  (Abnormal) Collected:  08/01/17 1133    Specimen:  Urine from Urine, Clean Catch Updated:  08/01/17 1243     RBC, UA 0-2 /HPF      WBC, UA 0-2 /HPF      Bacteria, UA 1+ (A) /HPF      Squamous Epithelial Cells, UA 0-2 /HPF      Hyaline Casts, UA 0-2 /LPF      Amorphous Crystals, UA Small/1+ /HPF      Methodology Manual Light Microscopy    Blood Culture [[id number]] Collected:  08/01/17 1341    Specimen:  Blood from Arm, Right Updated:  08/01/17 1403    Lactic Acid, Plasma [[id number]]  (Normal) Collected:  08/01/17 1341    Specimen:  Blood Updated:  08/01/17 1419     Lactate 1.6 mmol/L     Blood Culture [[id number]] Collected:  08/01/17 1440    Specimen:  Blood from Hand, Right Updated:   08/01/17 1444          I ordered the above labs and reviewed the results    RADIOLOGY  XR Chest 2 View   Final Result   Narrative:       EMERGENCY TWO-VIEW CHEST     HISTORY: 82-year-old female with chest pain, abdominal pain and  tightness x2 weeks progressive x2 days and associated shortness of  breath x2 days     COMPARISON: 10/23/2016     FINDINGS:  1. Patchy bilateral pulmonary airspace disease at the left base, right  middle and right lower lung consistent with pneumonia and a suspected  component of atelectasis.  2. No other change since previous study.  3. Recommend follow-up to confirm resolution.     This report was finalized on 8/1/2017 11:34 AM by Dr. Babak Zabala MD.             I ordered the above noted radiological studies. Interpreted by radiologist. Reviewed by me in PACS.       PROCEDURES  Procedures    EKG           EKG time: 1107  Rhythm/Rate: 113, Sinus tachycardia  P waves and KS: nml  QRS, axis: borderline LAD   ST and T waves: Nonspecific ST and T changes     Interpreted Contemporaneously by me, independently viewed  Unchanged compared to prior 2005      PROGRESS AND CONSULTS  ED Course   Value Comment By Time   Creatinine: (!) 1.83 1.25 2 weeks ago Janes Hernandez MD 08/01 1245   Hemoglobin: (!) 10.2 Chronic anemia Janes Hernandez MD 08/01 1246     1059 Ordered XR Chest, CBC, CMP, CK, Troponin, Protime-INR, UA, and BNP for further evaluation    1200 I was informed of negative Doppler results    1247 Placed call to A    1300 Discussed pt with Dr. Carvajal (hospitalist) who agrees to admit pt to telemetry    1304 Ordered Lactic Acid for further evaluation. Ordered Zosyn and Zithromax for relief of symptoms.    1314 BP- 127/65 HR- 110 Temp- 98.1 °F (36.7 °C) (Oral) O2 sat- 94%. Rechecked the patient who is in NAD and is resting comfortably. Informed pt of XR Chest results which show patchy bilateral pulmonary airspace disease at the left base, right middle and right lower lung consistent with  pneumonia and a suspected component of atelectasis. Will admit to telemetry. Pt understands and agrees with the plan. All questions answered.    MEDICAL DECISION MAKING  Results were reviewed/discussed with the patient and they were also made aware of online access. Pt also made aware that some labs, such as cultures, will not be resulted during ER visit and follow up with PMD is necessary.     MDM  Number of Diagnoses or Management Options     Amount and/or Complexity of Data Reviewed  Clinical lab tests: ordered and reviewed (Troponin - <.010, BNP - 1288, WBC - 7.36, Hemoglobin - 10.2, INR - 1.27, Protime - 15.4)  Tests in the radiology section of CPT®: ordered and reviewed (XR Chest shows patchy bilateral pulmonary airspace disease at the left base, right middle and right lower lung consistent with pneumonia and a suspected component of atelectasis.)  Tests in the medicine section of CPT®: ordered and reviewed (See EKG procedure note.)  Decide to obtain previous medical records or to obtain history from someone other than the patient: yes  Review and summarize past medical records: yes (Pt had an office visit with Dr. Ngo 7/21/2017. Per Dr. Ngo, the assessment was:    Metastatic breast cancer, predominantly bone disease, biopsy confirmed triple negative in late October 2016. She was started on Xeloda 1500 mg b.i.d.,on 11/14/16, about 25% dose reduction from calculated dose 2000 mg b.i.d. She was not able to tolerate, one week into the therapy, patient called reporting significant fatigue associated with treatment. We decreased her Xeloda to 1000 mg twice a day.  She has since tolerated better with less fatigue.       Her tumor marker revealed elevation of CA 15-3 from 36.8 on 1/27/2017 to 39.3 on 3/24/2017. Given escalation, we increased Xeloda dose to 1500 mg in the morning, and 1000 mg in the evening, she tolerated well.   Her Ca15-3 further increased to 68 on 6/16/17,  we increased her xeloda dosing to 1500  mg BID, one week on and one week off.  She tolerated better diss round, she is presently on day #5 of Xeloda.  She has no specific complaints.  She did have some leg pains and takes narcotics as needed, typically once or twice a day.  Currently in office she denies any pains.        2. Anemia secondary to chemotherapy, with a background of anemia related to her metastatic breast cancer.  Iron studies, B12, and folate levels remain adequate. Her ferritin remains significantly elevated and is reactive to her disease.  Recently she required 2 units PRBC transfusion due to symptomatic, in early this month.  Her hemoglobin now has trending down, however does not need Procrit injection today.  I think next week and her hemoglobin will further decrease, and will quantify for Procrit.         3. Metastatic bone disease. She is due for Zometa in 3 weeks and repeat every 4 weeks. Her next dose will be due on 08/11/2017.       4. Elevated Creatinine:Patient has been encouraged to increase hydration.       5. Cancer related pain: over the last several the weeks patient has noted significant lower extremity pain. Pain is intermittent in nature without evidence of erythema, warmth, or accompanying dyspnea. Thankfully, pain has slowly resolved over the last several days and she is now only utilizing occasional iburprofen.  She did request refill of her narcotics..      6.  Poor oral intake and weight loss.  This is secondary to her malignancy and chemotherapy.  Her weight is stable now.  She is still struggling with early satiety.  Her abdomen examination is benign, I do not see any signs of ascites fluid.  We will continue to monitor closely. )  Discuss the patient with other providers: yes (Dr. Rnig (hospitalist))  Independent visualization of images, tracings, or specimens: yes           DIAGNOSIS  Final diagnoses:   Anemia, unspecified type   Pneumonia of both lower lobes due to infectious organism   Metastatic breast cancer  - On chemotherapy   Renal failure (ARF), acute on chronic   Generalized edema       DISPOSITION  ADMISSION    Discussed treatment plan and reason for admission with pt/family and admitting physician.  Pt/family voiced understanding of the plan for admission for further testing/treatment as needed.       Latest Documented Vital Signs:  As of 5:45 PM  BP- 127/65 HR- 110 Temp- 98.1 °F (36.7 °C) (Oral) O2 sat- 94%    --  Documentation assistance provided by sohan Qiu for Dr. Hernandez.  Information recorded by the scribe was done at my direction and has been verified and validated by me.     Madison Qiu  08/01/17 1104       Madison Qiu  08/01/17 1327       Janes Hernandez MD  08/01/17 [id number]

## 2017-08-02 PROBLEM — R14.0 DISTENDED ABDOMEN: Status: ACTIVE | Noted: 2017-01-01

## 2017-08-02 PROBLEM — N17.9 ACUTE KIDNEY INJURY (HCC): Status: ACTIVE | Noted: 2017-01-01

## 2017-08-02 NOTE — PROGRESS NOTES
LOS: 1 day   Patient Care Team:  Shivam Llanes MD as PCP - General (Internal Medicine)  Masha Gutierres MD PhD as Consulting Physician (Hematology and Oncology)  Cuba Mabry MD as Referring Physician (Internal Medicine)    Chief Complaint   Patient presents with   • Shortness of Breath   • Leg Swelling         Subjective   Pretty much feels the same. Eating some. No nausea     Objective     Vital Signs  Temp:  [97.6 °F (36.4 °C)-98.8 °F (37.1 °C)] 97.6 °F (36.4 °C)  Heart Rate:  [105-125] 105  Resp:  [16-22] 16  BP: (118-146)/(59-76) 139/76    Physical Exam:  Thin WF in NAD  Skin warm & dry  Lungs scattered crackles  Heart RRR  Abd distended, BS+, mild tenderness  Ext no edema     Results Review:    Reviewed labs    Medication Review:       LABS    Results from last 7 days  Lab Units 08/02/17  0344 08/01/17  1105   SODIUM mmol/L 147* 147*   POTASSIUM mmol/L 4.3 4.5   CHLORIDE mmol/L 111* 109*   CO2 mmol/L 19.6* 22.9   BUN mg/dL 14 18   CREATININE mg/dL 1.76* 1.83*   GLUCOSE mg/dL 104* 131*   CALCIUM mg/dL 7.8* 9.1       Results from last 7 days  Lab Units 08/02/17  0344 08/01/17  1105 07/28/17  0953   WBC 10*3/mm3 6.91 7.36 5.65   HEMOGLOBIN g/dL 8.8* 10.2* 9.5*   HEMATOCRIT % 30.7* 33.5* 30.5*   PLATELETS 10*3/mm3 349 331 325       Results from last 7 days  Lab Units 08/02/17  0344 08/01/17  1105   INR  1.31* 1.27*           Assessment/Plan     Principal Problem:    Pneumonia  Active Problems:    Breast cancer metastasized to bone    Essential hypertension    Anemia    Chronic kidney disease, stage 3    Distended abdomen - creat not improved enough to do contrasted CT so will at least do U/S of abd. Will allow visualization of kidneys to r/o obstruction    Acute kidney injury          Raven Carvajal MD  08/02/17  9:56 AM      Time:

## 2017-08-02 NOTE — H&P
Name: Erin Vora ADMIT: 2017   : 1935  PCP: Shivam Llanes MD    MRN: [id number] LOS: 1 days   AGE/SEX: 82 y.o. female  ROOM: [facility]/1     Chief Complaint   Patient presents with   • Shortness of Breath   • Leg Swelling       History of Present Illness  83 yo WF with history of breast cancer that is metastatic to the bone.  She has been undergoing chemotherapy which she reports she has been tolerating fairly well.  She started noticing abdominal swelling and bloating about 2-3 weeks ago and over the last 2 days noted that her feet were swelling.  She gets occasional shortness of breath over the last few days but reports that it's not bad.  She has an occasional cough but nothing unusual and it's nonproductive.  There's been no chest pain.  No fever or chills.  She denies any abdominal pain other than when she tries to sit up it's uncomfortable for her.  No nausea. She reports a poor appetite over the last few days but claims that her appetite has been good prior to that.  Bowel movements are normal and she generally has one every day.  She's been noticing some pain in her neck off and on that started yesterday.  She reports that swallowing ice chips helps.  There is no pain associated with swallowing and she's having no difficulty swallowing.  She denies any problems with heartburn.  She's had no recent skin rashes.  She reports that she is normally very active and does her own cooking and cleaning as well as babysitting for her grandchildren.        Past Medical History:   Diagnosis Date   • Anemia    • Bone metastases 2016   • Breast cancer    • HL (hearing loss)    • Hyperlipidemia    • Hyperlipidemia    • Hypertension    • Pneumonia 2017       Past Surgical History:   Procedure Laterality Date   • BREAST BIOPSY Left     malignant   • MASTECTOMY Left          Family History   Problem Relation Age of Onset   • Ovarian cancer Sister 84   • Heart failure Sister    • Hypertension  Sister    • No Known Problems Mother    • No Known Problems Father    • No Known Problems Brother    • No Known Problems Son    • No Known Problems Daughter    • No Known Problems Maternal Grandmother    • No Known Problems Maternal Grandfather    • No Known Problems Paternal Grandmother    • No Known Problems Paternal Grandfather    • No Known Problems Cousin    • Throat cancer Sister          Social History   Substance Use Topics   • Smoking status: Former Smoker     Packs/day: 1.00     Years: 2.00     Types: Cigarettes     Quit date: 1967   • Smokeless tobacco: None      Comment: About 50 years ago   • Alcohol use No         Prescriptions Prior to Admission   Medication Sig Dispense Refill Last Dose   • aspirin 81 MG EC tablet Take 81 mg by mouth Daily.   8/1/2017 at Unknown time   • calcium carbonate-vitamin d (CALCIUM 600+D HIGH POTENCY) 600-400 MG-UNIT per tablet Take 1 tablet by mouth Daily.   8/1/2017 at Unknown time   • capecitabine (XELODA) 500 MG chemo tablet Take 3 tabs (1500 mg) po in the AM then take 3 tabs (1500 mg) po in the PM 7 days on then 7 days off. (Patient taking differently: 1,500 mg/m2 2 (Two) Times a Day. Take 3 tabs (1500 mg) po in the AM then take 3 tabs (1500 mg) po in the PM 7 days on then 7 days off.) 84 tablet 2 8/1/2017 at Unknown time   • HYDROcodone-acetaminophen (NORCO) 5-325 MG per tablet Take 1 tablet by mouth Every 6 (Six) Hours As Needed for Moderate Pain (4-6) or Severe Pain  (7-10). 90 tablet 0 7/31/2017 at Unknown time   • losartan-hydrochlorothiazide (HYZAAR) 100-12.5 MG per tablet Take 1 tablet by mouth Daily.      • Omega-3 Fatty Acids (FISH OIL PO) Take 300 mg by mouth Daily.   8/1/2017 at Unknown time   • ondansetron (ZOFRAN) 4 MG tablet Take 1 tablet by mouth Every 8 (Eight) Hours As Needed for nausea or vomiting. 30 tablet 2 7/31/2017 at Unknown time   • pravastatin (PRAVACHOL) 40 MG tablet Take 40 mg by mouth Daily.   8/1/2017 at Unknown time   • predniSONE  (DELTASONE) 5 MG tablet Take 5 mg by mouth Daily.      • senna (SENOKOT) 8.6 MG tablet tablet Take 1 tablet by mouth Every Night.      • Acetaminophen (TYLENOL PO) Take  by mouth.   Taking   • Multiple Vitamin (MULTIVITAMIN) tablet Take 1 tablet by mouth Daily.   Taking     Allergies:  Review of patient's allergies indicates no known allergies.    Review of Systems   See HPI  Denies any h/o heart problems      Objective    Vital Signs  Temp:  [97.6 °F (36.4 °C)-98.8 °F (37.1 °C)] 97.6 °F (36.4 °C)  Heart Rate:  [105-125] 105  Resp:  [16-22] 16  BP: (118-146)/(59-76) 139/76  SpO2:  [93 %-98 %] 96 %  on   ;   O2 Device: room air  Body mass index is 24.17 kg/(m^2).    Physical Exam  Thin white female in no acute distress  PERRL, EOMI, sclera nonicteric.  Oropharynx benign, no erythema, no evidence of thrush.  Neck supple without adenopathy or thyromegaly.  Lungs reveal scattered fine crackles mostly in the mid lung region  Heart regular rate and rhythm without murmur or gallop  Abdomen is distended and tight feeling. Bowel sounds are present throughout.  Nontender on palpation  Ext - no edema in her feet but she has had them elevated in bed for a while.  There was trace edema in the left calf but I couldn't really detect any in the right calf.  Skin warm and dry      Results Review:   I reviewed the patient's new clinical results.    Results from last 7 days  Lab Units 08/02/17  0344 08/01/17  1105 07/28/17  0953   WBC 10*3/mm3 6.91 7.36 5.65   HEMOGLOBIN g/dL 8.8* 10.2* 9.5*   PLATELETS 10*3/mm3 349 331 325       Results from last 7 days  Lab Units 08/02/17  0344 08/01/17  1105   SODIUM mmol/L 147* 147*   POTASSIUM mmol/L 4.3 4.5   CHLORIDE mmol/L 111* 109*   CO2 mmol/L 19.6* 22.9   BUN mg/dL 14 18   CREATININE mg/dL 1.76* 1.83*   GLUCOSE mg/dL 104* 131*   ALBUMIN g/dL  --  3.30*   BILIRUBIN mg/dL  --  0.3   ALK PHOS U/L  --  125*   AST (SGOT) U/L  --  67*   ALT (SGPT) U/L  --  15   Estimated Creatinine Clearance:  24.5 mL/min (by C-G formula based on Cr of 1.76).    Results from last 7 days  Lab Units 08/02/17  0344 08/01/17  1105   INR  1.31* 1.27*   CK TOTAL U/L  --  101   TROPONIN T ng/mL  --  <0.010   PROBNP pg/mL  --  1288.0         Results from last 7 days  Lab Units 08/01/17  1133   NITRITE UA  Negative   WBC UA /HPF 0-2   BACTERIA UA /HPF 1+*   SQUAM EPITHEL UA /HPF 0-2       XR Chest 2 View   Final Result      US Abdomen Complete    (Results Pending)     Assessment/Plan   Assessment:     Active Hospital Problems (** Indicates Principal Problem)    Diagnosis Date Noted   • **Pneumonia [J18.9] 08/01/2017   • Distended abdomen [R14.0] 08/02/2017   • Acute kidney injury [N17.9] 08/02/2017   • Anemia [D64.9] 08/01/2017   • Chronic kidney disease, stage 3 [N18.3] 08/01/2017   • Essential hypertension [I10] 03/28/2017   • Breast cancer metastasized to bone [C50.919, C79.51] 11/04/2016      Resolved Hospital Problems    Diagnosis Date Noted Date Resolved   No resolved problems to display.         Plan:     I think a CT of the abdomen and pelvis needs to be obtained but I don't think it's going to be very helpful if it's not done with contrast.  With her creatinine elevated right now, it wouldn't be safe to try and do that.  I have been hydrating her with IV fluids and will repeat the labs tomorrow morning.  Hopefully her creatinine will be improved enough that the CT with contrast can be done.  I am also going to do an echocardiogram.  Because of the findings on her chest x-ray she was started on IV antibiotics to cover for pneumonia.  Given the ongoing chemotherapy I felt that the antibiotic needed to be a step up from the usual choices for community-acquired pneumonia.  She's been started on Zosyn and she's also on azithromycin to cover for atypical organisms.her white count was normal at just over 7000 but her previous white count has been in the 5000 range so there is an increase in what she's previously run.   Additionally she has more neutrophils on the differential than what she has had previously although it does not constitute a left shift. She does not have any symptoms consistent with pneumonia but there is an abnormality on the chest x-ray that will warrant further evaluation.  Until that is completed, I'm going to keep her covered with the antibiotics. I'll plan on doing a CT of the chest with contrast, as well, if the creatinine comes down.    I discussed the patients findings and my recommendations with the pt          Raven Carvajal MD  [facility]  08/02/17  9:57 AM

## 2017-08-02 NOTE — PLAN OF CARE
Problem: Patient Care Overview (Adult)  Goal: Plan of Care Review  Outcome: Ongoing (interventions implemented as appropriate)    08/02/17 1607   Coping/Psychosocial Response Interventions   Plan Of Care Reviewed With patient   Patient Care Overview   Progress no change   Outcome Evaluation   Outcome Summary/Follow up Plan VSS. 2D echo done. Abd US done, results pending. Chemo meds held. IV abx given. Will contine to monitor        Goal: Adult Individualization and Mutuality  Outcome: Ongoing (interventions implemented as appropriate)  Goal: Discharge Needs Assessment  Outcome: Ongoing (interventions implemented as appropriate)    Problem: Infection, Risk/Actual (Adult)  Goal: Identify Related Risk Factors and Signs and Symptoms  Outcome: Ongoing (interventions implemented as appropriate)

## 2017-08-02 NOTE — PLAN OF CARE
Problem: Patient Care Overview (Adult)  Goal: Plan of Care Review  Outcome: Ongoing (interventions implemented as appropriate)    08/02/17 0534   Coping/Psychosocial Response Interventions   Plan Of Care Reviewed With patient   Patient Care Overview   Progress no change   Outcome Evaluation   Outcome Summary/Follow up Plan Pt still waiting for CBC to see. Plans on getting CT if kidney labs allow. ABD distention noted and MD aware. Contiue to monitor         Problem: Infection, Risk/Actual (Adult)  Goal: Identify Related Risk Factors and Signs and Symptoms  Outcome: Ongoing (interventions implemented as appropriate)    08/02/17 0513   Infection, Risk/Actual   Infection, Risk/Actual: Related Risk Factors age extremes   Signs and Symptoms (Infection, Risk/Actual) weakness;lab value changes;heart rate increase       Goal: Infection Prevention/Resolution  Outcome: Ongoing (interventions implemented as appropriate)    08/02/17 0543   Infection, Risk/Actual (Adult)   Infection Prevention/Resolution making progress toward outcome

## 2017-08-03 PROBLEM — C76.2 ABDOMINAL CARCINOMATOSIS (HCC): Status: ACTIVE | Noted: 2017-01-01

## 2017-08-03 NOTE — PROGRESS NOTES
Discharge Planning Assessment  [facility]     Patient Name: Erin Vora  MRN: [id number]  Today's Date: 8/3/2017    Admit Date: 8/1/2017          Discharge Needs Assessment       08/03/17 1435    Living Environment    Lives With alone    Living Arrangements house    Home Accessibility no concerns    Stair Railings at Home none    Type of Financial/Environmental Concern none    Transportation Available none    Living Environment    Provides Primary Care For no one, unable/limited ability to care for self    Primary Care Provided By spouse/significant other    Quality Of Family Relationships supportive    Able to Return to Prior Living Arrangements yes    Discharge Needs Assessment    Concerns To Be Addressed no discharge needs identified    Readmission Within The Last 30 Days no previous admission in last 30 days    Anticipated Changes Related to Illness none    Equipment Currently Used at Home none    Equipment Needed After Discharge none            Discharge Plan       08/03/17 1436    Case Management/Social Work Plan    Plan Home no needs    Patient/Family In Agreement With Plan yes   son at bedside [place] [phone]    Additional Comments IMM verified. Met at bedside with pt and her family whom she has given permission to speak in front of. Pt verifies her demographics. Per pt she has never used home health or been to rehab. She has no equip. Her PCP is Dr Llanes and pharmacy is [facility] on [address]. Pt denies difficulty affording or obtaining information. Plan at DC is home no needs....[facility]        Discharge Placement     No information found                Demographic Summary       08/03/17 1434    Referral Information    Admission Type inpatient    Arrived From admitted as an inpatient    Referral Source admission list    Reason For Consult discharge planning    Record Reviewed clinical discipline documentation;history and physical;medical record    [facility] Care Phys[facility] Information    [facility]            Functional Status       08/03/17 1435    Functional Status Current    Ambulation 2-->assistive person    Transferring 2-->assistive person    Toileting 2-->assistive person    Bathing 2-->assistive person    Dressing 2-->assistive person    Eating 2-->assistive person    Communication 0-->understands/communicates without difficulty    Swallowing (if score 2 or more for any item, consult Rehab Services) 0-->swallows foods/liquids without difficulty    Functional Status Prior    Ambulation 0-->independent    Transferring 0-->independent    Toileting 0-->independent    Bathing 0-->independent    Dressing 0-->independent    Eating 0-->independent    Communication 0-->understands/communicates without difficulty            Psychosocial     None            Abuse/Neglect     None            Legal     None            Substance Abuse     None            Patient Forms     None          Kimberley Pittman RN

## 2017-08-03 NOTE — PLAN OF CARE
Problem: Patient Care Overview (Adult)  Goal: Plan of Care Review  Outcome: Ongoing (interventions implemented as appropriate)    08/03/17 0335   Coping/Psychosocial Response Interventions   Plan Of Care Reviewed With patient   Patient Care Overview   Progress no change   Outcome Evaluation   Outcome Summary/Follow up Plan VSS; CT of ABD/Pelvis without contrast to be done; Chemo meds still being held; no s/s of distress noted; no c/o pain or nausea         Problem: Infection, Risk/Actual (Adult)  Goal: Identify Related Risk Factors and Signs and Symptoms  Outcome: Outcome(s) achieved Date Met:  08/03/17  Goal: Infection Prevention/Resolution  Outcome: Ongoing (interventions implemented as appropriate)

## 2017-08-03 NOTE — CONSULTS
[facility] CONSULT      Patient Identification:  NAME:  Erin Vora  Age:  82 y.o.   Sex:  female   :  1935   MRN:  [id number]       Chief complaint: abdominal swelling    History of present illness:  83yo CF with triple neg mets breast ca has abdominal distentsion and discomfort for about 1 month. CT shows ascites and bilat hydro. VOiding but creat up slightly.No priro  problems. No frequency ro rurgency. No flank pain. No gross heamturia.      Past medical history:  Past Medical History:   Diagnosis Date   • Anemia    • Bone metastases    • Breast cancer    • HL (hearing loss)    • Hyperlipidemia    • Hyperlipidemia    • Hypertension    • Pneumonia 2017       Past surgical history:  Past Surgical History:   Procedure Laterality Date   • BREAST BIOPSY Left     malignant   • MASTECTOMY Left        Allergies:  Review of patient's allergies indicates no known allergies.    Home medications:  Prescriptions Prior to Admission   Medication Sig Dispense Refill Last Dose   • aspirin 81 MG EC tablet Take 81 mg by mouth Daily.   2017 at Unknown time   • calcium carbonate-vitamin d (CALCIUM 600+D HIGH POTENCY) 600-400 MG-UNIT per tablet Take 1 tablet by mouth Daily.   2017 at Unknown time   • capecitabine (XELODA) 500 MG chemo tablet Take 3 tabs (1500 mg) po in the AM then take 3 tabs (1500 mg) po in the PM 7 days on then 7 days off. (Patient taking differently: 1,500 mg/m2 2 (Two) Times a Day. Take 3 tabs (1500 mg) po in the AM then take 3 tabs (1500 mg) po in the PM 7 days on then 7 days off.) 84 tablet 2 2017 at Unknown time   • HYDROcodone-acetaminophen (NORCO) 5-325 MG per tablet Take 1 tablet by mouth Every 6 (Six) Hours As Needed for Moderate Pain (4-6) or Severe Pain  (7-10). 90 tablet 0 2017 at Unknown time   • losartan-hydrochlorothiazide (HYZAAR) 100-12.5 MG per tablet Take 1 tablet by mouth Daily.      • Omega-3 Fatty Acids (FISH OIL PO) Take 300 mg by  mouth Daily.   8/1/2017 at Unknown time   • ondansetron (ZOFRAN) 4 MG tablet Take 1 tablet by mouth Every 8 (Eight) Hours As Needed for nausea or vomiting. 30 tablet 2 7/31/2017 at Unknown time   • pravastatin (PRAVACHOL) 40 MG tablet Take 40 mg by mouth Daily.   8/1/2017 at Unknown time   • predniSONE (DELTASONE) 5 MG tablet Take 5 mg by mouth Daily.      • senna (SENOKOT) 8.6 MG tablet tablet Take 1 tablet by mouth Every Night.      • Acetaminophen (TYLENOL PO) Take  by mouth.   Taking   • Multiple Vitamin (MULTIVITAMIN) tablet Take 1 tablet by mouth Daily.   Taking       Hospital medications:    aspirin 81 mg Oral Daily   azithromycin 500 mg Intravenous Q24H   enoxaparin 30 mg Subcutaneous Daily   piperacillin-tazobactam 3.375 g Intravenous Q8H   predniSONE 5 mg Oral Daily   senna 1 tablet Oral Nightly   sucralfate 1 g Oral Q6H       sodium chloride 125 mL/hr Last Rate: 125 mL/hr (08/03/17 1537)     HYDROcodone-acetaminophen  •  ondansetron  •  [CANCELED] Insert peripheral IV **AND** sodium chloride  •  temazepam    Family history:  Family History   Problem Relation Age of Onset   • Ovarian cancer Sister 84   • Heart failure Sister    • Hypertension Sister    • No Known Problems Mother    • No Known Problems Father    • No Known Problems Brother    • No Known Problems Son    • No Known Problems Daughter    • No Known Problems Maternal Grandmother    • No Known Problems Maternal Grandfather    • No Known Problems Paternal Grandmother    • No Known Problems Paternal Grandfather    • No Known Problems Cousin    • Throat cancer Sister        Social history:  Social History   Substance Use Topics   • Smoking status: Former Smoker     Packs/day: 1.00     Years: 2.00     Types: Cigarettes     Quit date: 1967   • Smokeless tobacco: None      Comment: About 50 years ago   • Alcohol use No       Review of systems:    Negative 12-system ROS except for the following:  No SOA or CP, some nausea but no  emesis      Objective:  TMax 24 hours:   Temp (24hrs), Av.8 °F (36.6 °C), Min:97.6 °F (36.4 °C), Max:98 °F (36.7 °C)      Vitals Ranges:   Temp:  [97.6 °F (36.4 °C)-98 °F (36.7 °C)] 97.6 °F (36.4 °C)  Heart Rate:  [] 110  Resp:  [18] 18  BP: (135-142)/(67-72) 142/72    Intake/Output Last 3 shifts:  I/O last 3 completed shifts:  In: 2820 [P.O.:820; I.V.:2000]  Out: 3200 [Urine:3200]     Physical Exam:       General Appearance:    Alert, cooperative, in no acute distress   Head:    Normocephalic, without obvious abnormality, atraumatic   Eyes:          PERRL, conjunctivae and corneas clear   Ears:    Normal external inspection   Throat:   No oral lesions, oral mucosa moist   Neck:   Supple, no LAD, trachea midline   Back:     No CVA tenderness   Lungs:     Respirations unlabored, symmetric excursion    Heart:    RRR, intact peripheral pulses   Abdomen:     Soft, NDNT, no masses, no guarding   :    No pelvic examination performed   Extremities:   No edema, no deformity   Skin:   No bleeding, bruising or rashes   Neuro/Psych:   Orientation intact, mood/affect pleasant, no focal findings       Results review:   I reviewed the patient's new clinical results.    Data review:  Lab Results (last 24 hours)     Procedure Component Value Units Date/Time    CBC & Differential [[id number]] Collected:  17    Specimen:  Blood Updated:  17    Narrative:       The following orders were created for panel order CBC & Differential.  Procedure                               Abnormality         Status                     ---------                               -----------         ------                     CBC Auto Differential[[id number]]        Abnormal            Final result                 Please view results for these tests on the individual orders.    CBC Auto Differential [[id number]]  (Abnormal) Collected:  17    Specimen:  Blood Updated:  17     WBC 7.04 10*3/mm3      RBC 3.19  (L) 10*6/mm3      Hemoglobin 9.1 (L) g/dL      Hematocrit 32.3 (L) %      .3 (H) fL      MCH 28.5 pg      MCHC 28.2 (L) g/dL      RDW 24.2 (H) %      RDW-SD 88.0 (H) fl      MPV 9.7 fL      Platelets 316 10*3/mm3      Neutrophil % 75.1 %      Lymphocyte % 14.6 (L) %      Monocyte % 6.4 %      Eosinophil % 2.6 %      Basophil % 0.6 %      Immature Grans % 0.7 (H) %      Neutrophils, Absolute 5.29 10*3/mm3      Lymphocytes, Absolute 1.03 10*3/mm3      Monocytes, Absolute 0.45 10*3/mm3      Eosinophils, Absolute 0.18 10*3/mm3      Basophils, Absolute 0.04 10*3/mm3      Immature Grans, Absolute 0.05 (H) 10*3/mm3      nRBC 0.6 (H) /100 WBC     Basic Metabolic Panel [[id number]]  (Abnormal) Collected:  08/03/17 0504    Specimen:  Blood Updated:  08/03/17 0611     Glucose 102 (H) mg/dL      BUN 15 mg/dL      Creatinine 1.78 (H) mg/dL      Sodium 146 (H) mmol/L      Potassium 4.2 mmol/L      Chloride 113 (H) mmol/L      CO2 18.7 (L) mmol/L      Calcium 7.7 (L) mg/dL      eGFR Non African Amer 27 (L) mL/min/1.73      BUN/Creatinine Ratio 8.4     Anion Gap 14.3 mmol/L     Narrative:       The MDRD GFR formula is only valid for adults with stable renal function between ages 18 and 70.    Cancer Antigen 15-3 [[id number]]  (Abnormal) Collected:  08/02/17 1202    Specimen:  Blood Updated:  08/03/17 0615     CA 15-3 79.3 (H) U/mL       Roche ECLIA  methodology       Narrative:       Performed at:  [address] [place] [address]  [id number]  : Richard Josue PhD, Phone:  [phone]    Blood Culture [[id number]]  (Normal) Collected:  08/01/17 1440    Specimen:  Blood from Hand, Right Updated:  08/03/17 1501     Blood Culture No growth at 2 days    Blood Culture [[id number]]  (Normal) Collected:  08/01/17 1341    Specimen:  Blood from Arm, Right Updated:  08/03/17 1501     Blood Culture No growth at 2 days           Imaging:  Imaging Results (last 24 hours)     Procedure Component Value Units  Date/Time    US Abdomen Complete [[id number]] Collected:  08/02/17 1907     Updated:  08/02/17 1927    Narrative:       ULTRASOUND EXAMINATION OF THE ABDOMEN     CLINICAL HISTORY: Distention. Acute renal failure.     FINDINGS: Ultrasound examination of the abdomen demonstrates a right  kidney measuring up to 9.7 cm in long axis. There is mild-to-moderate  hydronephrosis. Moderate-to-severe hydronephrosis is noted within the  left kidney which measures up to 10.7 cm in long axis. The liver  parenchyma has homogeneous echotexture. There is no evidence for  cholelithiasis or cholecystitis. There is no evidence for intra or  extrahepatic ductal dilatation. The common duct measures up to 6 mm in  diameter. The pancreas is not well seen although the pancreas is  unremarkable in its visualized portions. The spleen is within normal  limits with regards to its echogenicity and size. It measures up to 7.7  x 3.6 x 3.4 cm in greatest dimensions. The aorta measures up to 2.1 cm  within its proximal portion. The mid and distal aspects of the abdominal  aorta are not well seen. The IVC is not well evaluated. A moderate  amount of ascites is noted and small bilateral pleural effusions are  identified.       Impression:          There is bilateral hydronephrosis with a moderate-to-severe degree of  hydronephrosis noted within the left kidney and a mild-to-moderate  degree of hydronephrosis noted within the right kidney. I recommend  further evaluation with a CT scan of the abdomen and pelvis to further  evaluate the cause of this hydronephrosis.     A moderate amount of ascites is noted and there are bilateral pleural  effusions.     These findings and recommendations were directly discussed with Dr. Sal 05/02/2017 approximate 7:20 PM.     This report was finalized on 8/2/2017 7:24 PM by Dr. Yuri Lamb MD.       CT Abdomen Pelvis Without Contrast [[id number]] Collected:  08/03/17 0924     Updated:  08/03/17 0924    Narrative:        CT ABDOMEN AND PELVIS WITHOUT CONTRAST     HISTORY: History of metastatic breast cancer with abdominal distention  and acute renal failure.     TECHNIQUE: Axial CT images of the abdomen and pelvis were obtained  without administration of intravenous contrast. The patient was given  oral contrast. Coronal and sagittal reformats were obtained.     COMPARISON: CT abdomen and pelvis from 10/23/2016.     FINDINGS: There are bilateral small layering pleural effusions with  bibasilar atelectasis. There is new moderate to marked ascites in the  abdomen associated with soft tissue density/deposits within the omentum  consistent with omental caking. These findings are consistent with  peritoneal carcinomatosis. There is a moderate size hiatal hernia  present. Noncontrast attenuation of the liver is normal. Punctate  calcified granulomas are seen within the spleen. The pancreas is normal.  Bilateral adrenal glands are not well imaged; however, the left adrenal  gland does appear mildly thickened.  Both kidneys are normal in size and  attenuation. There is mild right and moderate left hydronephrosis. The  urinary bladder is partially distended and normal. There are new  solid-appearing masses seen in bilateral adnexal region that are  consistent with metastatic deposits. Additional deposits seen within the  posterior right hemipelvis measures up to 3.5 x 3.5 cm. There is no  evidence of bowel obstruction at present. Diffuse metastatic disease is  identified within the visualized bones.       Impression:       Limited in the absence of IV contrast.  1.  New evidence of peritoneal carcinomatosis with moderate ascites,  omental caking as well as pelvic deposits. 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  2.  Diffuse bony metastatic disease within all the visualized bones.  3.  Mild right and moderate left hydronephrosis. Bilateral ureters are  not well imaged; however, I  suspect this is secondary to external  compression on the ureters secondary to multiple peritoneal implants.  4.  Small to moderate size hiatal hernia.                Assessment:     Principal Problem:    Pneumonia  Active Problems:    Breast cancer metastasized to bone    Essential hypertension    Anemia    Chronic kidney disease, stage 3    Distended abdomen    Acute kidney injury    Abdominal Discomfort laureen be due to ascites or hydro- suspect the former    Plan:     Paracentsis.  Possible cysto, bilat stents tomorrow.    Albino Jolley MD  08/03/17  5:41 PM

## 2017-08-03 NOTE — PROGRESS NOTES
[facility] INPATIENT PROGRESS NOTE  Subjective     CC: Metastatic breast cancer     Interval history: Abd US showed moderate ascites and bilateral effusions as well as bilateral hydronephrosis. Echo with EF >70%. Still complaints of abd pain/bloating. Good urination.    General ROS: negative for - chills, fever or night sweats  Respiratory ROS: no cough, shortness of breath, or wheezing     Medications:  The current medication list was reviewed in the EMR.    Allergies:  No Known Allergies    Objective      Vitals:    08/03/17 0725   BP: 142/72   Pulse: 110   Resp: 18   Temp: 97.6 °F (36.4 °C)   SpO2:         Physical exam:   General appearance: alert, appears stated age and no distress  Lungs: clear to auscultation bilaterally, good air movement, no wheezes  Heart: regular rate and rhythm, S1, S2 normal, no murmur, click, rub or gallop  Abdomen: distended, nontender, normal bowel sounds   Extremities: extremities normal, atraumatic, no cyanosis or edema  Skin: Skin color, texture, turgor normal. No rashes or lesions      RECENT LABS:      Results from last 7 days  Lab Units 08/03/17  0504 08/02/17  0344 08/01/17  1105   WBC 10*3/mm3 7.04 6.91 7.36   HEMOGLOBIN g/dL 9.1* 8.8* 10.2*   HEMATOCRIT % 32.3* 30.7* 33.5*   PLATELETS 10*3/mm3 316 349 331       Results from last 7 days  Lab Units 08/03/17  0504 08/02/17  0344 08/01/17  1105   SODIUM mmol/L 146* 147* 147*   POTASSIUM mmol/L 4.2 4.3 4.5   CHLORIDE mmol/L 113* 111* 109*   CO2 mmol/L 18.7* 19.6* 22.9   BUN mg/dL 15 14 18   CREATININE mg/dL 1.78* 1.76* 1.83*   CALCIUM mg/dL 7.7* 7.8* 9.1   BILIRUBIN mg/dL  --   --  0.3   ALK PHOS U/L  --   --  125*   ALT (SGPT) U/L  --   --  15   AST (SGOT) U/L  --   --  67*   GLUCOSE mg/dL 102* 104* 131*       ABd US  IMPRESSION:      There is bilateral hydronephrosis with a moderate-to-severe degree of  hydronephrosis noted within the left kidney and a mild-to-moderate  degree of hydronephrosis noted within the  right kidney. I recommend  further evaluation with a CT scan of the abdomen and pelvis to further  evaluate the cause of this hydronephrosis.      A moderate amount of ascites is noted and there are bilateral effusions      Echo:  Interpretation Summary   · Left ventricular systolic function is hyperdynamic (EF > 70).  · The left ventricular cavity is small.  · calcification of the aortic valve  · Mild tricuspid valve regurgitation is present.  · Estimated right ventricular systolic pressure from tricuspid regurgitation is normal (<35 mmHg).  · Saline test results are negative.               Assessment/Plan   This is a 82 y.o. female with:     1. Metastatic triple negative breast cancer    - Currently regimen: Xeloda 1500 mg bid, 1 wk on/1 wk off    - Recommend holding Xeloda during infection (she was due for day 3 today)   -  CA 15-3 only slightly up from prior lab in mid-June.      2. Anemia due to chemotherapy.    - Transfused in early July and plan to start procrit soon.   - Monitor for now     3. Increasing abdominal girth/edema. Unclear etiology but definitely could be related to progressive breast cancer.   - Imaging showed bilateral hydro and ascities   - CT A/P      4. MARIA DEL CARMEN likely related to hydronephrosis. Urology consult once CT scan done. CT to evaluate for cause of hydro.     5. Pneumonia. On Zosyn and azithromycin                Jocelyn Mcarthur MD  8/3/2017  7:39 AM

## 2017-08-03 NOTE — PROGRESS NOTES
LOS: 2 days   Patient Care Team:  Shivam Llanes MD as PCP - General (Internal Medicine)  Masha Gutierres MD PhD as Consulting Physician (Hematology and Oncology)  Cuba Mabry MD as Referring Physician (Internal Medicine)    Chief Complaint   Patient presents with   • Shortness of Breath   • Leg Swelling         Subjective   Pretty much feels the same.  Can't really eat because she gets too full. No nausea     Objective     Vital Signs  Temp:  [97.6 °F (36.4 °C)-98 °F (36.7 °C)] 97.6 °F (36.4 °C)  Heart Rate:  [] 110  Resp:  [18] 18  BP: (135-142)/(67-72) 142/72    Physical Exam:  Thin WF in NAD  Skin warm & dry  Lungs scattered crackles  Heart RRR  Abd distended, BS+, mild tenderness  Ext no edema     Results Review:    Reviewed labs    Medication Review:       LABS    Results from last 7 days  Lab Units 08/03/17  0504 08/02/17  0344 08/01/17  1105   SODIUM mmol/L 146* 147* 147*   POTASSIUM mmol/L 4.2 4.3 4.5   CHLORIDE mmol/L 113* 111* 109*   CO2 mmol/L 18.7* 19.6* 22.9   BUN mg/dL 15 14 18   CREATININE mg/dL 1.78* 1.76* 1.83*   GLUCOSE mg/dL 102* 104* 131*   CALCIUM mg/dL 7.7* 7.8* 9.1       Results from last 7 days  Lab Units 08/03/17  0504 08/02/17  0344 08/01/17  1105   WBC 10*3/mm3 7.04 6.91 7.36   HEMOGLOBIN g/dL 9.1* 8.8* 10.2*   HEMATOCRIT % 32.3* 30.7* 33.5*   PLATELETS 10*3/mm3 316 349 331       Results from last 7 days  Lab Units 08/02/17  0344 08/01/17  1105   INR  1.31* 1.27*           Assessment/Plan     Principal Problem:  1. Peritoneal carcinomatosis - noncontrasted CT reveals peritoneal carcinomatosis with moderate ascites, as well as bilateral hydronephrosis.  I've consulted urology to see her for the hydronephrosis.  I also think it would make her more comfortable if a paracentesis were done.  2.  Pneumonia - continue antibiotics for now but I question whether there is actually a pneumonia or not.  3.  Breast cancer metastasized to bone  4.  Essential hypertension  5.    Anemia  6.  Chronic kidney disease, stage 3  7.  Acute kidney injury     I discussed this with the patient and then I spoke with her son by phone.  Total time 40 min          Raven Carvajal MD  08/03/17  3:17 PM      Time:

## 2017-08-03 NOTE — PLAN OF CARE
Problem: Patient Care Overview (Adult)  Goal: Plan of Care Review  Outcome: Ongoing (interventions implemented as appropriate)    08/03/17 1532   Coping/Psychosocial Response Interventions   Plan Of Care Reviewed With patient   Patient Care Overview   Progress no change   Outcome Evaluation   Outcome Summary/Follow up Plan vitals stable. ST. urology consulted - CT guided paracentesis tomorrow, NPO at midnight. pain controlled with prn pain med. no distress noted. pt ambulating with standby assist. IV fluids continued. Will continue to monitor.        Goal: Adult Individualization and Mutuality  Outcome: Ongoing (interventions implemented as appropriate)  Goal: Discharge Needs Assessment  Outcome: Ongoing (interventions implemented as appropriate)    Problem: Infection, Risk/Actual (Adult)  Goal: Infection Prevention/Resolution  Outcome: Ongoing (interventions implemented as appropriate)

## 2017-08-04 NOTE — PLAN OF CARE
Problem: Patient Care Overview (Adult)  Goal: Plan of Care Review  Outcome: Ongoing (interventions implemented as appropriate)    08/03/17 1767   Coping/Psychosocial Response Interventions   Plan Of Care Reviewed With patient   Patient Care Overview   Progress no change       Goal: Adult Individualization and Mutuality  Outcome: Ongoing (interventions implemented as appropriate)  Goal: Discharge Needs Assessment  Outcome: Ongoing (interventions implemented as appropriate)    Problem: Perioperative Period (Adult)  Goal: Signs and Symptoms of Listed Potential Problems Will be Absent or Manageable (Perioperative Period)  Outcome: Ongoing (interventions implemented as appropriate)

## 2017-08-04 NOTE — ANESTHESIA PROCEDURE NOTES
Airway  Urgency: elective    Airway not difficult    General Information and Staff    Patient location during procedure: OR  Anesthesiologist: ML TORRES    Indications and Patient Condition  Indications for airway management: airway protection    Preoxygenated: yes  MILS maintained throughout  Mask difficulty assessment: 2 - vent by mask + OA or adjuvant +/- NMBA    Final Airway Details  Final airway type: endotracheal airway      Successful airway: ETT  Cuffed: yes   Successful intubation technique: direct laryngoscopy  Facilitating devices/methods: cricoid pressure  Endotracheal tube insertion site: oral  Blade: Marilee  Blade size: #3  ETT size: 7.0 mm  Cormack-Lehane Classification: grade IIa - partial view of glottis  Placement verified by: capnometry   Cuff volume (mL): 5  Measured from: lips  ETT to lips (cm): 20  Number of attempts at approach: 1

## 2017-08-04 NOTE — ANESTHESIA PREPROCEDURE EVALUATION
Anesthesia Evaluation     Patient summary reviewed and Nursing notes reviewed   no history of anesthetic complications:  NPO Solid Status: > 2 hours  NPO Liquid Status: > 2 hours     Airway   Mallampati: II  TM distance: >3 FB  Neck ROM: full  no difficulty expected  Dental - normal exam     Pulmonary - normal exam    breath sounds clear to auscultation  (+) pneumonia resolved , a smoker Former, shortness of breath (secondary to ascites),   Cardiovascular - normal exam    ECG reviewed  Rhythm: regular  Rate: normal    (+) hypertension, hyperlipidemia      Neuro/Psych- negative ROS  GI/Hepatic/Renal/Endo      Musculoskeletal         ROS comment: Bone mets  Abdominal    Substance History      OB/GYN          Other      history of cancer active      Other Comment: Breast cancer with bone mets, abdominal carcinomatosis.  For paracentesis 8/5.      Phys Exam Other: distended                                Anesthesia Plan    ASA 3 - emergent     general     intravenous induction   Anesthetic plan and risks discussed with patient.

## 2017-08-04 NOTE — OP NOTE
CYSTOSCOPY URETERAL CATHETER/STENT INSERTION  Procedure Note    Erin Vora  8/1/2017 - 8/3/2017    Pre-op Diagnosis:   Bilateral Hydronephrosis    Post-op Diagnosis:     Post-Op Diagnosis Codes:     * Hydronephrosis, bilateral [N13.30]    Procedure(s):  WITH BILATERAL URETERAL STENT INSERTION    Surgeon(s):  Albino Jolley MD    Anesthesia: General    Staff:   Circulator: Eboni Guadarrama RN  Radiology Technologist: Lilo Cheung, ASHANTI  Scrub Person: Anna Arce    Estimated Blood Loss: *No blood loss documented*    Specimens:                * No orders in the log *      Drains:           Findings: bilateral moderate hydronephrosis    Complications: None    Indications: 82-year-old female with metastatic breast cancer and bilateral hydronephrosis now presents for stents.    Procedure: Patient was taken out suite after informed consent was obtained.  She was given general anesthesia.  She's placed in lithotomy.  She is prepped and draped in sterile fashion.  22 (scope was inserted.  Bladder was visualized.  No cystitis was seen.  Bilateral retrograde polygrams was performed.    Right collecting system showed a stenotic ureter in the distal segment extrinsic compression and mild dilation above this was some moderate dilatation of the collecting system and blunting of the calyces.    Left collecting system showed extrinsic compression of the distal ureter to the midureteral segment with moderate hydronephrosis and blunted calyces.  It was worse on the left than the right.    Guidewire was passed up in the right collecting system and a 6 Cuban by 24 cm doubly sent was in placed.  It had a proximal curl in the upper pole calyx and I couldn't really get it to curl in the pelvis as well but it had good positioning with a good distal curl.  Guidewire was passed the left side and a 6 Cuban by 24 cm doubly sent was in placed with good curl the pelvis to curl in the bladder.  Bladder was drained she was awoken  and taken to recovery in stable condition after lidocaine was injected intraurethral.      Albino Jolley MD     Date: 8/3/2017  Time: 11:35 PM

## 2017-08-04 NOTE — PROGRESS NOTES
[facility] Progress Note    Feeling much better after stents and paracentesis.  No hematuria.    abd- benign and much less tense.    Imp Peritoneal Carcinomatosis with Bilat hyrdo    Plan- stents will  Need changed in about 6 months.

## 2017-08-04 NOTE — PLAN OF CARE
Problem: Patient Care Overview (Adult)  Goal: Plan of Care Review  Outcome: Ongoing (interventions implemented as appropriate)    08/04/17 0532   Coping/Psychosocial Response Interventions   Plan Of Care Reviewed With patient   Patient Care Overview   Progress no change   Outcome Evaluation   Outcome Summary/Follow up Plan ureteral stents placed, CT guided paracentesis scheduled for AM. pain controlled with PO meds. no signs or symptoms of distress.         Problem: Infection, Risk/Actual (Adult)  Goal: Infection Prevention/Resolution  Outcome: Ongoing (interventions implemented as appropriate)    Problem: Perioperative Period (Adult)  Goal: Signs and Symptoms of Listed Potential Problems Will be Absent or Manageable (Perioperative Period)  Outcome: Ongoing (interventions implemented as appropriate)    Problem: Fluid Volume Excess (Adult,Obstetrics,Pediatric)  Goal: Identify Related Risk Factors and Signs and Symptoms  Outcome: Outcome(s) achieved Date Met:  08/04/17  Goal: Stable Weight  Outcome: Ongoing (interventions implemented as appropriate)  Goal: Balanced Intake/Output  Outcome: Ongoing (interventions implemented as appropriate)

## 2017-08-04 NOTE — PLAN OF CARE
Problem: Patient Care Overview (Adult)  Goal: Plan of Care Review  Outcome: Ongoing (interventions implemented as appropriate)    08/04/17 1502   Coping/Psychosocial Response Interventions   Plan Of Care Reviewed With patient   Patient Care Overview   Progress improving   Outcome Evaluation   Outcome Summary/Follow up Plan VSS. PO pain meds controlling pain. Paracentesis done, 4750ml taken off. IV abx given. No new c/o this shift. Will continue to monitor        Goal: Adult Individualization and Mutuality  Outcome: Ongoing (interventions implemented as appropriate)  Goal: Discharge Needs Assessment  Outcome: Ongoing (interventions implemented as appropriate)    Problem: Infection, Risk/Actual (Adult)  Goal: Infection Prevention/Resolution  Outcome: Ongoing (interventions implemented as appropriate)    Problem: Perioperative Period (Adult)  Goal: Signs and Symptoms of Listed Potential Problems Will be Absent or Manageable (Perioperative Period)  Outcome: Ongoing (interventions implemented as appropriate)    Problem: Fluid Volume Excess (Adult,Obstetrics,Pediatric)  Goal: Stable Weight  Outcome: Ongoing (interventions implemented as appropriate)

## 2017-08-04 NOTE — PROGRESS NOTES
[facility] GROUP INPATIENT PROGRESS NOTE  Subjective     CC: Metastatic breast cancer     Interval history: Scans show ascites, bilateral hydro and new carcinomatosis. Discussed with Dr Jolley and patient went down last night for bilateral ureteral stents. She's already had paracentesis this morning too. Feeling much better.     General ROS: negative for - chills, fever or night sweats  Respiratory ROS: no cough, shortness of breath, or wheezing     Medications:  The current medication list was reviewed in the EMR.    Allergies:  No Known Allergies    Objective      Vitals:    08/04/17 0724   BP: 144/75   Pulse: 102   Resp: 18   Temp: 97.5 °F (36.4 °C)   SpO2: 96%        Physical exam:   General appearance: alert, appears stated age and no distress  Lungs: clear to auscultation bilaterally, good air movement, no wheezes  Heart: regular rate and rhythm, S1, S2 normal, no murmur, click, rub or gallop  Abdomen: distension improved, nontender, normal bowel sounds   Extremities: extremities normal, atraumatic, no cyanosis or edema  Skin: Skin color, texture, turgor normal. No rashes or lesions      RECENT LABS:      Results from last 7 days  Lab Units 08/04/17  0532 08/03/17  0504 08/02/17  0344   WBC 10*3/mm3 7.03 7.04 6.91   HEMOGLOBIN g/dL 8.7* 9.1* 8.8*   HEMATOCRIT % 28.9* 32.3* 30.7*   PLATELETS 10*3/mm3 266 316 349       Results from last 7 days  Lab Units 08/04/17  0532 08/03/17  0504 08/02/17  0344 08/01/17  1105   SODIUM mmol/L 148* 146* 147* 147*   POTASSIUM mmol/L 4.2 4.2 4.3 4.5   CHLORIDE mmol/L 114* 113* 111* 109*   CO2 mmol/L 18.7* 18.7* 19.6* 22.9   BUN mg/dL 17 15 14 18   CREATININE mg/dL 1.72* 1.78* 1.76* 1.83*   CALCIUM mg/dL 7.6* 7.7* 7.8* 9.1   BILIRUBIN mg/dL  --   --   --  0.3   ALK PHOS U/L  --   --   --  125*   ALT (SGPT) U/L  --   --   --  15   AST (SGOT) U/L  --   --   --  67*   GLUCOSE mg/dL 125* 102* 104* 131*       CT A/P: **I reviewed scans myself and concur with the below  dictation.    IMPRESSION:  Limited in the absence of IV contrast.  1.  New evidence of peritoneal carcinomatosis with moderate ascites,  omental caking as well as pelvic deposits. 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  2.  Diffuse bony metastatic disease within all the visualized bones.  3.  Mild right and moderate left hydronephrosis. Bilateral ureters are  not well imaged; however, I suspect this is secondary to external  compression on the ureters secondary to multiple peritoneal implants.  4.  Small to moderate size hiatal hernia.          Assessment/Plan   This is a 82 y.o. female with:     1. Metastatic triple negative breast cancer   - Current regimen: Xeloda 1500 mg bid, 1 wk on/1 wk off. Will discontinue given progression.   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 Discussed this with patient today as well as her multiple treatment options available.    - status post paracentesis and ureteral stents to optimize renal function   - Lots of other systemic options available (taxanes, eribulin, gemzar among others) and can discuss with Dr Gutierres as outpatient. While she is 82, she's quite active and is a candidate for therapy.    - Will need restaging scans but can be done as outpatient    2. New peritoneal carcinomatosis with ascites   - See #1   - paracentesis 8/4/17    3. Bone mets   - Zometa or Xgeva as outpatient.     Anemia due to chemotherapy.    - Transfused in early July and plan to start procrit soon.   - Monitor for now     4. MARIA DEL CARMEN related to hydronephrosis.    - Discussed with Dr Jolley- ureteral stents placed 8/3/17 pm   - Renal function not improved yet, but will take some time.     5. Possible pneumonia. On Zosyn and azithromycin. Defer to primary team.     Arranged outpatient follow up with Dr Gutierres for next week. No Xeloda on  discharge. Left message with son to discuss.             Jocelyn Mcarthur MD  8/4/2017  9:16 AM

## 2017-08-04 NOTE — PROGRESS NOTES
LOS: 3 days   Patient Care Team:  Shivam Llanes MD as PCP - General (Internal Medicine)  Masha Gutierres MD PhD as Consulting Physician (Hematology and Oncology)  Cuba Mabry MD as Referring Physician (Internal Medicine)    Chief Complaint   Patient presents with   • Shortness of Breath   • Leg Swelling         Subjective   Feels much better since having the paracentesis.     Objective     Vital Signs  Temp:  [97.4 °F (36.3 °C)-98.5 °F (36.9 °C)] 97.4 °F (36.3 °C)  Heart Rate:  [100-112] 112  Resp:  [16-18] 18  BP: (125-149)/(61-79) 140/61    Physical Exam:  Thin WF in NAD  Skin warm & dry  Lungs scattered crackles  Heart RRR  Abd flat, BS+  Ext no edema     Results Review:    Reviewed labs    Medication Review:       LABS    Results from last 7 days  Lab Units 08/04/17  0532 08/03/17  0504 08/02/17  0344   SODIUM mmol/L 148* 146* 147*   POTASSIUM mmol/L 4.2 4.2 4.3   CHLORIDE mmol/L 114* 113* 111*   CO2 mmol/L 18.7* 18.7* 19.6*   BUN mg/dL 17 15 14   CREATININE mg/dL 1.72* 1.78* 1.76*   GLUCOSE mg/dL 125* 102* 104*   CALCIUM mg/dL 7.6* 7.7* 7.8*       Results from last 7 days  Lab Units 08/04/17  0532 08/03/17  0504 08/02/17  0344   WBC 10*3/mm3 7.03 7.04 6.91   HEMOGLOBIN g/dL 8.7* 9.1* 8.8*   HEMATOCRIT % 28.9* 32.3* 30.7*   PLATELETS 10*3/mm3 266 316 349       Results from last 7 days  Lab Units 08/04/17  0841 08/02/17  0344 08/01/17  1105   INR  1.18* 1.31* 1.27*           Assessment/Plan     Principal Problem:  1. Peritoneal carcinomatosis with bilateral hydronephrosis.  She has had stent placement for the hydronephrosis.  She also underwent paracentesis which has helped significantly with the abdominal pain that she was having.  2.  Pneumonia - continue antibiotics for now but I question whether there is actually a pneumonia or not.  Will check noncontrasted CT of the chest  3.  Breast cancer metastasized to bone  4.  Essential hypertension  5.   Anemia  6.  Chronic kidney disease, stage 3  7.   Acute kidney injury             Raven Carvajal MD  08/04/17  3:30 PM      Time:

## 2017-08-04 NOTE — ANESTHESIA POSTPROCEDURE EVALUATION
Patient: Erin Vora    Procedure Summary     Date Anesthesia Start Anesthesia Stop Room / Location    08/03/17 [id number]  [facility] / [facility]       Procedure Diagnosis Surgeon Provider    WITH BILATERAL URETERAL STENT INSERTION (Bilateral ) No diagnosis on file. MD Rivas Gabriel MD          Anesthesia Type: general  Last vitals  BP        Temp        Pulse       Resp        SpO2          Post Anesthesia Care and Evaluation    Patient location during evaluation: PACU  Patient participation: waiting for patient participation  Level of consciousness: awake  Pain management: adequate  Airway patency: patent  Anesthetic complications: No anesthetic complications    Cardiovascular status: acceptable  Respiratory status: acceptable  Hydration status: acceptable

## 2017-08-04 NOTE — PLAN OF CARE
Problem: Perioperative Period (Adult)  Goal: Signs and Symptoms of Listed Potential Problems Will be Absent or Manageable (Perioperative Period)  Outcome: Ongoing (interventions implemented as appropriate)    08/03/17 3866   Perioperative Period   Problems Assessed (Perioperative Period) pain;hypoxia/hypoxemia   Problems Present (Perioperative Period) pain

## 2017-08-05 NOTE — PLAN OF CARE
Problem: Patient Care Overview (Adult)  Goal: Plan of Care Review  Outcome: Ongoing (interventions implemented as appropriate)    08/05/17 1451   Coping/Psychosocial Response Interventions   Plan Of Care Reviewed With patient;son   Patient Care Overview   Progress improving   Outcome Evaluation   Outcome Summary/Follow up Plan pt d/c home. f/u with Dr. Gutierres in 5 days

## 2017-08-05 NOTE — PLAN OF CARE
Problem: Patient Care Overview (Adult)  Goal: Plan of Care Review  Outcome: Ongoing (interventions implemented as appropriate)    08/05/17 0805   Coping/Psychosocial Response Interventions   Plan Of Care Reviewed With patient   Patient Care Overview   Progress improving   Outcome Evaluation   Outcome Summary/Follow up Plan VSS. PRN pain med given x1 for back pain. IV abx continued. New IV in right arm. Continue to monitor.        Goal: Adult Individualization and Mutuality  Outcome: Ongoing (interventions implemented as appropriate)  Goal: Discharge Needs Assessment  Outcome: Ongoing (interventions implemented as appropriate)    Problem: Infection, Risk/Actual (Adult)  Goal: Infection Prevention/Resolution  Outcome: Ongoing (interventions implemented as appropriate)    Problem: Perioperative Period (Adult)  Goal: Signs and Symptoms of Listed Potential Problems Will be Absent or Manageable (Perioperative Period)  Outcome: Ongoing (interventions implemented as appropriate)    Problem: Fluid Volume Excess (Adult,Obstetrics,Pediatric)  Goal: Stable Weight  Outcome: Ongoing (interventions implemented as appropriate)  Goal: Balanced Intake/Output  Outcome: Ongoing (interventions implemented as appropriate)

## 2017-08-05 NOTE — DISCHARGE SUMMARY
NAME: Erin Vora ADMIT: 2017   : 1935  PCP: Shivam Llanes MD    MRN: [id number] LOS: 4 days   AGE/SEX: 82 y.o. female  ROOM: [facility]/     Date of Admission:  2017  Date of Discharge:  2017    PCP: Shivam Llanes MD      CHIEF COMPLAINT  Shortness of Breath and Leg Swelling      DISCHARGE DIAGNOSIS  Active Hospital Problems (** Indicates Principal Problem)    Diagnosis Date Noted   • **Pneumonia [J18.9] 2017   • Abdominal carcinomatosis [C76.2] 2017   • Distended abdomen [R14.0] 2017   • Acute kidney injury [N17.9] 2017   • Anemia [D64.9] 2017   • Chronic kidney disease, stage 3 [N18.3] 2017   • Essential hypertension [I10] 2017   • Breast cancer metastasized to bone [C50.919, C79.51] 2016      Resolved Hospital Problems    Diagnosis Date Noted Date Resolved   No resolved problems to display.       SECONDARY DIAGNOSES  Past Medical History:   Diagnosis Date   • Anemia    • Bone metastases    • Breast cancer    • HL (hearing loss)    • Hyperlipidemia    • Hyperlipidemia    • Hypertension    • Pneumonia 2017       CONSULTS   None  Consult Orders (all)     Start     Ordered    17 1043  Inpatient Consult to Urology  Once     Specialty:  Urology  Provider:  Ajith Colbert Jr., MD    17 1042    17 1550  Inpatient Consult to Hematology & Oncology  Once     Specialty:  Hematology and Oncology  Provider:  Drake Ibarra II, MD    17 1550    17 1247  [facility] (on-call MD unless specified)  Once     Specialty:  Internal Medicine  Provider:  (Not yet assigned)    17 1247        Treatment Team     Provider Relationship Specialty Contact    Raven Carvajal MD Attending --  [phone]    Albino Jolley MD Consulting Physician, Surgeon Urology  [phone]    Kimberley Pittman RN Case Manager --      [facility] [place] --      Drake Ibarra II, MD Consulting Physician Hematology and  Oncology  [phone]    Ajith Colbert Jr., MD Consulting Physician Urology  [phone]    Camille Fernández, CRT Respiratory Therapist Respiratory Therapy  [phone]    Laure Talamantes, RN Registered Nurse --      Gladis Rodriguez Patient Care Technician --            PROCEDURES PERFORMED  CYSTO WITH BILATERAL URETERAL STENT INSERTION  PARACENTESIS    HOSPITAL COURSE  82-year-old female with a history of breast cancer metastatic to the bone, who presented with abdominal distention and lower extremity edema.  Her creatinine was found to be elevated.  Her chest x-ray was read as showing patchy bilateral pulmonary airspace disease consistent with pneumonia.  She is on chemotherapy for the breast cancer therefore it was felt important to cover her more broadly. She didn't really have any symptoms of pneumonia and her white count was not elevated but with the chemotherapy, I felt like it was better to air on the side of caution.    Because of the elevated creatinine I ordered an ultrasound of the abdomen which showed bilateral hydronephrosis and ascites.  A noncontrasted CT of the abdomen was subsequently obtained.  This showed new evidence of peritoneal carcinomatosis with moderate ascites, omental caking, as well as pelvic deposits.  There was solid lesions in both adnexa that may represent Kruckenberg tumors.  She was seen in consultation by Dr. Albino Jolley and taken to surgery on 8/3 for a cystoscopy and bilateral ureteral stent placement.  She was then sent for a paracentesis.  This helped the abdominal distention significantly. 4750 ml was removed.    Dr Mcarthur has followed her while she is been here as well.  Arrangements have been made for the patient to see Dr. Gutierres next week.  Her creatinine has not improved yet but she is feeling much better and this can be followed as an outpatient.    As briefly mentioned, I question whether she has any pneumonia or not but I'm going to air on the side of caution  and discharge her with oral antibiotics.  I did do a noncontrasted CT of the chest which showed moderate bilateral pleural effusions and associated atelectasis there was however some pulmonary consolidation that could potentially represent an infiltrate.      PHYSICAL EXAM  Temp:  [97.4 °F (36.3 °C)-98.7 °F (37.1 °C)] 97.7 °F (36.5 °C)  Heart Rate:  [] 100  Resp:  [18-22] 20  BP: (119-149)/(59-77) 149/77  Body mass index is 23.76 kg/(m^2).  Physical Exam      CONDITION ON DISCHARGE  Stable.      DISCHARGE DISPOSITION   Home or Self Care        DISCHARGE MEDICATIONS   Erin Vora   Home Medication Instructions CARLOS EDUARDO:[id number]    Printed on:08/05/17 2649   Medication Information                      Acetaminophen (TYLENOL PO)  Take  by mouth.             amoxicillin-clavulanate (AUGMENTIN) 500-125 MG per tablet  Take 1 tablet by mouth Every 12 (Twelve) Hours. Indications: Pneumonia             aspirin 81 MG EC tablet  Take 81 mg by mouth Daily.             calcium carbonate-vitamin d (CALCIUM 600+D HIGH POTENCY) 600-400 MG-UNIT per tablet  Take 1 tablet by mouth Daily.             HYDROcodone-acetaminophen (NORCO) 5-325 MG per tablet  Take 1 tablet by mouth Every 6 (Six) Hours As Needed for Moderate Pain (4-6) or Severe Pain  (7-10).             Multiple Vitamin (MULTIVITAMIN) tablet  Take 1 tablet by mouth Daily.             Omega-3 Fatty Acids (FISH OIL PO)  Take 300 mg by mouth Daily.             ondansetron (ZOFRAN) 4 MG tablet  Take 1 tablet by mouth Every 8 (Eight) Hours As Needed for nausea or vomiting.             pravastatin (PRAVACHOL) 40 MG tablet  Take 40 mg by mouth Daily.             predniSONE (DELTASONE) 5 MG tablet  Take 5 mg by mouth Daily.                  Future Appointments  Date Time Provider Department Center   8/8/2017 11:30 AM [facility] MAMM 1  M [place]   8/10/2017 10:50 AM LAB CHAIR 5 RACIEL MANZO  LAB [facility]   8/10/2017 11:20 AM M[facility]ierres MD PhD [facility]  CBC [place]   8/25/2017 10:00 AM LAB CHAIR [facility] [place] [facility] None   8/25/2017 10:30 AM INJECTION CHAIR [facility] [place] [facility] INFUS [facility]   9/8/2017 9:45 AM [facility] NM ADMIN RM 1 BH [facility] [place]   9/8/2017 10:50 AM LAB CHAIR 2 [facility] LAB [facility]   9/8/2017 11:15 AM INJECTION CHAIR CBC [facility] BH INFUS KRE LAG   9/8/2017 12:45 PM [facility] NM 1(AXIS) BH [facility] NM [facility]   9/15/2017 10:10 AM LAB CHAIR [facility] [place] [facility] LAG [place] None   9/15/2017 10:40 AM Masha Gutierres MD PhD [facility] CBC [facility] BH CBC [place]   9/15/2017 11:30 AM CHAIR 03 [facility] [place] [facility] INFUS EP LAG   9/26/2017 11:00 AM Shivam lLanes MD [facility] PC K[facility]G1 None       TEST  RESULTS PENDING AT DISCHARGE   Order Current Status    Non-gynecologic Cytology In process    Blood Culture Preliminary result    Blood Culture Preliminary result             Raven Carvajal MD  [facility]  08/05/17  1:59 PM      Time: greater than 30 minutes.

## 2017-08-05 NOTE — PROGRESS NOTES
[facility] GROUP INPATIENT PROGRESS NOTE  Subjective     CC: Metastatic breast cancer     Interval history:   Some abdominal cramping due to Senokot but she did have a BM.  Feeling nearly ready for discharge.      General ROS: negative for - chills, fever or night sweats  Respiratory ROS: no cough, shortness of breath, or wheezing     Medications:  The current medication list was reviewed in the EMR.    Allergies:  No Known Allergies    Objective      Vitals:    08/05/17 0746   BP: 127/71   Pulse: 100   Resp: 22   Temp: 98.7 °F (37.1 °C)   SpO2: 97%        Physical exam:   General appearance: alert, appears stated age and no distress  Lungs: clear to auscultation bilaterally, good air movement, no wheezes  Heart: regular rate and rhythm, S1, S2 normal, no murmur, click, rub or gallop  Abdomen: Soft, NT, ND, normal bowel sounds   Extremities: extremities normal, atraumatic, no cyanosis or edema  Skin: Skin color, texture, turgor normal. No rashes or lesions      RECENT LABS:      Results from last 7 days  Lab Units 08/05/17  0545 08/04/17  0532 08/03/17  0504   WBC 10*3/mm3 8.23 7.03 7.04   HEMOGLOBIN g/dL 8.3* 8.7* 9.1*   HEMATOCRIT % 29.1* 28.9* 32.3*   PLATELETS 10*3/mm3 290 266 316       Results from last 7 days  Lab Units 08/05/17  0545 08/04/17  1653 08/04/17  0532  08/01/17  1105   SODIUM mmol/L 146* 147* 148*  < > 147*   POTASSIUM mmol/L 4.0 4.4 4.2  < > 4.5   CHLORIDE mmol/L 114* 112* 114*  < > 109*   CO2 mmol/L 17.3* 17.0* 18.7*  < > 22.9   BUN mg/dL 21 17 17  < > 18   CREATININE mg/dL 1.96* 1.76* 1.72*  < > 1.83*   CALCIUM mg/dL 7.4* 7.7* 7.6*  < > 9.1   BILIRUBIN mg/dL  --   --   --   --  0.3   ALK PHOS U/L  --   --   --   --  125*   ALT (SGPT) U/L  --   --   --   --  15   AST (SGOT) U/L  --   --   --   --  67*   GLUCOSE mg/dL 131* 177* 125*  < > 131*   < > = values in this interval not displayed.    CT A/P:     IMPRESSION:  Limited in the absence of IV contrast.  1.  New evidence of peritoneal  carcinomatosis with moderate ascites,  omental caking as well as pelvic deposits. 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  2.  Diffuse bony metastatic disease within all the visualized bones.  3.  Mild right and moderate left hydronephrosis. Bilateral ureters are  not well imaged; however, I suspect this is secondary to external  compression on the ureters secondary to multiple peritoneal implants.  4.  Small to moderate size hiatal hernia.    CT chest 8/4 images personally reviewed.  Bilateral pleural effusion/consolidation    IMPRESSION:  1. Moderate bilateral pleural effusions. Right greater than left lower  lobe pulmonary consolidation potentially associated with infiltrate.  There is linear right lower lobe parenchymal opacity likely representing  atelectasis.  2. Diffuse mixed osteolytic and osteosclerotic metastatic disease.  3. Left axillary maribeth enlargement.  4. Ascites exhibits improvement as compared to previous CT as there has  been interval paracentesis.          Assessment/Plan   This is a 82 y.o. female with:     1. Metastatic triple negative breast cancer   - Current regimen: Xeloda 1500 mg bid, 1 wk on/1 wk off. Will discontinue given progression.   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    - status post paracentesis and ureteral stents to optimize renal function   - Lots of other systemic options available (taxanes, eribulin, gemzar, etc.) and can discuss with Dr Gutierres as outpatient. While she is 82, she's quite active and is a candidate for therapy.    2. New peritoneal carcinomatosis with ascites   - See #1   - paracentesis 8/4/17    3. Bone mets   - Zometa or Xgeva as outpatient.     4.  Anemia due to chemotherapy.    - Transfused in early July and plan to start Procrit soon as an outpatient.   - Monitor for now     5. MARIA DEL CARMEN  related to hydronephrosis.    - Ureteral stents placed 8/3/17 pm   - Renal function not improved yet, but may take some time.    - creatinine up a little today    5. Pulmonary infiltrates. On Zosyn and azithromycin. Per primary team.     Outpatient follow up with Dr Gutierres next week on 8/10 has been arranged to discuss possible further treatment options. No Xeloda at discharge.             Roberto Drummond MD  8/5/2017  10:47 AM

## 2017-08-07 PROBLEM — R18.0 ASCITES, MALIGNANT: Status: ACTIVE | Noted: 2017-01-01

## 2017-08-07 NOTE — TELEPHONE ENCOUNTER
----- Message from Yoly Anderson RN sent at 8/7/2017 11:57 AM EDT -----  Please schedule patient for Paracentesis ordered by Dr Gutierres. Preferably before she has appt with him this week. Thanks

## 2017-08-07 NOTE — TELEPHONE ENCOUNTER
Patient calling with increased abdominal swelling and swelling in her LE since being discharged. SHe has some SOA when she is up moving. Reviewed with Dr Almazan. He will call patient back.

## 2017-08-09 NOTE — NURSING NOTE
Verbal and written D/C instructions given to patient and son.  They voice understanding and are able to teach back D/C instructions.

## 2017-08-09 NOTE — DISCHARGE INSTRUCTIONS
EDUCATION /DISCHARGE INSTRUCTIONS  Paracentesis:  A needle is inserted into the space between your abdominal organs and the membrane that surrounds them (peritoneal space).  It is done for the diagnosis and treatment of fluid that is resistant to other therapies.  It helps determine the cause of the fluid and at the relieves pressure created by the fluid.  A sample is obtained and sent to the laboratory for study.    During the procedure:  You will lie on a bed on your back with your legs drawn up.  Your abdomen will be exposed from the chest to the pelvis.  You will otherwise be covered to maintain comfort.  A physician will clean your abdomen with antiseptic soap, place a sterile towel around the site and administer a local anesthetic to numb the area.  The physician will insert a needle into your abdominal wall.  There may be a popping sound which signifies the needle has pierced the abdominal wall. Next, the physician will attach tubing to transfer a sample into a collection bottle.  After the fluid is obtained the needle will be removed.  A pressure dressing is applied to the site.    Risks of the procedure include but are not limited to:   *  Bleeding    *  Wound infection   *  Low blood pressure   *  Decreased urination   *  Low sodium if a large amount of fluid is removed   *  Puncture of abdominal organs by the needle    Benefits of the procedure:  Benefits include the removal of fluid from the abdomen, relief of abdominal pressure and facilitation of a diagnosis.    Alternatives to the procedure:  Possible alternatives are diuretic drug therapy or surgery to place a shunt to drain fluid.  Risks of diuretic drug therapy include possible dehydration and renal failure.  The benefit of drug therapy is that it can be done at home under physician supervision.  Risks of shunt placement include exposure to anesthesia, infection, excessive bleeding and injury to abdominal organs.  The benefit of a shunt is that it  can be used to drain fluid over a longer period of time.  THIS EDUCATION INFORMATION WAS REVIEWED PRIOR TO THE PROCEDURE AND CONSENT. Patient initials__________________Time_________________    Post procedure:    *  Weigh yourself daily.   *  Follow your doctors dietary instructions.   *  Rest today (no pushing pulling, straining or heavy lifting).   *  Slowly increase activity tomorow.   *  If you received sedation do not drive for 24 hours.              * Skin affix applied to puncture site. Do not try to remove, scratch or apply lotion   * Skin affix will fall off on it's own   *  You may shower tomorrow    Call your doctor if experiencing:   *  Signs of infection such as redness, swelling, excessive pain and / or foul       smelling drainage from the puncture site.   *  Chills or fever over 101 degrees (by mouth).   *  Fainting.   *  Rapid weight gain / loss.   *  Unrelieved pain.   *  Any new or severe symptoms.    Following the procedure:      Follow-up with the ordering physician as directed.   Continue to take other medications as directed by your physician unless    otherwise instructed.   If applicable, resume taking your blood thinners or Aspirin in 24 hours.    If you have any concerns please call the Radiology Nurses Desk at [phone].  You are the most important factor in your recovery.  Follow the above instructions carefully.

## 2017-08-10 NOTE — PROGRESS NOTES
REASON FOR FOLLOW-UP:    1. Metastatic breast cancer, predominantly bone disease, biopsy confirmed triple negative in late October 2016. History of left breast cancer in 2005, had mastectomy, without adjuvant chemotherapy. She was followed by Dr. Romo at [facility], treated on Arimidex for 5 years, finished in 2011.   2. Anemia, secondary to her metastatic disease, with significantly elevated ferritin level. No evidence of deficiency for B12 or folic acid. She was given 3 units packed RBC in late October 2016 during hospitalization.    3. Bone biopsy on 10/25/2016 confirmed metastatic breast cancer, triple negative.  She was started on Xeloda 1500 mg twice a day on 11/14/2016 for 14 days on and 7 days off. Patient develops significant fatigue, Xeloda was decreased to 1000 mg twice a day from week #2. On 12/05/2016 schedule was changed to 7 days on, 7 days off.   4. Metastatic bone disease, Zometa was started in November 2016, repeat every 4 weeks.   5. Cancer-related pain, taking Lortab 5/325 mg prn.   6. 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   7.  Xeloda increased to 1500 mg in the morning and 1000 mg in the evening 7 days on and 7 days off, started on 4/21/2017.    8.  Bone scan on 6/7/2017 reported stable disease.  However her tumor marker was trending up at 68.7 on 6/16/2017.  Xeloda was increased to 1500 mg twice a day.    9.  Anemia secondary to chemotherapy.  She required 2 units PRBC transfusion on 7/2/2017.  Patient will be continued on Procrit injection as needed.   10.  Disease progress, required hospitalization in early August 2017.  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  Worsening ascites fluids, required repeated paracentesis on 8/09/2017.        HISTORY OF PRESENT ILLNESS:   The patient is a pleasant 82-year-old female with the above-mentioned history returning today for reevaluation, accompanied by her son.  Patient was recently admitted to the hospital from 8/1/2017 through 8/5/2017 because of weakness and abdomen distention.  She was found having significant ascites associated with peritoneal carcinomatosis.  She also had bilateral hydronephrosis, required a bilateral ureter stenting by Dr. Jolley on 8/3/2017.  Subsequently she had ultrasound-guided paracentesis on 8/4/2017, with total 4700 mL ascites removed.  Cytology study was positive for metastatic breast cancer.      She was discharged on 8/5/2017, however she called couple days ago and complaining worsening abdomen distention, we arranged outpatient paracentesis which was done yesterday on 8/9/2017.  There was another 4100 mL ascites fluid removed.  Patient reports she is better with abdomen distention.  She had nausea but no vomiting.  Her appetite is poor.  Denies constipation or diarrhea.  Denies fever sweating chills.  Her performance status is ECOG 2.  She denies bone pains.  She does complain of worsening edema in both legs in the past a few weeks.      She already stopped Xeloda due to disease progress.  Her tumor marker CA-15-3 was 79.3 on 8/2/2017.            Medical History            Past Medical History   Diagnosis Date   • Anemia      • Bone metastases      • Breast cancer 2005   • HL (hearing loss)      • Hyperlipidemia      • Hypertension                Surgical History                Past Surgical History   Procedure Laterality Date   • Breast biopsy Left 2005         malignant   • Mastectomy Left 2005               HEMATOLOGIC/ONCOLOGIC HISTORY: Ms. Vora is a 81-year-old  female who presented to the emergency room because of worsening lower back pain. She started having this about a week ago. Severity was 8 out of 10 but then subsided. In this past weekend, she had  worsening pain again and was sent to the ER for further evaluation. Prior to this, she was mowing her own grass, lives by herself, with good performance status.      This patient was found to have significant anemia, with hemoglobin 7.0 in the ER. Of note, in the ER there was attempt for transfusion of packed RBC, however she had a fever and the transfusion was abandoned. Yesterday hemoglobin was down to 6.6 and she was given 1 unit packed RBC with no incident. She was given Benadryl 50 mg. This patient has hemoglobin 7.6 this morning. She is scheduled to receive 2 more units packed RBC.        This patient had CT scan of the abdomen and pelvis examination during ER evaluation, which reported diffuse metastatic bone disease involving the spine, in the thoracic spine, lumbar spine and pelvic bones. The patient reports no constipation, no diarrhea, no urinary incontinence.        This patient had history of left breast cancer back in 2005 for which she had left mastectomy. No lymph node involvement. No adjuvant chemotherapy, no radiation therapy. The patient was followed by Dr. Romo at the [facility], and was on Arimidex for 5 years, which stopped in 2011. The patient was dismissed by Dr. Romo afterwards.       The patient reports weight loss of about 10 pounds since early September 2016. She has decreased appetite. No nausea/vomiting. No vision changes, no hearing changes. No fever, no sweating. Denies melena or hematochezia.        Patient had MRI examination with and without IV contrast for brain, cervical, thoracic and lumbar spin on 10/24/2016. There was no evidence of metastatic brain disease. There are metastatic disease involving the entire spine, no fresh compression fracture.       Patient had a CT-guided bone biopsy on 10/25/2016. G evaluation reported a primary breast cancer. Further testing showed triple negative, ER 0%, DC 0%, HER-2 IHC 2+, however FISH study was negative.      Was  started on IV Zometa in early November 2016. She was started on Xeloda 1500 mg twice a day on 11/14/2016 for 14 days on and 7 days off. Patient develops significant fatigue, Xeloda was decreased to 1000 mg twice a day from week #2.        Laboratory study on 12/02/16 showed significant  anemia with hemoglobin 7.8, but a normal WBC 7000 including neutrophils 4800. Her platelets is 367,000. She was given 2 units packed RBC transfusion. Because of poor tolerance, Xeloda was decreased to 1000 mg twice a day 7 days on and 7 days off.  was 30.5 on 12/16/16.       Patient has better tolerance to decrease the Xeloda. She is more energetic. However she has worsening anemia, with a hemoglobin in the 9 gram range. Patient was started on low-dose Procrit 20,000 units every 2 weeks by an of December 2016.     CA 15-3 at 53.4 U/ml on 5/19/17.  Despite increased Xeloda dose, her CA 15-3 continue to increase.      Her bone scan on 6/7/2017 showed a stable disease.  However laboratory study showed a worsening CA-15-3 at 68.7 on 6/16/2017.  Xeloda was increased to 1500 mg twice a day.      Because of anemia secondary to chemotherapy, and that she was symptomatic with hemoglobin 8.7, dizziness and fatigue, patient was given 2 units PRBC transfusion on 7/2/2017, and posttransfusion hemoglobin input to 10.9 on 7/02/17.  Patient reports significant improvement in energy level afterwards.    Patient was recently admitted to the hospital from 8/1/2017 through 8/5/2017 because of weakness and abdomen distention.  She was found having significant ascites associated with peritoneal carcinomatosis and the tumor deposits at the bilateral adnexal region as well as pelvic mass measuring 3.5×3.5 cm by CT scan on 8/3/2017.  She also had moderate to severe bilateral hydronephrosis, required bilateral ureter stenting by Dr. Jloley on 8/3/2017.  Subsequently she had ultrasound-guided paracentesis on 8/4/2017, with total 4700 mL ascites removed.   "Cytology study was positive for metastatic breast cancer.  CT scan of the chest on 8/4/2017 reported a moderate bilateral pleural effusion.      MEDICATIONS: see EMR       ALLERGIES:  No Known Allergies      Past Medical History, Past Surgical History, Social History, Family History have been reviewed and are without significant changes except as mentioned.        REVIEW OF SYSTEMS:  GENERAL: see HPI;    SKIN: No nonhealing lesions.  No rashes.   HEME/LYMPH: No easy bruising, bleeding.  No swollen nodes.    EYES: No vision changes or diplopia.    ENT: No tinnitus, hearing loss, gum bleeding, epistaxis, hoarseness or dysphagia.    RESPIRATORY: No cough,, hemoptysis or wheezing.   CVS: No chest pain, palpitations, orthopnea,   GI: See history of present illness .  No melena or hematochezia.   No nausea, vomiting, constipation, diarrhea  : No lower tract obstructive symptoms, dysuria or hematuria.    MUSCULOSKELETAL: No bone pain. No joint stiffness.  Patient has edema in both legs 2+.   NEUROLOGICAL: No global weakness, loss of consciousness or seizures.    PSYCHIATRIC: No increased nervousness, mood changes or depression.        Objective   Vitals:    08/10/17 1136   BP: 122/70   Pulse: 86   Resp: 16   Temp: 97.4 °F (36.3 °C)   Weight: 127 lb 9.6 oz (57.9 kg)   Height: 62.2\" (158 cm)   PainSc: 0-No pain   ECOG 1        PHYSICAL EXAM:    GENERAL: Well-developed, elder thin female in no acute distress.    SKIN: Warm, dry without rashes, purpura or petechiae.   HEAD: Normocephalic.  EYES: Pupils equal, round. EOMs intact. Conjunctivae normal.  CHEST: Decreased breathing sounds in bilateral lung fields.  Good airflow.  CARDIAC: Regular rate and rhythm without murmurs, rubs or gallops. Normal S1,S2.  ABDOMEN: Soft, no tender, guarding, with no organomegaly or masses. Bowel sounds normal.  EXTREMITIES: No clubbing, cyanosis. There are 2+ edema in both legs.    NEUROLOGICAL: Cranial Nerves II-XII grossly intact. No focal " neurological deficits.  PSYCHIATRIC: Normal affect and mood.        RECENT LABS:     Lab Results   Component Value Date    WBC 8.26 08/10/2017    HGB 10.0 (L) 08/10/2017    HCT 33.0 (L) 08/10/2017    MCV 97.9 (H) 08/10/2017     08/10/2017     Lab Results   Component Value Date    NEUTROABS 7.01 (H) 08/10/2017     Lab Results   Component Value Date    GLUCOSE 131 (H) 08/05/2017    BUN 21 08/05/2017    CREATININE 1.96 (H) 08/05/2017    EGFRIFNONA 25 (L) 08/05/2017    EGFRIFAFRI 65 10/22/2016    BCR 10.7 08/05/2017    K 4.0 08/05/2017    CO2 17.3 (L) 08/05/2017    CALCIUM 7.4 (L) 08/05/2017    PROTENTOTREF 6.8 10/22/2016    PROTENTOTREF 6.9 10/22/2016    ALBUMIN 3.30 (L) 08/01/2017    LABIL2 1.1 08/01/2017    AST 67 (H) 08/01/2017    ALT 15 08/01/2017      Her tumor marker CA-15-3 was 79.3 on 8/2/2017.    Assessment/Plan   1. Metastatic breast cancer, originally with predominantly bone disease, but now progressed after first line single agent Xeloda, now with peritoneal carcinomatosis with large ascites fluids, pelvic mass and bilateral hydronephrosis required bilateral ureter stenting in early August 2017.  She also has moderate bilateral pleural effusion.  She definitely has worsening performance status currently ECOG 2 for the best.     I discussed that with patient and her son today about different options, with either switching chemotherapy to single agent Taxol versus hospice care.  Since patient has reasonable performance status, I do recommend try second line chemotherapy with Taxol weekly for 3 weeks on and one week off.  I do not think she will tolerate continues 12 weekly Taxotere therapy, plus this is long-term data, she may needs longer than 12 weeks treatment if she does respond to treatment, as we can monitor her tumor marker which has been gradually increasing, associated with her disease progression.  Both patient and her son voiced understanding and agreed to proceed with treatment.  If she  does not tolerated chemotherapy, we can always discontinue it.    Patient has poor profile vein, she will need a central line.  Ideally with a rebeca catheter, however due to concerning for tolerance issue, I recommended to start with PICC line first, if she doesn't tolerated well with good response, we can always request a rebeca catheter placement.     2. Pain related to cancer. She has no specific complaints.  She did have some leg pains and takes narcotics as needed, typically once or twice a day.  Currently she denies any pains.       3.  Ascites fluid.  She has quicker recurrence of ascites fluid, required repeated paracentesis again on 8/9/2017 with 4100 ml ascites removed.  She had 4700 removed on 8/4 2017.  Because of his hepatitis schedule with the interventional radiologist, I will schedule this patient having weekly paracentesis for the next 5 weeks.  If she responds to therapy, we expect her having less ascites over time.  I don't think at this point place a peritoneal drainage catheter will be in the best interest of this patient due to risk of infection.    4. Anemia secondary to chemotherapy, with a background of anemia related to her metastatic breast cancer.  Iron studies, B12, and folate levels remain adequate. Her ferritin remains significantly elevated and is reactive to her disease.  Recently she required 2 units PRBC transfusion due to symptomatic, in early this July.  Today her hemoglobin is slightly better, due to of chemotherapy reported positive 2 weeks.  She may quantify for Procrit at some point         5. Metastatic bone disease.  We'll consolidate her schedule, for her Zometa treatment, which will be arranged on day of her chemotherapy.  She will receive Zometa every 4 weeks. Her next dose will be due on 08/18/2017.      6. Elevated Creatinine: It is still above her baseline level.  This is due to obstruction and bilateral hydronephrosis.  Hopefully it will gradually improve as she  already has ureter stents on both sides.        7.  Poor oral intake and weight loss.  This is secondary to her malignancy and chemotherapy.  She had a paracentesis, and improved symptomology.  I encourage patient to use liquid nutrition support as such as Insure or use homemade liquid nutrition supplementation.  Both patient and her son voiced understanding.         PLAN:   1.  Discontinue Xeloda permanently.     2.  Chemotherapy teaching lesson for weekly Taxol, for 3 weeks on 1 week off repeating every 4 week cycle.    3.  Request of outpatient PICC line placement.    4.  Arrange weekly paracentesis under ultrasound guidance, for the next 5 weeks.  5.  Patient will return on 8/18/2017 to start her first cycle Taxol treatment.   6.  No need of for bone scan anymore unless she becomes symptomatic.  Chemotherapy marker will be followed periodically.   5.  Due for Zometa on 8/18/2017.  We will incorporate scheduling of Zometa to her chemotherapy schedule.  6.  Continue prn Norco. No prescription today.   7. Patient will return in 1 week with M.D. visit, and to start 1st dose Taxol.     More than 60 minutes, over half of that time were for counseling.      ELVIN FONG M.D., Ph.D.   8/10/2017        Dragon disclaimer:  Much of this encounter note is an electronic transcription/translation of spoken language to printed text. The electronic translation of spoken language may permit erroneous, or at times, nonsensical words or phrases to be inadvertently transcribed; Although I have reviewed the note for such errors, some may still exist.

## 2017-08-11 NOTE — PROGRESS NOTES
Subjective     PATIENT NAME:  Erin Vora  YOB: 1935  PATIENTS AGE:  82 y.o.  PATIENTS SEX:  female  DATE OF SERVICE:  08/11/2017  PROVIDER:  ANGE Parker      ____________________PATIENT EDUCATION____________________    PATIENT EDUCATION:  Today I met with the patient and her son to discuss the chemotherapy regimen recommended for treatment of her disease.  The patient was given explanation of treatment premed side effects including office policy that prohibits patients to drive if sedating medications are administered, MD explanation given regarding benefits, side effects, toxicities and goals of treatment.  The patient received a Chemotherapy/Biotherapy Plan Summary including diagnosis and specific treatment plan.    SIDE EFFECTS:  Common side effects were discussed with the patient and/or significant other.  Discussion included hair loss/discoloration, anemia/fatigue, infection/chills/fever, appetite, bleeding risk/precautions, constipation, diarrhea, mouth sores, taste alteration, loss of appetite,nausea/vomiting, peripheral neuropathy, skin/nail changes, rash, muscle aches/weakness, photosensitivity, weight gain/loss, hearing loss, dizziness, high blood pressure, heart damage, liver damage, lung damage, kidney damage, DVT/PE risk, fluid retention, pleural/pericardial effusion, somnolence, electrolyte/LFT imbalance, vein exercises and/or the possible need for vascular access/port placement.  The patient was advice that although uncommon, leakage of an infused medication from the vein or venous access device (port) may lead to skin breakdown and/or other tissue damage.  The patient was advised that he/she may have pain, bleeding, and/or bruising from the insertion of a needle in their vein or venous access device (port).  The patient was further advised that, in spite of proper technique, infection with redness and irritation may rarely occur at the site where the needle was inserted.   The patient was advised that if complications occur, additional medical treatment is available.    Discussion also included side effects specific to drugs in the treatment plan, specifically Taxol weekly, 3 out of 4 weeks.     Reproductive risks were discussed, including appropriate use of birth control and protection during sexual relations.    A total of 40  minutes were spent with the patient, with 100% of time spent in education and counseling.

## 2017-08-15 NOTE — DISCHARGE INSTRUCTIONS
EDUCATION /DISCHARGE INSTRUCTIONS  Paracentesis:  A needle is inserted into the space between your abdominal organs and the membrane that surrounds them (peritoneal space).  It is done for the diagnosis and treatment of fluid that is resistant to other therapies.  It helps determine the cause of the fluid and at the relieves pressure created by the fluid.  A sample is obtained and sent to the laboratory for study.    During the procedure:  You will lie on a bed on your back with your legs drawn up.  Your abdomen will be exposed from the chest to the pelvis.  You will otherwise be covered to maintain comfort.  A physician will clean your abdomen with antiseptic soap, place a sterile towel around the site and administer a local anesthetic to numb the area.  The physician will insert a needle into your abdominal wall.  There may be a popping sound which signifies the needle has pierced the abdominal wall. Next, the physician will attach tubing to transfer a sample into a collection bottle.  After the fluid is obtained the needle will be removed.  A pressure dressing is applied to the site.    Risks of the procedure include but are not limited to:   *  Bleeding    *  Wound infection   *  Low blood pressure   *  Decreased urination   *  Low sodium if a large amount of fluid is removed   *  Puncture of abdominal organs by the needle    Benefits of the procedure:  Benefits include the removal of fluid from the abdomen, relief of abdominal pressure and facilitation of a diagnosis.    Alternatives to the procedure:  Possible alternatives are diuretic drug therapy or surgery to place a shunt to drain fluid.  Risks of diuretic drug therapy include possible dehydration and renal failure.  The benefit of drug therapy is that it can be done at home under physician supervision.  Risks of shunt placement include exposure to anesthesia, infection, excessive bleeding and injury to abdominal organs.  The benefit of a shunt is that it  can be used to drain fluid over a longer period of time.  THIS EDUCATION INFORMATION WAS REVIEWED PRIOR TO THE PROCEDURE AND CONSENT. Patient initials__________________Time_________________    Post procedure:    *  Weigh yourself daily.   *  Follow your doctors dietary instructions.   *  Rest today (no pushing pulling, straining or heavy lifting).   *  Slowly increase activity tomorow.   *  If you received sedation do not drive for 24 hours.              * Skin affix applied to puncture site. Do not try to remove, scratch or apply lotion   * Skin affix will fall off on it's own   *  You may shower tomorrow    Call your doctor if experiencing:   *  Signs of infection such as redness, swelling, excessive pain and / or foul       smelling drainage from the puncture site.   *  Chills or fever over 101 degrees (by mouth).   *  Fainting.   *  Rapid weight gain / loss.   *  Unrelieved pain.   *  Any new or severe symptoms.    Following the procedure:      Follow-up with the ordering physician as directed.   Continue to take other medications as directed by your physician unless    otherwise instructed.   If applicable, resume taking your blood thinners or Aspirin in 24 hours.    If you have any concerns please call the Radiology Nurses Desk at [phone].  You are the most important factor in your recovery.  Follow the above instructions carefully.

## 2017-08-15 NOTE — NURSING NOTE
Returned from ultrasound. Puncture site to lower right abdomen dry and intact. No complaints of pain. No distress.Lunch served.

## 2017-08-15 NOTE — DISCHARGE INSTRUCTIONS
EDUCATION /DISCHARGE INSTRUCTIONS  Paracentesis:  A needle is inserted into the space between your abdominal organs and the membrane that surrounds them (peritoneal space).  It is done for the diagnosis and treatment of fluid that is resistant to other therapies.  It helps determine the cause of the fluid and at the relieves pressure created by the fluid.  A sample is obtained and sent to the laboratory for study.    During the procedure:  You will lie on a bed on your back with your legs drawn up.  Your abdomen will be exposed from the chest to the pelvis.  You will otherwise be covered to maintain comfort.  A physician will clean your abdomen with antiseptic soap, place a sterile towel around the site and administer a local anesthetic to numb the area.  The physician will insert a needle into your abdominal wall.  There may be a popping sound which signifies the needle has pierced the abdominal wall. Next, the physician will attach tubing to transfer a sample into a collection bottle.  After the fluid is obtained the needle will be removed.  A pressure dressing is applied to the site.    Risks of the procedure include but are not limited to:   *  Bleeding    *  Wound infection   *  Low blood pressure   *  Decreased urination   *  Low sodium if a large amount of fluid is removed   *  Puncture of abdominal organs by the needle    Benefits of the procedure:  Benefits include the removal of fluid from the abdomen, relief of abdominal pressure and facilitation of a diagnosis.    Alternatives to the procedure:  Possible alternatives are diuretic drug therapy or surgery to place a shunt to drain fluid.  Risks of diuretic drug therapy include possible dehydration and renal failure.  The benefit of drug therapy is that it can be done at home under physician supervision.  Risks of shunt placement include exposure to anesthesia, infection, excessive bleeding and injury to abdominal organs.  The benefit of a shunt is that it  can be used to drain fluid over a longer period of time.  THIS EDUCATION INFORMATION WAS REVIEWED PRIOR TO THE PROCEDURE AND CONSENT. Patient initials__________________Time_________________    Post procedure:    *  Weigh yourself daily.   *  Follow your doctors dietary instructions.   *  Rest today (no pushing pulling, straining or heavy lifting).   *  Slowly increase activity tomorow.   *  If you received sedation do not drive for 24 hours.              * Skin affix applied to puncture site. Do not try to remove, scratch or apply lotion   * Skin affix will fall off on it's own   *  You may shower tomorrow    Call your doctor if experiencing:   *  Signs of infection such as redness, swelling, excessive pain and / or foul       smelling drainage from the puncture site.   *  Chills or fever over 101 degrees (by mouth).   *  Fainting.   *  Rapid weight gain / loss.   *  Unrelieved pain.   *  Any new or severe symptoms.    Following the procedure:      Follow-up with the ordering physician as directed.   Continue to take other medications as directed by your physician unless    otherwise instructed.   If applicable, resume taking your blood thinners or Aspirin in 24 hours.    If you have any concerns please call the Radiology Nurses Desk at [phone].  You are the most important factor in your recovery.  Follow the above instructions carefully.

## 2017-08-15 NOTE — NURSING NOTE
Returned to x ray triage post PICC.  Dressing right arm is dry and intact right upper arm.  Biopatch in place.  Stretchy mesh sleeve place on PICC to support pigtail and PICC card, PICC booklet and copy of PICC orders given to the patient to take to the CBC office.  PICC education done with patient and son.  Patient then taken to Ultrasound for paracentesis.  Son at bedside.

## 2017-08-18 NOTE — PROGRESS NOTES
Pt states zofran doesn't always help her nausea. reviewed with Dr. Gutierres. Sent script for compazine to pt's pharmacy. Pt and son aware

## 2017-08-18 NOTE — PROGRESS NOTES
REASON FOR FOLLOW-UP:    1. Metastatic breast cancer, predominantly bone disease, biopsy confirmed triple negative in late October 2016. History of left breast cancer in 2005, had mastectomy, without adjuvant chemotherapy. She was followed by Dr. Romo at [facility], treated on Arimidex for 5 years, finished in 2011.   2. Anemia, secondary to her metastatic disease, with significantly elevated ferritin level. No evidence of deficiency for B12 or folic acid. She was given 3 units packed RBC in late October 2016 during hospitalization.    3. Bone biopsy on 10/25/2016 confirmed metastatic breast cancer, triple negative.  She was started on Xeloda 1500 mg twice a day on 11/14/2016 for 14 days on and 7 days off. Patient develops significant fatigue, Xeloda was decreased to 1000 mg twice a day from week #2. On 12/05/2016 schedule was changed to 7 days on, 7 days off.   4. Metastatic bone disease, Zometa was started in November 2016, repeat every 4 weeks.   5. Cancer-related pain, taking Lortab 5/325 mg prn.   6. 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   7.  Xeloda increased to 1500 mg in the morning and 1000 mg in the evening 7 days on and 7 days off, started on 4/21/2017.    8.  Bone scan on 6/7/2017 reported stable disease.  However her tumor marker was trending up at 68.7 on 6/16/2017.  Xeloda was increased to 1500 mg twice a day.    9.  Anemia secondary to chemotherapy.  She required 2 units PRBC transfusion on 7/2/2017.  Patient will be continued on Procrit injection as needed.   10.  Disease progress, required hospitalization in early August 2017.  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  Worsening ascites fluids, required repeated paracentesis on 8/09/2017.  Xeloda was discontinued.  11.  Patient had a right arm PICC line placement on 8/15/2017.  Continued on weekly paracentesis.   12.  Patient will be started on single agent Taxol weekly on 8/18/2017.  Plan is 3 weeks on 1 week off repeat every 4 weeks.      HISTORY OF PRESENT ILLNESS:   The patient is a pleasant 82-year-old female with the above-mentioned history returning today for reevaluation, accompanied by her son.      Patient had a PICC line placement on 8/15/2017, and at the meantime she had third ultrasound-guided paracentesis, which had 4300 mL ascites fluid removed.    Patient reports that she still has abdomen distention, however not as bad.  Her appetite also slightly improved for the past 2 days.  She denies fever sweating.  She denies pain.  She denies nausea vomiting. Her performance status is ECOG 2.  She she continues to have significant edema in both legs in the past a few weeks.      Her hemoglobin today is 8.3.  Upon questioning, she admits some lightheadedness but no fainting or syncope.  She denies bleeding.  Her platelets is excellent at 293,000.     We will proceed ahead with initiation of her weekly Taxol today.  She will be given 2 units PRBC transfusion in the next day or 2.         Medical History            Past Medical History   Diagnosis Date   • Anemia      • Bone metastases      • Breast cancer 2005   • HL (hearing loss)      • Hyperlipidemia      • Hypertension                Surgical History                Past Surgical History   Procedure Laterality Date   • Breast biopsy Left 2005         malignant   • Mastectomy Left 2005               HEMATOLOGIC/ONCOLOGIC HISTORY: Ms. Vora is a 81-year-old  female who presented to the emergency room because of worsening lower back pain. She started having this about a week ago. Severity was 8 out of 10 but then subsided. In this past weekend, she had worsening pain again and was sent to the ER for further evaluation. Prior to this,  she was mowing her own grass, lives by herself, with good performance status.      This patient was found to have significant anemia, with hemoglobin 7.0 in the ER. Of note, in the ER there was attempt for transfusion of packed RBC, however she had a fever and the transfusion was abandoned. Yesterday hemoglobin was down to 6.6 and she was given 1 unit packed RBC with no incident. She was given Benadryl 50 mg. This patient has hemoglobin 7.6 this morning. She is scheduled to receive 2 more units packed RBC.        This patient had CT scan of the abdomen and pelvis examination during ER evaluation, which reported diffuse metastatic bone disease involving the spine, in the thoracic spine, lumbar spine and pelvic bones. The patient reports no constipation, no diarrhea, no urinary incontinence.        This patient had history of left breast cancer back in 2005 for which she had left mastectomy. No lymph node involvement. No adjuvant chemotherapy, no radiation therapy. The patient was followed by Dr. Romo at the [facility], and was on Arimidex for 5 years, which stopped in 2011. The patient was dismissed by Dr. Romo afterwards.       The patient reports weight loss of about 10 pounds since early September 2016. She has decreased appetite. No nausea/vomiting. No vision changes, no hearing changes. No fever, no sweating. Denies melena or hematochezia.        Patient had MRI examination with and without IV contrast for brain, cervical, thoracic and lumbar spin on 10/24/2016. There was no evidence of metastatic brain disease. There are metastatic disease involving the entire spine, no fresh compression fracture.       Patient had a CT-guided bone biopsy on 10/25/2016. G evaluation reported a primary breast cancer. Further testing showed triple negative, ER 0%, NV 0%, HER-2 IHC 2+, however FISH study was negative.      Was started on IV Zometa in early November 2016. She was started on Xeloda 1500 mg  twice a day on 11/14/2016 for 14 days on and 7 days off. Patient develops significant fatigue, Xeloda was decreased to 1000 mg twice a day from week #2.        Laboratory study on 12/02/16 showed significant  anemia with hemoglobin 7.8, but a normal WBC 7000 including neutrophils 4800. Her platelets is 367,000. She was given 2 units packed RBC transfusion. Because of poor tolerance, Xeloda was decreased to 1000 mg twice a day 7 days on and 7 days off.  was 30.5 on 12/16/16.       Patient has better tolerance to decrease the Xeloda. She is more energetic. However she has worsening anemia, with a hemoglobin in the 9 gram range. Patient was started on low-dose Procrit 20,000 units every 2 weeks by an of December 2016.     CA 15-3 at 53.4 U/ml on 5/19/17.  Despite increased Xeloda dose, her CA 15-3 continue to increase.      Her bone scan on 6/7/2017 showed a stable disease.  However laboratory study showed a worsening CA-15-3 at 68.7 on 6/16/2017.  Xeloda was increased to 1500 mg twice a day.      Because of anemia secondary to chemotherapy, and that she was symptomatic with hemoglobin 8.7, dizziness and fatigue, patient was given 2 units PRBC transfusion on 7/2/2017, and posttransfusion hemoglobin input to 10.9 on 7/02/17.  Patient reports significant improvement in energy level afterwards.    Patient was recently admitted to the hospital from 8/1/2017 through 8/5/2017 because of weakness and abdomen distention.  She was found having significant ascites associated with peritoneal carcinomatosis and the tumor deposits at the bilateral adnexal region as well as pelvic mass measuring 3.5×3.5 cm by CT scan on 8/3/2017.  She also had moderate to severe bilateral hydronephrosis, required bilateral ureter stenting by Dr. Jolley on 8/3/2017.  Subsequently she had ultrasound-guided paracentesis on 8/4/2017, with total 4700 mL ascites removed.  Cytology study was positive for metastatic breast cancer.  CT scan of the chest  "on 8/4/2017 reported a moderate bilateral pleural effusion.      She already stopped Xeloda due to disease progress.  Her tumor marker CA-15-3 was 79.3 on 8/2/2017.      MEDICATIONS: see EMR       ALLERGIES:  No Known Allergies      Past Medical History, Past Surgical History, Social History, Family History have been reviewed and are without significant changes except as mentioned.        REVIEW OF SYSTEMS:  GENERAL: see HPI;    SKIN: No nonhealing lesions.  No rashes.   HEME/LYMPH: No easy bruising, bleeding.  No swollen nodes.    EYES: No vision changes or diplopia.    ENT: No tinnitus, hearing loss, gum bleeding, epistaxis, hoarseness or dysphagia.    RESPIRATORY: No cough,, hemoptysis or wheezing.   CVS: No chest pain, palpitations, orthopnea,   GI: See history of present illness .  No melena or hematochezia.   No nausea, vomiting, constipation, diarrhea  : No lower tract obstructive symptoms, dysuria or hematuria.    MUSCULOSKELETAL: No bone pain. No joint stiffness.  Patient has edema in both legs 2+.   NEUROLOGICAL: No global weakness, loss of consciousness or seizures.  Patient has lightheadedness.  PSYCHIATRIC: No increased nervousness, mood changes or depression.        Objective   Vitals:    08/18/17 0835   BP: 137/85   Pulse: (!) 121   Resp: 16   Temp: 97.4 °F (36.3 °C)   TempSrc: Oral   SpO2: 97%   Weight: 124 lb (56.2 kg)   Height: 62.5\" (158.8 cm)   PainSc: 0-No pain   ECOG 2         PHYSICAL EXAM:    GENERAL: Well-developed, elder thin female in no acute distress.    SKIN: Warm, dry without rashes, purpura or petechiae.   HEAD: Normocephalic.  EYES: Pupils equal, round. EOMs intact. Conjunctivae normal.  CHEST: Decreased breathing sounds in bilateral lung fields.  Good airflow.  CARDIAC: Regular rate and rhythm without murmurs, rubs or gallops. Normal S1,S2.  ABDOMEN: Soft, mildly distended, no tender, guarding, with no organomegaly or masses. Bowel sounds normal.  EXTREMITIES: No clubbing, " cyanosis. There are 2+ edema in both legs.    NEUROLOGICAL: Cranial Nerves II-XII grossly intact. No focal neurological deficits.  PSYCHIATRIC: Normal affect and mood.        RECENT LABS:     Lab Results   Component Value Date    WBC 11.02 (H) 08/18/2017    HGB 8.3 (L) 08/18/2017    HCT 27.8 (L) 08/18/2017    MCV 98.2 (H) 08/18/2017     08/18/2017     Lab Results   Component Value Date    NEUTROABS 8.93 (H) 08/18/2017     Lab Results   Component Value Date    GLUCOSE 101 08/18/2017    BUN 20 08/18/2017    CREATININE 1.57 (H) 08/18/2017    EGFRIFNONA 32 (L) 08/18/2017    EGFRIFAFRI 65 10/22/2016    BCR 12.7 08/18/2017    K 5.1 (H) 08/18/2017    CO2 21.3 (L) 08/18/2017    CALCIUM 7.6 (L) 08/18/2017    PROTENTOTREF 6.8 10/22/2016    PROTENTOTREF 6.9 10/22/2016    ALBUMIN 2.00 (L) 08/18/2017    LABIL2 0.6 (L) 08/18/2017    AST 54 (H) 08/18/2017    ALT 16 08/18/2017      Her tumor marker CA-15-3 was 79.3 on 8/2/2017.    Assessment/Plan   1. Metastatic breast cancer, originally with predominantly bone disease, but now progressed after first line single agent Xeloda, now with peritoneal carcinomatosis with large ascites fluids, pelvic mass and bilateral hydronephrosis required bilateral ureter stenting in early August 2017.  She also has moderate bilateral pleural effusion.  She definitely has worsening performance status currently ECOG 2 for the best.     We will started patient on single agent Taxol today 8/18/2017.  The plan is for weekly for 3 weeks on and one week off and repeat every 4 weeks.    2. Significant anemia secondary to chemotherapy.  Her hemoglobin dropped to 8.3 today without bleeding.  I will arrange patient to receive 2 units PRBC transfusion in the next day or 2 on the weekend.   Iron studies, B12, and folate levels remain adequate. Her ferritin remains significantly elevated and is reactive to her disease.  She may quantify for Procrit at some point.     3.  Ascites fluid.  She has quicker  recurrence of ascites fluid, required repeated paracentesis again on 8/9/2017 with 4100 ml ascites removed.  She had 4700 removed on 8/4 2017.  She again had paracentesis on 8/15/2017 for another 4300 mL ascites removed.  She is scheduled for weekly paracentesis.    4. Pain related to cancer. She has no specific complaints.  She did have some leg pains and takes narcotics as needed, typically once or twice a day.  Currently she denies any pains.         5. Metastatic bone disease.  We'll consolidate her schedule, for her Zometa treatment, which will be arranged on day of her chemotherapy.  She will receive Zometa every 4 weeks and due on today 08/18/2017.      6. Elevated Creatinine: It is still above her baseline level.  Laboratory study today showed improved creatinine level.  This is due to obstruction and bilateral hydronephrosis.  Hopefully it will gradually improve as she already has ureter stents on both sides.        7.  Poor oral intake and weight loss.  This is secondary to her malignancy and chemotherapy.  She had a paracentesis, and improved symptomology.  She does have improved appetite of port's that field days.  I encourage patient to use liquid nutrition support as such as Insure or use homemade liquid nutrition supplementation.  Both patient and her son voiced understanding.         PLAN:   1.  Initiated single agent chemotherapy with first dose of weekly Taxol, for 3 weeks on 1 week off repeating every 4 weeks.    2.  Intravenous Zometa today.  Repeat every 4 weeks.  3.  Transfuse 2 units PRBC in the next couple of days.  She needs type crossmatch and I inform the laboratory.  4.  Continue weekly paracentesis under ultrasound guidance, for the next 4 weeks.   5.  Patient will return on 8/15/2017 cycle 1 day 8 Taxol treatment.   6.  Continue prn Norco. No prescription today.   7. Patient will return in 2 week with M.D. visit, and for cycle 1 day #15 Taxol treatment.      More than 25 minutes,  were  used for for counseling, coordinating her care and documentation.      ELVIN FONG M.D., Ph.D.   8/18/2017        Dragon disclaimer:  Much of this encounter note is an electronic transcription/translation of spoken language to printed text. The electronic translation of spoken language may permit erroneous, or at times, nonsensical words or phrases to be inadvertently transcribed; Although I have reviewed the note for such errors, some may still exist.

## 2017-08-18 NOTE — PROGRESS NOTES
Rec email from [employer] stating [facility] has started a PA for pt's Prochlorperazine. I have completed this request and submitted it to [facility] via [employer]  Request approved from 8/18/17 to 8/17/18 #[id number]/30  Sebastien at [facility] notified [phone]

## 2017-08-22 NOTE — DISCHARGE INSTRUCTIONS
EDUCATION /DISCHARGE INSTRUCTIONS  Paracentesis:  A needle is inserted into the space between your abdominal organs and the membrane that surrounds them (peritoneal space).  It is done for the diagnosis and treatment of fluid that is resistant to other therapies.  It helps determine the cause of the fluid and at the relieves pressure created by the fluid.  A sample is obtained and sent to the laboratory for study.    During the procedure:  You will lie on a bed on your back with your legs drawn up.  Your abdomen will be exposed from the chest to the pelvis.  You will otherwise be covered to maintain comfort.  A physician will clean your abdomen with antiseptic soap, place a sterile towel around the site and administer a local anesthetic to numb the area.  The physician will insert a needle into your abdominal wall.  There may be a popping sound which signifies the needle has pierced the abdominal wall. Next, the physician will attach tubing to transfer a sample into a collection bottle.  After the fluid is obtained the needle will be removed.  A pressure dressing is applied to the site.    Risks of the procedure include but are not limited to:   *  Bleeding    *  Wound infection   *  Low blood pressure   *  Decreased urination   *  Low sodium if a large amount of fluid is removed   *  Puncture of abdominal organs by the needle    Benefits of the procedure:  Benefits include the removal of fluid from the abdomen, relief of abdominal pressure and facilitation of a diagnosis.    Alternatives to the procedure:  Possible alternatives are diuretic drug therapy or surgery to place a shunt to drain fluid.  Risks of diuretic drug therapy include possible dehydration and renal failure.  The benefit of drug therapy is that it can be done at home under physician supervision.  Risks of shunt placement include exposure to anesthesia, infection, excessive bleeding and injury to abdominal organs.  The benefit of a shunt is that it  can be used to drain fluid over a longer period of time.  THIS EDUCATION INFORMATION WAS REVIEWED PRIOR TO THE PROCEDURE AND CONSENT. Patient initials__________________Time_________________    Post procedure:    *  Weigh yourself daily.   *  Follow your doctors dietary instructions.   *  Rest today (no pushing pulling, straining or heavy lifting).   *  Slowly increase activity tomorow.   *  If you received sedation do not drive for 24 hours.              * Skin affix applied to puncture site. Do not try to remove, scratch or apply lotion   * Skin affix will fall off on it's own   *  You may shower tomorrow    Call your doctor if experiencing:   *  Signs of infection such as redness, swelling, excessive pain and / or foul       smelling drainage from the puncture site.   *  Chills or fever over 101 degrees (by mouth).   *  Fainting.   *  Rapid weight gain / loss.   *  Unrelieved pain.   *  Any new or severe symptoms.    Following the procedure:      Follow-up with the ordering physician as directed.   Continue to take other medications as directed by your physician unless    otherwise instructed.   If applicable, resume taking your blood thinners or Aspirin in 24 hours.    If you have any concerns please call the Radiology Nurses Desk at [phone].  You are the most important factor in your recovery.  Follow the above instructions carefully.

## 2017-08-25 NOTE — PROGRESS NOTES
The patient returns today in anticipation of Taxol.  Her son did request I speak with the patient regarding diarrhea.  She's been experiencing 4-5 bowel movements per day.  She is not currently maximizing Imodium, only using one tablet occasionally.  She is drinking fluids, though probably not enough to compensate for losses.  We will provide an additional 500 ml normal saline in the infusion center today.  Additionally, provided the patient with written and verbal instructions regarding Imodium use.  She was instructed to call Monday morning if her diarrhea persists despite maximizing Imodium.  At that time, we will provide the patient with Lomotil.

## 2017-08-29 PROBLEM — E86.0 DEHYDRATION: Status: ACTIVE | Noted: 2017-01-01

## 2017-08-29 NOTE — TELEPHONE ENCOUNTER
S/w pt. Son ivana.  States his mom is sched. For a paracentesis today at [facility] at 11:30am.  feels like his mom might be dehydrated.  Inquiring about flds there at the hospital.  Informed bill the rn can eval pt. And call and get orders if needed.  Dr. Almendarez, and dr. peter Is over there this wk making rounds.  V/u.

## 2017-08-29 NOTE — PROGRESS NOTES
REASON FOR FOLLOW-UP:    1. Metastatic breast cancer, predominantly bone disease, biopsy confirmed triple negative in late October 2016. History of left breast cancer in 2005, had mastectomy, without adjuvant chemotherapy. She was followed by Dr. Romo at [facility], treated on Arimidex for 5 years, finished in 2011.   2. Anemia, secondary to her metastatic disease, with significantly elevated ferritin level. No evidence of deficiency for B12 or folic acid. She was given 3 units packed RBC in late October 2016 during hospitalization.    3. Bone biopsy on 10/25/2016 confirmed metastatic breast cancer, triple negative.  She was started on Xeloda 1500 mg twice a day on 11/14/2016 for 14 days on and 7 days off. Patient develops significant fatigue, Xeloda was decreased to 1000 mg twice a day from week #2. On 12/05/2016 schedule was changed to 7 days on, 7 days off.   4. Metastatic bone disease, Zometa was started in November 2016, repeat every 4 weeks.   5. Cancer-related pain, taking Lortab 5/325 mg prn.   6. 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   7.  Xeloda increased to 1500 mg in the morning and 1000 mg in the evening 7 days on and 7 days off, started on 4/21/2017.    8.  Bone scan on 6/7/2017 reported stable disease.  However her tumor marker was trending up at 68.7 on 6/16/2017.  Xeloda was increased to 1500 mg twice a day.    9.  Anemia secondary to chemotherapy.  She required 2 units PRBC transfusion on 7/2/2017.  Patient will be continued on Procrit injection as needed.   10.  Disease progress, required hospitalization in early August 2017.  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  Worsening ascites fluids, required repeated paracentesis on 8/09/2017.  Xeloda was discontinued.  11.  Patient had a right arm PICC line placement on 8/15/2017.  Continued on weekly paracentesis.   12.  Patient will be started on single agent Taxol weekly on 8/18/2017.  Plan is 3 weeks on 1 week off repeat every 4 weeks.      HISTORY OF PRESENT ILLNESS:   The patient is a pleasant 82-year-old female with the above-mentioned history returning today for triage visit, accompanied by her son.      She and her son report that she is having multiple loose stool episodes daily, proximally 5-6.  She's been taking Lomotil this morning is having 3 episodes of diarrhea and only taking 2 Lomotil.  We discussed the proper way in which to take this medication, 1-2 tabs 4 times daily up to a maximum of 8 tablets.  She has been drinking Gatorade in daily, approximately one bottle.  She tries to drink boost though does not consume an entire bottle.  She did go for paracentesis today though they did not see the need to drain fluid.  She denies fevers, nausea, vomiting, neuropathy.  She does report a gritty taste in her mouth and this is affecting her appetite.        Medical History            Past Medical History   Diagnosis Date   • Anemia      • Bone metastases      • Breast cancer 2005   • HL (hearing loss)      • Hyperlipidemia      • Hypertension                Surgical History                Past Surgical History   Procedure Laterality Date   • Breast biopsy Left 2005         malignant   • Mastectomy Left 2005               HEMATOLOGIC/ONCOLOGIC HISTORY: Ms. Vora is a 81-year-old  female who presented to the emergency room because of worsening lower back pain. She started having this about a week ago. Severity was 8 out of 10 but then subsided. In this past weekend, she had worsening pain again and was sent to the ER for further evaluation. Prior to this, she was mowing her own grass, lives by herself, with good performance status.      This patient was found to have significant anemia,  with hemoglobin 7.0 in the ER. Of note, in the ER there was attempt for transfusion of packed RBC, however she had a fever and the transfusion was abandoned. Yesterday hemoglobin was down to 6.6 and she was given 1 unit packed RBC with no incident. She was given Benadryl 50 mg. This patient has hemoglobin 7.6 this morning. She is scheduled to receive 2 more units packed RBC.        This patient had CT scan of the abdomen and pelvis examination during ER evaluation, which reported diffuse metastatic bone disease involving the spine, in the thoracic spine, lumbar spine and pelvic bones. The patient reports no constipation, no diarrhea, no urinary incontinence.        This patient had history of left breast cancer back in 2005 for which she had left mastectomy. No lymph node involvement. No adjuvant chemotherapy, no radiation therapy. The patient was followed by Dr. Romo at the [facility], and was on Arimidex for 5 years, which stopped in 2011. The patient was dismissed by Dr. Romo afterwards.       The patient reports weight loss of about 10 pounds since early September 2016. She has decreased appetite. No nausea/vomiting. No vision changes, no hearing changes. No fever, no sweating. Denies melena or hematochezia.        Patient had MRI examination with and without IV contrast for brain, cervical, thoracic and lumbar spin on 10/24/2016. There was no evidence of metastatic brain disease. There are metastatic disease involving the entire spine, no fresh compression fracture.       Patient had a CT-guided bone biopsy on 10/25/2016. G evaluation reported a primary breast cancer. Further testing showed triple negative, ER 0%, SC 0%, HER-2 IHC 2+, however FISH study was negative.      Was started on IV Zometa in early November 2016. She was started on Xeloda 1500 mg twice a day on 11/14/2016 for 14 days on and 7 days off. Patient develops significant fatigue, Xeloda was decreased to 1000 mg twice a day  from week #2.        Laboratory study on 12/02/16 showed significant  anemia with hemoglobin 7.8, but a normal WBC 7000 including neutrophils 4800. Her platelets is 367,000. She was given 2 units packed RBC transfusion. Because of poor tolerance, Xeloda was decreased to 1000 mg twice a day 7 days on and 7 days off.  was 30.5 on 12/16/16.       Patient has better tolerance to decrease the Xeloda. She is more energetic. However she has worsening anemia, with a hemoglobin in the 9 gram range. Patient was started on low-dose Procrit 20,000 units every 2 weeks by an of December 2016.     CA 15-3 at 53.4 U/ml on 5/19/17.  Despite increased Xeloda dose, her CA 15-3 continue to increase.      Her bone scan on 6/7/2017 showed a stable disease.  However laboratory study showed a worsening CA-15-3 at 68.7 on 6/16/2017.  Xeloda was increased to 1500 mg twice a day.      Because of anemia secondary to chemotherapy, and that she was symptomatic with hemoglobin 8.7, dizziness and fatigue, patient was given 2 units PRBC transfusion on 7/2/2017, and posttransfusion hemoglobin input to 10.9 on 7/02/17.  Patient reports significant improvement in energy level afterwards.    Patient was recently admitted to the hospital from 8/1/2017 through 8/5/2017 because of weakness and abdomen distention.  She was found having significant ascites associated with peritoneal carcinomatosis and the tumor deposits at the bilateral adnexal region as well as pelvic mass measuring 3.5×3.5 cm by CT scan on 8/3/2017.  She also had moderate to severe bilateral hydronephrosis, required bilateral ureter stenting by Dr. Jolley on 8/3/2017.  Subsequently she had ultrasound-guided paracentesis on 8/4/2017, with total 4700 mL ascites removed.  Cytology study was positive for metastatic breast cancer.  CT scan of the chest on 8/4/2017 reported a moderate bilateral pleural effusion.      She already stopped Xeloda due to disease progress.  Her tumor marker  CA-15-3 was 79.3 on 8/2/2017.      MEDICATIONS: see EMR       ALLERGIES:  No Known Allergies      Past Medical History, Past Surgical History, Social History, Family History have been reviewed and are without significant changes except as mentioned.        REVIEW OF SYSTEMS:  GENERAL: see HPI;    SKIN: No nonhealing lesions.  No rashes.   HEME/LYMPH: No easy bruising, bleeding.  No swollen nodes.    EYES: No vision changes or diplopia.    ENT: No tinnitus, hearing loss, gum bleeding, epistaxis, hoarseness or dysphagia.  Gritty taste in mouth.  RESPIRATORY: No cough,, hemoptysis or wheezing.   CVS: No chest pain, palpitations, orthopnea,   GI: See history of present illness .  No melena or hematochezia.   No nausea, vomiting, constipation, diarrhea  : No lower tract obstructive symptoms, dysuria or hematuria.    MUSCULOSKELETAL: No bone pain. No joint stiffness.  Patient has edema in both legs 2+.   NEUROLOGICAL: No global weakness, loss of consciousness or seizures.  Patient has lightheadedness.  PSYCHIATRIC: No increased nervousness, mood changes or depression.        Objective   There were no vitals filed for this visit.ECOG 2         PHYSICAL EXAM:    GENERAL: Well-developed, elder thin female in no acute distress.    SKIN: Warm, dry without rashes, purpura or petechiae.   HEAD: Normocephalic.  EYES: Pupils equal, round. EOMs intact. Conjunctivae normal.  MOUTH: Scattered white patches noted area did  CHEST: Decreased breathing sounds in bilateral lung fields.  Good airflow.  CARDIAC: Regular rate and rhythm without murmurs, rubs or gallops. Normal S1,S2.  ABDOMEN: Soft, mildly distended, no tender, guarding, with no organomegaly or masses. Bowel sounds normal.  EXTREMITIES: No clubbing, cyanosis. There are 2+ edema in both legs.    NEUROLOGICAL: Cranial Nerves II-XII grossly intact. No focal neurological deficits.  PSYCHIATRIC: Normal affect and mood.        RECENT LABS:     Lab Results   Component Value Date     WBC 1.91 (L) 08/29/2017    HGB 10.2 (L) 08/29/2017    HCT 35.3 08/29/2017    .6 (H) 08/29/2017     08/29/2017     Lab Results   Component Value Date    NEUTROABS 1.31 (L) 08/29/2017     Lab Results   Component Value Date    GLUCOSE 139 (H) 08/29/2017    BUN 37 (H) 08/29/2017    CREATININE 2.24 (C) 08/29/2017    EGFRIFNONA 21 (L) 08/29/2017    EGFRIFAFRI 65 10/22/2016    BCR 16.5 08/29/2017    K 5.2 (H) 08/29/2017    CO2 19.5 (L) 08/29/2017    CALCIUM 7.2 (L) 08/29/2017    PROTENTOTREF 6.8 10/22/2016    PROTENTOTREF 6.9 10/22/2016    ALBUMIN 2.30 (L) 08/29/2017    LABIL2 0.7 (L) 08/29/2017    AST 45 (H) 08/29/2017    ALT 14 08/29/2017      Her tumor marker CA-15-3 was 79.3 on 8/2/2017.    Assessment/Plan   1. Metastatic breast cancer, originally with predominantly bone disease, but now progressed after first line single agent Xeloda, now with peritoneal carcinomatosis with large ascites fluids, pelvic mass and bilateral hydronephrosis required bilateral ureter stenting in early August 2017.  She also has moderate bilateral pleural effusion.  She definitely has worsening performance status currently ECOG 2 for the best.     The patient initiated single agent Taxol on 8/18/2017.  The plan is for weekly for 3 weeks on and one week off and repeat every 4 weeks.  She is due for cycle 1 day 15 this Friday.    2. Significant anemia secondary to chemotherapy.  Her hemoglobin dropped to 8.3 today without bleeding.  I will arrange patient to receive 2 units PRBC transfusion in the next day or 2 on the weekend.   Iron studies, B12, and folate levels remain adequate. Her ferritin remains significantly elevated and is reactive to her disease.  She may qualify for Procrit at some point.     3.  Ascites fluid.  She has quicker recurrence of ascites fluid, required repeated paracentesis again on 8/9/2017 with 4100 ml ascites removed.  She had 4700 removed on 8/4 2017.  She again had paracentesis on 8/15/2017 for  another 4300 mL ascites removed.  She went again today for paracentesis but they did not find fluid that needed to be drained.  She is scheduled for weekly paracentesis.    4. Pain related to cancer. She has no specific complaints.  She did have some leg pains and takes narcotics as needed, typically once or twice a day.        5. Metastatic bone disease.  We'll consolidate her schedule, for her Zometa treatment, which will be arranged on day of her chemotherapy.  She will receive Zometa every 4 weeks, last given 08/18/2017.      6. Elevated Creatinine: It is still above her baseline level.  Laboratory study today showed improved creatinine level.  This is due to obstruction and bilateral hydronephrosis.  She already has ureter stents on both sides.  This is further elevated today secondary to dehydration.  Patient will receive 1 L normal saline in the office today as well as again tomorrow.  Labs will be rechecked on Friday when she returns for review by Dr. Gutierres.       7.  Poor oral intake and weight loss.  This is secondary to her malignancy and chemotherapy.  She had a paracentesis, and improved symptomology.   We did discuss the need for at least 1-2 boost daily in addition to foods.  The patient will stop drinking Gatorade secondary to her increased sodium and magnesium levels and instead drink water.    8. Oral candida. Patient states she has a gritty taste in her mouth that is unable to swallow pills or have much of an appetite.  I will prescribe nystatin swish and swallow, as with her further elevated creatinine today we cannot prescribe fluconazole.  If this is not improved when she follows up on Friday we can consider Mycelex mina.        PLAN:   1.  Initiated single agent chemotherapy with first dose of weekly Taxol, for 3 weeks on 1 week off repeating every 4 weeks.    2.  1 L normal saline in the office today and again tomorrow.  3.  Nystatin swish and swallow E scribed to pharmacy.  4.  Continue  weekly paracentesis under ultrasound guidance, for the next 4 weeks.   5. Patient will return in on Friday for review by Dr. Gutierres and for cycle 1 day #15 Taxol treatment.

## 2017-08-29 NOTE — PROGRESS NOTES
Erin Vora, 82, came in with her son for an appointment today.  They asked for a [facility] financial packet. I brought it to them, explained how to fill it out.  They will do that, call us with questions.  They may bring it back to us or deliver it to Sera Manning  In the [facility] Financial  Counseling office directly.

## 2017-09-01 PROBLEM — T45.1X5A CHEMOTHERAPY INDUCED NEUTROPENIA (HCC): Status: ACTIVE | Noted: 2017-01-01

## 2017-09-01 PROBLEM — D70.1 CHEMOTHERAPY INDUCED NEUTROPENIA (HCC): Status: ACTIVE | Noted: 2017-01-01

## 2017-09-01 PROBLEM — R19.7 DIARRHEA: Status: ACTIVE | Noted: 2017-01-01

## 2017-09-01 PROBLEM — E87.0 HYPERNATREMIA: Status: ACTIVE | Noted: 2017-01-01

## 2017-09-01 NOTE — CONSULTS
"Adult Nutrition  Assessment/PES    Patient Name:  Erin Vora  YOB: 1935  MRN: [id number]  Admit Date:  9/1/2017    Assessment Date:  9/1/2017     Comments:  Patient on clear liquids at time of initial assessment.  Diagnosis of breast cancer with mets to bone per chart and undergoing chemotherapy.  Per chart, drinks 2 Boosts per day at home.  Per chart, greater than 16 L of ascites fluid removed in past 4 weeks, probable weight loss r/t this and weight loss also attributed to chemo and cancer diagnosis.  Recommend Ensure Enlive TID once diet is advanced.  Will f/u to interview patient and get accurate assessment of weight history and PO intake history.            Reason for Assessment       09/01/17 1519    Reason for Assessment    Reason For Assessment/Visit admission assessment;identified at risk by screening criteria    Identified At Risk By Screening Criteria MST SCORE 2+;unintentional loss of 10 lbs or more in the past 2 mos    Diagnosis Diagnosis    Oncology Breast cancer   chemo              Nutrition/Diet History       09/01/17 1519    Nutrition/Diet History    Typical Food/Fluid Intake poor appetite, decreased PO intake per chart    Supplemental Drinks/Foods/Additives drinks 2 Boosts per day per chart            Anthropometrics       09/01/17 1519    Anthropometrics    Height 157.5 cm (62.01\")    Weight 49 kg (108 lb 0.4 oz)    RD Documented Weight on Admission 49 kg (108 lb 0.4 oz)    Anthropometrics (Special Considerations)    RD Calculated BMI (kg/m2) 19.75    Ideal Body Weight (IBW)    Ideal Body Weight (IBW), Female 50.85    % Ideal Body Weight 96.56    Usual Body Weight (UBW)    Weight Loss Time Frame possible weight loss noted per chart weight history    Body Mass Index (BMI)    BMI (kg/m2) 19.79    BMI Grade 19.1 - 24.9 - normal            Labs/Tests/Procedures/Meds       09/01/17 1520    Labs/Tests/Procedures/Meds    Diagnostic Test/Procedure Review reviewed, pertinent    " "Labs/Tests Review Reviewed;Glucose;Na+;CO2;Cl-;Creat;BUN;GFR;AST;Alb;Hgb Hct    Medication Review Reviewed, pertinent;Steroid   Ca+vit D    Significant Vitals reviewed, pertinent            Physical Findings       09/01/17 1521    Physical Findings/Assessment    Additional Documentation Physical Appearance (Group)    Physical Appearance    Gastrointestinal diarrhea    Skin --   B=19, intact            Estimated/Assessed Needs       09/01/17 1521    Calculation Measurements    Weight Used For Calculations 49 kg (108 lb 0.4 oz)    Height Used for Calculations 1.575 m (5' 2.01\")    Estimated/Assessed Energy Needs    Energy Need Method Kcal/kg    kcal/kg 35    35 Kcal/Kg (kcal) 1715    Estimated Kcal Range  3568-7400    Estimated/Assessed Protein Needs    Weight Used for Protein Calculation 49 kg (108 lb 0.4 oz)    Protein (gm/kg) 1.5    1.5 Gm Protein (gm) 73.5    Estimated Protein Range 59-74    Estimated/Assessed Fluid Needs    Fluid Need Method RDA method    RDA Method (mL)  1470            Nutrition Prescription Ordered       09/01/17 1522    Nutrition Prescription PO    Current PO Diet Clear Liquid                Problem/Interventions:        Problem 1       09/01/17 1522    Nutrition Diagnoses Problem 1    Problem 1 Inadequate Intake/Infusion    Inadequate Intake Type Oral    Macronutrient Kcal;Protein    Etiology (related to) Factors Affecting Nutrition    Appetite Poor    Reported GI Symptoms Diarrhea    Signs/Symptoms (evidenced by) Report/Observation;Clear Liquid Diet    Reported/Observed By Other (comment)   per chart                    Intervention Goal       09/01/17 1523    Intervention Goal    General Maintain nutrition;Meet nutritional needs for age/condition;Disease management/therapy;Reduce/improve symptoms    PO Advance diet;Tolerate PO;PO intake (%)    PO Intake % 75 %    Weight Maintain weight            Nutrition Intervention       09/01/17 1523    Nutrition Intervention    RD/Tech Action Follow " Tx progress;Care plan reviewd              Education/Evaluation       09/01/17 1523    Education    Education Will Instruct as appropriate    Monitor/Evaluation    Monitor Per protocol;I&O;PO intake;Supplement intake;Pertinent labs;Weight;Symptoms    Education Follow-up Reinforce PRN              Electronically signed by:  Laquita Dixon RD  09/01/17 3:23 PM

## 2017-09-01 NOTE — PROGRESS NOTES
REASON FOR FOLLOW-UP:    1. Metastatic breast cancer, predominantly bone disease, biopsy confirmed triple negative in late October 2016. History of left breast cancer in 2005, had mastectomy, without adjuvant chemotherapy. She was followed by Dr. Romo at [facility], treated on Arimidex for 5 years, finished in 2011.   2. Anemia, secondary to her metastatic disease, with significantly elevated ferritin level. No evidence of deficiency for B12 or folic acid. She was given 3 units packed RBC in late October 2016 during hospitalization.    3. Bone biopsy on 10/25/2016 confirmed metastatic breast cancer, triple negative.  She was started on Xeloda 1500 mg twice a day on 11/14/2016 for 14 days on and 7 days off. Patient develops significant fatigue, Xeloda was decreased to 1000 mg twice a day from week #2. On 12/05/2016 schedule was changed to 7 days on, 7 days off.   4. Metastatic bone disease, Zometa was started in November 2016, repeat every 4 weeks.   5. Cancer-related pain, taking Lortab 5/325 mg prn.   6. 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   7.  Xeloda increased to 1500 mg in the morning and 1000 mg in the evening 7 days on and 7 days off, started on 4/21/2017.    8.  Bone scan on 6/7/2017 reported stable disease.  However her tumor marker was trending up at 68.7 on 6/16/2017.  Xeloda was increased to 1500 mg twice a day.    9.  Anemia secondary to chemotherapy.  She required 2 units PRBC transfusion on 7/2/2017.  Patient will be continued on Procrit injection as needed.   10.  Disease progress, required hospitalization in early August 2017.  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  Worsening ascites fluids, required repeated paracentesis on 8/09/2017.  Xeloda was discontinued.  11.  Patient had a right arm PICC line placement on 8/15/2017.  Continued on weekly paracentesis.   12.  Patient will be started on single agent Taxol weekly on 8/18/2017.  Plan is 3 weeks on 1 week off repeat every 4 weeks.      HISTORY OF PRESENT ILLNESS:   The patient is a pleasant 82-year-old female with the above-mentioned history returning today for scheduled a cycle 1 day #15 Taxol chemotherapy, accompanied by her son.      Patient was here 2 days ago for triage visit, she was found having acute renal injury with creatinine 2.24, likely related to her poor oral intake and diarrhea, she was given 1 L normal saline that time.  She also was found having oral candida infection, and was given nystatin mouthwash.  She was also found having mild neutropenia with ANC 1300 but it was not febrile.     Today patient did return for reevaluation, she continues to have watery diarrhea about 5 episodes a day.  She has been taking Lomotil 2 tablets 4 times a day and Imodium, with only minimal improvement.  She denies fever sweating chills.  She has no nausea vomiting.  However she has poor appetite.  She does use 2 canes boost a day plus she has been drinking Gatorade daily, approximately one bottle. , probably total 1 L liquid intake per day.    Also 2 days ago on 8/29/2017, we attempted for ultrasound-guided paracentesis, however there was no ascites to be draining at that time.  Prior to that in the past month, she had a total 4 episodes of paracentesis, with more than 16 L ascites fluid removed in the past 4 weeks.  Patient today reports no distention or pain of the abdomen.  According to her son, patient is swelling the legs has been remodeling however slightly better.    Patient has a performance status is ECOG 2-3.          Medical History            Past Medical History   Diagnosis Date   • Anemia      • Bone metastases      • Breast cancer 2005   • HL (hearing loss)      • Hyperlipidemia       • Hypertension                Surgical History                Past Surgical History   Procedure Laterality Date   • Breast biopsy Left 2005         malignant   • Mastectomy Left 2005               HEMATOLOGIC/ONCOLOGIC HISTORY: Ms. Vora is a 81-year-old  female who presented to the emergency room because of worsening lower back pain. She started having this about a week ago. Severity was 8 out of 10 but then subsided. In this past weekend, she had worsening pain again and was sent to the ER for further evaluation. Prior to this, she was mowing her own grass, lives by herself, with good performance status.      This patient was found to have significant anemia, with hemoglobin 7.0 in the ER. Of note, in the ER there was attempt for transfusion of packed RBC, however she had a fever and the transfusion was abandoned. Yesterday hemoglobin was down to 6.6 and she was given 1 unit packed RBC with no incident. She was given Benadryl 50 mg. This patient has hemoglobin 7.6 this morning. She is scheduled to receive 2 more units packed RBC.        This patient had CT scan of the abdomen and pelvis examination during ER evaluation, which reported diffuse metastatic bone disease involving the spine, in the thoracic spine, lumbar spine and pelvic bones. The patient reports no constipation, no diarrhea, no urinary incontinence.        This patient had history of left breast cancer back in 2005 for which she had left mastectomy. No lymph node involvement. No adjuvant chemotherapy, no radiation therapy. The patient was followed by Dr. Romo at the [facility], and was on Arimidex for 5 years, which stopped in 2011. The patient was dismissed by Dr. Romo afterwards.       The patient reports weight loss of about 10 pounds since early September 2016. She has decreased appetite. No nausea/vomiting. No vision changes, no hearing changes. No fever, no sweating. Denies melena or hematochezia.         Patient had MRI examination with and without IV contrast for brain, cervical, thoracic and lumbar spin on 10/24/2016. There was no evidence of metastatic brain disease. There are metastatic disease involving the entire spine, no fresh compression fracture.       Patient had a CT-guided bone biopsy on 10/25/2016. G evaluation reported a primary breast cancer. Further testing showed triple negative, ER 0%, OK 0%, HER-2 IHC 2+, however FISH study was negative.      Was started on IV Zometa in early November 2016. She was started on Xeloda 1500 mg twice a day on 11/14/2016 for 14 days on and 7 days off. Patient develops significant fatigue, Xeloda was decreased to 1000 mg twice a day from week #2.        Laboratory study on 12/02/16 showed significant  anemia with hemoglobin 7.8, but a normal WBC 7000 including neutrophils 4800. Her platelets is 367,000. She was given 2 units packed RBC transfusion. Because of poor tolerance, Xeloda was decreased to 1000 mg twice a day 7 days on and 7 days off.  was 30.5 on 12/16/16.       Patient has better tolerance to decrease the Xeloda. She is more energetic. However she has worsening anemia, with a hemoglobin in the 9 gram range. Patient was started on low-dose Procrit 20,000 units every 2 weeks by an of December 2016.     CA 15-3 at 53.4 U/ml on 5/19/17.  Despite increased Xeloda dose, her CA 15-3 continue to increase.      Her bone scan on 6/7/2017 showed a stable disease.  However laboratory study showed a worsening CA-15-3 at 68.7 on 6/16/2017.  Xeloda was increased to 1500 mg twice a day.      Because of anemia secondary to chemotherapy, and that she was symptomatic with hemoglobin 8.7, dizziness and fatigue, patient was given 2 units PRBC transfusion on 7/2/2017, and posttransfusion hemoglobin input to 10.9 on 7/02/17.  Patient reports significant improvement in energy level afterwards.    Patient was recently admitted to the hospital from 8/1/2017 through  "8/5/2017 because of weakness and abdomen distention.  She was found having significant ascites associated with peritoneal carcinomatosis and the tumor deposits at the bilateral adnexal region as well as pelvic mass measuring 3.5×3.5 cm by CT scan on 8/3/2017.  She also had moderate to severe bilateral hydronephrosis, required bilateral ureter stenting by Dr. Jolley on 8/3/2017.  Subsequently she had ultrasound-guided paracentesis on 8/4/2017, with total 4700 mL ascites removed.  Cytology study was positive for metastatic breast cancer.  CT scan of the chest on 8/4/2017 reported a moderate bilateral pleural effusion.      She already stopped Xeloda due to disease progress.  Her tumor marker CA-15-3 was 79.3 on 8/2/2017.      MEDICATIONS: see EMR       ALLERGIES:  No Known Allergies      Past Medical History, Past Surgical History, Social History, Family History have been reviewed and are without significant changes except as mentioned.        REVIEW OF SYSTEMS:  GENERAL: see HPI;    SKIN: No nonhealing lesions.  No rashes.   HEME/LYMPH: No easy bruising, bleeding.  No swollen nodes.    EYES: No vision changes or diplopia.    ENT: No tinnitus, hearing loss, gum bleeding, epistaxis, hoarseness or dysphagia.   RESPIRATORY: No cough,, hemoptysis or wheezing.   CVS: No chest pain, palpitations, orthopnea,   GI: See history of present illness .  No melena or hematochezia.   No nausea, vomiting, constipation, diarrhea  : No lower tract obstructive symptoms, dysuria or hematuria.    MUSCULOSKELETAL: No bone pain. No joint stiffness.  Patient has edema in both legs 2+.   NEUROLOGICAL: No global weakness, loss of consciousness or seizures.  Patient has lightheadedness.  PSYCHIATRIC: No increased nervousness, mood changes or depression.        Objective   Vitals:    09/01/17 0944   BP: 120/75   Pulse: 113   Resp: 16   Temp: 97.4 °F (36.3 °C)   TempSrc: Oral   SpO2: 93%   Weight: 108 lb 6.4 oz (49.2 kg)   Height: 62.5\" (158.8 " cm)   PainSc: 0-No pain   ECOG 3         PHYSICAL EXAM:    GENERAL: Well-developed, elder thin female in no acute distress.    SKIN: Warm, dry without rashes, purpura or petechiae.   HEAD: Normocephalic.  EYES: Pupils equal, round. EOMs intact. Conjunctivae normal.  MOUTH: No oral candida infection.  CHEST: Decreased breathing sounds in bilateral lung fields.  Good airflow.  CARDIAC: Regular rate and rhythm without murmurs, rubs or gallops. Normal S1,S2.  ABDOMEN: Soft,  No distention, no tender, guarding, with no organomegaly or masses. Bowel sounds normal.  EXTREMITIES: No clubbing, cyanosis. There are 2+ edema in both legs.    NEUROLOGICAL: Cranial Nerves II-XII grossly intact. No focal neurological deficits.  PSYCHIATRIC: Normal affect and mood.        RECENT LABS:     Lab Results   Component Value Date    WBC 2.67 (L) 09/01/2017    HGB 9.4 (L) 09/01/2017    HCT 32.1 (L) 09/01/2017    .6 (H) 09/01/2017     09/01/2017     Lab Results   Component Value Date    NEUTROABS 1.60 (L) 09/01/2017     Lab Results   Component Value Date    GLUCOSE 198 (H) 09/01/2017    BUN 54 (H) 09/01/2017    CREATININE 3.32 (H) 09/01/2017    EGFRIFNONA 13 (L) 09/01/2017    EGFRIFAFRI 65 10/22/2016    BCR 16.3 09/01/2017    K 5.3 (H) 09/01/2017    CO2 18.1 (L) 09/01/2017    CALCIUM 7.6 (L) 09/01/2017    PROTENTOTREF 6.8 10/22/2016    PROTENTOTREF 6.9 10/22/2016    ALBUMIN 2.40 (L) 09/01/2017    LABIL2 0.8 09/01/2017    AST 49 (H) 09/01/2017    ALT 15 09/01/2017     Sodium   Date Value Ref Range Status   09/01/2017 158 (H) 136 - 145 mmol/L Final     Potassium   Date Value Ref Range Status   09/01/2017 5.3 (H) 3.5 - 5.2 mmol/L Final     Total Bilirubin   Date Value Ref Range Status   09/01/2017 0.2 0.1 - 1.2 mg/dL Final     Alkaline Phosphatase   Date Value Ref Range Status   09/01/2017 95 39 - 117 U/L Final   ]   Her tumor marker CA-15-3 was 79.3 on 8/2/2017.    Assessment/Plan   1. Metastatic breast cancer, originally with  predominantly bone disease, but now progressed after first line single agent Xeloda, now with peritoneal carcinomatosis with large ascites fluids, pelvic mass and bilateral hydronephrosis required bilateral ureter stenting in early August 2017.  She also has moderate bilateral pleural effusion.      The patient was initiated single agent Taxol on 8/18/2017.  The plan is for weekly for 3 weeks on and one week off and repeat every 4 weeks.  She is due for cycle 1 day 15 today, but because of the acute illness, we will hold her chemotherapy today.     She had indicated 4 episodes of paracentesis in the past 4 weeks, with total more than 16 L ascites fluid removed.  But the most recent attempt of paracentesis on 8/29/2017, there was no fluids to be tapped.  Maybe she is responding to the chemotherapy.     2.  Acute renal failure, hypernatremia and hyperchloremia.  This is likely secondary to dehydration from combination of poor oral intake and watery diarrhea.  Her mental status seems clear during clinical visit, however her son reports patient sharpness of mentality fluctuates.  We'll give her D5 hydration the clinic and to she is ready to be admitted to hospital.  I discussed that with the patient and her son, recommended patient to be admitted to hospital for further evaluation and treatment.  Keep in mind that she recently had bilateral hydronephrosis secondary to obstruction, post bilateral ureter stenting.     3.  Diarrhea.  She's been using Lomotil 2 tablets up to 4 times a day plus Imodium.  She may need a C. difficile test for further evaluation.     4.  Mild neutropenia secondary to chemotherapy.  She has no neutropenia fever.  Her neutrophil counts today actually is slightly improving compared to 2 days ago.    5.  Worsening anemia secondary to chemotherapy.  Her hemoglobin dropped to 9.4 today without bleeding.  Her recent iron lab showed a significant elevated at ferritin at 3500 on 8/25/2017.  I also  obtained a B12 and folic acid level today both supratherapeutic.    6.  Ascites fluid.  She has quicker recurrence of ascites fluid, required repeated paracentesis again on 8/9/2017 with 4100 ml ascites removed.  She had 4700 removed on 8/4 2017.  She again had paracentesis on 8/15/2017 for another 4300 mL ascites removed.  She most recent paracentesis on 8/22/17 at 3000 ml.  however on 8/29/2017, radiologist did not find fluid that needed to be drained.  She is scheduled for weekly paracentesis.    7. Pain related to cancer. She has no specific complaints.  She did have some leg pains and takes narcotics as needed, typically once or twice a day.        8. Metastatic bone disease.  We'll consolidate her schedule, for her Zometa treatment, which will be arranged on day of her chemotherapy.  She will receive Zometa every 4 weeks, last given 08/18/2017.         9.  Poor oral intake and weight loss.  This is secondary to her malignancy and chemotherapy.  She had paracentesis, and improved symptomology.   We discussed again today to increase boost to 3 cans daily in addition to foods.      10. Oral candida. On 8/29/2017, Patient stated she had a gritty taste in her mouth that is unable to swallow pills or have much of an appetite. We prescribed nystatin swish and swallow, as with her further elevated creatinine we cannot prescribe fluconazole.  If this is not improved when she follows up on Friday we can consider Mycelex mina.              PLAN:   1.  Hold her chemotherapy today   2.  Intravenous D5 water 500 mL today.  We do not have half normal saline the clinic.    3.  Patient will be admitted to [facility] today for further evaluation and management.  I spoke to admitting Dr. Paz today.       More than 60 min, over half of that time were for counseling and coordinating her care.    ELVIN FONG M.D., Ph.D.    9/1/2017

## 2017-09-01 NOTE — PROGRESS NOTES
Clinical Pharmacy Services: Medication History    Erin Vora is a 82 y.o. female presenting to [facility] with chief complaint of:   Acute renal failure, hypernatremia, and hyperchloremia     She  has a past medical history of Anemia; Bone metastases (2016); Breast cancer (2005); HL (hearing loss); Hyperlipidemia; Hyperlipidemia; Hypertension; and Pneumonia (8/1/2017).    Allergies as of 09/01/2017   • (No Known Allergies)       Medication information was obtained from: Patient's pharmacy and med list  Pharmacy and Phone Number: [facility], [phone]    Prior to Admission Medications     Prescriptions Last Dose Informant Patient Reported? Taking?    Acetaminophen (TYLENOL PO)  Self Yes Yes    Take 1 tablet by mouth Every 6 (Six) Hours As Needed.    aspirin 81 MG EC tablet  Self Yes Yes    Take 81 mg by mouth Daily.    calcium carbonate-vitamin d (CALCIUM 600+D HIGH POTENCY) 600-400 MG-UNIT per tablet  Self Yes Yes    Take 1 tablet by mouth Daily.    diphenoxylate-atropine (LOMOTIL) 2.5-0.025 MG per tablet  Pharmacy No Yes    Take 1-2 tabs 4 times daily PRN for diarrhea    HYDROcodone-acetaminophen (NORCO) 5-325 MG per tablet  Pharmacy No Yes    Take 1 tablet by mouth Every 6 (Six) Hours As Needed for Moderate Pain (4-6) or Severe Pain  (7-10).    Multiple Vitamin (MULTIVITAMIN) tablet  Self Yes Yes    Take 1 tablet by mouth Daily.    nystatin (MYCOSTATIN) 943409 UNIT/ML suspension  Pharmacy No Yes    Swish and swallow 5 mL 4 (Four) Times a Day.    Omega-3 Fatty Acids (FISH OIL PO)  Self Yes Yes    Take 300 mg by mouth Daily.    ondansetron (ZOFRAN) 4 MG tablet  Pharmacy No Yes    Take 1 tablet by mouth Every 8 (Eight) Hours As Needed for Nausea or Vomiting.    predniSONE (DELTASONE) 10 MG tablet  Pharmacy Yes Yes    Take 5 mg by mouth Daily.    pravastatin (PRAVACHOL) 40 MG tablet  Pharmacy Yes Yes    Take 40 mg by mouth Every Night.    prochlorperazine (COMPAZINE) 10 MG tablet  Pharmacy No Yes    Take 1 tablet  by mouth Every 6 (Six) Hours As Needed for Nausea or Vomiting.            Medication notes: Prednisone 5 and 10 mg were on home med list. Pt says she only takes Prednisone 10 mg 0.5 tablet daily.    This medication list is complete to the best of my knowledge as of 9/1/2017    Please call if questions.    Laquita Alcaraz, Pharmacy Intern  9/1/2017 1:23 PM

## 2017-09-01 NOTE — PLAN OF CARE
Problem: Patient Care Overview (Adult)  Goal: Plan of Care Review  Outcome: Ongoing (interventions implemented as appropriate)    09/01/17 1420   Coping/Psychosocial Response Interventions   Plan Of Care Reviewed With patient   Patient Care Overview   Progress no change   Outcome Evaluation   Outcome Summary/Follow up Plan Pt is a direct admit with renal insufficency and hypernatremia. Pt has had diarrhea the past couple of days. Awaiting admiting orders and CXR to verify placement of PICC in LUE, no c/o pain, VSS, will continue to monitor.

## 2017-09-01 NOTE — H&P
Name: Erin Vora ADMIT: 2017   : 1935  PCP: Shivam Llanes MD    MRN: [id number] LOS: 0 days   AGE/SEX: 82 y.o. female  ROOM: 53/     No chief complaint on file.  Chief complaint is diarrhea    Subjective   Ms. Vora is a 82 y.o. female who presents to [facility] complaining of diarrhea  History of Present Illness  Patient is a very pleasant 82-year-old female with a history of metastatic breast cancer to her bones.  She is getting Taxol weekly she has had 2 doses.  She was scheduled to have dyspnea #3 today.  She has had diarrhea for about 3 weeks since he's been started on Taxol which has progressively worsened.  He was also recently diagnosed with thrush which which further complicated her oral intake.  She had labs done today which showed a kidney injury of 3.2 with a baseline of around 1.  In addition she was hypernatremic at 158.  I was called by Dr. Gutierres to directly admit the patient.  Prior to arrival she had been started on D5W.  She was at the oncologist office about 2 days ago and was given some IV fluids for a creatinine of 2.  Despite this her kidney function continued to worsen.  She's had decreased oral intake, nausea and vomiting as well as about 5 watery bowel movements per day.  There is no blood or mucus.  She also denies any fevers or chills.  In addition her hemoglobin has been dropping from 11.2-9.4 currently.  Her white blood cell count is improved and has not had any neutropenia.  She also has peritoneal carcinoid ptosis and has undergone a total of 4 paracenteses with about 5 L removed the first time then 4 L then 2 L ×2.  Her last paracentesis was .    She had recent admission in 2017 for shortness of breath, lower extremity swelling and was found to have pneumonia with abdominal carcinomatosis and acute kidney injury.  The acute kidney injury was postobstructive and she had bilateral ureteral stents placed at that time.    For the  past week she's had cold feet and discoloration of her toes and heels.  Past Medical History:   Diagnosis Date   • Anemia    • Bone metastases 2016   • Breast cancer 2005   • HL (hearing loss)    • Hyperlipidemia    • Hyperlipidemia    • Hypertension    • Pneumonia 8/1/2017     Past Surgical History:   Procedure Laterality Date   • BREAST BIOPSY Left 2005    malignant   • CYSTOSCOPY W/ URETERAL STENT PLACEMENT Bilateral 8/3/2017    Procedure: WITH BILATERAL URETERAL STENT INSERTION;  Surgeon: Albino Jolley MD;  Location: [facility];  Service:    • MASTECTOMY Left 2005     Family History   Problem Relation Age of Onset   • Ovarian cancer Sister 84   • Heart failure Sister    • Hypertension Sister    • No Known Problems Mother    • No Known Problems Father    • No Known Problems Brother    • No Known Problems Son    • No Known Problems Daughter    • No Known Problems Maternal Grandmother    • No Known Problems Maternal Grandfather    • No Known Problems Paternal Grandmother    • No Known Problems Paternal Grandfather    • No Known Problems Cousin    • Throat cancer Sister      Social History   Substance Use Topics   • Smoking status: Former Smoker     Packs/day: 1.00     Years: 2.00     Types: Cigarettes     Quit date: 1967   • Smokeless tobacco: Not on file      Comment: About 50 years ago   • Alcohol use No     Prescriptions Prior to Admission   Medication Sig Dispense Refill Last Dose   • Acetaminophen (TYLENOL PO) Take 1 tablet by mouth Every 6 (Six) Hours As Needed.   Taking   • aspirin 81 MG EC tablet Take 81 mg by mouth Daily.   Taking   • calcium carbonate-vitamin d (CALCIUM 600+D HIGH POTENCY) 600-400 MG-UNIT per tablet Take 1 tablet by mouth Daily.   Taking   • diphenoxylate-atropine (LOMOTIL) 2.5-0.025 MG per tablet Take 1-2 tabs 4 times daily PRN for diarrhea 60 tablet 0 Taking   • HYDROcodone-acetaminophen (NORCO) 5-325 MG per tablet Take 1 tablet by mouth Every 6 (Six) Hours As Needed for  Moderate Pain (4-6) or Severe Pain  (7-10). 90 tablet 0 Taking   • Multiple Vitamin (MULTIVITAMIN) tablet Take 1 tablet by mouth Daily.   Taking   • nystatin (MYCOSTATIN) 669139 UNIT/ML suspension Swish and swallow 5 mL 4 (Four) Times a Day. 473 mL 0 Taking   • Omega-3 Fatty Acids (FISH OIL PO) Take 300 mg by mouth Daily.   Taking   • ondansetron (ZOFRAN) 4 MG tablet Take 1 tablet by mouth Every 8 (Eight) Hours As Needed for Nausea or Vomiting. 30 tablet 2 Taking   • pravastatin (PRAVACHOL) 40 MG tablet Take 40 mg by mouth Every Night.   Taking   • predniSONE (DELTASONE) 10 MG tablet Take 5 mg by mouth Daily.      • prochlorperazine (COMPAZINE) 10 MG tablet Take 1 tablet by mouth Every 6 (Six) Hours As Needed for Nausea or Vomiting. 30 tablet 3 Taking     Allergies:  Review of patient's allergies indicates no known allergies.    Review of Systems   Constitutional: Positive for activity change, appetite change, fatigue and unexpected weight change. Negative for fever.   HENT: Negative for sore throat and trouble swallowing.    Eyes: Negative for pain and visual disturbance.   Respiratory: Negative for cough, chest tightness and shortness of breath.    Cardiovascular: Negative for chest pain, palpitations and leg swelling.   Gastrointestinal: Positive for diarrhea, nausea and vomiting. Negative for abdominal distention, abdominal pain, blood in stool and constipation.   Endocrine:        Negative for Diabetes or thyroid disease   Genitourinary: Negative for dysuria and hematuria.   Musculoskeletal: Negative.  Negative for back pain, neck pain and neck stiffness.   Skin: Negative for rash and wound.   Neurological: Positive for weakness. Negative for dizziness, syncope, light-headedness and headaches.   Hematological: Negative for adenopathy. Bruises/bleeds easily.   Psychiatric/Behavioral: Negative for agitation, behavioral problems and confusion.        Objective    Vital Signs  Temp:  [97.2 °F (36.2 °C)-97.5 °F  (36.4 °C)] 97.2 °F (36.2 °C)  Heart Rate:  [] 96  Resp:  [16-18] 18  BP: (120-133)/(66-75) 123/70  SpO2:  [93 %-99 %] 96 %  on   ;   O2 Device: room air  Body mass index is 19.75 kg/(m^2).    Physical Exam   Constitutional: She is oriented to person, place, and time.   Elderly and chronically ill-appearing, cachectic and thin   HENT:   Head: Normocephalic and atraumatic.   Mouth/Throat: No oropharyngeal exudate.   Dry mucous membranes   Eyes: Conjunctivae and EOM are normal. Pupils are equal, round, and reactive to light. No scleral icterus.   Neck: Normal range of motion. Neck supple. No JVD present. No thyromegaly present.   Cardiovascular: Normal rate, regular rhythm and normal heart sounds.  Exam reveals no gallop and no friction rub.    No murmur heard.  Could not Palpate her dorsalis pedis were posterior tibial   Pulmonary/Chest: Effort normal and breath sounds normal. No stridor. No respiratory distress. She has no wheezes. She has no rales.   Abdominal: Soft. Bowel sounds are normal. She exhibits no distension (No ascites). There is no tenderness. There is no rebound and no guarding.   Musculoskeletal: She exhibits edema (2-3+ LE edema). She exhibits no tenderness.   Lymphadenopathy:     She has no cervical adenopathy.   Neurological: She is alert and oriented to person, place, and time. No cranial nerve deficit.   Skin: Skin is warm and dry.   Psychiatric: She has a normal mood and affect. Her behavior is normal.   Nursing note and vitals reviewed.      Results Review:   I reviewed the patient's new clinical results.    Results from last 7 days  Lab Units 09/01/17  0938 08/29/17  1423   WBC 10*3/mm3 2.67* 1.91*   HEMOGLOBIN g/dL 9.4* 10.2*   PLATELETS 10*3/mm3 282 273       Results from last 7 days  Lab Units 09/01/17  1357 09/01/17  0936 08/29/17  1422   SODIUM mmol/L 156* 158* 155*   POTASSIUM mmol/L 5.0 5.3* 5.2*   CHLORIDE mmol/L 121* 124* 120*   CO2 mmol/L 19.4* 18.1* 19.5*   BUN mg/dL 52* 54*  37*   CREATININE mg/dL 3.17* 3.32* 2.24*   GLUCOSE mg/dL 162* 198* 139*   ALBUMIN g/dL  --  2.40* 2.30*   BILIRUBIN mg/dL  --  0.2 0.3   ALK PHOS U/L  --  95 83   AST (SGOT) U/L  --  49* 45*   ALT (SGPT) U/L  --  15 14   Estimated Creatinine Clearance: 10.9 mL/min (by C-G formula based on Cr of 3.17).          US Renal Bilateral    (Results Pending)   XR Chest Post CVA Port    (Results Pending)     Assessment/Plan   Assessment:     Active Hospital Problems (** Indicates Principal Problem)    Diagnosis Date Noted   • **Hypernatremia [E87.0] 09/01/2017   • Diarrhea [R19.7] 09/01/2017   • Dehydration [E86.0] 08/29/2017   • Acute kidney injury [N17.9] 08/02/2017   • Essential hypertension [I10] 03/28/2017   • Weight loss due to medication [T50.901A, R63.4] 12/02/2016   • Breast cancer metastasized to bone [C50.919, C79.51] 11/04/2016      Resolved Hospital Problems    Diagnosis Date Noted Date Resolved   • Metastasis to bone [C79.51] 10/23/2016 11/04/2016         Plan:     -Continue with aggressive IV fluid hydration with a free water deficit of 2.8 L, follow sodium every 6 hours and careful not to decrease it quickly, goal 8 mEq per 24 hours.  -Renal function improving with hydration, if does not continue to improve we'll consult nephrology  -Recent ureteral stenting and acute kidney injury, suspect it's prerenal but will obtain ultrasounds of her kidneys to be sure  -She has cold and discolored toes bilaterally and I cannot palpate her pulses I'll check ABIs.  If abnormal consult vascular surgery but cannot get CT of her legs due to renal injury   -Check C. difficile and stool culture, if negative restart her on Lomotil and Imodium  -Suspect her lower extremity edema is due to her abdominal metastasis.  Doppler per last hospitalization isn't negative and her son states the edema is actually improved    The patient wishes to be conditional code.  She is fine with CPR, defibrillation but not intubation.  See orders for  details.  The patient's son is her healthcare surrogate    I discussed the patients findings and my recommendations with patient, family, nursing staff and primary care team.    Jordon John MD  [facility]  09/01/17  4:43 PM

## 2017-09-02 NOTE — PLAN OF CARE
Problem: Patient Care Overview (Adult)  Goal: Plan of Care Review  Outcome: Ongoing (interventions implemented as appropriate)    09/01/17 2248   Coping/Psychosocial Response Interventions   Plan Of Care Reviewed With patient   Patient Care Overview   Progress no change   Outcome Evaluation   Outcome Summary/Follow up Plan no c/o pain or distress noted. vss. stool obtained and sent to lab. meds administered. will continue to monitor         Problem: Nutrition, Imbalanced: Inadequate Oral Intake (Adult)  Goal: Identify Related Risk Factors and Signs and Symptoms  Outcome: Outcome(s) achieved Date Met:  09/01/17 09/01/17 2248   Nutrition, Imbalanced: Inadequate Oral Intake   Nutrition Imbalanced: Less than Body Requirements: Related Risk Factors eating difficulty;knowledge deficit   Signs and Symptoms (Nutrition Imbalance, Inadequate Oral Intake: Signs and Symptoms) diarrhea/malabsorption;edema/ascites;weakness/lethargy       Goal: Improved Oral Intake  Outcome: Ongoing (interventions implemented as appropriate)    09/01/17 2248   Nutrition, Imbalanced: Inadequate Oral Intake (Adult)   Improved Oral Intake making progress toward outcome       Goal: Prevent Further Weight Loss  Outcome: Ongoing (interventions implemented as appropriate)    09/01/17 2248   Nutrition, Imbalanced: Inadequate Oral Intake (Adult)   Prevent Further Weight Loss making progress toward outcome         Problem: Tissue Perfusion, Ineffective Peripheral (Adult)  Goal: Identify Related Risk Factors and Signs and Symptoms  Outcome: Outcome(s) achieved Date Met:  09/01/17 09/01/17 2248   Tissue Perfusion, Ineffective Peripheral   Tissue Perfusion, Ineffective Peripheral: Related Risk Factors aging effects;disease process   Signs and Symptoms (Ineffective Peripheral Tissue Perfusion) edema;muscle weakness;peripheral pulses absent/decreased       Goal: Adequate Tissue Perfusion  Outcome: Ongoing (interventions implemented as appropriate)     09/01/17 2248   Tissue Perfusion, Ineffective Peripheral (Adult)   Adequate Tissue Perfusion making progress toward outcome         Problem: Fluid Volume Deficit (Adult)  Goal: Identify Related Risk Factors and Signs and Symptoms  Outcome: Outcome(s) achieved Date Met:  09/01/17 09/01/17 2248   Fluid Volume Deficit   Fluid Volume Deficit: Related Risk Factors age extremes;vomiting/diarrhea   Signs and Symptoms (Fluid Volume Deficit) edema;lab value changes;mucous membranes dry;nausea/vomiting, anorexia, diarrhea complaints;thirst       Goal: Fluid/Electrolyte Balance  Outcome: Ongoing (interventions implemented as appropriate)    09/01/17 2248   Fluid Volume Deficit (Adult)   Fluid/Electrolyte Balance making progress toward outcome       Goal: Comfort/Well Being  Outcome: Ongoing (interventions implemented as appropriate)    09/01/17 2248   Fluid Volume Deficit (Adult)   Comfort/Well Being making progress toward outcome

## 2017-09-02 NOTE — PROGRESS NOTES
Name: Erin Vora ADMIT: 2017   : 1935  PCP: Shivam Llanes MD    MRN: [id number] LOS: 1 days   AGE/SEX: 82 y.o. female  ROOM: [facility]   Subjective    Feels stronger but still weak  No diarrhea  Sodium went up to 159  Abdomen getting more distended but not painful  No chest pain or soa  No nausea or vomiting  ROSEMARIE normal  Subjective    Objective   Vital Signs  Temp:  [97.1 °F (36.2 °C)] 97.1 °F (36.2 °C)  Heart Rate:  [96] 96  Resp:  [18] 18  BP: (111)/(56) 111/56     on   ;   O2 Device: room air  Body mass index is 20 kg/(m^2).    Physical Exam   Constitutional: No distress.   Thin cachectic and chronically ill appearing   HENT:   Head: Normocephalic and atraumatic.   Eyes: EOM are normal. Pupils are equal, round, and reactive to light. No scleral icterus.   Neck: No JVD present.   Cardiovascular: Normal rate and regular rhythm.  Exam reveals no friction rub.    No murmur heard.  Pulmonary/Chest: Effort normal and breath sounds normal. No stridor. No respiratory distress. She has no wheezes.   Abdominal: Soft. Bowel sounds are normal. She exhibits no distension. There is no tenderness. There is no guarding.   Skin: Skin is warm and dry. She is not diaphoretic.   Cool feet but ABIs normal   Psychiatric: She has a normal mood and affect. Her behavior is normal.   Nursing note and vitals reviewed.      Results Review:       I reviewed the patient's new clinical results.    Results from last 7 days  Lab Units 17  0451 17  0938 17  1423   WBC 10*3/mm3 4.31* 2.67* 1.91*   HEMOGLOBIN g/dL 8.0* 9.4* 10.2*   PLATELETS 10*3/mm3 223 282 273     Results from last 7 days  Lab Units 17  1811 17  1245 17  0451 17  1945 17  1357 17  0936 17  1422   SODIUM mmol/L 154* 157* 159* 156* 156* 158* 155*   POTASSIUM mmol/L 4.0 4.1 4.3 4.5 5.0 5.3* 5.2*   CHLORIDE mmol/L 123* 126* 129* 123* 121* 124* 120*   CO2 mmol/L 17.3* 17.4* 18.4* 19.6* 19.4* 18.1* 19.5*    BUN mg/dL 43* 44* 46* 49* 52* 54* 37*   CREATININE mg/dL 2.96* 2.98* 2.96* 3.04* 3.17* 3.32* 2.24*   GLUCOSE mg/dL 174* 104* 86 111* 162* 198* 139*   Estimated Creatinine Clearance: 11.9 mL/min (by C-G formula based on Cr of 2.96).  Results from last 7 days  Lab Units 09/02/17  1811 09/02/17  1245 09/02/17  0451 09/01/17  1945 09/01/17  1357 09/01/17  0936 08/29/17  1422   CALCIUM mg/dL 6.9* 7.2* 7.1* 7.7* 8.0* 7.6* 7.2*   ALBUMIN g/dL  --   --   --   --   --  2.40* 2.30*   MAGNESIUM mg/dL  --   --  3.0*  --   --  3.2* 2.8*   PHOSPHORUS mg/dL  --   --  3.3  --   --   --   --          aspirin 81 mg Oral Daily   atorvastatin 10 mg Oral Daily   calcium-vitamin D 500 mg Oral Daily   diphenoxylate-atropine 1 tablet Oral 4x Daily   heparin (porcine) 5,000 Units Subcutaneous Q8H   nystatin 500,000 Units Swish & Swallow 4x Daily   predniSONE 5 mg Oral Daily       dextrose 5 % and sodium chloride 0.2 % 125 mL/hr Last Rate: 125 mL/hr (09/02/17 1241)   Diet Regular      Assessment/Plan   Assessment:     Active Hospital Problems (** Indicates Principal Problem)    Diagnosis Date Noted   • **Hypernatremia [E87.0] 09/01/2017   • Diarrhea [R19.7] 09/01/2017   • Dehydration [E86.0] 08/29/2017   • Acute kidney injury [N17.9] 08/02/2017   • Essential hypertension [I10] 03/28/2017   • Weight loss due to medication [T50.901A, R63.4] 12/02/2016   • Breast cancer metastasized to bone [C50.919, C79.51] 11/04/2016      Resolved Hospital Problems    Diagnosis Date Noted Date Resolved   • Metastasis to bone [C79.51] 10/23/2016 11/04/2016       Plan:   -consult nephrology for help with sodium and with MARIA DEL CARMEN since she didn't improve much overnight  -continue current IV fluids  -renal ultrasound show no hydronephrosis and stents in the bladder  -Renal function improving with hydration, if does not continue to improve we'll consult nephrology  -Feet are cool but ABIs are normal  - C. difficile is negative and stool culture is pending, restart  her on Lomotil and Imodium  -Suspect her lower extremity edema is due to her abdominal metastasis.  Doppler last hospitalization is negative and her son states the edema is actually improved  -consult PT    Disposition  · Suspect 2 days pending improvement    Jordon John MD  [facility]  09/02/17  7:33 PM

## 2017-09-03 NOTE — PROGRESS NOTES
Name: Erin Vora ADMIT: 2017   : 1935  PCP: Shivam Llanes MD    MRN: [id number] LOS: 2 days   AGE/SEX: 82 y.o. female  ROOM: [facility]   Subjective    Feels stronger but still weak  No diarrhea still  Sodium down to 147, coming down slowly  Abdomen getting more distended and tender today  No chest pain or soa  No nausea or vomiting  ROSEMARIE normal  Subjective    Objective   Vital Signs  Temp:  [97.4 °F (36.3 °C)-99 °F (37.2 °C)] 99 °F (37.2 °C)  Heart Rate:  [] 103  Resp:  [18] 18  BP: (122-143)/(62-71) 122/62  SpO2:  [97 %-99 %] 97 %  on   ;   O2 Device: room air  Body mass index is 20.55 kg/(m^2).    Physical Exam   Constitutional: No distress.   Thin cachectic and chronically ill appearing   HENT:   Head: Normocephalic and atraumatic.   Eyes: EOM are normal. Pupils are equal, round, and reactive to light. No scleral icterus.   Neck: No JVD present.   Cardiovascular: Normal rate and regular rhythm.  Exam reveals no friction rub.    No murmur heard.  Pulmonary/Chest: Effort normal and breath sounds normal. No stridor. No respiratory distress. She has no wheezes.   Abdominal: Soft. Bowel sounds are normal. She exhibits no distension. There is no tenderness. There is no guarding.   Skin: Skin is warm and dry. She is not diaphoretic.   Cool feet but ABIs normal   Psychiatric: She has a normal mood and affect. Her behavior is normal.   Nursing note and vitals reviewed.      Results Review:       I reviewed the patient's new clinical results.    Results from last 7 days  Lab Units 17  0612 17  0451 17  0938 17  1423   WBC 10*3/mm3 4.79 4.31* 2.67* 1.91*   HEMOGLOBIN g/dL 8.4* 8.0* 9.4* 10.2*   PLATELETS 10*3/mm3 201 223 282 273       Results from last 7 days  Lab Units 17  1238 17  0612 17  0011 17  1811 17  1245 17  0451 17  1945   SODIUM mmol/L 147* 152* 153* 154* 157* 159* 156*   POTASSIUM mmol/L 3.8 4.0 3.8 4.0 4.1 4.3 4.5    CHLORIDE mmol/L 119* 122* 123* 123* 126* 129* 123*   CO2 mmol/L 15.4* 19.0* 17.5* 17.3* 17.4* 18.4* 19.6*   BUN mg/dL 37* 41* 43* 43* 44* 46* 49*   CREATININE mg/dL 2.55* 2.87* 2.94* 2.96* 2.98* 2.96* 3.04*   GLUCOSE mg/dL 138* 120* 147* 174* 104* 86 111*   Estimated Creatinine Clearance: 14.2 mL/min (by C-G formula based on Cr of 2.55).  Results from last 7 days  Lab Units 09/03/17  1238 09/03/17  0612 09/03/17  0011 09/02/17  1811 09/02/17  1245 09/02/17  0451 09/01/17  1945  09/01/17  0936 08/29/17  1422   CALCIUM mg/dL 6.4* 6.5* 6.4* 6.9* 7.2* 7.1* 7.7*  < > 7.6* 7.2*   ALBUMIN g/dL  --   --   --   --   --   --   --   --  2.40* 2.30*   MAGNESIUM mg/dL  --   --   --   --   --  3.0*  --   --  3.2* 2.8*   PHOSPHORUS mg/dL  --   --   --   --   --  3.3  --   --   --   --    < > = values in this interval not displayed.      aspirin 81 mg Oral Daily   atorvastatin 10 mg Oral Daily   calcium-vitamin D 500 mg Oral Daily   diphenoxylate-atropine 1 tablet Oral 4x Daily   heparin (porcine) 5,000 Units Subcutaneous Q8H   nystatin 500,000 Units Swish & Swallow 4x Daily   predniSONE 5 mg Oral Daily       dextrose 5 % and sodium chloride 0.2 % 125 mL/hr Last Rate: 125 mL/hr (09/03/17 0608)   Diet Regular      Assessment/Plan   Assessment:     Active Hospital Problems (** Indicates Principal Problem)    Diagnosis Date Noted   • **Hypernatremia [E87.0] 09/01/2017   • Diarrhea [R19.7] 09/01/2017   • Dehydration [E86.0] 08/29/2017   • Acute kidney injury [N17.9] 08/02/2017   • Essential hypertension [I10] 03/28/2017   • Weight loss due to medication [T50.901A, R63.4] 12/02/2016   • Breast cancer metastasized to bone [C50.919, C79.51] 11/04/2016      Resolved Hospital Problems    Diagnosis Date Noted Date Resolved   • Metastasis to bone [C79.51] 10/23/2016 11/04/2016       Plan:   -appreciate nephrology's help with sodium and with MARIA DEL CARMEN   -abdomen getting more distended with IV fluids so try to get paracentesis tomorrow (last was  8/22)  -sodium decreased 152 to 147 in 6 hours so decrease rate and change to D5 1/2NS  -renal ultrasound show no hydronephrosis and stents in the bladder  -Renal function improving with hydration   -Feet are cool but ABIs are normal  - C. difficile is negative and stool culture is pending, restarted her on Lomotil and Imodium and without diarrhea  -Suspect her lower extremity edema is due to her abdominal metastasis.  Doppler last hospitalization is negative and her son states the edema is actually improved  -consulted PT    Disposition  · Suspect 2 days pending improvement    Jordon John MD  [facility]  09/03/17  2:21 PM

## 2017-09-03 NOTE — PLAN OF CARE
Problem: Patient Care Overview (Adult)  Goal: Plan of Care Review  Outcome: Ongoing (interventions implemented as appropriate)    09/03/17 1647   Coping/Psychosocial Response Interventions   Plan Of Care Reviewed With patient   Outcome Evaluation   Outcome Summary/Follow up Plan PT EVAL completed. Pt transferred supine to sit with supervision and use of bedrail. Pt transferred sit to stand with CGA x 1 and RW. Pt ambulated 150 ft with CGA x 1 and RW. Pt requires skilled therapy due to balance deficits, decreased activity tolerance and decreased ambulation. Recommend D/C home with assist.         Problem: Inpatient Physical Therapy  Goal: Transfer Training Goal 1 LTG- PT  Outcome: Ongoing (interventions implemented as appropriate)    09/03/17 1647   Transfer Training PT LTG   Transfer Training PT LTG, Date Established 09/03/17   Transfer Training PT LTG, Time to Achieve 1 wk   Transfer Training PT LTG, Gage Level conditional independence   Transfer Training PT LTG, Assist Device walker, rolling       Goal: Gait Training Goal LTG- PT  Outcome: Ongoing (interventions implemented as appropriate)    09/03/17 1647   Gait Training PT LTG   Gait Training Goal PT LTG, Date Established 09/03/17   Gait Training Goal PT LTG, Time to Achieve 1 wk   Gait Training Goal PT LTG, Gage Level supervision required   Gait Training Goal PT LTG, Assist Device walker, rolling   Gait Training Goal PT LTG, Distance to Achieve 400

## 2017-09-03 NOTE — THERAPY EVALUATION
Acute Care - Physical Therapy Initial Evaluation  [facility]     Patient Name: Erin Vora  : 1935  MRN: [id number]  Today's Date: 9/3/2017   Onset of Illness/Injury or Date of Surgery Date: 17  Date of Referral to PT: 17  Referring Physician: Alvaro      Admit Date: 2017     Visit Dx:  No diagnosis found.  Patient Active Problem List   Diagnosis   • Breast cancer metastasized to bone   • Anemia in neoplastic disease   • Cancer associated pain   • Anemia associated with chemotherapy   • Weight loss due to medication   • Poor appetite   • Hypokalemia   • Hyperlipidemia   • Hearing loss   • Medicare annual wellness visit, initial   • Anemia of chronic renal failure, stage 3 (moderate)   • Post-menopause   • Encounter for medication review and counseling   • Fever   • Nausea and vomiting   • Essential hypertension   • Anemia   • Pneumonia   • Chronic kidney disease, stage 3   • Distended abdomen   • Acute kidney injury   • Abdominal carcinomatosis   • Ascites, malignant   • Dehydration   • Chemotherapy induced neutropenia   • Hypernatremia   • Diarrhea     Past Medical History:   Diagnosis Date   • Anemia    • Bone metastases    • Breast cancer    • HL (hearing loss)    • Hyperlipidemia    • Hyperlipidemia    • Hypertension    • Pneumonia 2017     Past Surgical History:   Procedure Laterality Date   • BREAST BIOPSY Left     malignant   • CYSTOSCOPY W/ URETERAL STENT PLACEMENT Bilateral 8/3/2017    Procedure: WITH BILATERAL URETERAL STENT INSERTION;  Surgeon: Albino Jolley MD;  Location: [facility] OR;  Service:    • MASTECTOMY Left           PT ASSESSMENT (last 72 hours)      PT Evaluation       17 1600 17 1542    Rehab Evaluation    Document Type evaluation  -LC     Subjective Information agree to therapy;no complaints  -LC     Evaluation Not Performed  patient unavailable for evaluation   off floor to radiology this pm, will follow up tomorrow.   -    General Information    Patient Profile Review yes  -LC     Onset of Illness/Injury or Date of Surgery Date 09/01/17  -     Referring Physician Alvaro  -     General Observations awake, alert, fowlers  -     Pertinent History Of Current Problem nypernatremia  -     Precautions/Limitations fall precautions  -     Prior Level of Function independent:;all household mobility;community mobility;ADL's;home management  -     Equipment Currently Used at Home cane, straight  -     Plans/Goals Discussed With patient;agreed upon  -     Risks Reviewed patient:;LOB;nausea/vomiting;dizziness;increased discomfort;change in vital signs;increased drainage;lines disloged  -     Benefits Reviewed patient:;improve function;increase independence;increase strength;increase balance;decrease pain;decrease risk of DVT;improve skin integrity;increase knowledge  -     Barriers to Rehab none identified  -     Living Environment    Lives With alone  -     Living Arrangements house  -     Home Accessibility no concerns  -     Stair Railings at Home none  -     Type of Financial/Environmental Concern none  -     Transportation Available car;family or friend will provide  -     Clinical Impression    Date of Referral to PT 09/02/17  -     PT Diagnosis impaired gait and mobility  -     Patient/Family Goals Statement return home  -     Criteria for Skilled Therapeutic Interventions Met yes;treatment indicated  -     Predicted Duration of Therapy Intervention (days/wks) 7 days  -     Pain Assessment    Pain Assessment No/denies pain  -     Vision Assessment/Intervention    Visual Impairment WNL  -     Cognitive Assessment/Intervention    Current Cognitive/Communication Assessment functional  -     Orientation Status oriented x 4  -     Follows Commands/Answers Questions 100% of the time;able to follow multi-step instructions  -     Personal Safety WNL/WFL  -     Personal Safety  Interventions fall prevention program maintained;gait belt;nonskid shoes/slippers when out of bed;supervised activity  -     ROM (Range of Motion)    General ROM Detail BLE AROM WNL  -LC     MMT (Manual Muscle Testing)    General MMT Assessment Detail BLE MMT grossly 4/5  -LC     Bed Mobility, Assessment/Treatment    Bed Mobility, Assistive Device bed rails  -     Bed Mobility, Roll Right, [place] supervision required  -     Bed Mobility, Scoot/Bridge, [place] supervision required  -LC     Bed Mob, Supine to Sit, [place] supervision required  -LC     Bed Mob, Sit to Supine, [place] supervision required  -LC     Bed Mobility, Impairments strength decreased  -     Transfer Assessment/Treatment    Transfers, Sit-Stand [place] contact guard assist  -     Transfers, Stand-Sit [place] contact guard assist  -     Transfers, Sit-Stand-Sit, Assist Device rolling walker  -     Gait Assessment/Treatment    Gait, [place] Level contact guard assist  -     Gait, Assistive Device rolling walker  -     Gait, Distance (Feet) 150  -     Gait, Gait Deviations narrow base;step length decreased;stride length decreased  -     Gait, Safety Issues loses balance backward;balance decreased during turns  -     Gait, Impairments strength decreased;impaired balance  -     Motor Skills/Interventions    Additional Documentation Balance Skills Training (Group)  -     Balance Skills Training    Sitting-Level of Assistance Independent  -     Sitting-Balance Support Feet supported  -     Standing-Level of Assistance Contact guard  -     Static Standing Balance Support assistive device  -     Gait Balance-Level of Assistance Contact guard  -     Gait Balance Support assistive device  -     Positioning and Restraints    Pre-Treatment Position in bed  -     Post Treatment Position bed  -LC     In Bed fowlers;notified nsg;call light within reach;encouraged to call for  assist;side rails up x2  -       09/01/17 1306 09/01/17 1302    General Information    Equipment Currently Used at Home  cane, straight  -KS    Living Environment    Lives With alone  -KS     Living Arrangements house  -KS     Home Accessibility no concerns  -KS     Stair Railings at Home none  -KS     Type of Financial/Environmental Concern none  -KS     Transportation Available car;family or friend will provide  -KS       User Key  (r) = Recorded By, (t) = Taken By, (c) = Cosigned By    Initials Name Provider Type    AMANDA Mendez, PT Physical Therapist     Bo Rivera, PT DPT Physical Therapist    TROY Caicedo, ROBBY Registered Nurse          Physical Therapy Education     Title: PT OT SLP Therapies (Active)     Topic: Physical Therapy (Active)     Point: Mobility training (Done)    Learning Progress Summary    Learner Readiness Method Response Comment Documented by Status   Patient Acceptance ESERGIO DU safety during transfers  09/03/17 1646 Done                      User Key     Initials Effective Dates Name Provider Type Discipline     08/02/16 -  Bo Rivera, PT DPT Physical Therapist PT                PT Recommendation and Plan  Anticipated Discharge Disposition: home  PT Frequency: daily  Plan of Care Review  Plan Of Care Reviewed With: patient  Outcome Summary/Follow up Plan: PT EVAL completed. Pt transferred supine to sit with supervision and use of bedrail. Pt transferred sit to stand with CGA x 1 and RW. Pt ambulated 150 ft with CGA x 1 and RW. Pt requires skilled therapy due to balance deficits, decreased activity tolerance and decreased ambulation. Recommend D/C home with assist.          IP PT Goals       09/03/17 1647          Transfer Training PT LTG    Transfer Training PT LTG, Date Established 09/03/17  -LC      Transfer Training PT LTG, Time to Achieve 1 wk  -LC      Transfer Training PT LTG, [place] Level conditional independence  -LC      Transfer Training PT LTG,  Assist Device walker, rolling  -LC      Gait Training PT LTG    Gait Training Goal PT LTG, Date Established 09/03/17  -LC      Gait Training Goal PT LTG, Time to Achieve 1 wk  -LC      Gait Training Goal PT LTG, [place] Level supervision required  -      Gait Training Goal PT LTG, Assist Device walker, rolling  -LC      Gait Training Goal PT LTG, Distance to Achieve 400  -LC        User Key  (r) = Recorded By, (t) = Taken By, (c) = Cosigned By    Initials Name Provider Type    CARINA Rivera, PT DPT Physical Therapist               Time Calculation:         PT Charges       09/03/17 1649 09/03/17 1542       Time Calculation    Start Time 1626  -LC      Stop Time 1649  -      Time Calculation (min) 23 min  -      PT Received On 09/03/17  -      PT - Next Appointment 09/04/17  - 09/04/17  -     PT Goal Re-Cert Due Date 09/10/17  -      Time Calculation- PT    Total Timed Code Minutes- PT 23 minute(s)  -        User Key  (r) = Recorded By, (t) = Taken By, (c) = Cosigned By    Initials Name Provider Type    AMANDA Mendez, PT Physical Therapist    LC Bo Rivera, PT DPT Physical Therapist          Therapy Charges for Today     Code Description Service Date Service Provider Modifiers Qty    14564429927 HC PT EVAL LOW COMPLEXITY 1 9/3/2017 Bo Rivera, PT DPT GP 1    75907995074 HC GAIT TRAINING EA 15 MIN 9/3/2017 Bo Rivera, PT DPT GP 1                 Bo Rivera PT DPT  9/3/2017

## 2017-09-03 NOTE — SIGNIFICANT NOTE
09/03/17 1542   Rehab Treatment   Discipline physical therapist   Rehab Evaluation   Evaluation Not Performed patient unavailable for evaluation  (off floor to radiology this pm, will follow up tomorrow.)   Recommendation   PT - Next Appointment 09/04/17

## 2017-09-03 NOTE — CONSULTS
Referring Provider: Dr. Cisneros  Reason for Consultation: MARIA DEL CARMEN    Subjective     Chief complaint No chief complaint on file.      History of present illness:  82 year old WM with metastatic breast cancer getting taxol now with recurrent maria del carmen, chemotherapy induced diarrhea and recurrent ascites.  She was directly admitted from Dr. Gutierres's office due to MARIA DEL CARMEN and hypernatremia.  A month ago she developed maria del carmen from hydronephrosis and had bilateral indwelling stents placed.  Repeat ultrasound did not show hydronephrosis this admission.  The patient has had a poor appetite and has had diarrhea 4 times per day.      Past Medical History:   Diagnosis Date   • Anemia    • Bone metastases 2016   • Breast cancer 2005   • HL (hearing loss)    • Hyperlipidemia    • Hyperlipidemia    • Hypertension    • Pneumonia 8/1/2017     Past Surgical History:   Procedure Laterality Date   • BREAST BIOPSY Left 2005    malignant   • CYSTOSCOPY W/ URETERAL STENT PLACEMENT Bilateral 8/3/2017    Procedure: WITH BILATERAL URETERAL STENT INSERTION;  Surgeon: Albino Jolley MD;  Location: [facility];  Service:    • MASTECTOMY Left 2005     Family History   Problem Relation Age of Onset   • Ovarian cancer Sister 84   • Heart failure Sister    • Hypertension Sister    • No Known Problems Mother    • No Known Problems Father    • No Known Problems Brother    • No Known Problems Son    • No Known Problems Daughter    • No Known Problems Maternal Grandmother    • No Known Problems Maternal Grandfather    • No Known Problems Paternal Grandmother    • No Known Problems Paternal Grandfather    • No Known Problems Cousin    • Throat cancer Sister        Social History   Substance Use Topics   • Smoking status: Former Smoker     Packs/day: 1.00     Years: 2.00     Types: Cigarettes     Quit date: 1967   • Smokeless tobacco: Not on file      Comment: About 50 years ago   • Alcohol use No     Prescriptions Prior to Admission   Medication Sig Dispense  "Refill Last Dose   • Acetaminophen (TYLENOL PO) Take 1 tablet by mouth Every 6 (Six) Hours As Needed.   Taking   • aspirin 81 MG EC tablet Take 81 mg by mouth Daily.   Taking   • calcium carbonate-vitamin d (CALCIUM 600+D HIGH POTENCY) 600-400 MG-UNIT per tablet Take 1 tablet by mouth Daily.   Taking   • diphenoxylate-atropine (LOMOTIL) 2.5-0.025 MG per tablet Take 1-2 tabs 4 times daily PRN for diarrhea 60 tablet 0 Taking   • HYDROcodone-acetaminophen (NORCO) 5-325 MG per tablet Take 1 tablet by mouth Every 6 (Six) Hours As Needed for Moderate Pain (4-6) or Severe Pain  (7-10). 90 tablet 0 Taking   • Multiple Vitamin (MULTIVITAMIN) tablet Take 1 tablet by mouth Daily.   Taking   • nystatin (MYCOSTATIN) 909349 UNIT/ML suspension Swish and swallow 5 mL 4 (Four) Times a Day. 473 mL 0 Taking   • Omega-3 Fatty Acids (FISH OIL PO) Take 300 mg by mouth Daily.   Taking   • ondansetron (ZOFRAN) 4 MG tablet Take 1 tablet by mouth Every 8 (Eight) Hours As Needed for Nausea or Vomiting. 30 tablet 2 Taking   • pravastatin (PRAVACHOL) 40 MG tablet Take 40 mg by mouth Every Night.   Taking   • predniSONE (DELTASONE) 10 MG tablet Take 5 mg by mouth Daily.      • prochlorperazine (COMPAZINE) 10 MG tablet Take 1 tablet by mouth Every 6 (Six) Hours As Needed for Nausea or Vomiting. 30 tablet 3 Taking     Allergies:  Review of patient's allergies indicates no known allergies.    Review of Systems  A comprehensive review of systems was negative except for: Constitutional: positive for anorexia  Gastrointestinal: positive for diarrhea    Objective     Vital Signs  Temp:  [97.1 °F (36.2 °C)-97.4 °F (36.3 °C)] 97.4 °F (36.3 °C)  Heart Rate:  [96] 96  Resp:  [18] 18  BP: (111-134)/(56-70) 134/70    Flowsheet Rows         First Filed Value    Admission Height  62\" (157.5 cm) Documented at 09/01/2017 1243    Admission Weight  108 lb (49 kg) Documented at 09/01/2017 1243           I/O this shift:  In: 1112.5 [I.V.:1112.5]  Out: -   I/O last 3 " completed shifts:  In: 993.8 [I.V.:993.8]  Out: 200 [Urine:200]    Intake/Output Summary (Last 24 hours) at 09/02/17 2151  Last data filed at 09/02/17 2135   Gross per 24 hour   Intake          2106.25 ml   Output                0 ml   Net          2106.25 ml       Physical Exam:   very frail and thin. pale  No jvd  Lungs clear  RRR  abd soft protuberant bs+  Ext 2+ edema    Results Review:  Results for orders placed or performed during the hospital encounter of 09/01/17   Clostridium Difficile Toxin, PCR   Result Value Ref Range    C. Difficile Toxins by PCR Negative Negative   Basic Metabolic Panel   Result Value Ref Range    Glucose 162 (H) 65 - 99 mg/dL    BUN 52 (H) 8 - 23 mg/dL    Creatinine 3.17 (H) 0.57 - 1.00 mg/dL    Sodium 156 (H) 136 - 145 mmol/L    Potassium 5.0 3.5 - 5.2 mmol/L    Chloride 121 (H) 98 - 107 mmol/L    CO2 19.4 (L) 22.0 - 29.0 mmol/L    Calcium 8.0 (L) 8.6 - 10.5 mg/dL    eGFR Non African Amer 14 (L) >60 mL/min/1.73    BUN/Creatinine Ratio 16.4 7.0 - 25.0    Anion Gap 15.6 mmol/L   Basic Metabolic Panel   Result Value Ref Range    Glucose 111 (H) 65 - 99 mg/dL    BUN 49 (H) 8 - 23 mg/dL    Creatinine 3.04 (H) 0.57 - 1.00 mg/dL    Sodium 156 (H) 136 - 145 mmol/L    Potassium 4.5 3.5 - 5.2 mmol/L    Chloride 123 (H) 98 - 107 mmol/L    CO2 19.6 (L) 22.0 - 29.0 mmol/L    Calcium 7.7 (L) 8.6 - 10.5 mg/dL    eGFR Non African Amer 15 (L) >60 mL/min/1.73    BUN/Creatinine Ratio 16.1 7.0 - 25.0    Anion Gap 13.4 mmol/L   Magnesium   Result Value Ref Range    Magnesium 3.2 (H) 1.6 - 2.4 mg/dL   Basic Metabolic Panel   Result Value Ref Range    Glucose 86 65 - 99 mg/dL    BUN 46 (H) 8 - 23 mg/dL    Creatinine 2.96 (H) 0.57 - 1.00 mg/dL    Sodium 159 (H) 136 - 145 mmol/L    Potassium 4.3 3.5 - 5.2 mmol/L    Chloride 129 (H) 98 - 107 mmol/L    CO2 18.4 (L) 22.0 - 29.0 mmol/L    Calcium 7.1 (L) 8.6 - 10.5 mg/dL    eGFR Non African Amer 15 (L) >60 mL/min/1.73    BUN/Creatinine Ratio 15.5 7.0 - 25.0     Anion Gap 11.6 mmol/L   Magnesium   Result Value Ref Range    Magnesium 3.0 (H) 1.6 - 2.4 mg/dL   Phosphorus   Result Value Ref Range    Phosphorus 3.3 2.5 - 4.5 mg/dL   CBC Auto Differential   Result Value Ref Range    WBC 4.31 (L) 4.50 - 10.70 10*3/mm3    RBC 2.81 (L) 3.90 - 5.20 10*6/mm3    Hemoglobin 8.0 (L) 11.9 - 15.5 g/dL    Hematocrit 29.1 (L) 35.6 - 45.5 %    .6 (H) 80.5 - 98.2 fL    MCH 28.5 26.9 - 32.0 pg    MCHC 27.5 (L) 32.4 - 36.3 g/dL    RDW 22.6 (H) 11.7 - 13.0 %    RDW-SD 86.0 (H) 37.0 - 54.0 fl    MPV 10.8 6.0 - 12.0 fL    Platelets 223 140 - 500 10*3/mm3    Neutrophil % 69.3 42.7 - 76.0 %    Lymphocyte % 14.2 (L) 19.6 - 45.3 %    Monocyte % 12.1 (H) 5.0 - 12.0 %    Eosinophil % 0.9 0.3 - 6.2 %    Basophil % 0.5 0.0 - 1.5 %    Immature Grans % 3.0 (H) 0.0 - 0.5 %    Neutrophils, Absolute 2.99 1.90 - 8.10 10*3/mm3    Lymphocytes, Absolute 0.61 (L) 0.90 - 4.80 10*3/mm3    Monocytes, Absolute 0.52 0.20 - 1.20 10*3/mm3    Eosinophils, Absolute 0.04 0.00 - 0.70 10*3/mm3    Basophils, Absolute 0.02 0.00 - 0.20 10*3/mm3    Immature Grans, Absolute 0.13 (H) 0.00 - 0.03 10*3/mm3    nRBC 2.7 (H) 0.0 - 0.0 /100 WBC   Scan Slide   Result Value Ref Range    Anisocytosis Mod/2+ None Seen    Elliptocytes Slight/1+ None Seen    Hypochromia Mod/2+ None Seen    WBC Morphology Normal Normal    Platelet Morphology Normal Normal   Basic Metabolic Panel   Result Value Ref Range    Glucose 104 (H) 65 - 99 mg/dL    BUN 44 (H) 8 - 23 mg/dL    Creatinine 2.98 (H) 0.57 - 1.00 mg/dL    Sodium 157 (H) 136 - 145 mmol/L    Potassium 4.1 3.5 - 5.2 mmol/L    Chloride 126 (H) 98 - 107 mmol/L    CO2 17.4 (L) 22.0 - 29.0 mmol/L    Calcium 7.2 (L) 8.6 - 10.5 mg/dL    eGFR Non African Amer 15 (L) >60 mL/min/1.73    BUN/Creatinine Ratio 14.8 7.0 - 25.0    Anion Gap 13.6 mmol/L   Urinalysis With / Culture If Indicated   Result Value Ref Range    Color, UA Yellow Yellow, Straw    Appearance, UA Cloudy (A) Clear    pH, UA <=5.0 5.0  - 8.0    Specific Gravity, UA 1.014 1.005 - 1.030    Glucose, UA Negative Negative    Ketones, UA Negative Negative    Bilirubin, UA Negative Negative    Blood, UA Moderate (2+) (A) Negative    Protein,  mg/dL (2+) (A) Negative    Leuk Esterase, UA Small (1+) (A) Negative    Nitrite, UA Negative Negative    Urobilinogen, UA 0.2 E.U./dL 0.2 - 1.0 E.U./dL   Basic Metabolic Panel   Result Value Ref Range    Glucose 174 (H) 65 - 99 mg/dL    BUN 43 (H) 8 - 23 mg/dL    Creatinine 2.96 (H) 0.57 - 1.00 mg/dL    Sodium 154 (H) 136 - 145 mmol/L    Potassium 4.0 3.5 - 5.2 mmol/L    Chloride 123 (H) 98 - 107 mmol/L    CO2 17.3 (L) 22.0 - 29.0 mmol/L    Calcium 6.9 (L) 8.6 - 10.5 mg/dL    eGFR Non African Amer 15 (L) >60 mL/min/1.73    BUN/Creatinine Ratio 14.5 7.0 - 25.0    Anion Gap 13.7 mmol/L   Urinalysis, Microscopic Only   Result Value Ref Range    RBC, UA 3-5 (A) None Seen, 0-2 /HPF    WBC, UA 3-5 (A) None Seen, 0-2 /HPF    Bacteria, UA 1+ (A) None Seen /HPF    Squamous Epithelial Cells, UA None Seen None Seen, 0-2 /HPF    Hyaline Casts, UA 3-6 None Seen /LPF    Methodology Manual Light Microscopy    Doppler Ankle Brachial Index Multi Level CAR   Result Value Ref Range    RIGHT DORSALIS PEDIS SYS  mmHg    RIGHT POST TIBIAL SYS  mmHg    RIGHT GREAT TOE SYS MAX 73 mmHg    RIGHT [place] RATIO 1.18     RIGHT TBI RATIO 0.57     LEFT DORSALIS PEDIS SYS  mmHg    LEFT POST TIBIAL SYS  mmHg    LEFT GREAT TOE SYS MAX 85 mmHg    LEFT [place] RATIO 1.05     LEFT TBI RATIO 0.66     Upper arterial right arm brachial sys max 129 mmHg     Imaging Results (last 72 hours)     Procedure Component Value Units Date/Time    US Renal Bilateral [[id number]] Collected:  09/01/17 1659     Updated:  09/01/17 1705    Narrative:       US RENAL BILATERAL-     INDICATIONS: Possible hydronephrosis     TECHNIQUE: ULTRASOUND OF THE KIDNEYS AND URINARY BLADDER.     COMPARISON: CT from 08/3/2017     FINDINGS:     The right kidney  measures 10.5 centimeters, the left kidney measures 9.0  centimeters.     No renal lesion is identified. No hydronephrosis or echogenic  nephrolithiasis.     Bilateral ureteral stents are evident in the urinary bladder. A right  ureteral jet was observed. No left ureteral jet was observed during the  exam.. The urinary bladder otherwise appears unremarkable.        Incidentally noted, abdominal and pelvic ascites.             Impression:       No hydronephrosis.     This report was finalized on 9/1/2017 5:02 PM by Dr. Shar Goins MD.       XR Chest Post CVA Port [[id number]] Collected:  09/01/17 1737     Updated:  09/01/17 1741    Narrative:       EMERGENCY PORTABLE CHEST POST CVA PORT     HISTORY: 82-year-old female presents for verification of PICC position     COMPARISON: 08/15/2017     FINDINGS:  1. Right arm PICC terminates over the SVC in satisfactory position.  2. No significant interval change.     This report was finalized on 9/1/2017 5:38 PM by Dr. Babak Zabala MD.                 aspirin 81 mg Oral Daily   atorvastatin 10 mg Oral Daily   calcium-vitamin D 500 mg Oral Daily   diphenoxylate-atropine 1 tablet Oral 4x Daily   heparin (porcine) 5,000 Units Subcutaneous Q8H   nystatin 500,000 Units Swish & Swallow 4x Daily   predniSONE 5 mg Oral Daily       dextrose 5 % and sodium chloride 0.2 % 125 mL/hr Last Rate: 125 mL/hr (09/02/17 2135)       Assessment/Plan   9/1/17:  The right kidney measures 10.5 centimeters, the left kidney measures 9.0  centimeters.      No renal lesion is identified. No hydronephrosis or echogenic  nephrolithiasis.      Bilateral ureteral stents are evident in the urinary bladder. A right  ureteral jet was observed. No left ureteral jet was observed during the  exam.. The urinary bladder otherwise appears unremarkable.          Incidentally noted, abdominal and pelvic ascites.  Principal Problem:    Hypernatremia  Active Problems:    Breast cancer metastasized to bone     Weight loss due to medication    Essential hypertension    Acute kidney injury    Dehydration    Diarrhea    MARIA DEL CARMEN due to volume depletion, recent severe diarrhea, taxol chemotherapy.  Pt with 3rd spacing of fluid from her cancer  Baseline cr 1.0.  Recent admission in July for maria del carmen due to obstruction.   Breast cancer with metastasis to bone s/p taxol  Chemotherapy induced diarrhea x 3 weeks  Hypernatremia due to diarrheal losses, decreased intake  Ascites:  S/p therapeutic paracentesis x 4, last one 8/22    Will try D51/4saline and see if that provides a little more free water.  Will probably takes some time to replete her, and she may increase her ascites and require another tap.    I discussed the patients findings and my recommendations with patient and family    Batsheva Patel MD  09/02/17  9:51 PM

## 2017-09-03 NOTE — CONSULTS
"Assessment   82 y.o. female with wesley hydronephrosis s/p wesley stent 8/4/17, MARIA DEL CARMEN      Plan    Hypernatremia - per nephrology  MARIA DEL CARMEN - per nephrology  Wesley HN - Wesely stents functioning well, R-US 9/1/17 no HN  Pt wo any voiding complaints      Subjective   Erin Vora is a 82 y.o. female who was admitted for Hypernatremia [E87.0]. Patient was sent for evaluation of hydronephrosis. Patient has had no symptoms. There is not a history of urologic trauma. 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 Patient denies history of any other urological problems despite wesley HN. Patient has seen a urologist in the past. Prior workup included  cysto and w wesley ureteral stents by Dr Jolley on 8/4/17, with significant findings being wesley HN, resolved w wesley stents.    Outside records reviewed. Pertinent radiology and labs reviewed.    Review of Systems  Neg except for +abdominal pain, neg voiding complaints.    Objective   Physicial Exam  /71 (BP Location: Right leg, Patient Position: Lying)  Pulse 102  Temp 97.4 °F (36.3 °C) (Oral)   Resp 18  Ht 62.01\" (157.5 cm)  Wt 112 lb 6.4 oz (51 kg)  LMP  (Approximate)  SpO2 98%  BMI 20.55 kg/m2  General appearance: alert, appears stated age and cooperative  Abdomen: abnormal findings:  ascites, distended and guarding  Pelvic: deferred  Extremities: wesley le pitting edema  Skin: Skin color, texture, turgor normal. No rashes or lesions or normal    Imaging  US Renal: R-US 9/1/17 no HN, wesley stents    Labs  Urine Microscopy:   Results from last 7 days  Lab Units 09/02/17  1557   RBC UA /HPF 3-5*   WBC UA /HPF 3-5*   , Urinalysis:   Results from last 7 days  Lab Units 09/02/17  1557   PH, URINE  <=5.0   PROTEIN UA  100 mg/dL (2+)*   GLUCOSE UA  Negative   KETONES UA  Negative   , Urine Culture:   , BMP:   Results from last 7 days  Lab Units 09/03/17  0612  09/01/17  0936   SODIUM mmol/L 152*  < > 158*   POTASSIUM mmol/L 4.0  < > 5.3*   CALCIUM mg/dL " 6.5*  < > 7.6*   CHLORIDE mmol/L 122*  < > 124*   CO2 mmol/L 19.0*  < > 18.1*   BUN mg/dL 41*  < > 54*   CREATININE mg/dL 2.87*  < > 3.32*   ALBUMIN g/dL  --   --  2.40*   BILIRUBIN mg/dL  --   --  0.2   ALK PHOS U/L  --   --  95   < > = values in this interval not displayed. and CBC:   Results from last 7 days  Lab Units 09/03/17  0612   WBC 10*3/mm3 4.79   RBC 10*6/mm3 2.87*   HEMOGLOBIN g/dL 8.4*   HEMATOCRIT % 29.4*   PLATELETS 10*3/mm3 201

## 2017-09-03 NOTE — PLAN OF CARE
Problem: Patient Care Overview (Adult)  Goal: Plan of Care Review  Outcome: Ongoing (interventions implemented as appropriate)    09/02/17 2132   Coping/Psychosocial Response Interventions   Plan Of Care Reviewed With patient   Patient Care Overview   Progress no change   Outcome Evaluation   Outcome Summary/Follow up Plan c/o pain, meds administered. no distress noted, vss, ivfs infusing. reports 2 loose stools today. lomotil added. poss d/c in a couple of days. will continue to monitor         Problem: Nutrition, Imbalanced: Inadequate Oral Intake (Adult)  Goal: Improved Oral Intake  Outcome: Ongoing (interventions implemented as appropriate)    09/02/17 2132   Nutrition, Imbalanced: Inadequate Oral Intake (Adult)   Improved Oral Intake making progress toward outcome       Goal: Prevent Further Weight Loss  Outcome: Ongoing (interventions implemented as appropriate)    09/02/17 2132   Nutrition, Imbalanced: Inadequate Oral Intake (Adult)   Prevent Further Weight Loss making progress toward outcome         Problem: Tissue Perfusion, Ineffective Peripheral (Adult)  Goal: Adequate Tissue Perfusion  Outcome: Ongoing (interventions implemented as appropriate)    09/02/17 2132   Tissue Perfusion, Ineffective Peripheral (Adult)   Adequate Tissue Perfusion making progress toward outcome         Problem: Fluid Volume Deficit (Adult)  Goal: Fluid/Electrolyte Balance  Outcome: Ongoing (interventions implemented as appropriate)    09/02/17 2132   Fluid Volume Deficit (Adult)   Fluid/Electrolyte Balance making progress toward outcome       Goal: Comfort/Well Being  Outcome: Ongoing (interventions implemented as appropriate)    09/02/17 2132   Fluid Volume Deficit (Adult)   Comfort/Well Being making progress toward outcome

## 2017-09-04 NOTE — PROGRESS NOTES
"   LOS: 3 days   Patient Care Team:  Shivam Llanes MD as PCP - General (Internal Medicine)  Masha Gutierres MD PhD as Consulting Physician (Hematology and Oncology)  Cuba Mabry MD as Referring Physician (Internal Medicine)    Chief Complaint: tired    Subjective     HPI Comments: Feeling okay today. Diarrhea less than usual. Good UOP.      Subjective:  Symptoms:  Improved.  She reports weakness, anorexia and diarrhea.  No shortness of breath, malaise, cough, chest pain, headache, chest pressure or anxiety.    Diet:  Poor intake.  No nausea or vomiting.    Activity level: Impaired due to weakness.    Pain:  She reports no pain.        History taken from: patient chart    Objective     Vital Signs  Temp:  [97.8 °F (36.6 °C)-97.9 °F (36.6 °C)] 97.8 °F (36.6 °C)  Heart Rate:  [] 102  Resp:  [16-20] 16  BP: (119-138)/(41-73) 138/73    Objective:  General Appearance:  Comfortable and in no acute distress.    Vital signs: (most recent): Blood pressure 138/73, pulse 102, temperature 97.8 °F (36.6 °C), temperature source Oral, resp. rate 16, height 62.01\" (157.5 cm), weight 112 lb 6.4 oz (51 kg), SpO2 98 %, not currently breastfeeding.  Vital signs are normal.  No fever.    Output: Producing urine and producing stool.    HEENT: Normal HEENT exam.    Lungs:  Normal respiratory rate and normal effort.  Breath sounds clear to auscultation.    Heart: Normal rate.  Regular rhythm.    Abdomen: Abdomen is soft and distended (mild).  Bowel sounds are normal.   There is no abdominal tenderness.     Extremities: There is dependent edema (2+ edema (pt says a little better since admission)).    Pulses: Distal pulses are intact.    Neurological: Patient is alert and oriented to person, place and time.    Skin:  Warm and dry.              Results Review:     I reviewed the patient's new clinical results.  I reviewed the patient's other test results and agree with the interpretation  Discussed with patient    Medication " Review: reviewed    Assessment/Plan     Principal Problem:    Hypernatremia  Active Problems:    Breast cancer metastasized to bone    Weight loss due to medication    Essential hypertension    Acute kidney injury    Dehydration    Diarrhea      Assessment:  (1. MARIA DEL CARMEN  2. Dehydration  3. Hypernatremia  4. Metastatic breast CA with peritoneal carcinomatosis  5. Diarrhea due to chemotx  6. BLE Edema and Ascites (due to #4)  7. Bilateral hydronephrosis with ureteral stents (due to #4)  8. HTN  9. Metabolic acidosis  10. HypoCa++).     Plan:   (Continue mgmt per Renal  S/p 2600ml paracentesis 9/3    ).       Ajith Colmenares MD  09/04/17  3:14 PM    Time: 30min

## 2017-09-04 NOTE — PLAN OF CARE
Problem: Patient Care Overview (Adult)  Goal: Plan of Care Review  Outcome: Ongoing (interventions implemented as appropriate)    09/04/17 0239   Patient Care Overview   Progress improving   Outcome Evaluation   Outcome Summary/Follow up Plan on scheduled Lomotil/had Paracentesis 9/3/2017-shahab off 2.5 liters/Renal following-continues with IVF'S and added Tums and Calcitriol /for am labs/lower legs remain edematous/elevated on pillows/enc food/poor appetite still/active with P.T./weak/needs assistance/will continue to monitor/labs slowing improving         Problem: Nutrition, Imbalanced: Inadequate Oral Intake (Adult)  Goal: Improved Oral Intake  Outcome: Ongoing (interventions implemented as appropriate)  Goal: Prevent Further Weight Loss  Outcome: Ongoing (interventions implemented as appropriate)    Problem: Tissue Perfusion, Ineffective Peripheral (Adult)  Goal: Adequate Tissue Perfusion  Outcome: Ongoing (interventions implemented as appropriate)    Problem: Fluid Volume Deficit (Adult)  Goal: Fluid/Electrolyte Balance  Outcome: Ongoing (interventions implemented as appropriate)

## 2017-09-04 NOTE — PLAN OF CARE
Problem: Patient Care Overview (Adult)  Goal: Plan of Care Review    09/04/17 1021   Coping/Psychosocial Response Interventions   Plan Of Care Reviewed With patient   Patient Care Overview   Progress improving   Outcome Evaluation   Outcome Summary/Follow up Plan Decreased assistance required for all functional mobility tasks. Hand held assist and patient pushed her own IV pole. Increased distance/tolerance to activity noted today. Remains appropriate for skilled therapy to return to prior level of function.

## 2017-09-04 NOTE — PROGRESS NOTES
"   LOS: 2 days    Patient Care Team:  Shivam Llanes MD as PCP - General (Internal Medicine)  Masha Gutierres MD PhD as Consulting Physician (Hematology and Oncology)  Cuba Mabry MD as Referring Physician (Internal Medicine)    Chief Complaint:  No chief complaint on file.      Subjective     Interval History:     Pt just back from paracentesis.   Roughly 2 liters removed.  She says she feels a lot better.  Still with some diarrhea.  Objective     Vital Signs  Temp:  [97.4 °F (36.3 °C)-99 °F (37.2 °C)] 97.8 °F (36.6 °C)  Heart Rate:  [] 96  Resp:  [16-20] 20  BP: (119-143)/(41-71) 119/41    Flowsheet Rows         First Filed Value    Admission Height  62\" (157.5 cm) Documented at 09/01/2017 1243    Admission Weight  108 lb (49 kg) Documented at 09/01/2017 1243             I/O last 3 completed shifts:  In: 3161.3 [I.V.:3161.3]  Out: 2500 [Other:2500]    Intake/Output Summary (Last 24 hours) at 09/03/17 2204  Last data filed at 09/03/17 1600   Gross per 24 hour   Intake          2048.75 ml   Output             2500 ml   Net          -451.25 ml       Physical Exam:  very frail and thin. pale  No jvd  Lungs clear  RRR  abd soft protuberant bs+  Ext 2+ edema      Results Review:      Results from last 7 days  Lab Units 09/03/17  1830 09/03/17  1238 09/03/17  0612  09/01/17  0936 08/29/17  1422   SODIUM mmol/L 148* 147* 152*  < > 158* 155*   POTASSIUM mmol/L 3.8 3.8 4.0  < > 5.3* 5.2*   CHLORIDE mmol/L 120* 119* 122*  < > 124* 120*   CO2 mmol/L 14.3* 15.4* 19.0*  < > 18.1* 19.5*   BUN mg/dL 35* 37* 41*  < > 54* 37*   CREATININE mg/dL 2.37* 2.55* 2.87*  < > 3.32* 2.24*   CALCIUM mg/dL 5.9* 6.4* 6.5*  < > 7.6* 7.2*   BILIRUBIN mg/dL  --   --   --   --  0.2 0.3   ALK PHOS U/L  --   --   --   --  95 83   ALT (SGPT) U/L  --   --   --   --  15 14   AST (SGOT) U/L  --   --   --   --  49* 45*   GLUCOSE mg/dL 143* 138* 120*  < > 198* 139*   < > = values in this interval not displayed.    Estimated Creatinine Clearance: " 15.2 mL/min (by C-G formula based on Cr of 2.37).      Results from last 7 days  Lab Units 09/02/17  0451 09/01/17  0936 08/29/17  1422   MAGNESIUM mg/dL 3.0* 3.2* 2.8*   PHOSPHORUS mg/dL 3.3  --   --                Results from last 7 days  Lab Units 09/03/17  0612 09/02/17  0451 09/01/17  0938 08/29/17  1423   WBC 10*3/mm3 4.79 4.31* 2.67* 1.91*   HEMOGLOBIN g/dL 8.4* 8.0* 9.4* 10.2*   PLATELETS 10*3/mm3 201 223 282 273               Imaging Results (last 24 hours)     Procedure Component Value Units Date/Time    US Paracentesis [[id number]] Collected:  09/03/17 1742    Specimen:  Body Fluid Updated:  09/03/17 1745    Narrative:       ULTRASOUND-GUIDED PARACENTESIS     HISTORY: Female who is 82 years-old, with ascites and paracentesis was  requested.     Procedure, including risk of hemorrhage, infection, recurrence,  discussed with the patient prior to examination. Consent was obtained.     PROCEDURE: The patient was placed in supine position. Localization was  performed with real-time ultrasound guidance and skin marker was placed.  Image obtained.     The patient was prepped in the usual sterile fashion. Following adequate  local anesthesia with 1% lidocaine, a paracentesis needle set was  advanced into the right lower quadrant and a total of 2600 mL of ascites  aspirated into the Vacutainer without difficulty nor immediate  postprocedure complication.       Impression:       Satisfactory ultrasound guided paracentesis as described  without difficulty nor immediate post procedure complication.     This report was finalized on 9/3/2017 5:42 PM by Dr. Babak Zabala MD.             aspirin 81 mg Oral Daily   atorvastatin 10 mg Oral Daily   calcitriol 0.5 mcg Oral Daily   calcium carbonate 2 tablet Oral TID   calcium-vitamin D 500 mg Oral Daily   diphenoxylate-atropine 1 tablet Oral 4x Daily   heparin (porcine) 5,000 Units Subcutaneous Q8H   nystatin 500,000 Units Swish & Swallow 4x Daily   predniSONE 5 mg Oral  Daily       dextrose 5 % and sodium chloride 0.45 % 50 mL/hr Last Rate: 50 mL/hr (09/03/17 1433)       Medication Review:   Current Facility-Administered Medications   Medication Dose Route Frequency Provider Last Rate Last Dose   • acetaminophen (TYLENOL) tablet 650 mg  650 mg Oral Q4H PRN Jordon John MD   650 mg at 09/02/17 1854   • aspirin EC tablet 81 mg  81 mg Oral Daily Jordon oJhn MD   81 mg at 09/03/17 0919   • atorvastatin (LIPITOR) tablet 10 mg  10 mg Oral Daily Jordon John MD   10 mg at 09/03/17 0919   • calcitriol (ROCALTROL) capsule 0.5 mcg  0.5 mcg Oral Daily Batsheva Patel MD       • calcium carbonate (TUMS) chewable tablet 500 mg (200 mg elemental)  2 tablet Oral TID Batsheva Patel MD       • calcium-vitamin D 500-200 MG-UNIT tablet 500 mg  500 mg Oral Daily Jordon John MD   500 mg at 09/03/17 0919   • dextrose 5 % and sodium chloride 0.45 % infusion  50 mL/hr Intravenous Continuous Jordon John MD 50 mL/hr at 09/03/17 1433 50 mL/hr at 09/03/17 1433   • diphenoxylate-atropine (LOMOTIL) 2.5-0.025 MG per tablet 1 tablet  1 tablet Oral 4x Daily Jordon John MD   1 tablet at 09/03/17 1823   • heparin (porcine) 5000 UNIT/ML injection 5,000 Units  5,000 Units Subcutaneous Q8H Jordon John MD   5,000 Units at 09/03/17 0608   • HYDROcodone-acetaminophen (NORCO) 5-325 MG per tablet 1 tablet  1 tablet Oral Q6H PRN Jordon John MD   1 tablet at 09/02/17 2136   • nystatin (MYCOSTATIN) 141976 UNIT/ML suspension 500,000 Units  500,000 Units Swish & Swallow 4x Daily Jordon John MD   500,000 Units at 09/03/17 1823   • ondansetron (ZOFRAN) injection 4 mg  4 mg Intravenous Q6H PRN Jordon John MD       • predniSONE (DELTASONE) tablet 5 mg  5 mg Oral Daily Jordon John MD   5 mg at 09/03/17 0919   • prochlorperazine (COMPAZINE) tablet 10 mg  10 mg Oral Q6H PRN Jordon John MD       • sodium  chloride 0.9 % flush 1-10 mL  1-10 mL Intravenous PRN Jordon John MD           Assessment/Plan       Principal Problem:    Hypernatremia  Active Problems:    Breast cancer metastasized to bone    Weight loss due to medication    Essential hypertension    Acute kidney injury    Dehydration    Diarrhea    MARIA DEL CARMEN due to volume depletion, recent severe diarrhea, taxol chemotherapy.  Pt with 3rd spacing of fluid from her cancer  Baseline cr 1.0..  Continue D51/2ns at 50cc/hr  Recent admission in July for maria del carmen due to obstruction.   Breast cancer with metastasis to bone s/p taxol  Chemotherapy induced diarrhea x 3 weeks  Hypernatremia due to diarrheal losses, decreased intake  Ascites:  S/p therapeutic paracentesis x 4, last one 8/22.  Had another tap today about 2 Liters removed  Hypocalcemia:  Add tums, calcitriol and monitor  Metabolic acidosis likely from diarrhea/renal failure.  Add sodium bicarbonate tablets        Batsheva Patel MD  09/03/17  10:04 PM

## 2017-09-05 NOTE — PROGRESS NOTES
"   LOS: 4 days    Patient Care Team:  Shivam Llanes MD as PCP - General (Internal Medicine)  Masha Gutierres MD PhD as Consulting Physician (Hematology and Oncology)  Cuba Mabry MD as Referring Physician (Internal Medicine)    Chief Complaint:  No chief complaint on file.      Subjective     Interval History:     Follow up MARIA DEL CARMEN and hypernatremia    Eating a little better.  Says no bm for 2 days. Passing gas. No pain.   Objective     Vital Signs  Temp:  [97.4 °F (36.3 °C)-98.6 °F (37 °C)] 97.4 °F (36.3 °C)  Heart Rate:  [102-118] 106  Resp:  [16-18] 18  BP: (132-171)/() 132/62    Flowsheet Rows         First Filed Value    Admission Height  62\" (157.5 cm) Documented at 09/01/2017 1243    Admission Weight  108 lb (49 kg) Documented at 09/01/2017 1243          I/O this shift:  In: 150 [P.O.:150]  Out: 700 [Urine:700]  I/O last 3 completed shifts:  In: 3600 [P.O.:750; I.V.:2850]  Out: 1625 [Urine:1625]    Intake/Output Summary (Last 24 hours) at 09/05/17 0948  Last data filed at 09/05/17 0900   Gross per 24 hour   Intake             1605 ml   Output             1925 ml   Net             -320 ml       Physical Exam:  very frail and thin. Pale. Oral mucosa dry  No jvd  Lungs clear to auscultation   RRR no s3 or rub  abd soft protuberant bs+, not tender.   Ext 2+ edema  lower ext.               Results Review:      Results from last 7 days  Lab Units 09/05/17  0659 09/04/17  0744 09/03/17  1830  09/01/17  0936 08/29/17  1422   SODIUM mmol/L 150* 149*  149* 148*  < > 158* 155*   POTASSIUM mmol/L 3.6 3.7  3.7 3.8  < > 5.3* 5.2*   CHLORIDE mmol/L 121* 119*  119* 120*  < > 124* 120*   CO2 mmol/L 17.4* 17.1*  17.1* 14.3*  < > 18.1* 19.5*   BUN mg/dL 32* 35*  35* 35*  < > 54* 37*   CREATININE mg/dL 2.35* 2.58*  2.58* 2.37*  < > 3.32* 2.24*   CALCIUM mg/dL 6.8* 6.5*  6.5* 5.9*  < > 7.6* 7.2*   BILIRUBIN mg/dL 0.2  --   --   --  0.2 0.3   ALK PHOS U/L 79  --   --   --  95 83   ALT (SGPT) U/L 14  --   --   --  15 " 14   AST (SGOT) U/L 48*  --   --   --  49* 45*   GLUCOSE mg/dL 94 106*  106* 143*  < > 198* 139*   < > = values in this interval not displayed.    Estimated Creatinine Clearance: 16.5 mL/min (by C-G formula based on Cr of 2.35).      Results from last 7 days  Lab Units 09/04/17  0744 09/02/17  0451 09/01/17  0936 08/29/17  1422   MAGNESIUM mg/dL  --  3.0* 3.2* 2.8*   PHOSPHORUS mg/dL 2.9 3.3  --   --                Results from last 7 days  Lab Units 09/05/17  0659 09/04/17  0744 09/03/17  0612 09/02/17  0451 09/01/17  0938   WBC 10*3/mm3 6.65 6.22 4.79 4.31* 2.67*   HEMOGLOBIN g/dL 8.0* 8.4* 8.4* 8.0* 9.4*   PLATELETS 10*3/mm3 176 173 201 223 282               Imaging Results (last 24 hours)     ** No results found for the last 24 hours. **          aspirin 81 mg Oral Daily   calcitriol 0.5 mcg Oral Daily   calcium carbonate 2 tablet Oral TID   calcium-vitamin D 500 mg Oral Daily   diphenoxylate-atropine 1 tablet Oral 4x Daily   heparin (porcine) 5,000 Units Subcutaneous Q8H   nystatin 500,000 Units Swish & Swallow 4x Daily   predniSONE 5 mg Oral Daily   sodium bicarbonate 650 mg Oral TID       dextrose 50 mL/hr       Medication Review:   Current Facility-Administered Medications   Medication Dose Route Frequency Provider Last Rate Last Dose   • acetaminophen (TYLENOL) tablet 650 mg  650 mg Oral Q4H PRN Jordon John MD   650 mg at 09/02/17 1854   • aspirin EC tablet 81 mg  81 mg Oral Daily Jordon John MD   81 mg at 09/05/17 0853   • calcitriol (ROCALTROL) capsule 0.5 mcg  0.5 mcg Oral Daily Batsheva Patel MD   0.5 mcg at 09/05/17 0849   • calcium carbonate (TUMS) chewable tablet 500 mg (200 mg elemental)  2 tablet Oral TID Batsheva Patel MD   2 tablet at 09/05/17 0849   • calcium-vitamin D 500-200 MG-UNIT tablet 500 mg  500 mg Oral Daily Jordon John MD   500 mg at 09/05/17 0849   • dextrose (D5W) 5 % infusion  50 mL/hr Intravenous Continuous Leyla Venegas MD        • diphenoxylate-atropine (LOMOTIL) 2.5-0.025 MG per tablet 1 tablet  1 tablet Oral 4x Daily Jordon John MD   1 tablet at 09/05/17 0849   • heparin (porcine) 5000 UNIT/ML injection 5,000 Units  5,000 Units Subcutaneous Q8H Jordon John MD   5,000 Units at 09/05/17 0649   • HYDROcodone-acetaminophen (NORCO) 5-325 MG per tablet 1 tablet  1 tablet Oral Q6H PRN Jordon John MD   1 tablet at 09/02/17 2136   • magic mouthwash oral supsension 10 mL  10 mL Swish & Spit Q4H PRN Ajith Colmenares MD   10 mL at 09/04/17 1855   • nystatin (MYCOSTATIN) 066808 UNIT/ML suspension 500,000 Units  500,000 Units Swish & Swallow 4x Daily Jordon John MD   500,000 Units at 09/05/17 0849   • ondansetron (ZOFRAN) injection 4 mg  4 mg Intravenous Q6H PRN Jordon John MD       • predniSONE (DELTASONE) tablet 5 mg  5 mg Oral Daily Jordon John MD   5 mg at 09/05/17 0849   • prochlorperazine (COMPAZINE) tablet 10 mg  10 mg Oral Q6H PRN Jordon John MD       • sodium bicarbonate tablet 650 mg  650 mg Oral TID Batsheva Patel MD   650 mg at 09/05/17 0849   • sodium chloride 0.9 % flush 1-10 mL  1-10 mL Intravenous PRN Jordon John MD           Assessment/Plan       Principal Problem:    Hypernatremia  Active Problems:    Breast cancer metastasized to bone    Weight loss due to medication    Essential hypertension    Acute kidney injury    Dehydration    Diarrhea    1. MARIA DEL CARMEN due to volume depletion, recent severe diarrhea, taxol chemotherapy.  Pt with 3rd spacing of fluid from her cancer  Baseline cr 1.0.  Continue D51/2ns at 50cc/hr. Na 150.  Change IVF to D5W.   Recent admission in July for maria del carmen due to obstruction.   2. Breast cancer with metastasis to bone s/p taxol  3. Chemotherapy induced diarrhea x 3 weeks  4. Hypernatremia due to diarrheal losses, decreased intake. Change to D5W.  5. Ascites:  S/p therapeutic paracentesis x 4, last one 9/3  6. Hypocalcemia:  Add  tums, calcitriol and monitor  7. Metabolic acidosis likely from diarrhea/renal failure. Non-anion gap. Reduce po bicarb to bid with her hypernatremia.   8. Mild elevation AST.  Hold statin    Dw family at bedside.      Leyla Venegas MD  09/05/17  9:48 AM

## 2017-09-05 NOTE — PROGRESS NOTES
Malnutrition Severity Assessment    Patient Name:  Erin Vora  YOB: 1935  MRN: [id number]  Admit Date:  9/1/2017    Patient meets criteria for : Moderate malnutrition (moderate-severe)    Comments:  40# (25%) weight loss x 10-11 months, decreased PO intake.    Moderate temporal and clavicle muscle wasting, mild fat loss to orbital regions.      Malnutrition Type: Chronic Illness Malnutrition     Malnutrition Type (last 8 hours)      Malnutrition Severity Assessment       09/05/17 1307    Physical Signs of Malnutrition (Chronic)    Muscle Wasting Severe    Fat Loss Mild      09/05/17 1307    Malnutrition Severity Assessment    Malnutrition Type Chronic Illness Malnutrition          Physical Signs of Malnutrition         Most Recent Value    Muscle Wasting  Severe    Fat Loss  Mild      Weight Status         Most Recent Value    Weight Loss  Severe (>20% / 1 yr)      Energy Intake Status         Most Recent Value    Energy Intake  Mod (<75% / > or equal to 1 mo)              Electronically signed by:  Laquita Dixon RD  09/05/17 1:09 PM

## 2017-09-05 NOTE — PLAN OF CARE
Problem: Patient Care Overview (Adult)  Goal: Plan of Care Review    09/05/17 1907   Coping/Psychosocial Response Interventions   Plan Of Care Reviewed With patient;son   Outcome Evaluation   Outcome Summary/Follow up Plan fair progression w/ mobility , continues to tolerate household ambulation assist x 1, does fatigue. will cont to progress mobility as dejon and appropriate and prepare for DC.

## 2017-09-05 NOTE — THERAPY TREATMENT NOTE
Acute Care - Physical Therapy Treatment Note  [facility]     Patient Name: Erin Vora  : 1935  MRN: [id number]  Today's Date: 2017  Onset of Illness/Injury or Date of Surgery Date: 17  Date of Referral to PT: 17  Referring Physician: Alvaro    Admit Date: 2017    Visit Dx:    ICD-10-CM ICD-9-CM   1. Anemia associated with chemotherapy D64.81 285.3    T45.1X5A E933.1   2. Mild dehydration E86.0 276.51   3. Anemia, unspecified type D64.9 285.9   4. Pneumonia of both lower lobes due to infectious organism J18.9 483.8   5. Metastatic breast cancer - On chemotherapy C50.919 174.9    C79.9 199.1   6. Renal failure (ARF), acute on chronic N17.9 584.9    N18.9 585.9   7. Generalized edema R60.1 782.3   8. Anemia of chronic renal failure, stage 3 (moderate) N18.3 285.21    D63.1 585.3   9. Breast cancer metastasized to bone, unspecified laterality C50.919 174.9    C79.51 198.5   10. Anemia in neoplastic disease D63.0 285.22     Patient Active Problem List   Diagnosis   • Breast cancer metastasized to bone   • Anemia in neoplastic disease   • Cancer associated pain   • Anemia associated with chemotherapy   • Weight loss due to medication   • Poor appetite   • Hypokalemia   • Hyperlipidemia   • Hearing loss   • Medicare annual wellness visit, initial   • Anemia of chronic renal failure, stage 3 (moderate)   • Post-menopause   • Encounter for medication review and counseling   • Fever   • Nausea and vomiting   • Essential hypertension   • Anemia   • Pneumonia   • Chronic kidney disease, stage 3   • Distended abdomen   • Acute kidney injury   • Abdominal carcinomatosis   • Ascites, malignant   • Dehydration   • Chemotherapy induced neutropenia   • Hypernatremia   • Diarrhea               Adult Rehabilitation Note       17 1536 17 1000       Rehab Assessment/Intervention    Discipline physical therapist  - physical therapist  -CK     Document Type therapy note (daily note)  -  therapy note (daily note)  -     Subjective Information agree to therapy  - agree to therapy;no complaints  -CK     Patient Effort, Rehab Treatment good  -      Precautions/Limitations fall precautions   exit alarm not in place on arrival, armed post PT session  - fall precautions  -CK     Recorded by [] Rosa Bradley, PT [CK] Eldon Mercado, PT     Vital Signs    Pretreatment Heart Rate (beats/min) 112  -      Posttreatment Heart Rate (beats/min) 117   nsg aware  -LH      Recorded by [] Rosa Bradley, PT      Pain Assessment    Pain Assessment No/denies pain  - No/denies pain  -CK     Recorded by [] Rosa Bradley, PT [CK] Eldon Mercado, PT     Vision Assessment/Intervention    Visual Impairment WNL  -LH      Recorded by [] Rosa Bradley, PT      Cognitive Assessment/Intervention    Current Cognitive/Communication Assessment functional  -      Orientation Status oriented x 4  -      Follows Commands/Answers Questions 100% of the time;able to follow single-step instructions  -      Personal Safety WNL/WFL  -      Personal Safety Interventions fall prevention program maintained;gait belt;nonskid shoes/slippers when out of bed;supervised activity  -LH      Recorded by [] Rosa Bradley, PT      Bed Mobility, Assessment/Treatment    Bed Mobility, Comment in chair  - deferred up walking in room  -CK     Recorded by [] Rosa Bradley, PT [CK] Eldon Mercado, PT     Transfer Assessment/Treatment    Transfers, Sit-Stand Bradley contact guard assist  - hand held assist  -CK     Transfers, Stand-Sit Bradley contact guard assist  - hand held assist  -CK     Transfers, Sit-Stand-Sit, Assist Device rolling walker  - --   pushed IV pole  -CK     Recorded by [] Rosa Bradley, PT [CK] Eldon Mercado, PT     Gait Assessment/Treatment    Gait, Bradley Level contact guard assist;verbal cues required  - hand held assist  -CK     Gait, Assistive Device rolling walker  - --   pushed IV  pole  -CK     Gait, Distance (Feet) 250  -  -CK     Gait, Gait Deviations maida decreased;bilateral:;decreased heel strike;forward flexed posture  - maida decreased;decreased heel strike;step length decreased  -CK     Gait, Safety Issues weight-shifting ability decreased;step length decreased  -LH      Gait, Impairments strength decreased  -      Gait, Comment BLEs swelling  -      Recorded by [] Rosa Bradley, PT [CK] Eldon Mercado, PT     Therapy Exercises    Bilateral Lower Extremities 10 reps;AROM:;sitting;ankle pumps/circles  -LH      Recorded by [LH] Rosa Bradley, PT      Positioning and Restraints    Pre-Treatment Position sitting in chair/recliner  -LH in bed  -CK     Post Treatment Position chair  -LH chair  -CK     In Chair reclined;call light within reach;encouraged to call for assist;exit alarm on;with family/caregiver   alarm donned post PT session  - sitting;reclined;call light within reach;with family/caregiver  -CK     Recorded by [] Rosa Bradley, PT [CK] Eldon Mercado, PT       User Key  (r) = Recorded By, (t) = Taken By, (c) = Cosigned By    Initials Name Effective Dates    CK Eldon Mercado, PT 04/24/15 -     LH Rosa Bradley, PT 02/07/17 -                 IP PT Goals       09/03/17 1647          Transfer Training PT LTG    Transfer Training PT LTG, Date Established 09/03/17  -LC      Transfer Training PT LTG, Time to Achieve 1 wk  -LC      Transfer Training PT LTG, [place] Level conditional independence  -LC      Transfer Training PT LTG, Assist Device walker, rolling  -LC      Gait Training PT LTG    Gait Training Goal PT LTG, Date Established 09/03/17  -LC      Gait Training Goal PT LTG, Time to Achieve 1 wk  -LC      Gait Training Goal PT LTG, [place] Level supervision required  -LC      Gait Training Goal PT LTG, Assist Device walker, rolling  -LC      Gait Training Goal PT LTG, Distance to Achieve 400  -LC        User Key  (r) = Recorded By, (t) = Taken By, (c) =  Cosigned By    Initials Name Provider Type     Bo Rivera, PT DPT Physical Therapist          Physical Therapy Education     Title: PT OT SLP Therapies (Active)     Topic: Physical Therapy (Active)     Point: Mobility training (Active)    Learning Progress Summary    Learner Readiness Method Response Comment Documented by Status   Patient Acceptance E NR   09/05/17 1539 Active    Acceptance E VU   09/04/17 1021 Done    Acceptance E,D ADRIANNA,DU safety during transfers  09/03/17 1646 Done               Point: Home exercise program (Active)    Learning Progress Summary    Learner Readiness Method Response Comment Documented by Status   Patient Acceptance E [facility] 09/05/17 1539 Active               Point: Body mechanics (Active)    Learning Progress Summary    Learner Readiness Method Response Comment Documented by Status   Patient Acceptance E [facility] 09/05/17 1539 Active               Point: Precautions (Active)    Learning Progress Summary    Learner Readiness Method Response Comment Documented by Status   Patient Acceptance E NR   09/05/17 1539 Active    Acceptance E [facility] 09/04/17 1021 Done                      User Key     Initials Effective Dates Name Provider Type Discipline     04/24/15 -  Eldon Mercado, PT Physical Therapist PT     02/07/17 -  Rosa Bradley, PT Physical Therapist PT     08/02/16 -  Bo Rivera, PT DPT Physical Therapist PT                    PT Recommendation and Plan  Anticipated Discharge Disposition: home  PT Frequency: daily  Plan of Care Review  Plan Of Care Reviewed With: patient, son  Outcome Summary/Follow up Plan: fair progression w/ mobility , continues to tolerate household ambulation assist x 1, does fatigue. will cont to progress mobility as dejon and appropriate and prepare for DC.           Outcome Measures       09/05/17 1500 09/04/17 1000       How much help from another person do you currently need...    Turning from your back to your side while in flat bed  without using bedrails? 4  - 4  -CK     Moving from lying on back to sitting on the side of a flat bed without bedrails? 3  - 3  -CK     Moving to and from a bed to a chair (including a wheelchair)? 3  - 3  -CK     Standing up from a chair using your arms (e.g., wheelchair, bedside chair)? 3  - 3  -CK     Climbing 3-5 steps with a railing? 3  - 3  -CK     To walk in hospital room? 3  - 3  -CK     AM-PAC 6 Clicks Score 19  - 19  -CK     Functional Assessment    Outcome Measure Options AM-PAC 6 Clicks Basic Mobility (PT)  - AM-PAC 6 Clicks Basic Mobility (PT)  -       User Key  (r) = Recorded By, (t) = Taken By, (c) = Cosigned By    Initials Name Provider Type     Eldon Mercado, PT Physical Therapist     Rosa Bradley, PT Physical Therapist           Time Calculation:         PT Charges       09/05/17 1541          Time Calculation    Start Time 1523  -      Stop Time 1536  -      Time Calculation (min) 13 min  -      PT Received On 09/05/17  -      PT - Next Appointment 09/06/17  -        User Key  (r) = Recorded By, (t) = Taken By, (c) = Cosigned By    Initials Name Provider Type     Rosa Bradley, PT Physical Therapist          Therapy Charges for Today     Code Description Service Date Service Provider Modifiers Qty    [id number]  PT THER PROC EA 15 MIN 9/5/2017 Rosa Bradley, PT GP 1          PT G-Codes  Outcome Measure Options: AM-PAC 6 Clicks Basic Mobility (PT)    Rosa Bradley, PT  9/5/2017

## 2017-09-05 NOTE — PLAN OF CARE
Problem: Patient Care Overview (Adult)  Goal: Plan of Care Review  Outcome: Ongoing (interventions implemented as appropriate)    09/05/17 0210 09/05/17 1540 09/05/17 1801   Coping/Psychosocial Response Interventions   Plan Of Care Reviewed With --  patient;son --    Patient Care Overview   Progress improving --  --    Outcome Evaluation   Outcome Summary/Follow up Plan --  --  Pt tolerated treatment with improvements. Pt states she had not had a BM since 9/2/2017, this is when we noticed her Lomotil was scheduled TID. She is now no longer on these meds. No pain. No falls. VSS. Will continue to monitor.        Goal: Adult Individualization and Mutuality  Outcome: Ongoing (interventions implemented as appropriate)  Goal: Discharge Needs Assessment  Outcome: Ongoing (interventions implemented as appropriate)    09/05/17 1343   Discharge Needs Assessment   Concerns To Be Addressed discharge planning concerns   Concerns Comments Skilled Rehab   Readmission Within The Last 30 Days no previous admission in last 30 days   Equipment Needed After Discharge other (see comments)   Discharge Facility/Level Of Care Needs nursing facility, skilled   Current Discharge Risk chronically ill   Discharge Disposition still a patient   Living Environment   Transportation Available family or friend will provide;car   Self-Care   Equipment Currently Used at Home none         Problem: Nutrition, Imbalanced: Inadequate Oral Intake (Adult)  Goal: Improved Oral Intake  Outcome: Ongoing (interventions implemented as appropriate)    09/05/17 1801   Nutrition, Imbalanced: Inadequate Oral Intake (Adult)   Improved Oral Intake making progress toward outcome       Goal: Prevent Further Weight Loss  Outcome: Ongoing (interventions implemented as appropriate)    09/05/17 1801   Nutrition, Imbalanced: Inadequate Oral Intake (Adult)   Prevent Further Weight Loss making progress toward outcome         Problem: Tissue Perfusion, Ineffective Peripheral  (Adult)  Goal: Adequate Tissue Perfusion  Outcome: Ongoing (interventions implemented as appropriate)    09/05/17 1801   Tissue Perfusion, Ineffective Peripheral (Adult)   Adequate Tissue Perfusion making progress toward outcome         Problem: Fluid Volume Deficit (Adult)  Goal: Fluid/Electrolyte Balance  Outcome: Ongoing (interventions implemented as appropriate)  Goal: Comfort/Well Being  Outcome: Ongoing (interventions implemented as appropriate)

## 2017-09-05 NOTE — PLAN OF CARE
Problem: Nutrition, Imbalanced: Inadequate Oral Intake (Adult)  Intervention: Promote/Optimize Nutrition  Will place orders for extra food items on trays.

## 2017-09-05 NOTE — PROGRESS NOTES
"   LOS: 3 days    Patient Care Team:  Shivam Llanes MD as PCP - General (Internal Medicine)  Masha Gutierres MD PhD as Consulting Physician (Hematology and Oncology)  Cuba Mabry MD as Referring Physician (Internal Medicine)    Chief Complaint:  No chief complaint on file.      Subjective     Interval History:     Diarrhea stopped today    Eating a little more.  No cp or soa  Objective     Vital Signs  Temp:  [97.8 °F (36.6 °C)-98.6 °F (37 °C)] 98.6 °F (37 °C)  Heart Rate:  [102-118] 118  Resp:  [16] 16  BP: (123-171)/() 165/105    Flowsheet Rows         First Filed Value    Admission Height  62\" (157.5 cm) Documented at 09/01/2017 1243    Admission Weight  108 lb (49 kg) Documented at 09/01/2017 1243             I/O last 3 completed shifts:  In: 3485 [P.O.:630; I.V.:2855]  Out: 3300 [Urine:800; Other:2500]    Intake/Output Summary (Last 24 hours) at 09/04/17 2025  Last data filed at 09/04/17 1800   Gross per 24 hour   Intake             2505 ml   Output              800 ml   Net             1705 ml       Physical Exam:  very frail and thin. pale  No jvd  Lungs clear  RRR  abd soft protuberant bs+  Ext 2+ edema                  Results Review:      Results from last 7 days  Lab Units 09/04/17  0744 09/03/17  1830 09/03/17  1238  09/01/17  0936 08/29/17  1422   SODIUM mmol/L 149*  149* 148* 147*  < > 158* 155*   POTASSIUM mmol/L 3.7  3.7 3.8 3.8  < > 5.3* 5.2*   CHLORIDE mmol/L 119*  119* 120* 119*  < > 124* 120*   CO2 mmol/L 17.1*  17.1* 14.3* 15.4*  < > 18.1* 19.5*   BUN mg/dL 35*  35* 35* 37*  < > 54* 37*   CREATININE mg/dL 2.58*  2.58* 2.37* 2.55*  < > 3.32* 2.24*   CALCIUM mg/dL 6.5*  6.5* 5.9* 6.4*  < > 7.6* 7.2*   BILIRUBIN mg/dL  --   --   --   --  0.2 0.3   ALK PHOS U/L  --   --   --   --  95 83   ALT (SGPT) U/L  --   --   --   --  15 14   AST (SGOT) U/L  --   --   --   --  49* 45*   GLUCOSE mg/dL 106*  106* 143* 138*  < > 198* 139*   < > = values in this interval not " displayed.    Estimated Creatinine Clearance: 14 mL/min (by C-G formula based on Cr of 2.58).      Results from last 7 days  Lab Units 09/04/17  0744 09/02/17  0451 09/01/17  0936 08/29/17  1422   MAGNESIUM mg/dL  --  3.0* 3.2* 2.8*   PHOSPHORUS mg/dL 2.9 3.3  --   --                Results from last 7 days  Lab Units 09/04/17  0744 09/03/17  0612 09/02/17  0451 09/01/17  0938 08/29/17  1423   WBC 10*3/mm3 6.22 4.79 4.31* 2.67* 1.91*   HEMOGLOBIN g/dL 8.4* 8.4* 8.0* 9.4* 10.2*   PLATELETS 10*3/mm3 173 201 223 282 273               Imaging Results (last 24 hours)     ** No results found for the last 24 hours. **          aspirin 81 mg Oral Daily   atorvastatin 10 mg Oral Daily   calcitriol 0.5 mcg Oral Daily   calcium carbonate 2 tablet Oral TID   calcium-vitamin D 500 mg Oral Daily   diphenoxylate-atropine 1 tablet Oral 4x Daily   heparin (porcine) 5,000 Units Subcutaneous Q8H   nystatin 500,000 Units Swish & Swallow 4x Daily   predniSONE 5 mg Oral Daily   sodium bicarbonate 650 mg Oral TID       dextrose 5 % and sodium chloride 0.45 % 50 mL/hr Last Rate: 50 mL/hr (09/04/17 0625)       Medication Review:   Current Facility-Administered Medications   Medication Dose Route Frequency Provider Last Rate Last Dose   • acetaminophen (TYLENOL) tablet 650 mg  650 mg Oral Q4H PRN Jordon John MD   650 mg at 09/02/17 1854   • aspirin EC tablet 81 mg  81 mg Oral Daily Jordon John MD   81 mg at 09/04/17 0955   • atorvastatin (LIPITOR) tablet 10 mg  10 mg Oral Daily Jordon John MD   10 mg at 09/04/17 0955   • calcitriol (ROCALTROL) capsule 0.5 mcg  0.5 mcg Oral Daily Batsheva Patel MD   0.5 mcg at 09/04/17 0954   • calcium carbonate (TUMS) chewable tablet 500 mg (200 mg elemental)  2 tablet Oral TID Batsheva Patel MD   2 tablet at 09/04/17 1737   • calcium-vitamin D 500-200 MG-UNIT tablet 500 mg  500 mg Oral Daily Jordon John MD   500 mg at 09/04/17 0986   • dextrose 5 %  and sodium chloride 0.45 % infusion  50 mL/hr Intravenous Continuous Jordon John MD 50 mL/hr at 09/04/17 0625 50 mL/hr at 09/04/17 0625   • diphenoxylate-atropine (LOMOTIL) 2.5-0.025 MG per tablet 1 tablet  1 tablet Oral 4x Daily Jordon John MD   1 tablet at 09/04/17 0954   • heparin (porcine) 5000 UNIT/ML injection 5,000 Units  5,000 Units Subcutaneous Q8H Jordon John MD   5,000 Units at 09/04/17 1327   • HYDROcodone-acetaminophen (NORCO) 5-325 MG per tablet 1 tablet  1 tablet Oral Q6H PRN Jordon John MD   1 tablet at 09/02/17 2136   • magic mouthwash oral supsension 10 mL  10 mL Swish & Spit Q4H PRN Ajith Colmenares MD   10 mL at 09/04/17 1855   • nystatin (MYCOSTATIN) 884797 UNIT/ML suspension 500,000 Units  500,000 Units Swish & Swallow 4x Daily Jordon John MD   500,000 Units at 09/04/17 1737   • ondansetron (ZOFRAN) injection 4 mg  4 mg Intravenous Q6H PRN Jordon John MD       • predniSONE (DELTASONE) tablet 5 mg  5 mg Oral Daily Jordon John MD   5 mg at 09/04/17 0955   • prochlorperazine (COMPAZINE) tablet 10 mg  10 mg Oral Q6H PRN Jordon John MD       • sodium bicarbonate tablet 650 mg  650 mg Oral TID Batsheva Patel MD   650 mg at 09/04/17 1737   • sodium chloride 0.9 % flush 1-10 mL  1-10 mL Intravenous PRN Jordon John MD           Assessment/Plan       Principal Problem:    Hypernatremia  Active Problems:    Breast cancer metastasized to bone    Weight loss due to medication    Essential hypertension    Acute kidney injury    Dehydration    Diarrhea    MARIA DEL CARMEN due to volume depletion, recent severe diarrhea, taxol chemotherapy.  Pt with 3rd spacing of fluid from her cancer  Baseline cr 1.0.  Continue D51/2ns at 50cc/hr.  Slight improvement  Recent admission in July for maria del carmen due to obstruction.   Breast cancer with metastasis to bone s/p taxol  Chemotherapy induced diarrhea x 3 weeks  Hypernatremia due to diarrheal  losses, decreased intake  Ascites:  S/p therapeutic paracentesis x 4, last one 8/22.  Had a tap yesterday about 2 Liters removed  Hypocalcemia:  Add tums, calcitriol and monitor  Metabolic acidosis likely from diarrhea/renal failure.  Add sodium bicarbonate tablets          Batsheva Patel MD  09/04/17  8:25 PM

## 2017-09-05 NOTE — DISCHARGE PLACEMENT REQUEST
"Liss Curry (82 y.o. Female)     Date of Birth Social Security Number Address Home Phone MRN    1935  [address] [place] [id number] [phone] [id number]    Denominational Marital Status          Lutheran        Admission Date Admission Type Admitting Provider Attending Provider Department, Room/Bed    9/1/17 Urgent Hogancamp, MD Darrian Ricci John B, MD [address], 539/1    Discharge Date Discharge Disposition Discharge Destination                      Attending Provider: Ajith Colmenares MD     Allergies:  No Known Allergies    Isolation:  None   Infection:  None   Code Status:  Conditional    Ht:  62.01\" (157.5 cm)   Wt:  120 lb 5.9 oz (54.6 kg)    Admission Cmt:  YSABELEM MED REPL   Principal Problem:  Hypernatremia [E87.0]                 Active Insurance as of 9/1/2017     Primary Coverage     Payor Plan Insurance Group Employer/Plan Group    ANTHEM MEDICARE REPLACEMENT ANTHEM MEDICARE ADVANTAGE [id number]     Payor Plan Address Payor Plan Phone Number Effective From Effective To    [address] [phone] 1/1/2016     [place] [address]       Subscriber Name Subscriber Birth Date Member ID       LISS CURRY 1935 [id number]                 Emergency Contacts      (Rel.) Home Phone Work Phone Mobile Phone    Aden Curry (Son) [phone] -- --              "

## 2017-09-05 NOTE — PROGRESS NOTES
Discharge Planning Assessment  [facility]     Patient Name: Erin Vora  MRN: [id number]  Today's Date: 9/5/2017    Admit Date: 9/1/2017          Discharge Needs Assessment       09/05/17 1343    Living Environment    Lives With other (see comments);sibling(s)    Living Arrangements house    Home Accessibility stairs to enter home    Number of Stairs to Enter Home 2    Type of Financial/Environmental Concern none    Transportation Available family or friend will provide;car    Living Environment    Provides Primary Care For no one, unable/limited ability to care for self    Primary Care Provided By child(mary) (specify);other (see comments)    Quality Of Family Relationships supportive    Able to Return to Prior Living Arrangements yes    Discharge Needs Assessment    Concerns To Be Addressed discharge planning concerns    Concerns Comments Skilled Rehab    Readmission Within The Last 30 Days no previous admission in last 30 days    Equipment Currently Used at Home none    Equipment Needed After Discharge other (see comments)    Discharge Facility/Level Of Care Needs nursing facility, skilled    Current Discharge Risk chronically ill    Discharge Disposition still a patient            Discharge Plan       09/05/17 1336    Case Management/Social Work Plan    [facility]    Patient/Family In Agreement With Plan yes    Additional Comments Spoke with patient at bedside, introduced self and explained CCP role. Verified facesheet and confirmed pharmacy is [facility]. Pt denies any challenges with medication costs. Pt states she was independent at home prior to getting weak but her sister Caryn was staying and assisting patient. Pt states she hasn't driven in the past month but still has her car. Pt denies any DME, HH or SNF. Pt stated she thinks she would need rehab and discussed that with Dr. Gutierres. CCP Provided [facility] and SNF/HH list and ccp  contact info given. Permission granted to speak with her son Aden Vora ([phone]). CCP  called pts son and introduced self and explained CCP role. Discussed dc planning, HH vs SNF and pts discussion requesting rehab. CCP reviewed precert process and pts son gave referrals to [facility], [facility] and [facility]. At this time he does not have a first choice. CCP made referrals. Packet started in CCP office. Josseline RN/CCP        Discharge Placement     Facility/Agency Request Status Selected? Address Phone Number Fax Number    [facility] [place] Pending - Request Sent     [address] , [facility] [address] [phone]    [facility] Pending - Request Sent     [address] , [facility] [address] [phone]    [facility] Pending - Request Sent     [address] [facility] [address] [phone]                Demographic Summary       09/05/17 1341    Referral Information    Admission Type inpatient    Arrived From admitted as an inpatient    Referral Source admission list    Reason For Consult discharge planning    Record Reviewed history and physical;medical record;patient profile    Contact Information    Permission Granted to Share Information With family/designee;facility     Primary Care Physician Information    Name Dr. Shivam Llanes            Functional Status       09/05/17 1341    Functional Status Current    Communication 0-->understands/communicates without difficulty    Swallowing (if score 2 or more for any item, consult Rehab Services) 0-->swallows foods/liquids without difficulty    Current Functional Level Comment Pt reports increased weakness.    Change in Functional Status Since Onset of Current Illness/Injury yes    Functional Status Prior    Ambulation 0-->independent    Transferring 0-->independent    Toileting 0-->independent    Bathing 0-->independent     Dressing 0-->independent    Eating 0-->independent    Communication 0-->understands/communicates without difficulty    Swallowing 0-->swallows foods/liquids without difficulty    IADL    Medications independent    Meal Preparation assistive person    Housekeeping assistive person    Laundry assistive person    Shopping assistive person    Oral Care independent    Activity Tolerance    Usual Activity Tolerance good    Current Activity Tolerance moderate    Cognitive/Perceptual/Developmental    Current Mental Status/Cognitive Functioning no deficits noted    Recent Changes in Mental Status/Cognitive Functioning no changes    Employment/Financial    Financial Concerns none            Psychosocial     None            Abuse/Neglect     None            Legal       09/05/17 1342    Legal    Legal Comments Pt reports pts son Aden Vora is her POA and HCS and reports papers should be on file here.             Substance Abuse     None            Patient Forms       09/05/17 1341    Patient Forms    Provider Choice List Delivered    Delivered to Patient    Method of delivery In person          Ly Ramos RN

## 2017-09-05 NOTE — PROGRESS NOTES
"   LOS: 4 days   Patient Care Team:  Shivam Llanes MD as PCP - General (Internal Medicine)  Masha Gutierres MD PhD as Consulting Physician (Hematology and Oncology)  Cuba Mabry MD as Referring Physician (Internal Medicine)    Chief Complaint: tired    Subjective     HPI Comments: Poor po intake--just not very hungry. Her mouth pain is greatly improved today. No diarrhea or abd pain. No N/V. Breathing easily. LE edema is unchanged/at baseline for her.      Subjective:  Symptoms:  Improved.  She reports weakness and anorexia.  No shortness of breath, malaise, cough, chest pain, headache, chest pressure, diarrhea or anxiety.    Diet:  Poor intake.  No nausea or vomiting.    Activity level: Impaired due to weakness.    Pain:  She reports no pain.        History taken from: patient chart RN    Objective     Vital Signs  Temp:  [97.4 °F (36.3 °C)-98.6 °F (37 °C)] 97.4 °F (36.3 °C)  Heart Rate:  [102-118] 106  Resp:  [16-18] 18  BP: (132-171)/() 132/62    Objective:  General Appearance:  Comfortable and in no acute distress.    Vital signs: (most recent): Blood pressure 132/62, pulse 106, temperature 97.4 °F (36.3 °C), temperature source Oral, resp. rate 18, height 62.01\" (157.5 cm), weight 120 lb 4.8 oz (54.6 kg), SpO2 99 %, not currently breastfeeding.  Vital signs are normal.  No fever.    Output: Producing urine and producing stool.    HEENT: Normal HEENT exam.    Lungs:  Normal respiratory rate and normal effort.  Breath sounds clear to auscultation.    Heart: Normal rate.  Regular rhythm.    Abdomen: Abdomen is soft and distended (mild).  Bowel sounds are normal.   There is no abdominal tenderness.     Extremities: There is dependent edema (2+ edema (pt says a little better since admission)).    Pulses: Distal pulses are intact.    Neurological: Patient is alert and oriented to person, place and time.    Skin:  Warm and dry.              Results Review:     I reviewed the patient's new clinical " results.  I reviewed the patient's other test results and agree with the interpretation  I personally viewed and interpreted the patient's EKG/Telemetry data  Discussed with patient and RN    Medication Review: reviewed    Assessment/Plan     Principal Problem:    Hypernatremia  Active Problems:    Breast cancer metastasized to bone    Weight loss due to medication    Essential hypertension    Acute kidney injury    Dehydration    Diarrhea      Assessment:  (1. MARIA DEL CARMEN  2. Dehydration  3. Hypernatremia  4. Metastatic breast CA with peritoneal carcinomatosis  5. Diarrhea due to chemotx  6. BLE Edema and Ascites (due to #4)  7. Bilateral hydronephrosis with ureteral stents (due to #4)  8. HTN  9. Metabolic acidosis  10. HypoCa++  11. Thrush).     Plan:   (Continue mgmt per Renal  Cr slightly better, Na+ higher--changed IVFs to D5W  S/p 2600ml paracentesis 9/3  Erin's Magic Mouthwash for mouth pain, already on oral nystatin for thrush    ).       Ajith Colmenares MD  09/05/17  12:27 PM    Time: 20min

## 2017-09-05 NOTE — PLAN OF CARE
Problem: Patient Care Overview (Adult)  Goal: Plan of Care Review  Outcome: Ongoing (interventions implemented as appropriate)    09/05/17 0210   Coping/Psychosocial Response Interventions   Plan Of Care Reviewed With patient   Patient Care Overview   Progress improving   Outcome Evaluation   Outcome Summary/Follow up Plan Remains weak/needs assistance/active with P.T./Started on Erin's Magic Mouthwash for c/o sore mouth(states it burns) already on Nystatin S/S/remains on IVF's/for am labs/will continue to monitor          Problem: Tissue Perfusion, Ineffective Peripheral (Adult)  Goal: Adequate Tissue Perfusion  Outcome: Ongoing (interventions implemented as appropriate)    Problem: Fluid Volume Deficit (Adult)  Goal: Fluid/Electrolyte Balance  Outcome: Ongoing (interventions implemented as appropriate)

## 2017-09-06 NOTE — PLAN OF CARE
Problem: Inpatient Physical Therapy  Goal: Transfer Training Goal 1 LTG- PT  Outcome: Outcome(s) achieved Date Met:  09/06/17 09/06/17 1644   Transfer Training PT LTG   Transfer Training PT LTG, Date Goal Reviewed 09/06/17   Transfer Training PT LTG, Outcome goal met       Goal: Gait Training Goal LTG- PT  Outcome: Outcome(s) achieved Date Met:  09/06/17 09/06/17 1644   Gait Training PT LTG   Gait Training Goal PT LTG, Date Goal Reviewed 09/06/17   Gait Training Goal PT LTG, Outcome goal met

## 2017-09-06 NOTE — PROGRESS NOTES
"   LOS: 5 days    Patient Care Team:  Shivam Llanes MD as PCP - General (Internal Medicine)  Masha Gutierres MD PhD as Consulting Physician (Hematology and Oncology)  Cuba Mabry MD as Referring Physician (Internal Medicine)    Chief Complaint:  No chief complaint on file.      Subjective     Interval History:     Follow up MARIA DEL CARMEN and hypernatremia    Eating fair.  Says no bm for 3 days. Passing gas. No pain. Episode of trigeminy   Objective     Vital Signs  Temp:  [97.3 °F (36.3 °C)-98 °F (36.7 °C)] 98 °F (36.7 °C)  Heart Rate:  [] 107  Resp:  [16] 16  BP: (143-147)/(64-89) 147/89    Flowsheet Rows         First Filed Value    Admission Height  62\" (157.5 cm) Documented at 09/01/2017 1243    Admission Weight  108 lb (49 kg) Documented at 09/01/2017 1243          I/O this shift:  In: 750 [I.V.:750]  Out: 800 [Urine:800]  I/O last 3 completed shifts:  In: 3765 [P.O.:890; I.V.:2875]  Out: 1525 [Urine:1525]    Intake/Output Summary (Last 24 hours) at 09/06/17 1503  Last data filed at 09/06/17 1446   Gross per 24 hour   Intake             2110 ml   Output              800 ml   Net             1310 ml       Physical Exam:  very frail and thin. Pale. Oral mucosa dry. Sitting in chair.   No jvd  Lungs clear to auscultation   RRR no s3 or rub  abd soft protuberant bs+, not tender.   Ext 2+ edema  lower ext.               Results Review:      Results from last 7 days  Lab Units 09/06/17  0550 09/05/17  0659 09/04/17  0744  09/01/17  0936   SODIUM mmol/L 149* 150* 149*  149*  < > 158*   POTASSIUM mmol/L 3.9 3.6 3.7  3.7  < > 5.3*   CHLORIDE mmol/L 117* 121* 119*  119*  < > 124*   CO2 mmol/L 19.6* 17.4* 17.1*  17.1*  < > 18.1*   BUN mg/dL 31* 32* 35*  35*  < > 54*   CREATININE mg/dL 2.11* 2.35* 2.58*  2.58*  < > 3.32*   CALCIUM mg/dL 7.2* 6.8* 6.5*  6.5*  < > 7.6*   BILIRUBIN mg/dL 0.2 0.2  --   --  0.2   ALK PHOS U/L 81 79  --   --  95   ALT (SGPT) U/L 14 14  --   --  15   AST (SGOT) U/L 49* 48*  --   --  49* "   GLUCOSE mg/dL 82 94 106*  106*  < > 198*   < > = values in this interval not displayed.    Estimated Creatinine Clearance: 18 mL/min (by C-G formula based on Cr of 2.11).      Results from last 7 days  Lab Units 09/06/17  0550 09/04/17  0744 09/02/17  0451 09/01/17  0936   MAGNESIUM mg/dL 2.1  --  3.0* 3.2*   PHOSPHORUS mg/dL  --  2.9 3.3  --                Results from last 7 days  Lab Units 09/06/17  0550 09/05/17  0659 09/04/17  0744 09/03/17  0612 09/02/17  0451   WBC 10*3/mm3 8.14 6.65 6.22 4.79 4.31*   HEMOGLOBIN g/dL 8.2* 8.0* 8.4* 8.4* 8.0*   PLATELETS 10*3/mm3 205 176 173 201 223               Imaging Results (last 24 hours)     ** No results found for the last 24 hours. **          aspirin 81 mg Oral Daily   calcitriol 0.5 mcg Oral Daily   calcium carbonate 2 tablet Oral TID   heparin (porcine) 5,000 Units Subcutaneous Q8H   nystatin 500,000 Units Swish & Swallow 4x Daily   predniSONE 5 mg Oral Daily   sodium bicarbonate 650 mg Oral BID       dextrose 100 mL/hr Last Rate: 100 mL/hr (09/06/17 1446)       Medication Review:   Current Facility-Administered Medications   Medication Dose Route Frequency Provider Last Rate Last Dose   • acetaminophen (TYLENOL) tablet 650 mg  650 mg Oral Q4H PRN Jordon John MD   650 mg at 09/02/17 1854   • aspirin EC tablet 81 mg  81 mg Oral Daily Jordon John MD   81 mg at 09/06/17 0839   • calcitriol (ROCALTROL) capsule 0.5 mcg  0.5 mcg Oral Daily Batsheva Patel MD   0.5 mcg at 09/06/17 0839   • calcium carbonate (TUMS) chewable tablet 500 mg (200 mg elemental)  2 tablet Oral TID Batsheva Patel MD   1 tablet at 09/06/17 0840   • dextrose (D5W) 5 % infusion  100 mL/hr Intravenous Continuous Leyla Venegas  mL/hr at 09/06/17 1446 100 mL/hr at 09/06/17 1446   • diphenoxylate-atropine (LOMOTIL) 2.5-0.025 MG per tablet 1 tablet  1 tablet Oral 4x Daily PRN Ajith Colmenares MD       • heparin (porcine) 5000 UNIT/ML injection 5,000 Units   5,000 Units Subcutaneous Q8H Jordon John MD   5,000 Units at 09/06/17 1446   • HYDROcodone-acetaminophen (NORCO) 5-325 MG per tablet 1 tablet  1 tablet Oral Q6H PRN Jordon John MD   1 tablet at 09/02/17 2136   • magic mouthwash oral supsension 10 mL  10 mL Swish & Spit Q4H PRN Ajith Colmenares MD   10 mL at 09/06/17 0840   • nystatin (MYCOSTATIN) 084537 UNIT/ML suspension 500,000 Units  500,000 Units Swish & Swallow 4x Daily Jordon John MD   500,000 Units at 09/06/17 1223   • ondansetron (ZOFRAN) injection 4 mg  4 mg Intravenous Q6H PRN Jordon John MD       • predniSONE (DELTASONE) tablet 5 mg  5 mg Oral Daily Jordon John MD   5 mg at 09/06/17 0839   • prochlorperazine (COMPAZINE) tablet 10 mg  10 mg Oral Q6H PRN Jordon John MD       • sodium bicarbonate tablet 650 mg  650 mg Oral BID Leyla Venegas MD   650 mg at 09/06/17 0840   • sodium chloride 0.9 % flush 1-10 mL  1-10 mL Intravenous PRN Jordon John MD           Assessment/Plan       Principal Problem:    Hypernatremia  Active Problems:    Breast cancer metastasized to bone    Weight loss due to medication    Essential hypertension    Acute kidney injury    Dehydration    Diarrhea    1. MARIA DEL CARMEN due to volume depletion, recent severe diarrhea, taxol chemotherapy.  Pt with 3rd spacing of fluid due to low albumin.  Baseline cr 1.0. Increase D5W to 100 cc per hour.   Crt better. Sodium still high.   2. Breast cancer with metastasis to bone s/p taxol  3. Chemotherapy induced diarrhea x 3 weeks. Now constipated.   4. Hypernatremia due to diarrheal losses, decreased intake. Increase D5W.  5. Ascites:  S/p therapeutic paracentesis x 4, last one 9/3  6. Hypocalcemia:  corrects to 10 with her albumin.   7. Metabolic acidosis likely from diarrhea/renal failure. Non-anion gap. Reduce po bicarb to bid with her hypernatremia.   8. Mild elevation AST.  Hold statin      Leyla Venegas MD  09/06/17  3:03  PM

## 2017-09-06 NOTE — PROGRESS NOTES
Continued Stay Note  [facility]     Patient Name: Erin Vora  MRN: [id number]  Today's Date: 9/6/2017    Admit Date: 9/1/2017          Discharge Plan       09/06/17 0920    Case Management/Social Work Plan    Plan Home with Home Health if needed    Patient/Family In Agreement With Plan yes    Additional Comments Spoke with Inessa from [facility] and Robina from [facility] pt is ambulating 300 feet and pt does not have skilled need for skilled rehab, or anticipate [place] [facility] will pay. CCP spoke with Lala with [facility] and they are not accepting admissions right now. CCP spoke with ROBBY Santa and patient at bedside, and discussed Home with Home Health. CCP explained would call patients son Aden. CCP called and left vm requesting call back. Josseline BUSTAMANTE/PAULA              Discharge Codes     None            Ly Ramos, RN

## 2017-09-06 NOTE — PLAN OF CARE
Problem: Patient Care Overview (Adult)  Goal: Plan of Care Review  Outcome: Ongoing (interventions implemented as appropriate)    09/06/17 0244   Coping/Psychosocial Response Interventions   Plan Of Care Reviewed With patient   Patient Care Overview   Progress no change   Outcome Evaluation   Outcome Summary/Follow up Plan no falls; vital signs stable; await AM labs; will continue to monitor

## 2017-09-06 NOTE — PROGRESS NOTES
"   LOS: 5 days   Patient Care Team:  Shivam Llanes MD as PCP - General (Internal Medicine)  Masha Gutierres MD PhD as Consulting Physician (Hematology and Oncology)  Cuba Mabry MD as Referring Physician (Internal Medicine)    Chief Complaint: tired    Subjective     HPI Comments: Pt still has very little po intake--due in part to anorexia and pain in mouth. Long d/w pt and daughter-in-law at bedside regarding importance of diet.      Subjective:  Symptoms:  Improved.  She reports weakness and anorexia.  No shortness of breath, malaise, cough, chest pain, headache, chest pressure, diarrhea or anxiety.    Diet:  Poor intake.  No nausea or vomiting.    Activity level: Impaired due to weakness.    Pain:  She reports no pain.        History taken from: patient chart family RN    Objective     Vital Signs  Temp:  [97.3 °F (36.3 °C)-97.7 °F (36.5 °C)] 97.7 °F (36.5 °C)  Heart Rate:  [] 106  Resp:  [16] 16  BP: (143-148)/(64-86) 144/70    Objective:  General Appearance:  Comfortable and in no acute distress.    Vital signs: (most recent): Blood pressure 144/70, pulse 106, temperature 97.7 °F (36.5 °C), temperature source Oral, resp. rate 16, height 62.01\" (157.5 cm), weight 118 lb (53.5 kg), SpO2 98 %, not currently breastfeeding.  Vital signs are normal.  No fever.    Output: Producing urine and producing stool.    HEENT: Normal HEENT exam.    Lungs:  Normal respiratory rate and normal effort.  Breath sounds clear to auscultation.    Heart: Normal rate.  Regular rhythm.    Abdomen: Abdomen is soft and distended (mild).  Bowel sounds are normal.   There is no abdominal tenderness.     Extremities: There is dependent edema (2+ edema (pt says a little better since admission)).    Pulses: Distal pulses are intact.    Neurological: Patient is alert and oriented to person, place and time.    Skin:  Warm and dry.              Results Review:     I reviewed the patient's new clinical results.  I reviewed the patient's " other test results and agree with the interpretation  Discussed with patient, daughter in law, and RN    Medication Review: reviewed    Assessment/Plan     Principal Problem:    Hypernatremia  Active Problems:    Breast cancer metastasized to bone    Weight loss due to medication    Essential hypertension    Acute kidney injury    Dehydration    Diarrhea      Assessment:  (1. MARIA DEL CARMEN  2. Dehydration  3. Hypernatremia  4. Metastatic breast CA with peritoneal carcinomatosis  5. Diarrhea due to chemotx  6. BLE Edema and Ascites (due to #4)  7. Bilateral hydronephrosis with ureteral stents (due to #4)  8. HTN  9. Metabolic acidosis  10. HypoCa++  11. Thrush).     Plan:   (Continue mgmt per Renal  Cr slightly better, Na+ higher--changed IVFs to D5W  S/p 2600ml paracentesis 9/3  Erin's Magic Mouthwash for mouth pain, already on oral nystatin for thrush  Poor PO intake is certainly worsening her edema  Does not qualify for rehab at present    ).       Ajith Colmenares MD  09/06/17  11:18 AM    Time: 25min

## 2017-09-06 NOTE — PLAN OF CARE
Problem: Patient Care Overview (Adult)  Goal: Plan of Care Review  Outcome: Ongoing (interventions implemented as appropriate)    09/06/17 1445 09/06/17 1737   Coping/Psychosocial Response Interventions   Plan Of Care Reviewed With patient --    Patient Care Overview   Progress --  improving   Outcome Evaluation   Outcome Summary/Follow up Plan --  Changed rate of fluids to 100ml/hr, patient tolerating. PT signed off, and instructed [facility] to encourage her to walk around unit 3x a day. One small episode of vomiting this morning while taking both nystatin and magic mouthwash, says if was the tastes of both of them so close together that made her vomit. Now we are spacing them out. No pain. No falls. VSS. Will continue to monitor.        Goal: Adult Individualization and Mutuality  Outcome: Ongoing (interventions implemented as appropriate)  Goal: Discharge Needs Assessment  Outcome: Ongoing (interventions implemented as appropriate)    09/05/17 1343   Discharge Needs Assessment   Concerns To Be Addressed discharge planning concerns   Concerns Comments Skilled Rehab   Readmission Within The Last 30 Days no previous admission in last 30 days   Equipment Needed After Discharge other (see comments)   Discharge Facility/Level Of Care Needs nursing facility, skilled   Current Discharge Risk chronically ill   Discharge Disposition still a patient   Living Environment   Transportation Available family or friend will provide;car   Self-Care   Equipment Currently Used at Home none         Problem: Nutrition, Imbalanced: Inadequate Oral Intake (Adult)  Goal: Improved Oral Intake  Outcome: Ongoing (interventions implemented as appropriate)    09/06/17 1737   Nutrition, Imbalanced: Inadequate Oral Intake (Adult)   Improved Oral Intake making progress toward outcome       Goal: Prevent Further Weight Loss  Outcome: Ongoing (interventions implemented as appropriate)    Problem: Tissue Perfusion, Ineffective Peripheral  (Adult)  Goal: Adequate Tissue Perfusion  Outcome: Ongoing (interventions implemented as appropriate)    09/06/17 1737   Tissue Perfusion, Ineffective Peripheral (Adult)   Adequate Tissue Perfusion making progress toward outcome         Problem: Fluid Volume Deficit (Adult)  Goal: Fluid/Electrolyte Balance  Outcome: Ongoing (interventions implemented as appropriate)    09/06/17 1737   Fluid Volume Deficit (Adult)   Fluid/Electrolyte Balance making progress toward outcome       Goal: Comfort/Well Being  Outcome: Ongoing (interventions implemented as appropriate)    09/06/17 1737   Fluid Volume Deficit (Adult)   Comfort/Well Being making progress toward outcome

## 2017-09-06 NOTE — THERAPY DISCHARGE NOTE
Acute Care - Physical Therapy Treatment Note/Discharge  [facility]     Patient Name: Erin Vora  : 1935  MRN: [id number]  Today's Date: 2017  Onset of Illness/Injury or Date of Surgery Date: 17  Date of Referral to PT: 17  Referring Physician: Alvaro    Admit Date: 2017    Visit Dx:    ICD-10-CM ICD-9-CM   1. Anemia associated with chemotherapy D64.81 285.3    T45.1X5A E933.1   2. Mild dehydration E86.0 276.51   3. Anemia, unspecified type D64.9 285.9   4. Pneumonia of both lower lobes due to infectious organism J18.9 483.8   5. Metastatic breast cancer - On chemotherapy C50.919 174.9    C79.9 199.1   6. Renal failure (ARF), acute on chronic N17.9 584.9    N18.9 585.9   7. Generalized edema R60.1 782.3   8. Anemia of chronic renal failure, stage 3 (moderate) N18.3 285.21    D63.1 585.3   9. Breast cancer metastasized to bone, unspecified laterality C50.919 174.9    C79.51 198.5   10. Anemia in neoplastic disease D63.0 285.22     Patient Active Problem List   Diagnosis   • Breast cancer metastasized to bone   • Anemia in neoplastic disease   • Cancer associated pain   • Anemia associated with chemotherapy   • Weight loss due to medication   • Poor appetite   • Hypokalemia   • Hyperlipidemia   • Hearing loss   • Medicare annual wellness visit, initial   • Anemia of chronic renal failure, stage 3 (moderate)   • Post-menopause   • Encounter for medication review and counseling   • Fever   • Nausea and vomiting   • Essential hypertension   • Anemia   • Pneumonia   • Chronic kidney disease, stage 3   • Distended abdomen   • Acute kidney injury   • Abdominal carcinomatosis   • Ascites, malignant   • Dehydration   • Chemotherapy induced neutropenia   • Hypernatremia   • Diarrhea       Physical Therapy Education     Title: PT OT SLP Therapies (Resolved)     Topic: Physical Therapy (Resolved)     Point: Mobility training (Resolved)    Learning Progress Summary    Learner Readiness Method  Response Comment Documented by Status   Patient Acceptance E VU  SAL 09/06/17 1644 Done    Acceptance E NR   09/05/17 1539 Active    Acceptance E VU  CK 09/04/17 1021 Done    Acceptance E,ЕЛЕНА GASTON safety during transfers  09/03/17 1646 Done               Point: Home exercise program (Resolved)    Learning Progress Summary    Learner Readiness Method Response Comment Documented by Status   Patient Acceptance E VU  SAL 09/06/17 1644 Done    Acceptance E NR   09/05/17 1539 Active               Point: Body mechanics (Resolved)    Learning Progress Summary    Learner Readiness Method Response Comment Documented by Status   Patient Acceptance E VU  SAL 09/06/17 1644 Done    Acceptance E NR   09/05/17 1539 Active               Point: Precautions (Resolved)    Learning Progress Summary    Learner Readiness Method Response Comment Documented by Status   Patient Acceptance E VU  SAL 09/06/17 1644 Done    Acceptance E NR   09/05/17 1539 Active    Acceptance E VU  CK 09/04/17 1021 Done                      User Key     Initials Effective Dates Name Provider Type Discipline     04/24/15 -  Eldon Mercado, PT Physical Therapist PT     02/07/17 -  Rosa Bradley, PT Physical Therapist PT     08/02/16 -  Bo Rivera, PT DPT Physical Therapist PT     04/24/15 -  Rolando Cadet, PTA Physical Therapy Assistant PT                    IP PT Goals       09/06/17 1644 09/03/17 1647       Transfer Training PT LTG    Transfer Training PT LTG, Date Established  09/03/17  -LC     Transfer Training PT LTG, Time to Achieve  1 wk  -LC     Transfer Training PT LTG, [place] Level  conditional independence  -LC     Transfer Training PT LTG, Assist Device  walker, rolling  -LC     Transfer Training PT  LTG, Date Goal Reviewed 09/06/17  -SAL      Transfer Training PT LTG, Outcome goal met  -SAL      Gait Training PT LTG    Gait Training Goal PT LTG, Date Established  09/03/17  -LC     Gait Training Goal PT LTG, Time to Achieve  1 wk  -LC      Gait Training Goal PT LTG, [place] Level  supervision required  -     Gait Training Goal PT LTG, Assist Device  walker, rolling  -     Gait Training Goal PT LTG, Distance to Achieve  400  -     Gait Training Goal PT LTG, Date Goal Reviewed 09/06/17  -SAL      Gait Training Goal PT LTG, Outcome goal met  -SAL        User Key  (r) = Recorded By, (t) = Taken By, (c) = Cosigned By    Initials Name Provider Type     Bo Rivera, PT DPT Physical Therapist    ASL Cadet PTA Physical Therapy Assistant              Adult Rehabilitation Note       09/06/17 1632 09/05/17 1536 09/04/17 1000    Rehab Assessment/Intervention    Discipline physical therapy assistant  -SAL physical therapist  -LH physical therapist  -CK    Document Type therapy note (daily note)  -SAL therapy note (daily note)  - therapy note (daily note)  -CK    Subjective Information agree to therapy  -SAL agree to therapy  - agree to therapy;no complaints  -CK    Patient Effort, Rehab Treatment good  -SAL good  -     Precautions/Limitations fall precautions  -SAL fall precautions   exit alarm not in place on arrival, armed post PT session  - fall precautions  -CK    Recorded by [SAL] Rolando Cadet PTA [LH] Rosa Bradley, PT [CK] Eldon Mercado, PT    Vital Signs    Pretreatment Heart Rate (beats/min)  112  -LH     Posttreatment Heart Rate (beats/min)  117   nsg aware  -LH     Recorded by  [] Rosa Bradley, PT     Pain Assessment    Pain Assessment 0-10  -SAL No/denies pain  - No/denies pain  -CK    Pain Score 3  -SAL      Post Pain Score 3  -SAL      Pain Type Acute pain  -SAL      Pain Location Abdomen  -SAL      Pain Intervention(s) Ambulation/increased activity;Repositioned;Rest  -SAL      Recorded by [SAL] Rolando Cadet PTA [] Rosa Bradley, PT [CK] Eldon Mercado, PT    Vision Assessment/Intervention    Visual Impairment  WNL  -LH     Recorded by  [] Rosa Bradley, PT     Cognitive Assessment/Intervention    Current Cognitive/Communication  Assessment functional  -SAL functional  -     Orientation Status oriented x 4  -SAL oriented x 4  -LH     Follows Commands/Answers Questions 100% of the time  -% of the time;able to follow single-step instructions  -LH     Personal Safety WNL/WFL  -SAL WNL/WFL  -LH     Personal Safety Interventions fall prevention program maintained;gait belt;nonskid shoes/slippers when out of bed  -SAL fall prevention program maintained;gait belt;nonskid shoes/slippers when out of bed;supervised activity  -LH     Recorded by [SAL] Rolando Cadet, PTA [LH] Rosa Bradley, PT     Bed Mobility, Assessment/Treatment    Bed Mob, Supine to Sit, [place] not tested  -SAL      Bed Mob, Sit to Supine, [place] not tested  -SAL      Bed Mobility, Comment  in chair  -LH deferred up walking in room  -CK    Recorded by [SAL] Rolando Cadet, PTA [LH] Rosa Bradley, PT [CK] Eldon Mercado, PT    Transfer Assessment/Treatment    Transfers, Sit-Stand [place] conditional independence  -SAL contact guard assist  - hand held assist  -CK    Transfers, Stand-Sit [place] conditional independence  -SAL contact guard assist  - hand held assist  -CK    Transfers, Sit-Stand-Sit, Assist Device rolling walker  -SAL rolling walker  - --   pushed IV pole  -CK    Recorded by [SAL] Rolando Cadet, PTA [LH] Rosa Bradley, PT [CK] Eldon Mercado, PT    Gait Assessment/Treatment    Gait, [place] Level supervision required  -SAL contact guard assist;verbal cues required  - hand held assist  -CK    Gait, Assistive Device rolling walker  -SAL rolling walker  - --   pushed IV pole  -CK    Gait, Distance (Feet) 400  -  -  -CK    Gait, Gait Deviations maida decreased;bilateral:;decreased heel strike;forward flexed posture  -SAL maida decreased;bilateral:;decreased heel strike;forward flexed posture  -LH maida decreased;decreased heel strike;step length decreased  -CK    Gait, Safety Issues step length decreased;balance decreased during turns   -SAL weight-shifting ability decreased;step length decreased  -LH     Gait, Impairments strength decreased  -SAL strength decreased  -LH     Gait, Comment  BLEs swelling  -LH     Recorded by [SAL] Rolando Cadet PTA [LH] Rosa Bradley, PT [CK] Eldon Mercado, PT    Therapy Exercises    Bilateral Lower Extremities AROM:;10 reps;ankle pumps/circles;LAQ  -SAL 10 reps;AROM:;sitting;ankle pumps/circles  -LH     Recorded by [SAL] Rolando Cadet PTA [LH] Rosa Bradley, PT     Positioning and Restraints    Pre-Treatment Position sitting in chair/recliner  -SAL sitting in chair/recliner  -LH in bed  -CK    Post Treatment Position chair  -SAL chair  -LH chair  -CK    In Chair reclined;call light within reach;encouraged to call for assist;notified nsg  -SAL reclined;call light within reach;encouraged to call for assist;exit alarm on;with family/caregiver   alarm donned post PT session  -LH sitting;reclined;call light within reach;with family/caregiver  -CK    Recorded by [SAL] Rolando Cadet, CRISTI [LH] Rosa Bradley, PT [CK] Eldon Mercado, PT      User Key  (r) = Recorded By, (t) = Taken By, (c) = Cosigned By    Initials Name Effective Dates    CK Eldon Mercado, PT 04/24/15 -     LH Rosa Bradley, PT 02/07/17 -     SAL Cadet PTA 04/24/15 -           PT Recommendation and Plan  Anticipated Discharge Disposition: home  PT Frequency: daily             Outcome Measures       09/06/17 1600 09/05/17 1500 09/04/17 1000    How much help from another person do you currently need...    Turning from your back to your side while in flat bed without using bedrails? 4  -SAL 4  -LH 4  -CK    Moving from lying on back to sitting on the side of a flat bed without bedrails? 4  -SAL 3  -LH 3  -CK    Moving to and from a bed to a chair (including a wheelchair)? 4  -SAL 3  -LH 3  -CK    Standing up from a chair using your arms (e.g., wheelchair, bedside chair)? 4  -SAL 3  -LH 3  -CK    Climbing 3-5 steps with a railing? 3  -SAL 3  -LH 3  -CK    To walk in hospital  room? 3  -SAL 3  -LH 3  -CK    AM-PAC 6 Clicks Score 22  -SAL 19  -LH 19  -CK    Functional Assessment    Outcome Measure Options AM-PAC 6 Clicks Basic Mobility (PT)  -SAL AM-PAC 6 Clicks Basic Mobility (PT)  -LH AM-PAC 6 Clicks Basic Mobility (PT)  -CK      User Key  (r) = Recorded By, (t) = Taken By, (c) = Cosigned By    Initials Name Provider Type    CK Eldon Mercado, PT Physical Therapist     Rosa Bradley, PT Physical Therapist    SAL Cadet PTA Physical Therapy Assistant           Time Calculation:         PT Charges       09/06/17 1644          Time Calculation    Start Time 1632  -SAL      Stop Time 1645  -SAL      Time Calculation (min) 13 min  -SAL      PT Received On 09/06/17  -SAL      PT - Next Appointment 09/07/17  -SAL        User Key  (r) = Recorded By, (t) = Taken By, (c) = Cosigned By    Initials Name Provider Type    SAL Cadet PTA Physical Therapy Assistant          Therapy Charges for Today     Code Description Service Date Service Provider Modifiers Qty    90705194702 HC PT THER PROC EA 15 MIN 9/6/2017 Rolando Cadet PTA GP 1          PT G-Codes  Outcome Measure Options: AM-PAC 6 Clicks Basic Mobility (PT)    PT Discharge Summary  Reason for Discharge: All goals achieved  Outcomes Achieved: Able to achieve all goals within established timeline  Discharge Destination: Home with home health, Home with assist    Rolando Cadet PTA  9/6/2017

## 2017-09-07 NOTE — CONSULTS
Referral received from Dr. Gutierres to assess for psychosocial needs, depression/ anxiety and goals of care.  Chart reviewed.  Patient admitted on 9/1/2017 with persistent diarrhea, weakness.  Patient dx with Breast cancer in 2005, metastatic disease progression to the bone in Sept 2016.  In August 2017 patient was found to have significant ascites associated with peritoneal carcinomatosis.  Patient was initiated with single agent Taxol, but due to acute illness chemo has been placed on hold.      Psychosocial assessment completed with patient.  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 Patient does have a living will on file.   Patient was fairly active and independent with ADL's prior to admit.  She was not using an DME in the home, but did need a wheelchair while out in the community.  Patient is alert, oriented x 3 and engaged in conversation. Patient has never used home health or been to a SNF.  Patient states that her , who passed away about 3 years ago, did use [facility] for a limited amount of time and passed away on 4 [facility] Palliative care unit.   Discussed patients disease progression and difficulty with tolerating the chemo.  Patient became very tearful while talking about her cancer and the progression.  Patient states that she doesn't think she can tolerate much more and that her doctors are concerned about her kidneys if she continues on aggressive chemo.   Discussed need for assistance and support at home at d/c.  Patient states that her sister plans to stay with her at her home at d/c.  Patient will have need for wx and w/c at d/c.  OSW will let CCP know this.  Discussed [facility] services at home vs  services.  Patient is agreeable to having [facility] talk with her and provide an EOS ( explanation of services consult).  This referral has been placed by OSW after permission and discussion with patient's  son.    OSW did preform a depression and anxiety screening.  PHQ9 = 12 and RONNELL 7 = 4.  Patient does endorse feelings of sadness and hopelessness surrounding her cancer diagnosis/prognsois.  Patient states that her ability to enjoy pleasurable activities have decreased due to lack of ability and energy.  Patient reports poor appetite and difficulty concentrating.  Patient does endorse feelings and thoughts of being better off dead ( due to fear of being a burden to her son and sister),  But she states that she would never hurt herself and does not have  thoughts of hurting herself.  OSW made referral to [facility] to evaluate further for symptom management/ depression.      OSW called and spoke to patient's son Aden Vora and discussed all of the above with him.  OSW to mail him information on In home caregiving agencies.  [facility] intake to contact Aden to set up time for EOS.    OSW will follow up with patient and CCP tomorrow.  Thank you for the referral

## 2017-09-07 NOTE — PROGRESS NOTES
"   LOS: 6 days    Patient Care Team:  Shivam Llanes MD as PCP - General (Internal Medicine)  Masha Gutierres MD PhD as Consulting Physician (Hematology and Oncology)  Cuba Mabry MD as Referring Physician (Internal Medicine)    Chief Complaint:  No chief complaint on file.      Subjective     Interval History:     Follow up MARIA DEL CARMEN and hypernatremia    Eating a little better.   Says no bm for 4 days. Passing gas.   Objective     Vital Signs  Temp:  [98 °F (36.7 °C)-98.6 °F (37 °C)] 98.6 °F (37 °C)  Heart Rate:  [] 106  Resp:  [16-18] 18  BP: (132-161)/(64-89) 149/64    Flowsheet Rows         First Filed Value    Admission Height  62\" (157.5 cm) Documented at 09/01/2017 1243    Admission Weight  108 lb (49 kg) Documented at 09/01/2017 1243          I/O this shift:  In: 120 [P.O.:120]  Out: 850 [Urine:850]  I/O last 3 completed shifts:  In: 2850 [P.O.:200; I.V.:2650]  Out: 2000 [Urine:2000]    Intake/Output Summary (Last 24 hours) at 09/07/17 1320  Last data filed at 09/07/17 1016   Gross per 24 hour   Intake             1820 ml   Output             2450 ml   Net             -630 ml       Physical Exam:  very frail and thin. Pale. Oral mucosa moist. Lying in bed.    No jvd  Lungs clear to auscultation   RRR no s3 or rub  abd soft protuberant bs+, not tender.   Ext 2+ edema  lower ext.               Results Review:      Results from last 7 days  Lab Units 09/07/17  0923 09/06/17  0550 09/05/17  0659  09/01/17  0936   SODIUM mmol/L 143 149* 150*  < > 158*   POTASSIUM mmol/L 4.0 3.9 3.6  < > 5.3*   CHLORIDE mmol/L 111* 117* 121*  < > 124*   CO2 mmol/L 19.6* 19.6* 17.4*  < > 18.1*   BUN mg/dL 27* 31* 32*  < > 54*   CREATININE mg/dL 1.99* 2.11* 2.35*  < > 3.32*   CALCIUM mg/dL 7.5* 7.2* 6.8*  < > 7.6*   BILIRUBIN mg/dL  --  0.2 0.2  --  0.2   ALK PHOS U/L  --  81 79  --  95   ALT (SGPT) U/L  --  14 14  --  15   AST (SGOT) U/L  --  49* 48*  --  49*   GLUCOSE mg/dL 120* 82 94  < > 198*   < > = values in this interval " not displayed.    Estimated Creatinine Clearance: 19.9 mL/min (by C-G formula based on Cr of 1.99).      Results from last 7 days  Lab Units 09/07/17  0923 09/06/17  0550 09/04/17  0744 09/02/17  0451 09/01/17  0936   MAGNESIUM mg/dL  --  2.1  --  3.0* 3.2*   PHOSPHORUS mg/dL 2.4*  --  2.9 3.3  --                Results from last 7 days  Lab Units 09/07/17  0456 09/06/17  0550 09/05/17  0659 09/04/17  0744 09/03/17  0612   WBC 10*3/mm3 7.36 8.14 6.65 6.22 4.79   HEMOGLOBIN g/dL 7.3* 8.2* 8.0* 8.4* 8.4*   PLATELETS 10*3/mm3 190 205 176 173 201               Imaging Results (last 24 hours)     ** No results found for the last 24 hours. **          aspirin 81 mg Oral Daily   calcitriol 0.5 mcg Oral Daily   calcium carbonate 2 tablet Oral TID   heparin (porcine) 5,000 Units Subcutaneous Q8H   nystatin 500,000 Units Swish & Swallow 4x Daily   predniSONE 5 mg Oral Daily   sodium bicarbonate 650 mg Oral BID          Medication Review:   Current Facility-Administered Medications   Medication Dose Route Frequency Provider Last Rate Last Dose   • acetaminophen (TYLENOL) tablet 650 mg  650 mg Oral Q4H PRN Jordon John MD   650 mg at 09/02/17 1854   • aspirin EC tablet 81 mg  81 mg Oral Daily Jordon John MD   81 mg at 09/07/17 0830   • calcitriol (ROCALTROL) capsule 0.5 mcg  0.5 mcg Oral Daily Batsheva Patel MD   0.5 mcg at 09/07/17 0829   • calcium carbonate (TUMS) chewable tablet 500 mg (200 mg elemental)  2 tablet Oral TID Batsheva Patel MD   1 tablet at 09/07/17 0829   • diphenoxylate-atropine (LOMOTIL) 2.5-0.025 MG per tablet 1 tablet  1 tablet Oral 4x Daily PRN Ajith Colmenares MD       • heparin (porcine) 5000 UNIT/ML injection 5,000 Units  5,000 Units Subcutaneous Q8H Jordon John MD   5,000 Units at 09/07/17 0763   • HYDROcodone-acetaminophen (NORCO) 5-325 MG per tablet 1 tablet  1 tablet Oral Q6H PRN Jordon John MD   1 tablet at 09/02/17 7126   • LORazepam (ATIVAN)  injection 0.5 mg  0.5 mg Intravenous Q4H PRN Albino Johnson MD       • magic mouthwash oral supsension 10 mL  10 mL Swish & Spit Q4H PRN Ajith Colmenares MD   10 mL at 09/06/17 1846   • nystatin (MYCOSTATIN) 406135 UNIT/ML suspension 500,000 Units  500,000 Units Swish & Swallow 4x Daily Jordon John MD   500,000 Units at 09/07/17 1248   • ondansetron (ZOFRAN) injection 4 mg  4 mg Intravenous Q6H PRN Jordon John MD       • predniSONE (DELTASONE) tablet 5 mg  5 mg Oral Daily Jordon John MD   5 mg at 09/07/17 0830   • prochlorperazine (COMPAZINE) tablet 10 mg  10 mg Oral Q6H PRN Jordon John MD       • sodium bicarbonate tablet 650 mg  650 mg Oral BID Leyla Venegas MD   650 mg at 09/07/17 0830   • sodium chloride 0.9 % flush 1-10 mL  1-10 mL Intravenous PRN Jordon John MD           Assessment/Plan       Principal Problem:    Hypernatremia  Active Problems:    Breast cancer metastasized to bone    Weight loss due to medication    Essential hypertension    Acute kidney injury    Dehydration    Diarrhea    1. MARIA DEL CARMEN due to volume depletion, recent severe diarrhea, taxol chemotherapy.  Pt with 3rd spacing of fluid due to low albumin.  Baseline cr 1.0.  Crt and Sodium better.  Dc IVF.   2. Breast cancer with metastasis to bone s/p taxol  3. Chemotherapy induced diarrhea x 3 weeks. Now constipated. Laxative today.   4. Hypernatremia due to diarrheal losses, decreased intake.  5. Ascites:  S/p therapeutic paracentesis x 4, last one 9/3  6. Hypocalcemia:  corrects to 10 with her albumin.   7. Metabolic acidosis likely from diarrhea/renal failure. Non-anion gap. Reduce po bicarb to bid with her hypernatremia.   8. Mild elevation AST.  Holding statin  9. Anemia. 1 unit PRBC today.   10. Try to maximize nutrition.  Strawberry milkshake with supper.       Leyla Venegas MD  09/07/17  1:20 PM

## 2017-09-07 NOTE — PLAN OF CARE
Problem: Patient Care Overview (Adult)  Goal: Plan of Care Review  Outcome: Ongoing (interventions implemented as appropriate)    09/07/17 0827 09/07/17 1819   Coping/Psychosocial Response Interventions   Plan Of Care Reviewed With patient --    Patient Care Overview   Progress --  improving   Outcome Evaluation   Outcome Summary/Follow up Plan --  Swelling decreasing in feet and hands. VSS. One unit of blood given today. Pt still complaining of soreness and intermittent bleeding in mouth. No falls. No pain. Will continue to monitor.       Goal: Adult Individualization and Mutuality  Outcome: Ongoing (interventions implemented as appropriate)  Goal: Discharge Needs Assessment  Outcome: Ongoing (interventions implemented as appropriate)    09/05/17 1343   Discharge Needs Assessment   Concerns To Be Addressed discharge planning concerns   Concerns Comments Skilled Rehab   Readmission Within The Last 30 Days no previous admission in last 30 days   Equipment Needed After Discharge other (see comments)   Discharge Facility/Level Of Care Needs nursing facility, skilled   Current Discharge Risk chronically ill   Discharge Disposition still a patient   Living Environment   Transportation Available family or friend will provide;car   Self-Care   Equipment Currently Used at Home none         Problem: Nutrition, Imbalanced: Inadequate Oral Intake (Adult)  Goal: Improved Oral Intake  Outcome: Ongoing (interventions implemented as appropriate)    09/07/17 1819   Nutrition, Imbalanced: Inadequate Oral Intake (Adult)   Improved Oral Intake making progress toward outcome       Goal: Prevent Further Weight Loss  Outcome: Ongoing (interventions implemented as appropriate)    09/07/17 1819   Nutrition, Imbalanced: Inadequate Oral Intake (Adult)   Prevent Further Weight Loss making progress toward outcome         Problem: Tissue Perfusion, Ineffective Peripheral (Adult)  Goal: Adequate Tissue Perfusion  Outcome: Ongoing (interventions  implemented as appropriate)    09/07/17 1819   Tissue Perfusion, Ineffective Peripheral (Adult)   Adequate Tissue Perfusion making progress toward outcome         Problem: Fluid Volume Deficit (Adult)  Goal: Fluid/Electrolyte Balance  Outcome: Ongoing (interventions implemented as appropriate)    09/07/17 1819   Fluid Volume Deficit (Adult)   Fluid/Electrolyte Balance making progress toward outcome       Goal: Comfort/Well Being  Outcome: Ongoing (interventions implemented as appropriate)    09/07/17 1819   Fluid Volume Deficit (Adult)   Comfort/Well Being making progress toward outcome

## 2017-09-07 NOTE — PROGRESS NOTES
"  Name: Erin Vora  ADMIT: 2017   Age/Sex: 82 y.o.female LOS:  LOS: 6 days    :    1935     ROOM: [facility]   MRN:    [id number]    PCP:    Shivam Llanes MD     Subjective   Feels a little better. Still with some mouth pain. Up ambulating.     Objective   Vital Signs  Temp:  [98 °F (36.7 °C)-98.6 °F (37 °C)] 98.6 °F (37 °C)  Heart Rate:  [] 106  Resp:  [16-18] 18  BP: (132-161)/(64-89) 149/64  Body mass index is 22.49 kg/(m^2).    Objective:  General Appearance:  Comfortable (chronically ill appearing).    Vital signs: (most recent): Blood pressure 149/64, pulse 106, temperature 98.6 °F (37 °C), temperature source Oral, resp. rate 18, height 62.01\" (157.5 cm), weight 123 lb (55.8 kg), SpO2 90 %, not currently breastfeeding.  Vital signs are normal.    HEENT: Normal HEENT exam.    Lungs:  Normal effort.  Breath sounds clear to auscultation.    Heart: Normal rate.  Regular rhythm.    Abdomen: Abdomen is soft and non-distended.  Bowel sounds are normal.   There is no abdominal tenderness.     Extremities: There is dependent edema.  There is no deformity.    Neurological: Patient is alert and oriented to person, place and time.    Skin:  Warm and dry.  No rash.             Results Review:       I reviewed the patient's new clinical results.    Results from last 7 days  Lab Units 17  0456 17  0550 17  0659 17  0744 17  0612 17  0451 17  0938   WBC 10*3/mm3 7.36 8.14 6.65 6.22 4.79 4.31* 2.67*   HEMOGLOBIN g/dL 7.3* 8.2* 8.0* 8.4* 8.4* 8.0* 9.4*   PLATELETS 10*3/mm3 190 205 176 173 201 223 282     Results from last 7 days  Lab Units 17  0550 17  0659 17  0744 17  1830 17  1238 17  0612 17  0011   SODIUM mmol/L 149* 150* 149*  149* 148* 147* 152* 153*   POTASSIUM mmol/L 3.9 3.6 3.7  3.7 3.8 3.8 4.0 3.8   CHLORIDE mmol/L 117* 121* 119*  119* 120* 119* 122* 123*   CO2 mmol/L 19.6* 17.4* 17.1*  17.1* 14.3* 15.4* 19.0* " 17.5*   BUN mg/dL 31* 32* 35*  35* 35* 37* 41* 43*   CREATININE mg/dL 2.11* 2.35* 2.58*  2.58* 2.37* 2.55* 2.87* 2.94*   GLUCOSE mg/dL 82 94 106*  106* 143* 138* 120* 147*   Estimated Creatinine Clearance: 18.7 mL/min (by C-G formula based on Cr of 2.11).  Results from last 7 days  Lab Units 09/06/17  0550 09/05/17  0659 09/04/17  0744 09/03/17  1830 09/03/17  1238 09/03/17  0612 09/03/17  0011  09/02/17  0451  09/01/17  0936   CALCIUM mg/dL 7.2* 6.8* 6.5*  6.5* 5.9* 6.4* 6.5* 6.4*  < > 7.1*  < > 7.6*   ALBUMIN g/dL 1.70* 1.20* 1.30*  --   --   --   --   --   --   --  2.40*   MAGNESIUM mg/dL 2.1  --   --   --   --   --   --   --  3.0*  --  3.2*   PHOSPHORUS mg/dL  --   --  2.9  --   --   --   --   --  3.3  --   --    < > = values in this interval not displayed.    RADIOLOGY  9/7/2017  Pending      aspirin 81 mg Oral Daily   calcitriol 0.5 mcg Oral Daily   calcium carbonate 2 tablet Oral TID   heparin (porcine) 5,000 Units Subcutaneous Q8H   nystatin 500,000 Units Swish & Swallow 4x Daily   predniSONE 5 mg Oral Daily   sodium bicarbonate 650 mg Oral BID       dextrose 100 mL/hr Last Rate: 100 mL/hr (09/07/17 0736)   Diet Regular      Assessment/Plan   Assessment:   Principal Problem:    Hypernatremia  Active Problems:    Breast cancer metastasized to bone    Weight loss due to medication    Essential hypertension    Acute kidney injury    Dehydration    Diarrhea        Plan:   - fluid management per renal. Na overall stable  - Cr cont to improve.  - s/p 2600cc paracentesis on 9/3..no need for another currently  - Erin's magic mouthwash for oral pain. On nystatin for thrush  - needs better po intake. Albumin very low which is likely contributing to edema  - Hb has dropped -- no active signs of bleeding currently. Likely combination of dilutional from IVF and chemotherapy. Will transfuse 1 unit  - will need to go home with home health      Disposition  Couple of days      Albino Johnson MD  [facility]  09/07/17  9:25 AM

## 2017-09-07 NOTE — PLAN OF CARE
Problem: Patient Care Overview (Adult)  Goal: Plan of Care Review  Outcome: Ongoing (interventions implemented as appropriate)    09/07/17 0337   Coping/Psychosocial Response Interventions   Plan Of Care Reviewed With patient   Patient Care Overview   Progress no change   Outcome Evaluation   Outcome Summary/Follow up Plan no falls; vital signs stable; await AM labs; will continue to monitor         Problem: Nutrition, Imbalanced: Inadequate Oral Intake (Adult)  Goal: Improved Oral Intake  Outcome: Ongoing (interventions implemented as appropriate)

## 2017-09-08 NOTE — PROGRESS NOTES
"  Name: Erin Vora  ADMIT: 2017   Age/Sex: 82 y.o.female LOS:  LOS: 7 days    :    1935     ROOM: [facility]   MRN:    [id number]    PCP:    Shivam Llanes MD     Subjective   Eating more. No BM yet.     Objective   Vital Signs  Temp:  [97.4 °F (36.3 °C)-98.6 °F (37 °C)] 97.4 °F (36.3 °C)  Heart Rate:  [100-119] 112  Resp:  [18-20] 20  BP: (133-151)/(65-94) 151/94  Body mass index is 22.67 kg/(m^2).    Objective:  General Appearance:  Comfortable (chronically ill appearing).    Vital signs: (most recent): Blood pressure 151/94, pulse 112, temperature 97.4 °F (36.3 °C), temperature source Oral, resp. rate 20, height 62.01\" (157.5 cm), weight 124 lb (56.2 kg), SpO2 100 %, not currently breastfeeding.  Vital signs are normal.    HEENT: Normal HEENT exam.    Lungs:  Normal effort.  There are decreased breath sounds.    Heart: Normal rate.  Regular rhythm.    Abdomen: Abdomen is soft and non-distended.  Bowel sounds are normal.   There is no abdominal tenderness.     Extremities: There is dependent edema.  There is no deformity.    Neurological: Patient is alert and oriented to person, place and time.    Skin:  Warm and dry.  No rash.             Results Review:       I reviewed the patient's new clinical results.    Results from last 7 days  Lab Units 17  0525 17  0456 17  0550 17  0659 17  0744 17  0612 17  0451   WBC 10*3/mm3 7.50 7.36 8.14 6.65 6.22 4.79 4.31*   HEMOGLOBIN g/dL 9.1* 7.3* 8.2* 8.0* 8.4* 8.4* 8.0*   PLATELETS 10*3/mm3 181 190 205 176 173 201 223     Results from last 7 days  Lab Units 17  0525 17  0923 17  0550 17  0659 17  0744 17  1830 17  1238   SODIUM mmol/L 147* 143 149* 150* 149*  149* 148* 147*   POTASSIUM mmol/L 3.8 4.0 3.9 3.6 3.7  3.7 3.8 3.8   CHLORIDE mmol/L 115* 111* 117* 121* 119*  119* 120* 119*   CO2 mmol/L 22.8 19.6* 19.6* 17.4* 17.1*  17.1* 14.3* 15.4*   BUN mg/dL 28* 27* 31* 32* 35* "  35* 35* 37*   CREATININE mg/dL 1.87* 1.99* 2.11* 2.35* 2.58*  2.58* 2.37* 2.55*   GLUCOSE mg/dL 76 120* 82 94 106*  106* 143* 138*   Estimated Creatinine Clearance: 21.3 mL/min (by C-G formula based on Cr of 1.87).  Results from last 7 days  Lab Units 09/08/17  0525 09/07/17  0923 09/06/17  0550 09/05/17  0659 09/04/17  0744 09/03/17  1830 09/03/17  1238  09/02/17  0451  09/01/17  0936   CALCIUM mg/dL 7.6* 7.5* 7.2* 6.8* 6.5*  6.5* 5.9* 6.4*  < > 7.1*  < > 7.6*   ALBUMIN g/dL 1.80* 1.90* 1.70* 1.20* 1.30*  --   --   --   --   --  2.40*   MAGNESIUM mg/dL  --   --  2.1  --   --   --   --   --  3.0*  --  3.2*   PHOSPHORUS mg/dL  --  2.4*  --   --  2.9  --   --   --  3.3  --   --    < > = values in this interval not displayed.    RADIOLOGY  9/8/2017  Pending      aspirin 81 mg Oral Daily   calcium carbonate 2 tablet Oral TID   heparin (porcine) 5,000 Units Subcutaneous Q8H   nystatin 500,000 Units Swish & Swallow 4x Daily   predniSONE 5 mg Oral Daily   senna 1 tablet Oral Nightly   sodium bicarbonate 650 mg Oral BID      Diet Regular      Assessment/Plan   Assessment:   Principal Problem:    Hypernatremia  Active Problems:    Breast cancer metastasized to bone    Weight loss due to medication    Essential hypertension    Acute kidney injury    Dehydration    Diarrhea        Plan:   - Na back up after stopping IVF yesterday. Encouraged free water intake. Defer any other changes to renal  - Cr cont to improve.  - s/p 2600cc paracentesis on 9/3..no need for another currently  - Erin's magic mouthwash for oral pain. On nystatin for thrush  - cont to encourage better nutrition. Albumin very low which is likely contributing to edema  - Hb responded well to 1 unit pRBCs on 9/7. Cont to monitor  - will add daily miralax  - will need to go home with home health       Disposition  1-2 days      Albino Johnson MD  [facility]  09/08/17  9:10 AM

## 2017-09-08 NOTE — PLAN OF CARE
Problem: Patient Care Overview (Adult)  Goal: Plan of Care Review  Outcome: Ongoing (interventions implemented as appropriate)    09/08/17 1529   Coping/Psychosocial Response Interventions   Plan Of Care Reviewed With patient   Patient Care Overview   Progress improving   Outcome Evaluation   Outcome Summary/Follow up Plan ambulated in [place] w/ son. met w/ [facility] and onc psych counselor. continue to monitor.

## 2017-09-08 NOTE — PLAN OF CARE
Problem: Patient Care Overview (Adult)  Goal: Plan of Care Review  Outcome: Ongoing (interventions implemented as appropriate)    09/08/17 0032   Coping/Psychosocial Response Interventions   Plan Of Care Reviewed With patient   Patient Care Overview   Progress improving   Outcome Evaluation   Outcome Summary/Follow up Plan no falls; vital signs stable; swelling improving; will continue to monitor

## 2017-09-08 NOTE — CONSULTS
This [facility] rep provided Explanation of services for the family. The pt is agreeable to [facility] services at home. The son states he wanted info on additional care in home as well as life alert info and understands to program [facility] number for emergency. This rep updated Carmelina to notify [facility] upon discharge so we can coordinate getting her home. Thank you for referral please call [phone] with any questions.

## 2017-09-08 NOTE — CONSULTS
Supportive Oncology Services    Pt is 83 yo WF, seen at bedside alongside son, POA. Consult received per Dr. Gutierres to assess pt for sx of depression and anxiety alongside progression of triple negative breast cancer dx. Chart reviewed. 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 Pt is currently admitted for diarrhea and dehydration. Consult received due to recent sx of tearfulness and anxiety alongside pt choice to discontinue further treatment. [facility] educational consult was completed just prior to my assessment; pt and son are interested in this as an option.    Pt is alert and oriented x3. Pt is tearful during interview, although receptive to and engaged in conversation. Affect is blunted, mood is sad, thought processes and conversation are fluent. MMSE completed with score of 28/30. Pt reports having lived and driven independently until appx 2 months ago, due to physical decline. Grief reported regarding loss of autonomy and feeling of being a burden to son and family. Social support network is positive with son, 2 grandchildren, 2 sisters, and 2 brothers. Pt believes she is coping well but becomes tearful when discussing impact on grandchildren, 11 and 14 yo. PHQ9=12 and GAD7=4 as conducted by [facility]. Pt endorses shortened sleep of 6 hours. States she feels rested but does endorse high levels of fatigue that has disrupted ability to go places. Appetite is poor, and recent diarrhea has contributed to significant weight loss. Pt reports worry to be generally well controlled, although heightened in light of recent decisions and thoughts of returning home. Ativan has been recently added inpatient, which pt reports allowed her to feel completely relaxed and go to sleep. However, pt and son report not wanted her to sleep all the time and are agreeable to another option. Pt denies psychiatric history of  any kind, has never been on antidepressant or anxiolytic. Pt is college educated, youngest of 10 children. Primary goals include maintaining independence and cognitive health. [facility] has provided pt with information for Life Alert, and pt and son are discussing options of HH vs home with [facility]. Counseling provided to pt and son regarding sx mgmt and functional improvement to maximize quality of life.     Outpatient Dignity Therapy offered and discussed. Additionally discussed and recommend addition of zyprexa 2.5 mg qhs for more even control of anxiety, as well as anticipated benefit of increased appetite, improved sleep, and mood mgmt. Will discuss recommendations with Dr. Gutierres for continued mgmt of medications; clinic information provided to pt and son for outpatient follow up. At this time, both are uncertain due to decision to go home with [facility] and coverage limitations.

## 2017-09-08 NOTE — PROGRESS NOTES
"   LOS: 7 days    Patient Care Team:  Shivam Llanes MD as PCP - General (Internal Medicine)  Masha Gutierres MD PhD as Consulting Physician (Hematology and Oncology)  Cuba Mabry MD as Referring Physician (Internal Medicine)    Chief Complaint:  No chief complaint on file.      Subjective     Interval History: [facility] consult noted or she may go home with [facility]    No new complaints.    Denies soa or cp    Objective     Vital Signs  Temp:  [97.4 °F (36.3 °C)-98.6 °F (37 °C)] 97.4 °F (36.3 °C)  Heart Rate:  [100-119] 112  Resp:  [18-20] 20  BP: (133-151)/(65-94) 151/94    Flowsheet Rows         First Filed Value    Admission Height  62\" (157.5 cm) Documented at 09/01/2017 1243    Admission Weight  108 lb (49 kg) Documented at 09/01/2017 1243          I/O this shift:  In: 120 [P.O.:120]  Out: 300 [Urine:300]  I/O last 3 completed shifts:  In: 1190 [P.O.:240; I.V.:950]  Out: 3550 [Urine:3550]    Intake/Output Summary (Last 24 hours) at 09/08/17 1234  Last data filed at 09/08/17 0846   Gross per 24 hour   Intake              240 ml   Output             1300 ml   Net            -1060 ml       Physical Exam:  very frail and thin. Pale. Oral mucosa moist. Lying in bed.    No jvd  Lungs clear to auscultation   RRR no s3 or rub  abd soft protuberant bs+, not tender.   Ext 2+ edema  lower ext.                                  Results Review:      Results from last 7 days  Lab Units 09/08/17  0525 09/07/17  0923 09/06/17  0550 09/05/17  0659   SODIUM mmol/L 147* 143 149* 150*   POTASSIUM mmol/L 3.8 4.0 3.9 3.6   CHLORIDE mmol/L 115* 111* 117* 121*   CO2 mmol/L 22.8 19.6* 19.6* 17.4*   BUN mg/dL 28* 27* 31* 32*   CREATININE mg/dL 1.87* 1.99* 2.11* 2.35*   CALCIUM mg/dL 7.6* 7.5* 7.2* 6.8*   BILIRUBIN mg/dL 0.2  --  0.2 0.2   ALK PHOS U/L 87  --  81 79   ALT (SGPT) U/L 18  --  14 14   AST (SGOT) U/L 53*  --  49* 48*   GLUCOSE mg/dL 76 120* 82 94       Estimated Creatinine Clearance: 21.3 mL/min (by C-G " formula based on Cr of 1.87).      Results from last 7 days  Lab Units 09/07/17  0923 09/06/17  0550 09/04/17  0744 09/02/17  0451   MAGNESIUM mg/dL  --  2.1  --  3.0*   PHOSPHORUS mg/dL 2.4*  --  2.9 3.3               Results from last 7 days  Lab Units 09/08/17  0525 09/07/17  0456 09/06/17  0550 09/05/17  0659 09/04/17  0744   WBC 10*3/mm3 7.50 7.36 8.14 6.65 6.22   HEMOGLOBIN g/dL 9.1* 7.3* 8.2* 8.0* 8.4*   PLATELETS 10*3/mm3 181 190 205 176 173               Imaging Results (last 24 hours)     ** No results found for the last 24 hours. **          aspirin 81 mg Oral Daily   calcium carbonate 2 tablet Oral TID   heparin (porcine) 5,000 Units Subcutaneous Q8H   nystatin 500,000 Units Swish & Swallow 4x Daily   polyethylene glycol 17 g Oral Daily   predniSONE 5 mg Oral Daily   sodium bicarbonate 650 mg Oral BID          Medication Review:   Current Facility-Administered Medications   Medication Dose Route Frequency Provider Last Rate Last Dose   • acetaminophen (TYLENOL) tablet 650 mg  650 mg Oral Q4H PRN Jordon John MD   650 mg at 09/02/17 1854   • aspirin EC tablet 81 mg  81 mg Oral Daily Jordon John MD   81 mg at 09/08/17 1037   • calcium carbonate (TUMS) chewable tablet 500 mg (200 mg elemental)  2 tablet Oral TID Batsheva Patel MD   1 tablet at 09/07/17 1630   • diphenoxylate-atropine (LOMOTIL) 2.5-0.025 MG per tablet 1 tablet  1 tablet Oral 4x Daily PRN Ajith Colmenares MD       • heparin (porcine) 5000 UNIT/ML injection 5,000 Units  5,000 Units Subcutaneous Q8H Jordon John MD   5,000 Units at 09/08/17 0528   • HYDROcodone-acetaminophen (NORCO) 5-325 MG per tablet 1 tablet  1 tablet Oral Q6H PRN Jordon John MD   1 tablet at 09/02/17 2136   • LORazepam (ATIVAN) injection 0.5 mg  0.5 mg Intravenous Q4H PRN Albino Johnson MD   0.5 mg at 09/07/17 1725   • magic mouthwash oral supsension 10 mL  10 mL Swish & Spit Q4H PRN Ajith Colmenares MD   10 mL at 09/06/17  1846   • nystatin (MYCOSTATIN) 155241 UNIT/ML suspension 500,000 Units  500,000 Units Swish & Swallow 4x Daily Jordon John MD   500,000 Units at 09/08/17 1036   • ondansetron (ZOFRAN) injection 4 mg  4 mg Intravenous Q6H PRN Jordon John MD       • polyethylene glycol (MIRALAX) powder 17 g  17 g Oral Daily Albino Johnson MD   17 g at 09/08/17 1036   • predniSONE (DELTASONE) tablet 5 mg  5 mg Oral Daily Jordon John MD   5 mg at 09/08/17 1037   • prochlorperazine (COMPAZINE) tablet 10 mg  10 mg Oral Q6H PRN Jordon John MD       • sodium bicarbonate tablet 650 mg  650 mg Oral BID Leyla Venegas MD   650 mg at 09/08/17 1037   • sodium chloride 0.9 % flush 1-10 mL  1-10 mL Intravenous PRN Jordon John MD           Assessment/Plan       Principal Problem:    Hypernatremia  Active Problems:    Breast cancer metastasized to bone    Weight loss due to medication    Essential hypertension    Acute kidney injury    Dehydration    Diarrhea      1. MARIA DEL CARMEN due to volume depletion, recent severe diarrhea, taxol chemotherapy.  Pt with 3rd spacing of fluid due to low albumin.  Baseline cr 1.0.  Crt  better.    2. Breast cancer with metastasis to bone s/p taxol  3. Chemotherapy induced diarrhea x 3 weeks. Now constipated. Laxative today.   4. Hypernatremia due to diarrheal losses, decreased intake.  5. Ascites:  S/p therapeutic paracentesis x 4, last one 9/3  6. Hypocalcemia:  corrects to 10 with her albumin.   7. Metabolic acidosis likely from diarrhea/renal failure. Non-anion gap. Reduce po bicarb to bid with her hypernatremia.   8. Mild elevation AST.  Holding statin  9. Anemia. S/p 1 unit PRBC today.            Batsheva Patel MD  09/08/17  12:34 PM

## 2017-09-08 NOTE — PROGRESS NOTES
Continued Stay Note  [facility]     Patient Name: Erin Vora  MRN: [id number]  Today's Date: 9/8/2017    Admit Date: 9/1/2017          Discharge Plan       09/08/17 1644    Case Management/Social Work Plan    Plan Home with [facility] vs Home with [facility]    Patient/Family In Agreement With Plan yes    Additional Comments Pt and son Aden met with Oncology Social Worker [facility] and also Erin with [facility] for explanation of services. Await family decision. Updated Jonathan with [facility]. CCP to call [facility] at [phone] if patient is discharged with [facility]. CCP to continue to follow. Josseline BUSTAMANTE/CCP              Discharge Codes     None            Ly Ramos, RN

## 2017-09-09 NOTE — PROGRESS NOTES
"   LOS: 8 days    Patient Care Team:  Shivam Llanes MD as PCP - General (Internal Medicine)  Masha Gutierres MD PhD as Consulting Physician (Hematology and Oncology)  Cuba Mabry MD as Referring Physician (Internal Medicine)    Chief Complaint:  No chief complaint on file.      Subjective     Interval History: [facility] consult noted or she may go home with [facility]    No new complaints.    Denies soa or cp    Objective     Vital Signs  Temp:  [98.1 °F (36.7 °C)-98.6 °F (37 °C)] 98.6 °F (37 °C)  Heart Rate:  [106-112] 107  Resp:  [16-18] 16  BP: (131-184)/(59-90) 131/79    Flowsheet Rows         First Filed Value    Admission Height  62\" (157.5 cm) Documented at 09/01/2017 1243    Admission Weight  108 lb (49 kg) Documented at 09/01/2017 1243          I/O this shift:  In: 360 [P.O.:360]  Out: 600 [Urine:600]  I/O last 3 completed shifts:  In: 480 [P.O.:480]  Out: 3950 [Urine:3950]    Intake/Output Summary (Last 24 hours) at 09/09/17 1017  Last data filed at 09/09/17 0907   Gross per 24 hour   Intake              720 ml   Output             3250 ml   Net            -2530 ml       Physical Exam:  very frail and thin. Pale. Oral mucosa moist. Lying in bed.    No jvd  Lungs clear to auscultation   RRR no s3 or rub  abd soft protuberant bs+, not tender.   Ext 1+ edema  lower ext.                                  Results Review:      Results from last 7 days  Lab Units 09/09/17  0537 09/08/17  0525 09/07/17  0923 09/06/17  0550 09/05/17  0659   SODIUM mmol/L 148* 147* 143 149* 150*   POTASSIUM mmol/L 4.1 3.8 4.0 3.9 3.6   CHLORIDE mmol/L 115* 115* 111* 117* 121*   CO2 mmol/L 22.5 22.8 19.6* 19.6* 17.4*   BUN mg/dL 29* 28* 27* 31* 32*   CREATININE mg/dL 1.79* 1.87* 1.99* 2.11* 2.35*   CALCIUM mg/dL 7.6* 7.6* 7.5* 7.2* 6.8*   BILIRUBIN mg/dL  --  0.2  --  0.2 0.2   ALK PHOS U/L  --  87  --  81 79   ALT (SGPT) U/L  --  18  --  14 14   AST (SGOT) U/L  --  53*  --  49* 48*   GLUCOSE mg/dL 73 76 120* 82 94 "       Estimated Creatinine Clearance: 22.2 mL/min (by C-G formula based on Cr of 1.79).      Results from last 7 days  Lab Units 09/09/17  0537 09/07/17  0923 09/06/17  0550 09/04/17  0744   MAGNESIUM mg/dL  --   --  2.1  --    PHOSPHORUS mg/dL 3.1 2.4*  --  2.9               Results from last 7 days  Lab Units 09/09/17  0537 09/08/17  0525 09/07/17  0456 09/06/17  0550 09/05/17  0659   WBC 10*3/mm3 7.72 7.50 7.36 8.14 6.65   HEMOGLOBIN g/dL 9.5* 9.1* 7.3* 8.2* 8.0*   PLATELETS 10*3/mm3 205 181 190 205 176               Imaging Results (last 24 hours)     ** No results found for the last 24 hours. **          aspirin 81 mg Oral Daily   calcium carbonate 2 tablet Oral TID   heparin (porcine) 5,000 Units Subcutaneous Q8H   nystatin 500,000 Units Swish & Swallow 4x Daily   OLANZapine 2.5 mg Oral Nightly   polyethylene glycol 17 g Oral Daily   predniSONE 5 mg Oral Daily   sennosides-docusate sodium 2 tablet Oral BID   sodium bicarbonate 650 mg Oral BID          Medication Review:   Current Facility-Administered Medications   Medication Dose Route Frequency Provider Last Rate Last Dose   • acetaminophen (TYLENOL) tablet 650 mg  650 mg Oral Q4H PRN Jordon John MD   650 mg at 09/02/17 1854   • aspirin EC tablet 81 mg  81 mg Oral Daily Jordon John MD   81 mg at 09/09/17 0910   • calcium carbonate (TUMS) chewable tablet 500 mg (200 mg elemental)  2 tablet Oral TID Batsheva Patel MD   1 tablet at 09/09/17 0910   • diphenoxylate-atropine (LOMOTIL) 2.5-0.025 MG per tablet 1 tablet  1 tablet Oral 4x Daily PRN Ajith Colmenares MD       • heparin (porcine) 5000 UNIT/ML injection 5,000 Units  5,000 Units Subcutaneous Q8H Jordon John MD   5,000 Units at 09/09/17 0536   • HYDROcodone-acetaminophen (NORCO) 5-325 MG per tablet 1 tablet  1 tablet Oral Q6H PRN Jordon John MD   1 tablet at 09/02/17 2136   • LORazepam (ATIVAN) injection 0.5 mg  0.5 mg Intravenous Q4H PRN Albino Johnson,  MD   0.5 mg at 09/07/17 1725   • magic mouthwash oral supsension 10 mL  10 mL Swish & Spit Q4H PRN Ajith Colmenares MD   10 mL at 09/06/17 1846   • nystatin (MYCOSTATIN) 403472 UNIT/ML suspension 500,000 Units  500,000 Units Swish & Swallow 4x Daily Jordon John MD   500,000 Units at 09/09/17 0909   • OLANZapine (zyPREXA) tablet 2.5 mg  2.5 mg Oral Nightly Albino Johnson MD   2.5 mg at 09/08/17 2106   • ondansetron (ZOFRAN) injection 4 mg  4 mg Intravenous Q6H PRN Jordon John MD       • polyethylene glycol (MIRALAX) powder 17 g  17 g Oral Daily Albino Johnson MD   17 g at 09/09/17 0909   • predniSONE (DELTASONE) tablet 5 mg  5 mg Oral Daily Jordon John MD   5 mg at 09/09/17 0910   • prochlorperazine (COMPAZINE) tablet 10 mg  10 mg Oral Q6H PRN Jordon John MD       • sennosides-docusate sodium (SENOKOT-S) 8.6-50 MG tablet 2 tablet  2 tablet Oral BID Albino Johnson MD   2 tablet at 09/09/17 0910   • sodium bicarbonate tablet 650 mg  650 mg Oral BID Leyla Venegas MD   650 mg at 09/09/17 0910   • sodium chloride 0.9 % flush 1-10 mL  1-10 mL Intravenous PRN Jordon John MD           Assessment/Plan       Principal Problem:    Hypernatremia  Active Problems:    Breast cancer metastasized to bone    Weight loss due to medication    Essential hypertension    Acute kidney injury    Dehydration    Diarrhea      1. MARIA DEL CARMEN due to volume depletion, recent severe diarrhea, taxol chemotherapy.  Pt with 3rd spacing of fluid due to low albumin.  Baseline cr 1.0.  Crt slowly  Better, continue same.  2. Breast cancer with metastasis to bone s/p taxol  3. Chemotherapy induced diarrhea x 3 weeks. Now constipated. Laxative today.   4. Hypernatremia due to diarrheal losses, decreased intake.  Encourage po today, if increased in am will add d5w.  5. Ascites:  S/p therapeutic paracentesis x 4, last one 9/3  6. Hypocalcemia:  corrects to 10 with her albumin.   7.  Metabolic acidosis likely from diarrhea/renal failure. Non-anion gap. Reduce po bicarb to bid with her hypernatremia.   8. Mild elevation AST.  Holding statin  9. Anemia. S/p 1 unit PRBC today.            Tayo Moyer MD  09/09/17  10:17 AM

## 2017-09-09 NOTE — PROGRESS NOTES
"  Name: Erin Vora  ADMIT: 2017   Age/Sex: 82 y.o.female LOS:  LOS: 8 days    :    1935     ROOM: [facility]   MRN:    [id number]    PCP:    Shivam Llanes MD     Subjective   States she is eating pretty well. Eating breakfast this am. No BM yet.     Objective   Vital Signs  Temp:  [98.1 °F (36.7 °C)-98.6 °F (37 °C)] 98.6 °F (37 °C)  Heart Rate:  [106-112] 107  Resp:  [16-18] 16  BP: (140-184)/(59-90) 184/90  Body mass index is 22.67 kg/(m^2).    Objective:  General Appearance:  Comfortable and well-appearing.    Vital signs: (most recent): Blood pressure (!) 184/90, pulse 107, temperature 98.6 °F (37 °C), temperature source Oral, resp. rate 16, height 62.01\" (157.5 cm), weight 124 lb (56.2 kg), SpO2 100 %, not currently breastfeeding.  Vital signs are normal.    HEENT: Normal HEENT exam.    Lungs:  Normal effort.  Breath sounds clear to auscultation.    Heart: Tachycardia.  Regular rhythm.    Abdomen: Abdomen is soft.  (Mildly distended)Bowel sounds are normal.   There is no abdominal tenderness.     Extremities: There is dependent edema.  There is no deformity.    Neurological: Patient is alert and oriented to person, place and time.    Skin:  Warm and dry.  No rash.             Results Review:       I reviewed the patient's new clinical results.    Results from last 7 days  Lab Units 17  0537 17  0525 17  0456 17  0550 17  0659 17  0744 17  0612   WBC 10*3/mm3 7.72 7.50 7.36 8.14 6.65 6.22 4.79   HEMOGLOBIN g/dL 9.5* 9.1* 7.3* 8.2* 8.0* 8.4* 8.4*   PLATELETS 10*3/mm3 205 181 190 205 176 173 201     Results from last 7 days  Lab Units 17  0537 17  0525 17  0923 17  0550 17  0659 17  0744 17  1830   SODIUM mmol/L 148* 147* 143 149* 150* 149*  149* 148*   POTASSIUM mmol/L 4.1 3.8 4.0 3.9 3.6 3.7  3.7 3.8   CHLORIDE mmol/L 115* 115* 111* 117* 121* 119*  119* 120*   CO2 mmol/L 22.5 22.8 19.6* 19.6* 17.4* 17.1*  17.1* " 14.3*   BUN mg/dL 29* 28* 27* 31* 32* 35*  35* 35*   CREATININE mg/dL 1.79* 1.87* 1.99* 2.11* 2.35* 2.58*  2.58* 2.37*   GLUCOSE mg/dL 73 76 120* 82 94 106*  106* 143*   Estimated Creatinine Clearance: 22.2 mL/min (by C-G formula based on Cr of 1.79).  Results from last 7 days  Lab Units 09/09/17  0537 09/08/17  0525 09/07/17  0923 09/06/17  0550 09/05/17  0659 09/04/17  0744 09/03/17  1830   CALCIUM mg/dL 7.6* 7.6* 7.5* 7.2* 6.8* 6.5*  6.5* 5.9*   ALBUMIN g/dL 1.50* 1.80* 1.90* 1.70* 1.20* 1.30*  --    MAGNESIUM mg/dL  --   --   --  2.1  --   --   --    PHOSPHORUS mg/dL 3.1  --  2.4*  --   --  2.9  --          Intake/Output Summary (Last 24 hours) at 09/09/17 0746  Last data filed at 09/09/17 0548   Gross per 24 hour   Intake              480 ml   Output             2950 ml   Net            -2470 ml     Wt Readings from Last 1 Encounters:   09/08/17 0500 124 lb (56.2 kg)   09/07/17 0500 123 lb (55.8 kg)   09/06/17 0519 118 lb (53.5 kg)   09/05/17 1304 120 lb 5.9 oz (54.6 kg)   09/05/17 0543 120 lb 4.8 oz (54.6 kg)   09/03/17 0500 112 lb 6.4 oz (51 kg)   09/02/17 0500 109 lb 6.4 oz (49.6 kg)   09/01/17 1519 108 lb 0.4 oz (49 kg)   09/01/17 1243 108 lb (49 kg)     Wt Readings from Last 3 Encounters:   09/08/17 124 lb (56.2 kg)   09/01/17 108 lb 6.4 oz (49.2 kg)   08/30/17 109 lb 3.2 oz (49.5 kg)       RADIOLOGY  9/9/2017  Pending      aspirin 81 mg Oral Daily   calcium carbonate 2 tablet Oral TID   heparin (porcine) 5,000 Units Subcutaneous Q8H   nystatin 500,000 Units Swish & Swallow 4x Daily   OLANZapine 2.5 mg Oral Nightly   polyethylene glycol 17 g Oral Daily   predniSONE 5 mg Oral Daily   sodium bicarbonate 650 mg Oral BID      Diet Regular      Assessment/Plan   Assessment:   Principal Problem:    Hypernatremia  Active Problems:    Breast cancer metastasized to bone    Weight loss due to medication    Essential hypertension    Acute kidney injury    Dehydration    Diarrhea        Plan:   - Na creeping back  up. Encouraged free water intake. According to her I/Os she is only taking in 400-500cc but is putting out almost 3L of urine. Unclear why she is diuresing so much. Will check some urine studies  - Cr cont to improve.  - s/p 2600cc paracentesis on 9/3..may need another prior to discharge  - Erin's magic mouthwash for oral pain. On nystatin for thrush  - cont to encourage better nutrition. Albumin very low which is likely contributing to edema. With the discrepancy between her input and output will ask nutrition to come back and do a calorie count   - Hb responded well to 1 unit pRBCs on 9/7. Cont to monitor  - on miralax. Add senna-s  - added zyprexa low dose at night for anxiety/depression  - will need to go home with home health vs home with [facility]      Disposition  TBD.      Albino Johnson MD  [facility]  09/09/17  7:45 AM

## 2017-09-09 NOTE — PLAN OF CARE
Problem: Patient Care Overview (Adult)  Goal: Plan of Care Review  Outcome: Ongoing (interventions implemented as appropriate)    09/09/17 0343   Coping/Psychosocial Response Interventions   Plan Of Care Reviewed With patient   Patient Care Overview   Progress improving   Outcome Evaluation   Outcome Summary/Follow up Plan A+O X 4. Denied pain. Up to bathroom X 1 w/o SOA present. On Heparin SQ + P.O. Deltasone. Sleeping w/o SOA at rest..         Problem: Nutrition, Imbalanced: Inadequate Oral Intake (Adult)  Goal: Improved Oral Intake  Outcome: Ongoing (interventions implemented as appropriate)  Goal: Prevent Further Weight Loss  Outcome: Ongoing (interventions implemented as appropriate)    Problem: Tissue Perfusion, Ineffective Peripheral (Adult)  Goal: Adequate Tissue Perfusion  Outcome: Ongoing (interventions implemented as appropriate)    Problem: Fluid Volume Deficit (Adult)  Goal: Fluid/Electrolyte Balance  Outcome: Ongoing (interventions implemented as appropriate)  Goal: Comfort/Well Being  Outcome: Ongoing (interventions implemented as appropriate)

## 2017-09-09 NOTE — CONSULTS
"Adult Nutrition  Assessment/PES    Patient Name:  Erin Vora  YOB: 1935  MRN: [id number]  Admit Date:  9/1/2017    Assessment Date:  9/9/2017     Comments:  Consulted for calorie count. Needs: 1715 kcal, 59-74 gm protein daily. Will f/u with day 1 results on 9/10.           Reason for Assessment       09/09/17 1156    Reason for Assessment    Reason For Assessment/Visit calorie count order              Nutrition/Diet History       09/09/17 1157    Nutrition/Diet History    Other MD wanted mercedez ct started due to continued low Albumin --> \"cont to encourage better nutrition. Albumin very low which is likely contributing to edema. With the discrepancy between her input and output will ask nutrition to come back and do a calorie count \" - Dr. Lei.            Anthropometrics       09/09/17 1158    Anthropometrics    RD Documented Current Weight  56.2 kg (124 lb)    RD Documented Weight on Admission 49 kg (108 lb)    Usual Body Weight (UBW)    Weight Loss Time Frame * gain of 16 lb since adm on 9/1 if weights are accurate; (+) for edema            Labs/Tests/Procedures/Meds       09/09/17 1159    Labs/Tests/Procedures/Meds    Diagnostic Test/Procedure Review reviewed;other (see comments)   Mgt of Ascites:  S/p therapeutic paracentesis x 4, last one 9/3. Hospice consulted. ? home with their services vs. home health    Labs/Tests Review Reviewed;Na+;Cl-;Glucose;BUN;Creat;Alb;Hgb Hct    Medication Review Reviewed, pertinent    Significant Vitals reviewed            Physical Findings       09/09/17 1159    Physical Appearance    Skin edema            Estimated/Assessed Needs       09/09/17 1201    Estimated/Assessed Energy Needs    Estimated Kcal Range  1715 kcal (35 mercedez/kg)    Estimated/Assessed Protein Needs    Estimated Protein Range 59-74g (1.2-1.5 g/kg)            Nutrition Prescription Ordered       09/09/17 1202    Nutrition Prescription PO    Current PO Diet Regular    Fluid Consistency Thin    " Supplement Milkshake;Ensure Enlive    Supplement Frequency 3 times a day            Evaluation of Received Nutrient/Fluid Intake       09/09/17 1203    PO Evaluation    Number of Days PO Intake Evaluated 3 days    % PO Intake Per graphics, pt only consumed 10% of 3 meals prior to today's breakfast, in which she ate 50%.               Problem/Interventions:    1. Inadequate PO intake, secondary to #2  2. Advanced disease state/cancer          Intervention Goal       09/09/17 1203    Intervention Goal    General Maintain nutrition;Disease management/therapy;Meet nutritional needs for age/condition    PO Increase intake;Meet estimated needs;PO intake (%)    PO Intake % 75 %            Nutrition Intervention       09/09/17 1203    Nutrition Intervention    RD/Tech Action Follow Tx progress;Care plan reviewd;Encourage intake              Education/Evaluation       09/09/17 1203    Monitor/Evaluation    Monitor Per protocol            Electronically signed by:  Sarahy Ontiveros RD  09/09/17 12:04 PM

## 2017-09-09 NOTE — PLAN OF CARE
Problem: Patient Care Overview (Adult)  Goal: Plan of Care Review  Outcome: Ongoing (interventions implemented as appropriate)    09/09/17 2872   Coping/Psychosocial Response Interventions   Plan Of Care Reviewed With patient   Patient Care Overview   Progress improving   Outcome Evaluation   Outcome Summary/Follow up Plan AXOX4 No complaints of pain. Urine sent to lab per MD order. Nutrition consult today. Will continue to monitor       Goal: Adult Individualization and Mutuality  Outcome: Ongoing (interventions implemented as appropriate)  Goal: Discharge Needs Assessment  Outcome: Ongoing (interventions implemented as appropriate)    Problem: Nutrition, Imbalanced: Inadequate Oral Intake (Adult)  Goal: Improved Oral Intake  Outcome: Ongoing (interventions implemented as appropriate)  Goal: Prevent Further Weight Loss  Outcome: Ongoing (interventions implemented as appropriate)    Problem: Tissue Perfusion, Ineffective Peripheral (Adult)  Goal: Adequate Tissue Perfusion  Outcome: Ongoing (interventions implemented as appropriate)    Problem: Fluid Volume Deficit (Adult)  Goal: Fluid/Electrolyte Balance  Outcome: Ongoing (interventions implemented as appropriate)  Goal: Comfort/Well Being  Outcome: Ongoing (interventions implemented as appropriate)

## 2017-09-10 NOTE — PLAN OF CARE
Problem: Patient Care Overview (Adult)  Goal: Plan of Care Review  Outcome: Ongoing (interventions implemented as appropriate)    09/10/17 0339   Coping/Psychosocial Response Interventions   Plan Of Care Reviewed With patient   Patient Care Overview   Progress improving   Outcome Evaluation   Outcome Summary/Follow up Plan A+O X 4. No c/o pain. Up to bathroom X 1 w/o SOA w/exertion. On SQ Heparin. Sleeping w/o SOA at rest.         Problem: Nutrition, Imbalanced: Inadequate Oral Intake (Adult)  Goal: Improved Oral Intake  Outcome: Ongoing (interventions implemented as appropriate)  Goal: Prevent Further Weight Loss  Outcome: Ongoing (interventions implemented as appropriate)    Problem: Tissue Perfusion, Ineffective Peripheral (Adult)  Goal: Adequate Tissue Perfusion  Outcome: Ongoing (interventions implemented as appropriate)    Problem: Fluid Volume Deficit (Adult)  Goal: Fluid/Electrolyte Balance  Outcome: Ongoing (interventions implemented as appropriate)  Goal: Comfort/Well Being  Outcome: Ongoing (interventions implemented as appropriate)

## 2017-09-10 NOTE — PLAN OF CARE
Problem: Patient Care Overview (Adult)  Goal: Plan of Care Review  Outcome: Ongoing (interventions implemented as appropriate)    09/10/17 7787   Coping/Psychosocial Response Interventions   Plan Of Care Reviewed With patient;keshia   Patient Care Overview   Progress improving   Outcome Evaluation   Outcome Summary/Follow up Plan Suppository given, BM successful. VSS. no complaints. patient and family make decision to go home with [facility] when discharged. Discharge pending NA level. Will continue to monitor       Goal: Adult Individualization and Mutuality  Outcome: Ongoing (interventions implemented as appropriate)  Goal: Discharge Needs Assessment  Outcome: Ongoing (interventions implemented as appropriate)    Problem: Nutrition, Imbalanced: Inadequate Oral Intake (Adult)  Goal: Improved Oral Intake  Outcome: Ongoing (interventions implemented as appropriate)  Goal: Prevent Further Weight Loss  Outcome: Ongoing (interventions implemented as appropriate)    Problem: Tissue Perfusion, Ineffective Peripheral (Adult)  Goal: Adequate Tissue Perfusion  Outcome: Ongoing (interventions implemented as appropriate)    Problem: Fluid Volume Deficit (Adult)  Goal: Fluid/Electrolyte Balance  Outcome: Ongoing (interventions implemented as appropriate)  Goal: Comfort/Well Being  Outcome: Ongoing (interventions implemented as appropriate)

## 2017-09-10 NOTE — PROGRESS NOTES
"   LOS: 9 days    Patient Care Team:  Shivam Llanes MD as PCP - General (Internal Medicine)  Masha Gutierres MD PhD as Consulting Physician (Hematology and Oncology)  Cuba Mabry MD as Referring Physician (Internal Medicine)    Chief Complaint:  No chief complaint on file.      Subjective     Interval History: [facility] consult noted or she may go home with [facility]    No new complaints.    Denies soa or cp    Objective     Vital Signs  Temp:  [97.1 °F (36.2 °C)-98.1 °F (36.7 °C)] 97.8 °F (36.6 °C)  Heart Rate:  [] 105  Resp:  [16-18] 16  BP: (145-180)/() 180/87    Flowsheet Rows         First Filed Value    Admission Height  62\" (157.5 cm) Documented at 09/01/2017 1243    Admission Weight  108 lb (49 kg) Documented at 09/01/2017 1243          I/O this shift:  In: 360 [P.O.:360]  Out: 400 [Urine:400]  I/O last 3 completed shifts:  In: 1380 [P.O.:1380]  Out: 4300 [Urine:4300]    Intake/Output Summary (Last 24 hours) at 09/10/17 0947  Last data filed at 09/10/17 0911   Gross per 24 hour   Intake             1220 ml   Output             2600 ml   Net            -1380 ml       Physical Exam:  very frail and thin. Pale. Oral mucosa moist. Lying in bed.    No jvd  Lungs clear to auscultation   RRR no s3 or rub  abd soft protuberant bs+, not tender.   Ext 1+ edema  lower ext.                                  Results Review:      Results from last 7 days  Lab Units 09/10/17  0527 09/09/17  0537 09/08/17  0525  09/06/17  0550 09/05/17  0659   SODIUM mmol/L 148* 148* 147*  < > 149* 150*   POTASSIUM mmol/L 4.1 4.1 3.8  < > 3.9 3.6   CHLORIDE mmol/L 113* 115* 115*  < > 117* 121*   CO2 mmol/L 22.0 22.5 22.8  < > 19.6* 17.4*   BUN mg/dL 31* 29* 28*  < > 31* 32*   CREATININE mg/dL 1.80* 1.79* 1.87*  < > 2.11* 2.35*   CALCIUM mg/dL 7.2* 7.6* 7.6*  < > 7.2* 6.8*   BILIRUBIN mg/dL  --   --  0.2  --  0.2 0.2   ALK PHOS U/L  --   --  87  --  81 79   ALT (SGPT) U/L  --   --  18  --  14 14   AST (SGOT) U/L  -- "   --  53*  --  49* 48*   GLUCOSE mg/dL 78 73 76  < > 82 94   < > = values in this interval not displayed.    Estimated Creatinine Clearance: 22.1 mL/min (by C-G formula based on Cr of 1.8).      Results from last 7 days  Lab Units 09/10/17  0527 09/09/17  0537 09/07/17  0923 09/06/17  0550   MAGNESIUM mg/dL  --   --   --  2.1   PHOSPHORUS mg/dL 2.9 3.1 2.4*  --                Results from last 7 days  Lab Units 09/09/17  0537 09/08/17  0525 09/07/17  0456 09/06/17  0550 09/05/17  0659   WBC 10*3/mm3 7.72 7.50 7.36 8.14 6.65   HEMOGLOBIN g/dL 9.5* 9.1* 7.3* 8.2* 8.0*   PLATELETS 10*3/mm3 205 181 190 205 176               Imaging Results (last 24 hours)     ** No results found for the last 24 hours. **          aspirin 81 mg Oral Daily   calcium carbonate 2 tablet Oral TID   heparin (porcine) 5,000 Units Subcutaneous Q8H   nystatin 500,000 Units Swish & Swallow 4x Daily   OLANZapine 2.5 mg Oral Nightly   polyethylene glycol 17 g Oral Daily   predniSONE 5 mg Oral Daily   sennosides-docusate sodium 2 tablet Oral BID   sodium bicarbonate 650 mg Oral BID          Medication Review:   Current Facility-Administered Medications   Medication Dose Route Frequency Provider Last Rate Last Dose   • acetaminophen (TYLENOL) tablet 650 mg  650 mg Oral Q4H PRN Jordon John MD   650 mg at 09/02/17 1854   • aspirin EC tablet 81 mg  81 mg Oral Daily Jordon John MD   81 mg at 09/10/17 0831   • calcium carbonate (TUMS) chewable tablet 500 mg (200 mg elemental)  2 tablet Oral TID Batsheva Patel MD   1 tablet at 09/10/17 0830   • diphenoxylate-atropine (LOMOTIL) 2.5-0.025 MG per tablet 1 tablet  1 tablet Oral 4x Daily PRN Ajith Colmenares MD       • heparin (porcine) 5000 UNIT/ML injection 5,000 Units  5,000 Units Subcutaneous Q8H Jordon John MD   5,000 Units at 09/10/17 0521   • HYDROcodone-acetaminophen (NORCO) 5-325 MG per tablet 1 tablet  1 tablet Oral Q6H PRN Jordon John MD   1 tablet at  09/02/17 2136   • LORazepam (ATIVAN) injection 0.5 mg  0.5 mg Intravenous Q4H PRN Albino Johnson MD   0.5 mg at 09/07/17 1725   • magic mouthwash oral supsension 10 mL  10 mL Swish & Spit Q4H PRN Ajith Colmenares MD   10 mL at 09/06/17 1846   • nystatin (MYCOSTATIN) 439036 UNIT/ML suspension 500,000 Units  500,000 Units Swish & Swallow 4x Daily Jordon John MD   500,000 Units at 09/10/17 0831   • OLANZapine (zyPREXA) tablet 2.5 mg  2.5 mg Oral Nightly Albino Johnson MD   2.5 mg at 09/09/17 2130   • ondansetron (ZOFRAN) injection 4 mg  4 mg Intravenous Q6H PRN Jordon John MD       • polyethylene glycol (MIRALAX) powder 17 g  17 g Oral Daily Albino Johnson MD   17 g at 09/10/17 0831   • predniSONE (DELTASONE) tablet 5 mg  5 mg Oral Daily Jordon John MD   5 mg at 09/10/17 0830   • prochlorperazine (COMPAZINE) tablet 10 mg  10 mg Oral Q6H PRN Jordon John MD       • sennosides-docusate sodium (SENOKOT-S) 8.6-50 MG tablet 2 tablet  2 tablet Oral BID Albino Johnson MD   2 tablet at 09/10/17 0831   • sodium bicarbonate tablet 650 mg  650 mg Oral BID Leyla Venegas MD   650 mg at 09/10/17 0831   • sodium chloride 0.9 % flush 1-10 mL  1-10 mL Intravenous PRN Jordon John MD           Assessment/Plan       Principal Problem:    Hypernatremia  Active Problems:    Breast cancer metastasized to bone    Weight loss due to medication    Essential hypertension    Acute kidney injury    Dehydration    Diarrhea      1. MARIA DEL CARMEN due to volume depletion, recent severe diarrhea, taxol chemotherapy.  Pt with 3rd spacing of fluid due to low albumin.  Baseline cr 1.0.  Crt about same today.  Given some gentle hydration today.  2. Breast cancer with metastasis to bone s/p taxol  3. Chemotherapy induced diarrhea x 3 weeks. Now constipated. Laxative today.   4. Hypernatremia due to diarrheal losses, decreased intake.  Adding d5w today.  5. Ascites:  S/p therapeutic  paracentesis x 4, last one 9/3  6. Hypocalcemia:  corrects to 10 with her albumin.   7. Metabolic acidosis likely from diarrhea/renal failure. Non-anion gap. Mostly resolved, will hold bicarb.  8. Mild elevation AST.  Holding statin  9. Anemia. S/p PRBC.           Tayo Moyer MD  09/10/17  9:47 AM

## 2017-09-10 NOTE — PROGRESS NOTES
Nutrition Services    Patient Name:  Erin Vora  YOB: 1935  MRN: [id number]  Admit Date:  9/1/2017        Calorie count never officially initiated yesterday.  Placed orders for it today so will start at dinner tonight and go for 3 days.  Will follow for day 1 results tomorrow (9/11).      Electronically signed by:  Laquita Dixon RD  09/10/17 2:27 PM

## 2017-09-10 NOTE — PROGRESS NOTES
"  Name: Erin Vora  ADMIT: 2017   Age/Sex: 82 y.o.female LOS:  LOS: 9 days    :    1935     ROOM: [facility]   MRN:    [id number]    PCP:    Shivam Llanes MD     Subjective   Feels ok. No sob. Sleeping well at night. Thinks she is eating better.     Objective   Vital Signs  Temp:  [97.1 °F (36.2 °C)-98.1 °F (36.7 °C)] 97.8 °F (36.6 °C)  Heart Rate:  [] 105  Resp:  [16-18] 16  BP: (145-180)/() 180/87  Body mass index is 22.67 kg/(m^2).    Objective  General Appearance:  Comfortable and well-appearing.    Vital signs: (most recent): Blood pressure (!) 184/90, pulse 107, temperature 98.6 °F (37 °C), temperature source Oral, resp. rate 16, height 62.01\" (157.5 cm), weight 124 lb (56.2 kg), SpO2 100 %, not currently breastfeeding.  Vital signs are normal.    HEENT: Normal HEENT exam.    Lungs:  Normal effort.  Breath sounds clear to auscultation.    Heart: Tachycardia.  Regular rhythm.    Abdomen: Abdomen is soft.  (Mildly distended)Bowel sounds are normal.   There is no abdominal tenderness.     Extremities: There is dependent edema.  There is no deformity.    Neurological: Patient is alert and oriented to person, place and time.    Skin:  Warm and dry.  No rash.   Results Review:       I reviewed the patient's new clinical results.    Results from last 7 days  Lab Units 17  0537 17  0525 17  0456 17  0550 17  0659 17  0744   WBC 10*3/mm3 7.72 7.50 7.36 8.14 6.65 6.22   HEMOGLOBIN g/dL 9.5* 9.1* 7.3* 8.2* 8.0* 8.4*   PLATELETS 10*3/mm3 205 181 190 205 176 173     Results from last 7 days  Lab Units 09/10/17  0527 17  0537 17  0525 17  0923 17  0550 17  0659 17  0744   SODIUM mmol/L 148* 148* 147* 143 149* 150* 149*  149*   POTASSIUM mmol/L 4.1 4.1 3.8 4.0 3.9 3.6 3.7  3.7   CHLORIDE mmol/L 113* 115* 115* 111* 117* 121* 119*  119*   CO2 mmol/L 22.0 22.5 22.8 19.6* 19.6* 17.4* 17.1*  17.1*   BUN mg/dL 31* 29* 28* 27* 31* " 32* 35*  35*   CREATININE mg/dL 1.80* 1.79* 1.87* 1.99* 2.11* 2.35* 2.58*  2.58*   GLUCOSE mg/dL 78 73 76 120* 82 94 106*  106*   Estimated Creatinine Clearance: 22.1 mL/min (by C-G formula based on Cr of 1.8).  Results from last 7 days  Lab Units 09/10/17  0527 09/09/17  0537 09/08/17  0525 09/07/17  0923 09/06/17  0550 09/05/17  0659 09/04/17  0744   CALCIUM mg/dL 7.2* 7.6* 7.6* 7.5* 7.2* 6.8* 6.5*  6.5*   ALBUMIN g/dL 1.60* 1.50* 1.80* 1.90* 1.70* 1.20* 1.30*   MAGNESIUM mg/dL  --   --   --   --  2.1  --   --    PHOSPHORUS mg/dL 2.9 3.1  --  2.4*  --   --  2.9       Intake/Output Summary (Last 24 hours) at 09/10/17 1000  Last data filed at 09/10/17 0911   Gross per 24 hour   Intake             1220 ml   Output             2600 ml   Net            -1380 ml     Wt Readings from Last 1 Encounters:   09/08/17 0500 124 lb (56.2 kg)   09/07/17 0500 123 lb (55.8 kg)   09/06/17 0519 118 lb (53.5 kg)   09/05/17 1304 120 lb 5.9 oz (54.6 kg)   09/05/17 0543 120 lb 4.8 oz (54.6 kg)   09/03/17 0500 112 lb 6.4 oz (51 kg)   09/02/17 0500 109 lb 6.4 oz (49.6 kg)   09/01/17 1519 108 lb 0.4 oz (49 kg)   09/01/17 1243 108 lb (49 kg)     Wt Readings from Last 3 Encounters:   09/08/17 124 lb (56.2 kg)   09/01/17 108 lb 6.4 oz (49.2 kg)   08/30/17 109 lb 3.2 oz (49.5 kg)         RADIOLOGY  9/10/2017  Pending      aspirin 81 mg Oral Daily   calcium carbonate 2 tablet Oral TID   heparin (porcine) 5,000 Units Subcutaneous Q8H   nystatin 500,000 Units Swish & Swallow 4x Daily   OLANZapine 2.5 mg Oral Nightly   polyethylene glycol 17 g Oral Daily   predniSONE 5 mg Oral Daily   sennosides-docusate sodium 2 tablet Oral BID       dextrose 75 mL/hr   Diet Regular      Assessment/Plan   Assessment:   Principal Problem:    Hypernatremia  Active Problems:    Breast cancer metastasized to bone    Weight loss due to medication    Essential hypertension    Acute kidney injury    Dehydration    Diarrhea        Plan:   - Na stable bu televated.  Encouraged free water intake. Nephrology restarting D5W  - Cr stable  - s/p 2600cc paracentesis on 9/3..may need another prior to discharge  - Erin's magic mouthwash for oral pain. On nystatin for thrush  - cont to encourage better nutrition. Albumin very low which is likely contributing to edema. Calorie count in progress   - Hb responded well to 1 unit pRBCs on 9/7. Cont to monitor  - on miralax. Cont senna-s. Add prn suppository  - added zyprexa low dose at night for anxiety/depression  - will need to go home with home health vs home with [facility]      Disposition  Whenever Na stabilizes      Albino Johnson MD  [facility]  09/10/17  9:59 AM

## 2017-09-11 NOTE — PROGRESS NOTES
Continued Stay Note  [facility]     Patient Name: Erin Vora  MRN: [id number]  Today's Date: 9/11/2017    Admit Date: 9/1/2017          Discharge Plan       09/11/17 1500    Case Management/Social Work Plan    [facility]    Patient/Family In Agreement With Plan yes    Additional Comments Spoke with Montse with [facility] she is in patients room now arranging discharge. Updated ROBBY Underwood. Josseline BUSTAMANTE/CCP      09/11/17 1219    Case Management/Social Work Plan    Additional Comments Spoke with ROBBY Underwood to confirm patient is okay for dc today.               Discharge Codes     None        Expected Discharge Date and Time     Expected Discharge Date Expected Discharge Time    Sep 11, 2017             Ly Ramos, RN

## 2017-09-11 NOTE — DISCHARGE SUMMARY
Date of Admission: 9/1/2017  Date of Discharge:  9/11/2017    PCP: Shivam Llanes MD      DISCHARGE DIAGNOSIS  Principal Problem:    Hypernatremia  Active Problems:    Breast cancer metastasized to bone    Weight loss due to medication    Essential hypertension    Acute kidney injury    Dehydration    Diarrhea      SECONDARY DIAGNOSES  Past Medical History:   Diagnosis Date   • Anemia    • Bone metastases 2016   • Breast cancer 2005   • HL (hearing loss)    • Hyperlipidemia    • Hyperlipidemia    • Hypertension    • Pneumonia 8/1/2017       CONSULTS   Consults     Date and Time Order Name Status Description    9/7/2017 0941 Inpatient Consult to Psychiatrist Completed     9/2/2017 1350 Inpatient Consult to Urology Completed     9/2/2017 0791 Inpatient Consult to Nephrology Completed           PROCEDURES PERFORMED  Renal ultrasound:  No hydronephrosis    HOSPITAL COURSE  Patient is a 82 y.o. female presented to [facility] complaining of diarrhea.  Please see the admitting history and physical for further details. On admission she had an MARIA DEL CARMEN with Cr of 3.3 and a Na of 158.  She had recent ureteral stents placed but these were found to be in satisfactory position without any evidence of hydronephrosis. She was given hypotonic saline with improvement of her Na and renal function. Her Cr trended down to 1.6 at discharge which is around her baseline and her Na was 140. Her diarrhea had actually resolved early on and she developed constipation and was started on miralax and senna-s and had a BM the day prior to discharge. Her CDiff was negative on admission. She did have a paracentesis x1 while here and also required 1 unit of pRBCs with adequate response of her Hb. She had no active bleeding and this drop was more likely due to her underlying cancer and dilutional effect from all of the fluid she was receiving. Her overall prognosis is poor based on her stage IV cancer and [facility] was consulted.  "She met with them and decided to go home with [facility] and they will make all arrangements with her after she is discharged. She was started on Zyprexa at night to help with sleep and anxiety.       CONDITION ON DISCHARGE  Stable.      VITAL SIGNS  /74 (BP Location: Right leg, Patient Position: Lying)  Pulse 98  Temp 98.1 °F (36.7 °C) (Oral)   Resp 16  Ht 62.01\" (157.5 cm)  Wt 124 lb (56.2 kg)  LMP  (Approximate)  SpO2 97%  BMI 22.67 kg/m2  Objective  General Appearance:  Comfortable and well-appearing.    HEENT: Normal HEENT exam.    Lungs:  Normal effort.  Breath sounds clear to auscultation.    Heart: Tachycardia.  Regular rhythm.    Abdomen: Abdomen is soft.  (Mildly distended)Bowel sounds are normal.   There is no abdominal tenderness.     Extremities: There is dependent edema.  There is no deformity.    Neurological: Patient is alert and oriented to person, place and time.    Skin:  Warm and dry.  No rash.     DISCHARGE DISPOSITION   Home or Self Care      DISCHARGE MEDICATIONS   Erin Vora   Home Medication Instructions CARLOS EDUARDO:[id number]    Printed on:09/11/17 1311   Medication Information                      Acetaminophen (TYLENOL PO)  Take 1 tablet by mouth Every 6 (Six) Hours As Needed.             aspirin 81 MG EC tablet  Take 81 mg by mouth Daily.             calcium carbonate-vitamin d (CALCIUM 600+D HIGH POTENCY) 600-400 MG-UNIT per tablet  Take 1 tablet by mouth Daily.             diphenoxylate-atropine (LOMOTIL) 2.5-0.025 MG per tablet  Take 1-2 tabs 4 times daily PRN for diarrhea             HYDROcodone-acetaminophen (NORCO) 5-325 MG per tablet  Take 1 tablet by mouth Every 6 (Six) Hours As Needed for Moderate Pain (4-6) or Severe Pain  (7-10).             Multiple Vitamin (MULTIVITAMIN) tablet  Take 1 tablet by mouth Daily.             Nystatin (MAGIC MOUTHWASH)  Swish and spit 10 mL Every 4 (Four) Hours As Needed (mouth pain).             nystatin (MYCOSTATIN) 374379 UNIT/ML " suspension  Swish and swallow 5 mL 4 (Four) Times a Day.             OLANZapine (zyPREXA) 2.5 MG tablet  Take 1 tablet by mouth Every Night.             Omega-3 Fatty Acids (FISH OIL PO)  Take 300 mg by mouth Daily.             ondansetron (ZOFRAN) 4 MG tablet  Take 1 tablet by mouth Every 8 (Eight) Hours As Needed for Nausea or Vomiting.             polyethylene glycol (MIRALAX) packet  Take 17 g by mouth Daily.             predniSONE (DELTASONE) 10 MG tablet  Take 5 mg by mouth Daily.             prochlorperazine (COMPAZINE) 10 MG tablet  Take 1 tablet by mouth Every 6 (Six) Hours As Needed for Nausea or Vomiting.             sennosides-docusate sodium (SENOKOT-S) 8.6-50 MG tablet  Take 2 tablets by mouth 2 (Two) Times a Day.                Future Appointments  Date Time Provider Department Center   9/12/2017 11:30 AM [facility] US 1  [facility] US [facility]   9/15/2017 10:00 AM LAB CHAIR 1 [facility]  LAB [facility]   9/15/2017 10:40 AM Masha Gutierres MD PhD [facility] CBC [facility]RES  [facility]   9/15/2017 11:30 AM CHAIR 01 CBC KRE - LG  INFUS KRE LAG   9/26/2017 11:00 AM Shivam Llanes MD [facility]  K[facility]G1 None     Additional Instructions for the Follow-ups that You Need to Schedule     Obtain Informed Consent    As directed    Informed Consent Given For:  US Paracentesis             Follow-up Information     Follow up with [facility] .    Specialty:  Home Health Services    Contact information:    [address] [facility] [address]  [facility] [address] [phone]          TEST  RESULTS PENDING AT DISCHARGE         Albino Johnson MD  [facility]  09/11/17  1:11 PM      Time: greater than 30 minutes.      Dragon disclaimer:  Much of this encounter note is an electronic transcription/translation of spoken language to printed text. The electronic translation of spoken language may permit erroneous, or at times, nonsensical words or phrases to be inadvertently transcribed; Although I  have reviewed the note for such errors, some may still exist.

## 2017-09-11 NOTE — PROGRESS NOTES
"   LOS: 10 days    Patient Care Team:  Shivam Llanes MD as PCP - General (Internal Medicine)  Masha Gutierres MD PhD as Consulting Physician (Hematology and Oncology)  Cuba Mabry MD as Referring Physician (Internal Medicine)    Chief Complaint:  No chief complaint on file.      Subjective follow-up acute kidney injury    Interval History:   Patient is feeling better, denies any chest pain, no nausea or vomiting, no shortness of air.  No dysuria or gross hematuria, he has lower extremity edema.      Review of Systems:   As noted above    Objective     Vital Signs  Temp:  [98 °F (36.7 °C)-98.5 °F (36.9 °C)] 98.1 °F (36.7 °C)  Heart Rate:  [] 98  Resp:  [16-18] 16  BP: (137-169)/(74-85) 169/74    Flowsheet Rows         First Filed Value    Admission Height  62\" (157.5 cm) Documented at 09/01/2017 1243    Admission Weight  108 lb (49 kg) Documented at 09/01/2017 1243          I/O this shift:  In: -   Out: 1200 [Urine:1200]  I/O last 3 completed shifts:  In: 845 [P.O.:620; I.V.:225]  Out: 3850 [Urine:3850]    Intake/Output Summary (Last 24 hours) at 09/11/17 1151  Last data filed at 09/11/17 1100   Gross per 24 hour   Intake              225 ml   Output             2450 ml   Net            -2225 ml       Physical Exam:  General Appearance: alert, oriented x 3, no acute distress, chronically ill and frail  Skin: warm and dry  Neck: supple, no JVD, trachea midline  Lungs: Decreased breath sounds bilaterally, unlabored breathing effort  Heart: RRR, normal S1 and S2, no S3, no rub  Abdomen: soft, non-tender, no palpable bladder, present bowel sounds to auscultation  Extremities: 1+ edema  Neuro: normal speech and mental status      Results Review:      Results from last 7 days  Lab Units 09/11/17  0441 09/10/17  0527 09/09/17  0537 09/08/17  0525  09/06/17  0550 09/05/17  0659   SODIUM mmol/L 140 148* 148* 147*  < > 149* 150*   POTASSIUM mmol/L 3.9 4.1 4.1 3.8  < > 3.9 3.6   CHLORIDE mmol/L 106 113* 115* 115*  < " > 117* 121*   CO2 mmol/L 23.4 22.0 22.5 22.8  < > 19.6* 17.4*   BUN mg/dL 29* 31* 29* 28*  < > 31* 32*   CREATININE mg/dL 1.67* 1.80* 1.79* 1.87*  < > 2.11* 2.35*   CALCIUM mg/dL 7.1* 7.2* 7.6* 7.6*  < > 7.2* 6.8*   BILIRUBIN mg/dL  --   --   --  0.2  --  0.2 0.2   ALK PHOS U/L  --   --   --  87  --  81 79   ALT (SGPT) U/L  --   --   --  18  --  14 14   AST (SGOT) U/L  --   --   --  53*  --  49* 48*   GLUCOSE mg/dL 78 78 73 76  < > 82 94   < > = values in this interval not displayed.    Estimated Creatinine Clearance: 23.8 mL/min (by C-G formula based on Cr of 1.67).      Results from last 7 days  Lab Units 09/11/17  0441 09/10/17  0527 09/09/17  0537  09/06/17  0550   MAGNESIUM mg/dL  --   --   --   --  2.1   PHOSPHORUS mg/dL 2.5 2.9 3.1  < >  --    < > = values in this interval not displayed.            Results from last 7 days  Lab Units 09/11/17  0441 09/09/17  0537 09/08/17  0525 09/07/17  0456 09/06/17  0550   WBC 10*3/mm3 6.35 7.72 7.50 7.36 8.14   HEMOGLOBIN g/dL 8.7* 9.5* 9.1* 7.3* 8.2*   PLATELETS 10*3/mm3 211 205 181 190 205               Imaging Results (last 24 hours)     ** No results found for the last 24 hours. **          aspirin 81 mg Oral Daily   calcium carbonate 2 tablet Oral TID   heparin (porcine) 5,000 Units Subcutaneous Q8H   nystatin 500,000 Units Swish & Swallow 4x Daily   OLANZapine 2.5 mg Oral Nightly   polyethylene glycol 17 g Oral Daily   predniSONE 5 mg Oral Daily   sennosides-docusate sodium 2 tablet Oral BID          Medication Review:   Current Facility-Administered Medications   Medication Dose Route Frequency Provider Last Rate Last Dose   • acetaminophen (TYLENOL) tablet 650 mg  650 mg Oral Q4H PRN Jordon Jonh MD   650 mg at 09/02/17 1854   • aspirin EC tablet 81 mg  81 mg Oral Daily Jordon John MD   81 mg at 09/11/17 0931   • bisacodyl (DULCOLAX) suppository 10 mg  10 mg Rectal Daily PRN Albino Johnson MD   10 mg at 09/10/17 1102   • calcium  carbonate (TUMS) chewable tablet 500 mg (200 mg elemental)  2 tablet Oral TID Batsheva Patel MD   1 tablet at 09/11/17 0932   • diphenoxylate-atropine (LOMOTIL) 2.5-0.025 MG per tablet 1 tablet  1 tablet Oral 4x Daily PRN Ajith Colmenares MD       • heparin (porcine) 5000 UNIT/ML injection 5,000 Units  5,000 Units Subcutaneous Q8H Jordon John MD   5,000 Units at 09/11/17 0703   • HYDROcodone-acetaminophen (NORCO) 5-325 MG per tablet 1 tablet  1 tablet Oral Q6H PRN Jordon John MD   1 tablet at 09/02/17 2136   • LORazepam (ATIVAN) injection 0.5 mg  0.5 mg Intravenous Q4H PRN Albino Johnson MD   0.5 mg at 09/07/17 1725   • magic mouthwash oral supsension 10 mL  10 mL Swish & Spit Q4H PRN Ajith Colmenares MD   10 mL at 09/06/17 1846   • nystatin (MYCOSTATIN) 337732 UNIT/ML suspension 500,000 Units  500,000 Units Swish & Swallow 4x Daily Jordon John MD   500,000 Units at 09/11/17 0931   • OLANZapine (zyPREXA) tablet 2.5 mg  2.5 mg Oral Nightly Albino Johnson MD   2.5 mg at 09/10/17 2226   • ondansetron (ZOFRAN) injection 4 mg  4 mg Intravenous Q6H PRN Jordon John MD       • polyethylene glycol (MIRALAX) powder 17 g  17 g Oral Daily Albino Johnson MD   17 g at 09/10/17 0831   • predniSONE (DELTASONE) tablet 5 mg  5 mg Oral Daily Jordon John MD   5 mg at 09/11/17 0931   • prochlorperazine (COMPAZINE) tablet 10 mg  10 mg Oral Q6H PRN Jordon John MD       • sennosides-docusate sodium (SENOKOT-S) 8.6-50 MG tablet 2 tablet  2 tablet Oral BID Albino Johnson MD   2 tablet at 09/11/17 0931   • sodium chloride 0.9 % flush 1-10 mL  1-10 mL Intravenous PRN Jordon John MD           Assessment/Plan   1.  Acute kidney injury associated with volume depletion improving slowly  2.  Metastatic breast cancer  3.  Area associated with chemotherapy and resolved  4.  Hypernatremia resolved  5. Ascites:  S/p therapeutic paracentesis x 4,  last one 9/3  6. Hypocalcemia:  corrects to 10 with her albumin.   7. Metabolic acidosis likely from diarrhea/renal failure. Non-anion gap. Mostly resolved, will hold bicarb.  8. Mild elevation AST.  Holding statin  9. Anemia. S/p PRBC.      Plan:  1.  Continue the same treatment.  2.  Surveillance labs              Ricky Pompa MD  09/11/17  11:51 AM

## 2017-09-11 NOTE — PROGRESS NOTES
Name: Erin Vora  ADMIT: 2017   Age/Sex: 82 y.o.female LOS:  LOS: 10 days    :    1935     ROOM: [facility]   MRN:    [id number]    PCP:    Shivam Llanes MD     Subjective   Feels good. Up in chair. No complaints. Had BM    Objective   Vital Signs  Temp:  [98 °F (36.7 °C)-98.5 °F (36.9 °C)] 98.1 °F (36.7 °C)  Heart Rate:  [] 98  Resp:  [16-18] 16  BP: (137-169)/(74-85) 169/74  Body mass index is 22.67 kg/(m^2).    Objective  General Appearance:  Comfortable and well-appearing.    HEENT: Normal HEENT exam.    Lungs:  Normal effort.  Breath sounds clear to auscultation.    Heart: Tachycardia.  Regular rhythm.    Abdomen: Abdomen is soft.  (Mildly distended)Bowel sounds are normal.   There is no abdominal tenderness.     Extremities: There is dependent edema.  There is no deformity.    Neurological: Patient is alert and oriented to person, place and time.    Skin:  Warm and dry.  No rash.     Results Review:       I reviewed the patient's new clinical results.    Results from last 7 days  Lab Units 17  04417  0537 17  0525 17  0456 17  0550 17  0659   WBC 10*3/mm3 6.35 7.72 7.50 7.36 8.14 6.65   HEMOGLOBIN g/dL 8.7* 9.5* 9.1* 7.3* 8.2* 8.0*   PLATELETS 10*3/mm3 211 205 181 190 205 176     Results from last 7 days  Lab Units 17  0441 09/10/17  0527 17  0537 17  0525 17  0923 17  0550 17  0659   SODIUM mmol/L 140 148* 148* 147* 143 149* 150*   POTASSIUM mmol/L 3.9 4.1 4.1 3.8 4.0 3.9 3.6   CHLORIDE mmol/L 106 113* 115* 115* 111* 117* 121*   CO2 mmol/L 23.4 22.0 22.5 22.8 19.6* 19.6* 17.4*   BUN mg/dL 29* 31* 29* 28* 27* 31* 32*   CREATININE mg/dL 1.67* 1.80* 1.79* 1.87* 1.99* 2.11* 2.35*   GLUCOSE mg/dL 78 78 73 76 120* 82 94   Estimated Creatinine Clearance: 23.8 mL/min (by C-G formula based on Cr of 1.67).  Results from last 7 days  Lab Units 17  0441 09/10/17  0527 17  0537 17  0525 17  0923  09/06/17  0550 09/05/17  0659   CALCIUM mg/dL 7.1* 7.2* 7.6* 7.6* 7.5* 7.2* 6.8*   ALBUMIN g/dL 1.60* 1.60* 1.50* 1.80* 1.90* 1.70* 1.20*   MAGNESIUM mg/dL  --   --   --   --   --  2.1  --    PHOSPHORUS mg/dL 2.5 2.9 3.1  --  2.4*  --   --        RADIOLOGY  9/11/2017  Pending      aspirin 81 mg Oral Daily   calcium carbonate 2 tablet Oral TID   heparin (porcine) 5,000 Units Subcutaneous Q8H   nystatin 500,000 Units Swish & Swallow 4x Daily   OLANZapine 2.5 mg Oral Nightly   polyethylene glycol 17 g Oral Daily   predniSONE 5 mg Oral Daily   sennosides-docusate sodium 2 tablet Oral BID      Diet Regular      Assessment/Plan   Assessment:   Principal Problem:    Hypernatremia  Active Problems:    Breast cancer metastasized to bone    Weight loss due to medication    Essential hypertension    Acute kidney injury    Dehydration    Diarrhea        Plan:   - Na better today after d5w yesterday. Encouraged free water intake.   - Cr stable  - s/p 2600cc paracentesis on 9/3  - Erin's magic mouthwash for oral pain. On nystatin for thrush  - cont to encourage better nutrition. Albumin very low which is likely contributing to edema. Calorie count in progress   - Hb responded well to 1 unit pRBCs on 9/7. Cont to monitor  - on miralax. Cont senna-s.   - added zyprexa low dose at night for anxiety/depression  - will need to go home with home health vs home with [facility]      Disposition  Today if ok with renal or maybe tomorrow      Albino Johnson MD  [facility]  09/11/17  9:56 AM

## 2017-09-11 NOTE — PROGRESS NOTES
Phone call received form patients son, Toby Vora ( [phone]).  Toby states that they have decided to go home with [facility] services.  Called April Gutierres to obtain order and to verify next appt with him on 9/15/2017.  Dr. Gutierres would like to cont to follow .  OSW called and spoke to Montse Robbins with [facility] to make her aware of patient/family decision. Montse to meet with patient and son prior to d/c home today.    Explained to patient and son that OSW services remain available if needed in the future.  [facility] APRN still following.    Thank you,  Mercy Corrales, MSSW, CSW, OSW-C

## 2017-09-11 NOTE — PROGRESS NOTES
[facility]    Pt seen at bedside. States she is tolerating zyprexa well with improvement in sleep and appetite. Unsure of effect on anxiety and mood, as pt reports some uncertainty and worry regarding transition to home. Pt does endorse some daytime drowsiness, which she feels may be related to zyprexa. Discussed potential for increased drowsiness, especially for first few days. Recommend to continue dose as ordered at this time. Will follow up with pt via phone after discharge to arrange follow up in clinic in two weeks.

## 2017-09-11 NOTE — PROGRESS NOTES
Nutrition Services    Patient Name:  Erin Vora  YOB: 1935  MRN: [id number]  Admit Date:  9/1/2017        Calorie count f/u.  Noted per chart that patient and family have decided to d/c home with [facility] and noted that patient is being discharged today.  Calorie count discontinued.  Will follow as needed.      Electronically signed by:  Laquita Dixon RD  09/11/17 4:04 PM

## 2017-09-11 NOTE — PLAN OF CARE
Problem: Patient Care Overview (Adult)  Goal: Plan of Care Review  Outcome: Ongoing (interventions implemented as appropriate)    09/11/17 0434   Coping/Psychosocial Response Interventions   Plan Of Care Reviewed With patient   Patient Care Overview   Progress improving   Outcome Evaluation   Outcome Summary/Follow up Plan d/c home w/ [facility] today.

## 2017-09-11 NOTE — TELEPHONE ENCOUNTER
Jennifer with Hospice calling to see how often pt's power picc needs to be flushed. Pt is currently in the hospital and will be d/c'd today. D/W RN's at our office and confirmed that pt's PICC needs to be flushed once a week with saline. Pt also needs weekly dressing changes. Informed Jennifer and she v/u. Hospice will do weekly dressing changes.

## 2017-09-11 NOTE — PLAN OF CARE
Problem: Patient Care Overview (Adult)  Goal: Plan of Care Review  Outcome: Ongoing (interventions implemented as appropriate)    09/11/17 0335   Coping/Psychosocial Response Interventions   Plan Of Care Reviewed With patient   Patient Care Overview   Progress improving   Outcome Evaluation   Outcome Summary/Follow up Plan Pt is alert and oriented. VSS no signs of respiratory distress, denies pain at this time, no complaints at this time. Will continue to monitor pt.        Goal: Adult Individualization and Mutuality  Outcome: Ongoing (interventions implemented as appropriate)  Goal: Discharge Needs Assessment  Outcome: Ongoing (interventions implemented as appropriate)    Problem: Nutrition, Imbalanced: Inadequate Oral Intake (Adult)  Goal: Improved Oral Intake  Outcome: Ongoing (interventions implemented as appropriate)  Goal: Prevent Further Weight Loss  Outcome: Ongoing (interventions implemented as appropriate)    Problem: Tissue Perfusion, Ineffective Peripheral (Adult)  Goal: Adequate Tissue Perfusion  Outcome: Ongoing (interventions implemented as appropriate)    Problem: Fluid Volume Deficit (Adult)  Goal: Fluid/Electrolyte Balance  Outcome: Ongoing (interventions implemented as appropriate)  Goal: Comfort/Well Being  Outcome: Ongoing (interventions implemented as appropriate)

## 2017-09-13 NOTE — TELEPHONE ENCOUNTER
[facility]cology [facility]    Called pt to establish follow up visit in clinic post inpatient evaluation. Pt reports to be doing well on zyprexa, noting decrease in daytime drowsiness. Pt expresses interest in continuation of rx and follow up in clinic although reports transportation as being a stressor. Pt gave me permission to reach out to son to set apt if son able to bring her. Son called. LM with contact information.

## 2017-09-15 NOTE — PROGRESS NOTES
Support[facility] Oncology [facility]    Pt and son stopped into [facility] clinic to touch base following CBC visit. Both are saddened about med onc decision not to transfuse RBCs given hgb of 8.6, coupled with pt s/s fatigue. They feel this has been done in the past with significant improvement in energy. Goals of care discussed. Pt is alert and oriented with sad but appropriate affect. States mood is generally good, and son agrees. PHQ9=8 and GAD7=3. Pt has initiated home care with [facility]. Primary goal is to spend time with family, including grandchildren. Currently, she reports high fatigue preventing her from going out to eat or attending [employer] sports functions. Pt denies pain, stating she may take 2-3 pain pills weekly. Sleep is decent at 6-7 hours nightly on zyprexa. Pt denies feeling additional of zyprexa is contributing to fatigue. Appetite is decent; pt reports weight gain in last few weeks. Pt denies nausea and use of antiemetics. Discussed pt activity level, as pt is in wheelchair. Pt utilizes cane and is able to walk around at home. Pt states she has the most energy in the morning. Discussed planning high energy demand activities during periods of the day when she has most energy. Additionally discussed continued benefit of activity, as she is able, for mood, sleep, and energy. Offered pt [employer], which pt and son are excited to do. D[facility]. Next visit with CBC is in one month. Plan to discuss plan of care with Dr. Gutierres and Dr. Rudolph by Monday to assess all possibilities for this patient.

## 2017-09-17 PROBLEM — D64.9 ANEMIA: Status: RESOLVED | Noted: 2017-01-01 | Resolved: 2017-01-01

## 2017-09-17 NOTE — PROGRESS NOTES
REASON FOR FOLLOW-UP:    1. Metastatic breast cancer, predominantly bone disease, biopsy confirmed triple negative in late October 2016. History of left breast cancer in 2005, had mastectomy, without adjuvant chemotherapy. She was followed by Dr. Romo at [facility], treated on Arimidex for 5 years, finished in 2011.   2. Anemia, secondary to her metastatic disease, with significantly elevated ferritin level. No evidence of deficiency for B12 or folic acid. She was given 3 units packed RBC in late October 2016 during hospitalization.    3. Bone biopsy on 10/25/2016 confirmed metastatic breast cancer, triple negative.  She was started on Xeloda 1500 mg twice a day on 11/14/2016 for 14 days on and 7 days off. Patient develops significant fatigue, Xeloda was decreased to 1000 mg twice a day from week #2. On 12/05/2016 schedule was changed to 7 days on, 7 days off.   4. Metastatic bone disease, Zometa was started in November 2016, repeat every 4 weeks.   5. Cancer-related pain, taking Lortab 5/325 mg prn.   6. 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   7.  Xeloda increased to 1500 mg in the morning and 1000 mg in the evening 7 days on and 7 days off, started on 4/21/2017.    8.  Bone scan on 6/7/2017 reported stable disease.  However her tumor marker was trending up at 68.7 on 6/16/2017.  Xeloda was increased to 1500 mg twice a day.    9.  Anemia secondary to chemotherapy.  She required 2 units PRBC transfusion on 7/2/2017.  Patient will be continued on Procrit injection as needed.   10.  Disease progress, required hospitalization in early August 2017.  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  Worsening ascites fluids, required repeated paracentesis on 8/09/2017.  Xeloda was discontinued.  She had paracentesis total of 16 L within a 3-week period in August 2017  11.  Patient had a right arm PICC line placement on 8/15/2017. Patient will be started on single agent Taxol weekly on 8/18/2017.  Plan is 3 weeks on 1 week off repeat every 4 weeks.   12.  Patient was not tolerating chemotherapy after 2 doses of weekly Taxol, developed acute renal failure and neutropenia, required hospitalization from September 1 through September 11, 2017.  She was discharged to home hospice care.      HISTORY OF PRESENT ILLNESS:   The patient is a pleasant 82-year-old female with the above-mentioned history returning today for a close follow-up, 4 days after her recent hospital discharge on 9/11/2017.  Patient is accompanied by her son.  She was hospitalized for 11 days because of acute renal failure with a creatinine 3.3, and neutropenia with ANC 1300.  Both of her renal function and neutrophil counts have improved.  She was also given 2 units PRBC transfusion when she had a hemoglobin 7.3 on 9/7/2017.  Patient also had paracentesis with 2600 ml ascites fluid removed on 9/3/2017.  She relies on walker at home.  Patient reports she has improved appetite, performance status is ECOG 3.  Her diarrhea has resolved.     Because of her worsening clinical condition, and unable to tolerate weekly Taxol after only 2 doses, with poor appetite and severe diarrhea and dehydration leading to kidney failure, and also marrow suppression with mild neutropenia only after 2 doses Taxol, we discussed with the patient and advised her for home hospice care.  This was arranged before her recent discharge from hospital.    Laboratory study today reported a hemoglobin 8.6, normal WBC 6100 including neutrophils 4000, and the normal platelets at 260,000.  Her renal function also improved with a creatinine 1.47 today.             Medical History            Past Medical History   Diagnosis Date   • Anemia      • Bone  metastases      • Breast cancer 2005   • HL (hearing loss)      • Hyperlipidemia      • Hypertension                Surgical History                Past Surgical History   Procedure Laterality Date   • Breast biopsy Left 2005         malignant   • Mastectomy Left 2005               HEMATOLOGIC/ONCOLOGIC HISTORY: Ms. Vora is a 81-year-old  female who presented to the emergency room because of worsening lower back pain. She started having this about a week ago. Severity was 8 out of 10 but then subsided. In this past weekend, she had worsening pain again and was sent to the ER for further evaluation. Prior to this, she was mowing her own grass, lives by herself, with good performance status.      This patient was found to have significant anemia, with hemoglobin 7.0 in the ER. Of note, in the ER there was attempt for transfusion of packed RBC, however she had a fever and the transfusion was abandoned. Yesterday hemoglobin was down to 6.6 and she was given 1 unit packed RBC with no incident. She was given Benadryl 50 mg. This patient has hemoglobin 7.6 this morning. She is scheduled to receive 2 more units packed RBC.        This patient had CT scan of the abdomen and pelvis examination during ER evaluation, which reported diffuse metastatic bone disease involving the spine, in the thoracic spine, lumbar spine and pelvic bones. The patient reports no constipation, no diarrhea, no urinary incontinence.        This patient had history of left breast cancer back in 2005 for which she had left mastectomy. No lymph node involvement. No adjuvant chemotherapy, no radiation therapy. The patient was followed by Dr. Romo at the [facility], and was on Arimidex for 5 years, which stopped in 2011. The patient was dismissed by Dr. Romo afterwards.       The patient reports weight loss of about 10 pounds since early September 2016. She has decreased appetite. No nausea/vomiting. No vision changes, no  hearing changes. No fever, no sweating. Denies melena or hematochezia.        Patient had MRI examination with and without IV contrast for brain, cervical, thoracic and lumbar spin on 10/24/2016. There was no evidence of metastatic brain disease. There are metastatic disease involving the entire spine, no fresh compression fracture.       Patient had a CT-guided bone biopsy on 10/25/2016. G evaluation reported a primary breast cancer. Further testing showed triple negative, ER 0%, OK 0%, HER-2 IHC 2+, however FISH study was negative.      Was started on IV Zometa in early November 2016. She was started on Xeloda 1500 mg twice a day on 11/14/2016 for 14 days on and 7 days off. Patient develops significant fatigue, Xeloda was decreased to 1000 mg twice a day from week #2.        Laboratory study on 12/02/16 showed significant  anemia with hemoglobin 7.8, but a normal WBC 7000 including neutrophils 4800. Her platelets is 367,000. She was given 2 units packed RBC transfusion. Because of poor tolerance, Xeloda was decreased to 1000 mg twice a day 7 days on and 7 days off.  was 30.5 on 12/16/16.       Patient has better tolerance to decrease the Xeloda. She is more energetic. However she has worsening anemia, with a hemoglobin in the 9 gram range. Patient was started on low-dose Procrit 20,000 units every 2 weeks by an of December 2016.     CA 15-3 at 53.4 U/ml on 5/19/17.  Despite increased Xeloda dose, her CA 15-3 continue to increase.      Her bone scan on 6/7/2017 showed a stable disease.  However laboratory study showed a worsening CA-15-3 at 68.7 on 6/16/2017.  Xeloda was increased to 1500 mg twice a day.      Because of anemia secondary to chemotherapy, and that she was symptomatic with hemoglobin 8.7, dizziness and fatigue, patient was given 2 units PRBC transfusion on 7/2/2017, and posttransfusion hemoglobin input to 10.9 on 7/02/17.  Patient reports significant improvement in energy level  afterwards.    Patient was recently admitted to the hospital from 8/1/2017 through 8/5/2017 because of weakness and abdomen distention.  She was found having significant ascites associated with peritoneal carcinomatosis and the tumor deposits at the bilateral adnexal region as well as pelvic mass measuring 3.5×3.5 cm by CT scan on 8/3/2017.  She also had moderate to severe bilateral hydronephrosis, required bilateral ureter stenting by Dr. Jolley on 8/3/2017.  Subsequently she had ultrasound-guided paracentesis on 8/4/2017, with total 4700 mL ascites removed.  Cytology study was positive for metastatic breast cancer.  CT scan of the chest on 8/4/2017 reported a moderate bilateral pleural effusion.      She already stopped Xeloda due to disease progress.  Her tumor marker CA-15-3 was 79.3 on 8/2/2017.      MEDICATIONS: see EMR       ALLERGIES:  No Known Allergies      Past Medical History, Past Surgical History, Social History, Family History have been reviewed and are without significant changes except as mentioned.        REVIEW OF SYSTEMS:  GENERAL: see HPI;    SKIN: No nonhealing lesions.  No rashes.   HEME/LYMPH: No easy bruising, bleeding.  No swollen nodes.    EYES: No vision changes or diplopia.    ENT: No tinnitus, hearing loss, gum bleeding, epistaxis, hoarseness or dysphagia.   RESPIRATORY: No cough,, hemoptysis or wheezing.   CVS: No chest pain, palpitations, orthopnea,   GI: See history of present illness .  No melena or hematochezia.   No nausea, vomiting, constipation, diarrhea  : No lower tract obstructive symptoms, dysuria or hematuria.    MUSCULOSKELETAL: No bone pain. No joint stiffness.  Patient has edema in both legs 2+.   NEUROLOGICAL: has global weakness, but no loss of consciousness or seizures.  Patient relies on walker to ambulate.    PSYCHIATRIC: No increased nervousness, mood changes or depression.        Objective   Vitals:    09/15/17 1021   BP: 142/72  Comment: rt wrist BP   Pulse: 97  "  Resp: 18   Temp: 97.6 °F (36.4 °C)   TempSrc: Oral   SpO2: 100%   Weight: 128 lb 12.8 oz (58.4 kg)   Height: 62.5\" (158.8 cm)   PainSc: 0-No pain   ECOG 3         PHYSICAL EXAM:    GENERAL: Well-developed, elder thin female in no acute distress.    SKIN: Warm, dry without rashes, purpura or petechiae.   HEAD: Normocephalic.  EYES: Pupils equal, round. EOMs intact. Conjunctivae normal.  MOUTH: No oral candida infection.  CHEST: Decreased breathing sounds in bilateral lung fields.  Good airflow.  CARDIAC: Regular rate and rhythm without murmurs, rubs or gallops. Normal S1,S2.  ABDOMEN: Soft,  mild distention, no tender, guarding, with no organomegaly or masses. Bowel sounds present.  EXTREMITIES: No clubbing, cyanosis. There are 2+ edema in both legs.    NEUROLOGICAL: Cranial Nerves II-XII grossly intact. No focal neurological deficits.  PSYCHIATRIC: Normal affect and mood.        RECENT LABS:     Lab Results   Component Value Date    WBC 6.10 09/15/2017    HGB 8.6 (L) 09/15/2017    HCT 28.2 (L) 09/15/2017    .5 (H) 09/15/2017     09/15/2017     Lab Results   Component Value Date    NEUTROABS 4.01 09/15/2017     Lab Results   Component Value Date    GLUCOSE 80 09/15/2017    BUN 26 (H) 09/15/2017    CREATININE 1.47 (H) 09/15/2017    EGFRIFNONA 34 (L) 09/15/2017    EGFRIFAFRI 65 10/22/2016    BCR 17.7 09/15/2017    K 4.6 09/15/2017    CO2 26.0 09/15/2017    CALCIUM 7.9 (L) 09/15/2017    PROTENTOTREF 6.8 10/22/2016    PROTENTOTREF 6.9 10/22/2016    ALBUMIN 2.30 (L) 09/15/2017    LABIL2 0.8 (L) 09/15/2017    AST 31 09/15/2017    ALT 14 09/15/2017     Sodium   Date Value Ref Range Status   09/15/2017 140 134 - 145 mmol/L Final     Potassium   Date Value Ref Range Status   09/15/2017 4.6 3.5 - 4.7 mmol/L Final     Total Bilirubin   Date Value Ref Range Status   09/15/2017 0.2 0.1 - 1.2 mg/dL Final     Alkaline Phosphatase   Date Value Ref Range Status   09/15/2017 99 38 - 116 U/L Final   ]   Her tumor marker " CA-15-3 was 77.7 on 9/01/2017.    Assessment/Plan   1. Metastatic breast cancer, originally with predominantly bone disease, but progressed after first line single agent Xeloda, with peritoneal carcinomatosis with large ascites fluids, pelvic mass and bilateral hydronephrosis required bilateral ureter stenting in early August 2017.  She also has moderate bilateral pleural effusion.      The patient was initiated single agent Taxol on 8/18/2017.  She was not able to tolerate Taxol after 2 doses, with significant fatigue, diarrhea and not able to eat or drink properly, leading to acute renal failure with creatinine 3.3 and neutropenia with ANC 1300, required hospitalization for more than 10 days in early September 2017.  She also required 2 units PRBC transfusion during hospitalization.  She does have improved renal function on to baseline level, and also resolution of neutropenia, and improved hemoglobin after transfusion, however she is still very weak, with poor performance status is ECOG 3.     Patient was discharged to home hospice care on 9/11/2017.  Patient and her son still has questions whether they should continue blood product transfusion and intravenous Zometa treatment.  I discussed with him the goal of care is different and now, with palliation only, and focused is to maintain her quality of life as much as possible, control pains, etc.     She is to physically very weak.  I think physical therapy by home health care will be very appropriate for this patient.  Both patient and her son are agreeable.      2.  Ascites fluid.  She had large quantity of ascites fluid, required repeated paracentesis totaling 16 L in the first 3 weeks in August 2017.  She then again according paracentesis on September 30, 2017 with 2600 mL ascites removed.  Patient does report worsening distention of the abdomen.  Examination today shows moderate mucositis.  I will arrange patient to have ultrasound-guided paracentesis next  week and also then repeated 3 weeks later, to make her comfortable and to maintain quality of life.      3. Metastatic bone disease.  Patient was given Zometa treatment.  Discussed with patient and her son, there is no need for continuing since we are morning to palliative care and Zometa will not help.  Her life expectancy is limited.  I will cancel Zometa treatment today.     4.  Worsening anemia secondary to chemotherapy as well as the her malignancy.  She had a laboratory study on 8/25 2017 reported an enormously elevated ferritin at 3550.  Her vitamin B12 and folic acid level were both supratherapeutic on 9/1/2017.   She did require 2 units PRBC transfusion on 9/7/2017 when she had a hemoglobin 7.3.  I discussed with them there is no need for further blood product support as she is on hospice care, and transfusion will not making her better or prolong her life.    5.  Acute renal failure, hypernatremia and hyperchloremia.  These all resolved.     6. Pain related to cancer. She has no pain today.  She has Norco available for as needed use.      7.  Continue home hospice care.           PLAN:   1.  Continue home hospice care.   2.  Cancel Zometa treatment as scheduled today.    3.  Request a home health care for evaluation, for home physical therapy.    4.  Arrange ultrasound-guided paracentesis, first one next week and then repeat 3 weeks later.    5.  Dressing change for right arm PICC line today.   6.  M.D. visit in 4 weeks for reevaluation.      More than 45 min, over half of that time were for counseling and coordinating her care.    ELVIN FONG M.D., Ph.D.    9/15/2017

## 2017-09-18 PROBLEM — Z45.2 ENCOUNTER FOR FITTING AND ADJUSTMENT OF VASCULAR CATHETER: Status: ACTIVE | Noted: 2017-01-01

## 2017-09-18 NOTE — TELEPHONE ENCOUNTER
Supportive Oncology Services    Pt is [facility] pt with primary concern being fatigue. Full case reviewed with Dr. Rudolph in addition to collaboration with Dr. Gutierres regarding pt fatigue and questions surrounding benefit of blood transfusion. Given pt disease state,  does not feel transfusion will provide benefit to pt. Discussed with Dr. Rudolph psychopharm possibilities. Explored several options. Stimulant not appropriate due to anemia and likelihood of increased workload on heart. Plan to initiate low dose armodafinil 50 mg (1/2 tab first 3-4 days). Will assess pt response and determine whether benefit is worth cost. Discussed this information with son who desires to try. Seven day supply has been called into pt pharmacy, per verbal order Dr. Rudolph. Pt son to call clinic tomorrow with follow up appt date.

## 2017-09-18 NOTE — TELEPHONE ENCOUNTER
Supportive Oncology Services    Called pt son regarding plan of care and discussions with both Dr. Gutierres and Dr. Rudolph. Left message with contact information.

## 2017-09-22 NOTE — TELEPHONE ENCOUNTER
Supportive Oncology Services    Called pt son to discuss ability to fill armodafinil and pt response to it. Unfortunately, prior authorization not denied. Message left.

## 2017-09-25 NOTE — PROGRESS NOTES
Supportive Oncology Services    Pt presents to clinic for initiation of Dignity Therapy. Pt is alert and oriented x3. Station and gait observed to be slow and steady when assisted with cane. Pt affect is slightly blunted. Mood is reported to be good, although pt mourns independence and continues to wish she could be more present in the lives of her son and grandchildren. Medications reviewed. Pt reports sleep to be good some days, but difficult to initiate and maintain others. Continues to take zyprexa 2.5 mg qhs. Advised to increase to two tablets to evaluate effect on sleep and mood. Pt states appetite is mediocre.     Dignity therapy initiated. Total time spent = 60 minutes, with 50 minutes spent on ideas of legacy, current ability and handling limitations, and energy restoration.

## 2017-09-26 NOTE — TELEPHONE ENCOUNTER
Spoke with Hospice Nurse and she stated that there would need to be more information for the PT to see pt other than a PT evaluate and treat.  RN Spoke with Dr Gutierres to verify reason - pt will need muscle strengthening for safety reasons.  Called back and gave information to Shani BUSTAMANTE and she spoke with prior auth department and they can see pt for 1 visit for PT to eval and treat and one follow up visit.    She will send orders to pt's nurse to begin the process.      ----- Message from Bailey Wang RN sent at 9/26/2017  8:39 AM EDT -----      ----- Message -----     From: Bailey Wang RN     Sent: 9/26/2017   8:39 AM       To: [facility]        ----- Message -----     From: Masha Gutierres MD PhD     Sent: 9/25/2017  11:20 PM       To: Bailey Wang RN    Yes, please!    Thanks!    Dr. Gutierres     ----- Message -----     From: Bailey Wang RN     Sent: 9/25/2017   3:52 PM       To: Masha Gutierres MD PhD    [facility] called stating pt is current with hospice. I think we sent a referral to  for PT but since she is with hospice they can not see her. Do you want a PT consult with [facility]? thanks

## 2017-09-26 NOTE — TELEPHONE ENCOUNTER
Called [facility] to give order for PT to eval and treat pt per Dr Gutierres.   RN on phone stated that they may need more information than just an eval and treat order.  She stated that she will find out and call us back.  Verified reason with Dr Ngo and pt will need PT for muscle strengthening since pt is very weak.

## 2017-10-10 NOTE — PROGRESS NOTES
Supportive Oncology Services    Pt presents to clinic for regularly scheduled dignity therapy session. Current PHQ9=5 and GAD7=2. Pt denies difficulty initiating or maintaining sleep. Appetite continues to wax and wane. Pt continues to endorse fatigue, however today reports improvement in energy and increase in independence. Pt has driven herself to appt and now walks with cane, as opposed to wheelchair. Gait is steady with minimal reliance upon cane. Medications reviewed and discussed. Pt continues zyprexa for mood/ anxiety, appetite, and sleep. Pt initiated armodafinil q am with reported benefit of energy increase. States she is now out and unable to refill due to cost of $7 per pill. We discussed the benefit of having this medication and taking only as needed for benefit of allowed rest with option for energy. Pt appreciates this as cost effective option. Affect continues to be blunted, although with more array of emotions as we talk. Pt is tearful with discussion of death, but also laughs in discussion of stories throughout her life. She states her mood is generally good, which she clarifies as being happy. She continues to report positive support from son and interactions with friends and grandchildren.    Total session time 50 minutes with counseling provided regarding topics of legacy, sadness surrounding death, focus on abilities, and benefit of both activity and rest.

## 2017-10-19 NOTE — PROGRESS NOTES
REASON FOR FOLLOW-UP:    1. Metastatic breast cancer, predominantly bone disease, biopsy confirmed triple negative in late October 2016. History of left breast cancer in 2005, had mastectomy, without adjuvant chemotherapy. She was followed by Dr. Romo at [facility], treated on Arimidex for 5 years, finished in 2011.   2. Anemia, secondary to her metastatic disease, with significantly elevated ferritin level. No evidence of deficiency for B12 or folic acid. She was given 3 units packed RBC in late October 2016 during hospitalization.    3. Bone biopsy on 10/25/2016 confirmed metastatic breast cancer, triple negative.  She was started on Xeloda 1500 mg twice a day on 11/14/2016 for 14 days on and 7 days off. Patient develops significant fatigue, Xeloda was decreased to 1000 mg twice a day from week #2. On 12/05/2016 schedule was changed to 7 days on, 7 days off.   4. Metastatic bone disease, Zometa was started in November 2016, repeat every 4 weeks.   5. Cancer-related pain, taking Lortab 5/325 mg prn.   6. 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   7.  Xeloda increased to 1500 mg in the morning and 1000 mg in the evening 7 days on and 7 days off, started on 4/21/2017. 8.  Bone scan on 6/7/2017 reported stable disease.  However her tumor marker was trending up at 68.7 on 6/16/2017.  Xeloda was increased to 1500 mg twice a day.    9.  Anemia secondary to chemotherapy.  She required 2 units PRBC transfusion on 7/2/2017.  Patient will be continued on Procrit injection as needed.   10.  Disease progress, required hospitalization in early August 2017.  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  Worsening ascites fluids, required repeated paracentesis on 8/09/2017. Xeloda  was discontinued.  She had paracentesis total of 16 L within a 3-week period in August 2017  11.  Patient had a right arm PICC line placement on 8/15/2017. Patient will be started on single agent Taxol weekly on 8/18/2017.  Plan is 3 weeks on 1 week off repeat every 4 weeks.   12.  Patient was not tolerating chemotherapy after 2 doses of weekly Taxol, developed acute renal failure and neutropenia, required hospitalization from 9/1/2017-9/11/2017.  She was discharged to home hospice care.      HISTORY OF PRESENT ILLNESS:   The patient is an 82-year-old female with the above-mentioned history returning today for 1-month reevaluation. Patient is accompanied by her son.  Patient has being with the home hospice for the past 5-6 weeks.     Patient reports that she has improved energy level and performance status, ECOG 2.  She has reasonable appetite.  Denies abdomen pains or worsening distention.  She has not had paracentesis in the past month since I saw her.  The last time she had paracentesis was on 9/3/2017, when she had a 2200 mL ascites fluid removed.  Patient also has chronic lower extremity edema, and if she reports her edema is also slightly better.  Patient denies pains.  She does not take pain medication.    Today she was found having elevated blood pressure at 180/100.  Patient and her son report that her anti-hypertensive medication was discontinued, when she was discharged from hospital in September 2017.  She used to take losartan  100 mg daily.  She is also not on low-dose amlodipine.    Laboratory study today reported stable hemoglobin 8.8, normal WBC 7450 including neutrophils 4960, and the normal platelets at 253,000.             Medical History            Past Medical History   Diagnosis Date   • Anemia      • Bone metastases      • Breast cancer 2005   • HL (hearing loss)      • Hyperlipidemia      • Hypertension                Surgical History                Past Surgical History   Procedure  Laterality Date   • Breast biopsy Left 2005         malignant   • Mastectomy Left 2005               HEMATOLOGIC/ONCOLOGIC HISTORY: Ms. Vora is a 81-year-old  female who presented to the emergency room because of worsening lower back pain. She started having this about a week ago. Severity was 8 out of 10 but then subsided. In this past weekend, she had worsening pain again and was sent to the ER for further evaluation. Prior to this, she was mowing her own grass, lives by herself, with good performance status.      This patient was found to have significant anemia, with hemoglobin 7.0 in the ER. Of note, in the ER there was attempt for transfusion of packed RBC, however she had a fever and the transfusion was abandoned. Yesterday hemoglobin was down to 6.6 and she was given 1 unit packed RBC with no incident. She was given Benadryl 50 mg. This patient has hemoglobin 7.6 this morning. She is scheduled to receive 2 more units packed RBC.        This patient had CT scan of the abdomen and pelvis examination during ER evaluation, which reported diffuse metastatic bone disease involving the spine, in the thoracic spine, lumbar spine and pelvic bones. The patient reports no constipation, no diarrhea, no urinary incontinence.        This patient had history of left breast cancer back in 2005 for which she had left mastectomy. No lymph node involvement. No adjuvant chemotherapy, no radiation therapy. The patient was followed by Dr. Romo at the [facility], and was on Arimidex for 5 years, which stopped in 2011. The patient was dismissed by Dr. Romo afterwards.       The patient reports weight loss of about 10 pounds since early September 2016. She has decreased appetite. No nausea/vomiting. No vision changes, no hearing changes. No fever, no sweating. Denies melena or hematochezia.        Patient had MRI examination with and without IV contrast for brain, cervical, thoracic and lumbar spin  on 10/24/2016. There was no evidence of metastatic brain disease. There are metastatic disease involving the entire spine, no fresh compression fracture.       Patient had a CT-guided bone biopsy on 10/25/2016. G evaluation reported a primary breast cancer. Further testing showed triple negative, ER 0%, MD 0%, HER-2 IHC 2+, however FISH study was negative.      Was started on IV Zometa in early November 2016. She was started on Xeloda 1500 mg twice a day on 11/14/2016 for 14 days on and 7 days off. Patient develops significant fatigue, Xeloda was decreased to 1000 mg twice a day from week #2.        Laboratory study on 12/02/16 showed significant  anemia with hemoglobin 7.8, but a normal WBC 7000 including neutrophils 4800. Her platelets is 367,000. She was given 2 units packed RBC transfusion. Because of poor tolerance, Xeloda was decreased to 1000 mg twice a day 7 days on and 7 days off.  was 30.5 on 12/16/16.       Patient has better tolerance to decrease the Xeloda. She is more energetic. However she has worsening anemia, with a hemoglobin in the 9 gram range. Patient was started on low-dose Procrit 20,000 units every 2 weeks by an of December 2016.     CA 15-3 at 53.4 U/ml on 5/19/17.  Despite increased Xeloda dose, her CA 15-3 continue to increase.      Her bone scan on 6/7/2017 showed a stable disease.  However laboratory study showed a worsening CA-15-3 at 68.7 on 6/16/2017.  Xeloda was increased to 1500 mg twice a day.      Because of anemia secondary to chemotherapy, and that she was symptomatic with hemoglobin 8.7, dizziness and fatigue, patient was given 2 units PRBC transfusion on 7/2/2017, and posttransfusion hemoglobin input to 10.9 on 7/02/17.  Patient reports significant improvement in energy level afterwards.    Patient was recently admitted to the hospital from 8/1/2017 through 8/5/2017 because of weakness and abdomen distention.  She was found having significant ascites associated with  peritoneal carcinomatosis and the tumor deposits at the bilateral adnexal region as well as pelvic mass measuring 3.5×3.5 cm by CT scan on 8/3/2017.  She also had moderate to severe bilateral hydronephrosis, required bilateral ureter stenting by Dr. Jolley on 8/3/2017.  Subsequently she had ultrasound-guided paracentesis on 8/4/2017, with total 4700 mL ascites removed.  Cytology study was positive for metastatic breast cancer.  CT scan of the chest on 8/4/2017 reported a moderate bilateral pleural effusion.      She already stopped Xeloda due to disease progress.  Her tumor marker CA-15-3 was 79.3 on 8/2/2017.    Patient was switched to weekly Taxol.  She received a 2 doses and was not able to tolerate, developed acute renal failure requiring hospitalization.  She was hospitalized for 11 days because of acute renal failure with a creatinine 3.3, and neutropenia with ANC 1300.  Both of her renal function and neutrophil counts have improved.  She was also given 2 units PRBC transfusion when she had a hemoglobin 7.3 on 9/7/2017.  Patient also had paracentesis with 2600 ml ascites fluid removed on 9/3/2017.  She relies on walker at home.  Patient reports she has improved appetite, performance status is ECOG 3.  Her diarrhea has resolved.     Because of her worsening clinical condition, and unable to tolerate weekly Taxol after only 2 doses, with poor appetite and severe diarrhea and dehydration leading to kidney failure, and also marrow suppression with mild neutropenia only after 2 doses Taxol, we discussed with the patient and advised her for home hospice care.  This was arranged before her recent discharge from hospital.      MEDICATIONS: see EMR       ALLERGIES:  No Known Allergies      Past Medical History, Past Surgical History, Social History, Family History have been reviewed and are without significant changes except as mentioned.        REVIEW OF SYSTEMS:  GENERAL: see HPI;    SKIN: No nonhealing lesions.  No  "rashes.   HEME/LYMPH: No easy bruising, bleeding.  No swollen nodes.    EYES: No vision changes or diplopia.    ENT: No tinnitus, hearing loss, gum bleeding, epistaxis, hoarseness or dysphagia.   RESPIRATORY: No cough,, hemoptysis or wheezing.   CVS: No chest pain, palpitations, orthopnea,   GI: See history of present illness .  No melena or hematochezia.   No nausea, vomiting, constipation, diarrhea  : No lower tract obstructive symptoms, dysuria or hematuria.    MUSCULOSKELETAL: No bone pain. No joint stiffness.  Patient has edema in both legs 1+.   NEUROLOGICAL: has improved global weakness, but no loss of consciousness or seizures.    PSYCHIATRIC: No increased nervousness, mood changes or depression.        Objective   Vitals:    10/19/17 1620   BP: (!) 188/98   Pulse: 88   Resp: 12   Temp: 97.5 °F (36.4 °C)   TempSrc: Oral   SpO2: 96%   Weight: 118 lb 12.8 oz (53.9 kg)   Height: 62.5\" (158.8 cm)   PainSc: 0-No pain   ECOG 2         PHYSICAL EXAM:    GENERAL: Well-developed, elder and thin female in no acute distress.    SKIN: Warm, dry without rashes, purpura or petechiae.   HEAD: Normocephalic.  EYES: Pupils equal, round. EOMs intact. Conjunctivae normal.  MOUTH: No oral candida infection.  CHEST: Decreased breathing sounds in bilateral lung fields.    CARDIAC: Regular rate and rhythm without murmurs, rubs or gallops. Normal S1,S2.  ABDOMEN: Soft,  mild distention, no tender, guarding, with no organomegaly or masses. Bowel sounds present.  EXTREMITIES: No clubbing, cyanosis. There are 1+ edema in both legs.    NEUROLOGICAL: Cranial Nerves II-XII grossly intact. No focal neurological deficits.  PSYCHIATRIC: Normal affect and mood.        RECENT LABS:     Lab Results   Component Value Date    WBC 7.47 10/19/2017    HGB 8.8 (L) 10/19/2017    HCT 28.4 (L) 10/19/2017    .4 (H) 10/19/2017     10/19/2017     Lab Results   Component Value Date    NEUTROABS 4.96 10/19/2017     Lab Results   Component " Value Date    GLUCOSE 80 09/15/2017    BUN 26 (H) 09/15/2017    CREATININE 1.47 (H) 09/15/2017    EGFRIFNONA 34 (L) 09/15/2017    EGFRIFAFRI 65 10/22/2016    BCR 17.7 09/15/2017    K 4.6 09/15/2017    CO2 26.0 09/15/2017    CALCIUM 7.9 (L) 09/15/2017    PROTENTOTREF 6.8 10/22/2016    PROTENTOTREF 6.9 10/22/2016    ALBUMIN 2.30 (L) 09/15/2017    LABIL2 0.8 (L) 09/15/2017    AST 31 09/15/2017    ALT 14 09/15/2017     Sodium   Date Value Ref Range Status   09/15/2017 140 134 - 145 mmol/L Final     Potassium   Date Value Ref Range Status   09/15/2017 4.6 3.5 - 4.7 mmol/L Final     Total Bilirubin   Date Value Ref Range Status   09/15/2017 0.2 0.1 - 1.2 mg/dL Final     Alkaline Phosphatase   Date Value Ref Range Status   09/15/2017 99 38 - 116 U/L Final   ]   Her tumor marker CA-15-3 was 77.7 on 9/01/2017.    Assessment/Plan   1. Metastatic breast cancer, originally with predominantly bone disease, but progressed after first line single agent Xeloda, with peritoneal carcinomatosis with large ascites fluids, pelvic mass and bilateral hydronephrosis required bilateral ureter stenting in early August 2017.  She also has moderate bilateral pleural effusion.      The patient was initiated single agent Taxol on 8/18/2017.  She was not able to tolerate Taxol after 2 doses, with significant fatigue, diarrhea and not able to eat or drink properly, leading to acute renal failure with creatinine 3.3 and neutropenia with ANC 1300, required hospitalization for more than 10 days in early September 2017.  She also required 2 units PRBC transfusion during hospitalization.  She does have improved renal function on to baseline level, and also resolution of neutropenia, and improved hemoglobin after transfusion, however she was still very weak, with poor performance status is ECOG 3.     Patient was discharged to home hospice care on 9/11/2017.   Patient and I has improvement of performance status is ECOG 2.  She wants to have active  treatment of for her metastatic breast cancer.  I discussed with the patient and her son, certainly we would not put her back on Taxol which she tolerated poorly after 2 doses.  Instead I recommended weekly Gemzar for 2 weeks on 1 week off, at 20% dose reduction to begin with.  If she could not tolerate, we may switch to one week on and one week off fashion.      2.  Malignant Ascites fluid.  She had large quantity of ascites fluid, required repeated paracentesis totaling 16 L in the first 3 weeks in August 2017.  She then had paracentesis on 9/3/2017 with 2600 mL ascites removed.  Since that time patient reports no worsening distention of abdomen, without paracentesis even though it was scheduled.  Physical examination today did issue some ascites, however no need of for paracentesis.  We'll continue to monitor.    3. Metastatic bone disease.  Patient was given Zometa treatment.  It has being on hold because of a hospice care.  If she tolerates single agent Gemzar, we'll will restart her back on monthly Zometa.    4.  Moderate anemia multifactorial, secondary to chemotherapy as well as the her malignancy, also stage III chronic renal insufficiency..  She had a laboratory study on 8/25/2017 reported an enormously elevated ferritin at 3550.  Her vitamin B12 and folic acid level were both supratherapeutic on 9/1/2017.   She did require 2 units PRBC transfusion on 9/7/2017 when she had a hemoglobin 7.3.     Her hemoglobin has been stable for the past 6 weeks.  Denies bleeding.  There is no need of for PRBC transfusion today.    5.  Pain related to cancer. She has no pain today.  She has Norco available for as needed use.     6.  Hypertension.  Her measured her blood pressure again myself and indeed it was elevated at 180/100.  Discussed with patient and her son, apparently she is not taking any antihypertensive medication since discharge from hospital.  She was taken losartan.  I gave her oral 0.1 mg clonidine in the  clinic,  And asked patient to restart back on losartan at half of previous dose which is probably 50 mg.      PLAN:   1.  Restart chemotherapy next week with single agent Gemzar weekly for 2 weeks on and one week off, starting 20% dose reduction at 800 mg/m².  Patient needs to be off home hospice care.   2.  Patient will have M.D. visit on cycle #1 day #8 for tolerance check.     More than 45 min, over half of that time were for counseling and coordinating her care.    ELVIN FONG M.D., Ph.D.    10/19/2017

## 2017-10-23 NOTE — TELEPHONE ENCOUNTER
[facility]    Message left for son regarding dosing of armodafinil. Return phone call requested.

## 2017-10-23 NOTE — TELEPHONE ENCOUNTER
[facility]    Called pt and pt son to discuss change to armodafinil. Dr. Rudolph authorized prescription of 150mg tablets, to be split in 1/2 to 1/3. Message left with son and pt with contact information if needed.

## 2017-10-24 NOTE — TELEPHONE ENCOUNTER
Dentist office called to see if ok for her to get her teeth cleaned. Discussed with NP. Ok for teeth cleaning. Dentist office called.

## 2017-10-24 NOTE — TELEPHONE ENCOUNTER
Phone call received from patient's son, Diony.  Diony states that his mother's assistance through [employer] is expiring on 11/9/2017 and he is wondering if it can be renewed if patient goes on a new medication.  He states that Dr. Rudolph has placed her on Nuvigil and was wondering if there was any assistance for this med.  OSW advised him to contact Kristin Tobin in [facility] ( [facility] office) who can assist with medications and foundations.  Phone number provided    Thank you,  Mercy [place], JOJOW, CSW, OSW-C

## 2017-10-27 NOTE — PROGRESS NOTES
PT'S HGB 7.9. PER  NP ORDERS REC'D FOR BLD TRANSFUSION 2 U AND 650MG TYL AND 25MG BENADRYL PO AS PREMEDS. TCM DRAWN AND BRACELET ATTACHED. ASKING SCHEDULING TO GET PT IN Sunday PER PT'S REQUEST.     ALSO PER  NP PT OK TO TREAT TODAY.     PT'S PICC LINE DRESSING CHANGED PER STERILE TECH. EXTERNAL CATH LENGTH AT ZERO. SITE WNL. BIOPATCH APPLIED.

## 2017-10-30 NOTE — TELEPHONE ENCOUNTER
[facility]    Message left for pt son alert him that rx has been called into [facility] and cancelled at [facility]. INA.

## 2017-10-30 NOTE — PROGRESS NOTES
[facility]    Discussed with pt son expense of armodafinil as ordered per Dr. Rudolph. [facility]

## 2017-11-09 PROBLEM — R51.9 HEADACHE AROUND THE EYES: Status: ACTIVE | Noted: 2017-01-01

## 2017-11-09 NOTE — PROGRESS NOTES
REASON FOR FOLLOW-UP:    1. Metastatic breast cancer, predominantly bone disease, biopsy confirmed triple negative in late October 2016. History of left breast cancer in 2005, had mastectomy, without adjuvant chemotherapy. She was followed by Dr. Romo at [facility], treated on Arimidex for 5 years, finished in 2011.   2. Anemia, secondary to her metastatic disease, with significantly elevated ferritin level. No evidence of deficiency for B12 or folic acid. She was given 3 units packed RBC in late October 2016 during hospitalization.    3. Bone biopsy on 10/25/2016 confirmed metastatic breast cancer, triple negative.  She was started on Xeloda 1500 mg twice a day on 11/14/2016 for 14 days on and 7 days off. Patient develops significant fatigue, Xeloda was decreased to 1000 mg twice a day from week #2. On 12/05/2016 schedule was changed to 7 days on, 7 days off.   4. Metastatic bone disease, Zometa was started in November 2016, repeat every 4 weeks.   5. Cancer-related pain, taking Lortab 5/325 mg prn.   6. 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   7.  Xeloda increased to 1500 mg in the morning and 1000 mg in the evening 7 days on and 7 days off, started on 4/21/2017. 8.  Bone scan on 6/7/2017 reported stable disease.  However her tumor marker was trending up at 68.7 on 6/16/2017.  Xeloda was increased to 1500 mg twice a day.    9.  Anemia secondary to chemotherapy.  She required 2 units PRBC transfusion on 7/2/2017.  Patient will be continued on Procrit injection as needed.   10.  Disease progress, required hospitalization in early August 2017.  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  Worsening ascites fluids, required repeated paracentesis on 8/09/2017. Xeloda  was discontinued.  She had paracentesis total of 16 L within a 3-week period in August 2017  11.  Patient had a right arm PICC line placement on 8/15/2017. Patient will be started on single agent Taxol weekly on 8/18/2017.  Plan is 3 weeks on 1 week off repeat every 4 weeks.   12.  Patient was not tolerating chemotherapy after 2 doses of weekly Taxol, developed acute renal failure and neutropenia, required hospitalization from 9/1/2017-9/11/2017.  She was discharged to home hospice care.    13.  Patient has improved performance status, and then restarted back on single agent chemotherapy Gemzar on 10/27/2017, with 20% dose reduction, for plan to 2 weeks on and one week off.        HISTORY OF PRESENT ILLNESS:   The patient is an 82-year-old female with the above-mentioned history returning today for reevaluation. Patient is accompanied by her son.      Patient started first dose of Gemzar on 10/27/2017.  She tolerated therapy well.  She was also given 2 units PRBC transfusion because her hemoglobin was 7.9 at that time.  She denies bleeding.  However last week she got a cold, with cough but denies fever.  She called and canceled her cycle 1 day #8 treatment.  Patient presented today with symptoms almost resolved.  Patient reports that she performance status, ECOG 2.    Patient reports she has no nausea vomiting.  No distention of abdomen.  The last time paracentesis was actually on September 30, 2017.  Patient does report recently, for about 4 weeks, having headache and pain around her eyes, and she also has some vision blurriness in the past few weeks.    Laboratory study today showed a much improved hemoglobin at 11.5, together with normal WBC 6260, however decreased platelets but is still normal at 182,000.    Patient has chronic lower extremity edema. Patient denies pains.          Medical History            Past Medical History   Diagnosis Date   • Anemia      • Bone metastases      • Breast cancer 2005   • HL  (hearing loss)      • Hyperlipidemia      • Hypertension                Surgical History                Past Surgical History   Procedure Laterality Date   • Breast biopsy Left 2005         malignant   • Mastectomy Left 2005               HEMATOLOGIC/ONCOLOGIC HISTORY: Ms. Vora is a 81-year-old  female who presented to the emergency room because of worsening lower back pain. She started having this about a week ago. Severity was 8 out of 10 but then subsided. In this past weekend, she had worsening pain again and was sent to the ER for further evaluation. Prior to this, she was mowing her own grass, lives by herself, with good performance status.      This patient was found to have significant anemia, with hemoglobin 7.0 in the ER. Of note, in the ER there was attempt for transfusion of packed RBC, however she had a fever and the transfusion was abandoned. Yesterday hemoglobin was down to 6.6 and she was given 1 unit packed RBC with no incident. She was given Benadryl 50 mg. This patient has hemoglobin 7.6 this morning. She is scheduled to receive 2 more units packed RBC.        This patient had CT scan of the abdomen and pelvis examination during ER evaluation, which reported diffuse metastatic bone disease involving the spine, in the thoracic spine, lumbar spine and pelvic bones. The patient reports no constipation, no diarrhea, no urinary incontinence.        This patient had history of left breast cancer back in 2005 for which she had left mastectomy. No lymph node involvement. No adjuvant chemotherapy, no radiation therapy. The patient was followed by Dr. Romo at the [facility], and was on Arimidex for 5 years, which stopped in 2011. The patient was dismissed by Dr. Romo afterwards.       The patient reports weight loss of about 10 pounds since early September 2016. She has decreased appetite. No nausea/vomiting. No vision changes, no hearing changes. No fever, no sweating.  Denies melena or hematochezia.        Patient had MRI examination with and without IV contrast for brain, cervical, thoracic and lumbar spin on 10/24/2016. There was no evidence of metastatic brain disease. There are metastatic disease involving the entire spine, no fresh compression fracture.       Patient had a CT-guided bone biopsy on 10/25/2016. G evaluation reported a primary breast cancer. Further testing showed triple negative, ER 0%, OR 0%, HER-2 IHC 2+, however FISH study was negative.      Was started on IV Zometa in early November 2016. She was started on Xeloda 1500 mg twice a day on 11/14/2016 for 14 days on and 7 days off. Patient develops significant fatigue, Xeloda was decreased to 1000 mg twice a day from week #2.        Laboratory study on 12/02/16 showed significant  anemia with hemoglobin 7.8, but a normal WBC 7000 including neutrophils 4800. Her platelets is 367,000. She was given 2 units packed RBC transfusion. Because of poor tolerance, Xeloda was decreased to 1000 mg twice a day 7 days on and 7 days off.  was 30.5 on 12/16/16.       Patient has better tolerance to decrease the Xeloda. She is more energetic. However she has worsening anemia, with a hemoglobin in the 9 gram range. Patient was started on low-dose Procrit 20,000 units every 2 weeks by an of December 2016.     CA 15-3 at 53.4 U/ml on 5/19/17.  Despite increased Xeloda dose, her CA 15-3 continue to increase.      Her bone scan on 6/7/2017 showed a stable disease.  However laboratory study showed a worsening CA-15-3 at 68.7 on 6/16/2017.  Xeloda was increased to 1500 mg twice a day.      Because of anemia secondary to chemotherapy, and that she was symptomatic with hemoglobin 8.7, dizziness and fatigue, patient was given 2 units PRBC transfusion on 7/2/2017, and posttransfusion hemoglobin input to 10.9 on 7/02/17.  Patient reports significant improvement in energy level afterwards.    Patient was recently admitted to the  hospital from 8/1/2017 through 8/5/2017 because of weakness and abdomen distention.  She was found having significant ascites associated with peritoneal carcinomatosis and the tumor deposits at the bilateral adnexal region as well as pelvic mass measuring 3.5×3.5 cm by CT scan on 8/3/2017.  She also had moderate to severe bilateral hydronephrosis, required bilateral ureter stenting by Dr. Jolley on 8/3/2017.  Subsequently she had ultrasound-guided paracentesis on 8/4/2017, with total 4700 mL ascites removed.  Cytology study was positive for metastatic breast cancer.  CT scan of the chest on 8/4/2017 reported a moderate bilateral pleural effusion.      She already stopped Xeloda due to disease progress.  Her tumor marker CA-15-3 was 79.3 on 8/2/2017.    Patient was switched to weekly Taxol.  She received a 2 doses and was not able to tolerate, developed acute renal failure requiring hospitalization.  She was hospitalized for 11 days because of acute renal failure with a creatinine 3.3, and neutropenia with ANC 1300.  Both of her renal function and neutrophil counts have improved.  She was also given 2 units PRBC transfusion when she had a hemoglobin 7.3 on 9/7/2017.  Patient also had paracentesis with 2600 ml ascites fluid removed on 9/3/2017.  She relies on walker at home.  Patient reports she has improved appetite, performance status is ECOG 3.  Her diarrhea has resolved.     Because of her worsening clinical condition, and unable to tolerate weekly Taxol after only 2 doses, with poor appetite and severe diarrhea and dehydration leading to kidney failure, and also marrow suppression with mild neutropenia only after 2 doses Taxol, we discussed with the patient and advised her for home hospice care.  This was arranged before her recent discharge from hospital.    Patient has improved the cranial condition in October 2017.  After evaluation, she was restarted back on single agent Gemzar with 20% dose reduction, weekly  "for 2 weeks on 1 week off.  The treatment was started on 10/27/2017.     MEDICATIONS: see EMR       ALLERGIES:  No Known Allergies      Past Medical History, Past Surgical History, Social History, Family History have been reviewed and are without significant changes except as mentioned.        REVIEW OF SYSTEMS:  GENERAL: see HPI;    SKIN: No nonhealing lesions.  No rashes.   HEME/LYMPH: No easy bruising, bleeding.  No swollen nodes.    EYES: No vision changes or diplopia.    ENT: No tinnitus, hearing loss, gum bleeding, epistaxis, hoarseness or dysphagia.   RESPIRATORY: No cough,, hemoptysis or wheezing.   CVS: No chest pain, palpitations, orthopnea,   GI: See history of present illness .  No melena or hematochezia.   No nausea, vomiting, constipation, diarrhea  : No lower tract obstructive symptoms, dysuria or hematuria.    MUSCULOSKELETAL: No bone pain. No joint stiffness.  Patient has edema in both legs 1+.   NEUROLOGICAL: has improved global weakness, but no loss of consciousness or seizures.    PSYCHIATRIC: No increased nervousness, mood changes or depression.        Objective   Vitals:    11/09/17 1018   BP: 130/70   Pulse: 96   Resp: 16   Temp: 99.1 °F (37.3 °C)   Weight: 114 lb 12.8 oz (52.1 kg)   Height: 62.2\" (158 cm)   PainSc: 6  Comment: rt. leg took pain med this morning   ECOG 2         PHYSICAL EXAM:    GENERAL: Well-developed, chronically ill-looking elder and thin female in no acute distress.    SKIN: Warm, dry without rashes, purpura or petechiae.   HEAD: Normocephalic.  EYES: Pupils equal, round. EOMs intact. Conjunctivae normal.  MOUTH: No oral candida infection.  CHEST: Decreased breathing sounds in bilateral lung fields.    CARDIAC: Regular rate and rhythm without murmurs, rubs or gallops. Normal S1,S2.  ABDOMEN: Soft,  mild distention, no tender, guarding, with no organomegaly or masses. Bowel sounds present.  EXTREMITIES: No clubbing, cyanosis. There are 1+ edema in both legs. "    NEUROLOGICAL: Cranial Nerves II-XII grossly intact. No focal neurological deficits.  PSYCHIATRIC: Normal affect and mood.        RECENT LABS:     Lab Results   Component Value Date    WBC 6.26 11/09/2017    HGB 11.5 11/09/2017    HCT 37.0 11/09/2017    MCV 93.4 11/09/2017     11/09/2017     Lab Results   Component Value Date    NEUTROABS 4.07 11/09/2017     Lab Results   Component Value Date    GLUCOSE 81 11/09/2017    BUN 34 (H) 11/09/2017    CREATININE 1.55 (H) 11/09/2017    EGFRIFNONA 32 (L) 11/09/2017    EGFRIFAFRI 65 10/22/2016    BCR 21.9 11/09/2017    K 4.5 11/09/2017    CO2 26.6 11/09/2017    CALCIUM 9.0 11/09/2017    PROTENTOTREF 6.8 10/22/2016    PROTENTOTREF 6.9 10/22/2016    ALBUMIN 2.90 (L) 11/09/2017    LABIL2 0.9 (L) 11/09/2017    AST 61 (H) 11/09/2017    ALT 27 11/09/2017     Sodium   Date Value Ref Range Status   11/09/2017 143 134 - 145 mmol/L Final     Potassium   Date Value Ref Range Status   11/09/2017 4.5 3.5 - 4.7 mmol/L Final     Total Bilirubin   Date Value Ref Range Status   11/09/2017 0.3 0.1 - 1.2 mg/dL Final     Alkaline Phosphatase   Date Value Ref Range Status   11/09/2017 105 38 - 116 U/L Final   ]     Her tumor marker CA-15-3 was 62.8 on 10/27/2017    Assessment/Plan   1. Metastatic breast cancer, originally with predominantly bone disease, but progressed after first line single agent Xeloda, with peritoneal carcinomatosis with large ascites fluids, pelvic mass and bilateral hydronephrosis required bilateral ureter stenting in early August 2017.  She also has moderate bilateral pleural effusion.      The patient was initiated single agent Taxol on 8/18/2017.  She was not able to tolerate Taxol after 2 doses, with significant fatigue, diarrhea and not able to eat or drink properly, leading to acute renal failure with creatinine 3.3 and neutropenia with ANC 1300, required hospitalization for more than 10 days in early September 2017.  She also required 2 units PRBC  transfusion during hospitalization.  She does have improved renal function to baseline level, and also resolution of neutropenia, and improved hemoglobin after transfusion, however she was still very weak, with poor performance status is ECOG 3.     Patient had improved her functionality in October 2017.  Since patient prefers to have active treatment, we started her on weekly Gemzar  at a 20% dose reduction, planned for for 2 weeks on 1 week off.  She started therapy on 10/20/1717 with good tolerance.  Nevertheless she was sick last week likely viral illness and a cancer her treatment.  Patient presents today with much improved the condition, almost resolved cough.  We'll resume Gemzar today content as cycle #1 day #8 for 1 week delayed.    If she could not tolerate, we may switch to one week on and one week off fashion.      2.  New onset of headache and vision blurriness.  Discussed with patient and her son, I recommend obtain a brain MRI examination with and without contrast for evaluation.  I'm concerned that she may develop metastatic brain disease.      3. Metastatic bone disease.  Patient was given monthly Zometa treatment.  It has being on hold because of a hospice care.   we checked that her renal function today, it's above her baseline level.  We'll hold her Zometa for now.  Her renal function improves, we'll will restart her back on monthly Zometa.    4.  Anemia, multifactorial, secondary to chemotherapy as well as the her malignancy, also stage III chronic renal insufficiency her hemoglobin was 7.9 and required 2 units PRBC transfusion because she was symptomatic on 10/27/2017.  Her hemoglobin is 11.5 today.  Patient refused more energetic with transfusion.  She had a laboratory study on 8/25/2017 reported an enormously elevated ferritin at 3550.  Her vitamin B12 and folic acid level were both supratherapeutic on 9/1/2017.       5.  Pain related to cancer.  She has Norco available for as needed use.      6.  Malignant Ascites fluid.  Patient has no distention.  Her last paracentesis was from 9/3/2017.  She had large quantity of ascites fluid, required repeated paracentesis totaling 16 L in the first 3 weeks in August 2017.  She then had paracentesis on 9/3/2017 with 2600 mL ascites removed.  Since that time patient reports no worsening distention of abdomen.  We'll continue to monitor.    7.  Poor appetite.  Asked patient to restart back on low-dose prednisone 10 mg daily as appetite stimulator.      PLAN:   1.  Proceed ahead with single agent Gemzar chemotherapy cycle 1 day #8 today. same 20% dose reduction at 800 mg/m².   2.  Brain MRI examination within and without contrast in the next couple days for evaluation of metastatic disease.   3.  Patient will return for M.D. visit in one week for reevaluation.      More than 45 min, over half of that time were for counseling and coordinating her care.  I also spoke to pharmacist and will continue to hold Zometa for now.     ELVIN FONG M.D., Ph.D.    11/9/2017

## 2017-11-09 NOTE — PROGRESS NOTES
Ok was given to give zometa today per , however after drawing labs and reviewing after releasing orders, decision made to not give zometa based on creat clearance.

## 2017-11-14 NOTE — TELEPHONE ENCOUNTER
Pt's Son Bill calling for his mother, he states she has run out of her pain medication, Lortab, and is in need of a refill  Script printed and signed by Dr Gutierres, Lorab 5/325mg #90  Placed in triage for son to come

## 2017-11-14 NOTE — PROGRESS NOTES
Per verbal order Dr Gutierres Patient is to receive Gemzar 2 weeks on and 1 week off but patient's Day 8 was delayed one week and due to this patient will receive treatment this week on 11/16 and then be off the week of Jesús

## 2017-11-16 PROBLEM — H53.8 BLURRY VISION, RIGHT EYE: Status: ACTIVE | Noted: 2017-01-01

## 2017-11-16 NOTE — PROGRESS NOTES
REASON FOR FOLLOW-UP:    1. Metastatic breast cancer, predominantly bone disease, biopsy confirmed triple negative in late October 2016. History of left breast cancer in 2005, had mastectomy, without adjuvant chemotherapy. She was followed by Dr. Romo at [facility], treated on Arimidex for 5 years, finished in 2011.   2. Anemia, secondary to her metastatic disease, with significantly elevated ferritin level. No evidence of deficiency for B12 or folic acid. She was given 3 units packed RBC in late October 2016 during hospitalization.    3. Bone biopsy on 10/25/2016 confirmed metastatic breast cancer, triple negative.  She was started on Xeloda 1500 mg twice a day on 11/14/2016 for 14 days on and 7 days off. Patient develops significant fatigue, Xeloda was decreased to 1000 mg twice a day from week #2. On 12/05/2016 schedule was changed to 7 days on, 7 days off.   4. Metastatic bone disease, Zometa was started in November 2016, repeat every 4 weeks.   5. Cancer-related pain, taking Lortab 5/325 mg prn.   6. 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   7.  Xeloda increased to 1500 mg in the morning and 1000 mg in the evening 7 days on and 7 days off, started on 4/21/2017. 8.  Bone scan on 6/7/2017 reported stable disease.  However her tumor marker was trending up at 68.7 on 6/16/2017.  Xeloda was increased to 1500 mg twice a day.    9.  Anemia secondary to chemotherapy.  She required 2 units PRBC transfusion on 7/2/2017.  Patient will be continued on Procrit injection as needed.   10.  Disease progress, required hospitalization in early August 2017.  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  Worsening ascites fluids, required repeated paracentesis on 8/09/2017. Xeloda  was discontinued.  She had paracentesis total of 16 L within a 3-week period in August 2017  11.  Patient had a right arm PICC line placement on 8/15/2017. Patient will be started on single agent Taxol weekly on 8/18/2017.  Plan is 3 weeks on 1 week off repeat every 4 weeks.   12.  Patient was not tolerating chemotherapy after 2 doses of weekly Taxol, developed acute renal failure and neutropenia, required hospitalization from 9/1/2017-9/11/2017.  She was discharged to home hospice care.    13.  Patient has improved performance status, and then restarted back on single agent chemotherapy Gemzar on 10/27/2017, with 20% dose reduction, for plan to 2 weeks on and one week off.        HISTORY OF PRESENT ILLNESS:   The patient is an 82-year-old female with the above-mentioned history returning today for reevaluation. Patient is accompanied by her son.      Patient started first dose of Gemzar on 10/27/2017.  She tolerated therapy well.  She was also given 2 units PRBC transfusion because her hemoglobin was 7.9 at that time.  She denies bleeding.  She got a significant dose on 11/9/2017 after more than 1 week delay due to feeling ill.  Nevertheless because of the headache and the blurriness of vision, we requested a brain MRI examination with IV contrast which was done yesterday.  This morning radiologist Dr. Cohen called me reporting no apparent brain metastatic disease, there was suspicious evidence of tumor involving the orbital area.  Dr. Cohen recommended dedicated orbital MRI with and without IV contrast.     Patient coming reporting not feeling well, in because overall condition with the headache and the pleural effusions and a low energy level.  She prefers not to proceed ahead with chemotherapy as original plan.  She denies nausea vomiting.  His appetite is not great.  She denies nausea vomiting.    Patient reports that she performance status, ECOG 2.    Laboratory study today showed hemoglobin at 10.8, slightly  down compared to a week ago, together with decreased but is due normal WBC 4150, and a normal neutrophils 2730 and stable platelets at 175,000.    Patient has chronic lower extremity edema. Patient denies pains.          Medical History            Past Medical History   Diagnosis Date   • Anemia      • Bone metastases      • Breast cancer 2005   • HL (hearing loss)      • Hyperlipidemia      • Hypertension                Surgical History                Past Surgical History   Procedure Laterality Date   • Breast biopsy Left 2005         malignant   • Mastectomy Left 2005               HEMATOLOGIC/ONCOLOGIC HISTORY: Ms. Vora is a 81-year-old  female who presented to the emergency room because of worsening lower back pain. She started having this about a week ago. Severity was 8 out of 10 but then subsided. In this past weekend, she had worsening pain again and was sent to the ER for further evaluation. Prior to this, she was mowing her own grass, lives by herself, with good performance status.      This patient was found to have significant anemia, with hemoglobin 7.0 in the ER. Of note, in the ER there was attempt for transfusion of packed RBC, however she had a fever and the transfusion was abandoned. Yesterday hemoglobin was down to 6.6 and she was given 1 unit packed RBC with no incident. She was given Benadryl 50 mg. This patient has hemoglobin 7.6 this morning. She is scheduled to receive 2 more units packed RBC.        This patient had CT scan of the abdomen and pelvis examination during ER evaluation, which reported diffuse metastatic bone disease involving the spine, in the thoracic spine, lumbar spine and pelvic bones. The patient reports no constipation, no diarrhea, no urinary incontinence.        This patient had history of left breast cancer back in 2005 for which she had left mastectomy. No lymph node involvement. No adjuvant chemotherapy, no radiation therapy. The patient was followed by  Dr. Romo at [facility], and was on Arimidex for 5 years, which stopped in 2011. The patient was dismissed by Dr. Romo afterwards.       The patient reports weight loss of about 10 pounds since early September 2016. She has decreased appetite. No nausea/vomiting. No vision changes, no hearing changes. No fever, no sweating. Denies melena or hematochezia.        Patient had MRI examination with and without IV contrast for brain, cervical, thoracic and lumbar spin on 10/24/2016. There was no evidence of metastatic brain disease. There are metastatic disease involving the entire spine, no fresh compression fracture.       Patient had a CT-guided bone biopsy on 10/25/2016. G evaluation reported a primary breast cancer. Further testing showed triple negative, ER 0%, RI 0%, HER-2 IHC 2+, however FISH study was negative.      Was started on IV Zometa in early November 2016. She was started on Xeloda 1500 mg twice a day on 11/14/2016 for 14 days on and 7 days off. Patient develops significant fatigue, Xeloda was decreased to 1000 mg twice a day from week #2.        Laboratory study on 12/02/16 showed significant  anemia with hemoglobin 7.8, but a normal WBC 7000 including neutrophils 4800. Her platelets is 367,000. She was given 2 units packed RBC transfusion. Because of poor tolerance, Xeloda was decreased to 1000 mg twice a day 7 days on and 7 days off.  was 30.5 on 12/16/16.       Patient has better tolerance to decrease the Xeloda. She is more energetic. However she has worsening anemia, with a hemoglobin in the 9 gram range. Patient was started on low-dose Procrit 20,000 units every 2 weeks by an of December 2016.     CA 15-3 at 53.4 U/ml on 5/19/17.  Despite increased Xeloda dose, her CA 15-3 continue to increase.      Her bone scan on 6/7/2017 showed a stable disease.  However laboratory study showed a worsening CA-15-3 at 68.7 on 6/16/2017.  Xeloda was increased to 1500 mg twice a day.       Because of anemia secondary to chemotherapy, and that she was symptomatic with hemoglobin 8.7, dizziness and fatigue, patient was given 2 units PRBC transfusion on 7/2/2017, and posttransfusion hemoglobin input to 10.9 on 7/02/17.  Patient reports significant improvement in energy level afterwards.    Patient was recently admitted to the hospital from 8/1/2017 through 8/5/2017 because of weakness and abdomen distention.  She was found having significant ascites associated with peritoneal carcinomatosis and the tumor deposits at the bilateral adnexal region as well as pelvic mass measuring 3.5×3.5 cm by CT scan on 8/3/2017.  She also had moderate to severe bilateral hydronephrosis, required bilateral ureter stenting by Dr. Jolley on 8/3/2017.  Subsequently she had ultrasound-guided paracentesis on 8/4/2017, with total 4700 mL ascites removed.  Cytology study was positive for metastatic breast cancer.  CT scan of the chest on 8/4/2017 reported a moderate bilateral pleural effusion.      She already stopped Xeloda due to disease progress.  Her tumor marker CA-15-3 was 79.3 on 8/2/2017.    Patient was switched to weekly Taxol.  She received a 2 doses and was not able to tolerate, developed acute renal failure requiring hospitalization.  She was hospitalized for 11 days because of acute renal failure with a creatinine 3.3, and neutropenia with ANC 1300.  Both of her renal function and neutrophil counts have improved.  She was also given 2 units PRBC transfusion when she had a hemoglobin 7.3 on 9/7/2017.  Patient also had paracentesis with 2600 ml ascites fluid removed on 9/3/2017.  She relies on walker at home.  Patient reports she has improved appetite, performance status is ECOG 3.  Her diarrhea has resolved.     Because of her worsening clinical condition, and unable to tolerate weekly Taxol after only 2 doses, with poor appetite and severe diarrhea and dehydration leading to kidney failure, and also marrow  "suppression with mild neutropenia only after 2 doses Taxol, we discussed with the patient and advised her for home hospice care.  This was arranged before her recent discharge from hospital.    Patient has improved the cranial condition in October 2017.  After evaluation, she was restarted back on single agent Gemzar with 20% dose reduction, weekly for 2 weeks on 1 week off.  The treatment was started on 10/27/2017.     MEDICATIONS: see EMR       ALLERGIES:  No Known Allergies      Past Medical History, Past Surgical History, Social History, Family History have been reviewed and are without significant changes except as mentioned.        REVIEW OF SYSTEMS:  GENERAL: see HPI;    SKIN: No nonhealing lesions.  No rashes.   HEME/LYMPH: No easy bruising, bleeding.  No swollen nodes.    EYES: No vision changes or diplopia.    ENT: No tinnitus, hearing loss, gum bleeding, epistaxis, hoarseness or dysphagia.   RESPIRATORY: No cough,, hemoptysis or wheezing.   CVS: No chest pain, palpitations, orthopnea,   GI: See history of present illness .  No melena or hematochezia.   No nausea, vomiting, constipation, diarrhea  : No lower tract obstructive symptoms, dysuria or hematuria.    MUSCULOSKELETAL: No bone pain. No joint stiffness.  Patient has edema in both legs 1+.   NEUROLOGICAL: has improved global weakness, but no loss of consciousness or seizures.    PSYCHIATRIC: No increased nervousness, mood changes or depression.        Objective   Vitals:    11/16/17 1057   BP: 140/78   Pulse: 103   Temp: 97.6 °F (36.4 °C)   TempSrc: Oral   SpO2: 97%   Weight: 113 lb 9.6 oz (51.5 kg)   Height: 62.2\" (158 cm)   PainSc: 0-No pain   ECOG 2         PHYSICAL EXAM:    GENERAL: Well-developed, chronically ill-looking elder and thin female in no acute distress.    SKIN: Warm, dry without rashes, purpura or petechiae.   HEAD: Normocephalic.  EYES: Pupils equal, round. EOMs intact. Conjunctivae normal.  MOUTH: No oral candida " infection.  CHEST: Decreased breathing sounds in bilateral lung fields.    CARDIAC: Regular rate and rhythm without murmurs, rubs or gallops. Normal S1,S2.  ABDOMEN: Soft,  mild distention, no tender, guarding, with no organomegaly or masses. Bowel sounds present.  EXTREMITIES: No clubbing, cyanosis. There are 1+ edema in both legs.    NEUROLOGICAL: Cranial Nerves II-XII grossly intact. No focal neurological deficits.  PSYCHIATRIC: Normal affect and mood.        RECENT LABS:     Lab Results   Component Value Date    WBC 4.15 11/16/2017    HGB 10.8 (L) 11/16/2017    HCT 34.3 11/16/2017    MCV 93.0 11/16/2017     11/16/2017     Lab Results   Component Value Date    NEUTROABS 2.73 11/16/2017     Lab Results   Component Value Date    GLUCOSE 81 11/09/2017    BUN 34 (H) 11/09/2017    CREATININE 1.55 (H) 11/09/2017    EGFRIFNONA 32 (L) 11/09/2017    EGFRIFAFRI 65 10/22/2016    BCR 21.9 11/09/2017    K 4.5 11/09/2017    CO2 26.6 11/09/2017    CALCIUM 9.0 11/09/2017    PROTENTOTREF 6.8 10/22/2016    PROTENTOTREF 6.9 10/22/2016    ALBUMIN 2.90 (L) 11/09/2017    LABIL2 0.9 (L) 11/09/2017    AST 61 (H) 11/09/2017    ALT 27 11/09/2017     Sodium   Date Value Ref Range Status   11/09/2017 143 134 - 145 mmol/L Final     Potassium   Date Value Ref Range Status   11/09/2017 4.5 3.5 - 4.7 mmol/L Final     Total Bilirubin   Date Value Ref Range Status   11/09/2017 0.3 0.1 - 1.2 mg/dL Final     Alkaline Phosphatase   Date Value Ref Range Status   11/09/2017 105 38 - 116 U/L Final   ]     Her tumor marker CA-15-3 was 62.8 on 10/27/2017    IMAGE STUDY:    MRI OF THE BRAIN WITH AND WITHOUT CONTRAST 11/15/2017      CLINICAL HISTORY: Patient has history of breast cancer metastatic to  bone, has headache around eyes, some blurry vision for one month,  patient is on chemotherapy, evaluate for brain metastases.      TECHNIQUE: Axial T1, FLAIR, fat-suppressed T2, axial diffusion sagittal  T1 and postcontrast axial fat-suppressed T1  sagittal and coronal  T1-weighted images were obtained of the entire head.      COMPARISON: This was correlated to a prior MRI of the brain from [facility] 10/24/2016.      FINDINGS: There is very minimal patchy nodular T2 high signal in the  periventricular white matter, scattered tiny nodular foci in the  subcortical white matter of the cerebral hemispheres, most consistent  with mild small vessel disease. The remainder of the brain parenchyma is  normal in signal intensity. The ventricles are normal in size. There is  no focal mass effect. There is no midline shift. No extraaxial fluid  collections are identified. No abnormal areas of enhancement are seen in  the brain. There is extensive T1 hypointensity throughout the calvarium  and skull base involving the clivus, as well as visualized upper  cervical spine from C1 to the visualized body of C5. There is also T1  hypointensity through the rami and necks and heads in the mandible  bilaterally. There is some heterogeneous increased enhancement in the  basiocciput portions of the skull base, findings are compatible with  known extensive osteoblastic metastatic disease. While given the diffuse  nature, a portion of this could also be red marrow conversion. Good flow  voids are demonstrated in the cerebral vessels and dural venous sinuses.  There is minimal bilateral ethmoid and inferior maxillary sinus mucosal  thickening, and right sphenoid sinus mucosal thickening. The remainder  of the paranasal sinuses and mastoid air cells and middle ear cavities  are clear. While this exam is not a dedicated orbital MR, there is  stranding in the intraconal fat of the orbit in the precontrast  T1-weighted images and on the postcontrast fat-suppressed T1-weighted  images there is thin rind of enhancement along the margins of the optic  nerves and along the medial rectus muscles bilaterally with some  distortion of the contour of the medial rectus muscles, and  generalized  abnormal increased enhancement in portions of the intra and extraconal  fat of the orbits. Finding is not entirely specific, can be seen with  serous breast metastases to the orbit, potentially can be seen with  Pseudotumor.            Assessment/Plan   1. Metastatic breast cancer, originally with predominantly bone disease, but progressed after first line single agent Xeloda, with peritoneal carcinomatosis with large ascites fluids, pelvic mass and bilateral hydronephrosis required bilateral ureter stenting in early August 2017.  She also has moderate bilateral pleural effusion.      The patient was initiated single agent Taxol on 8/18/2017.  She was not able to tolerate Taxol after 2 doses, with significant fatigue, diarrhea and not able to eat or drink properly, leading to acute renal failure with creatinine 3.3 and neutropenia with ANC 1300, required hospitalization for more than 10 days in early September 2017.  She also required 2 units PRBC transfusion during hospitalization.  She does have improved renal function to baseline level, and also resolution of neutropenia, and improved hemoglobin after transfusion, however she was still very weak, with poor performance status is ECOG 3.     Patient had improved her functionality in October 2017.  Since patient prefers to have active treatment, we started her on weekly Gemzar at a 20% dose reduction, planned for 2 weeks on 1 week off.  She started therapy on 10/20/1717 with good tolerance.  Nevertheless she was sick Cerner second dose Gemzar was more than 1 week delayed and was delivered on 11/9/2017.    2.  New onset of headache and right eye vision blurriness.  Patient had a brain MRI examination done yesterday and the radiologist Dr. Cohen called me this morning reporting suspicious involvement in the orbital area, and in no suspicious lesion in the brain itself, and Dr. Cohen recommended orbital MRI examination with and without contrast for further  evaluation.      I discussed with the patient and the her son today, we'll arrange this to be done as soon as possible.  In the meantime I will start patient on oral dexamethasone 4 mg twice a day.  I will also arrange patient into see a radiation oncologist, ideally after orbital MRI examination for possible palliative radiation therapy.  No evidence of For GI prophylaxis, I would also started patient on Protonix 40 mg daily.     3. Metastatic bone disease.  Patient was given monthly Zometa treatment.  It has being on hold because of a hospice care.  We plan to 2 restarted back on it since she is getting active chemotherapy, however she had worsening renal function, and I discussed with the patient and her person, we'll postpone Zometa at this point, it's not a priority.  In the meantime I encourage patient to keep well-hydrated.  We will repeat assessment of this issue later.     4.  Anemia, multifactorial, secondary to chemotherapy as well as the her malignancy, also stage III chronic renal insufficiency her hemoglobin was 7.9 and required 2 units PRBC transfusion because she was symptomatic on 10/27/2017.   Patient feels more energetic with transfusion.  Her hemoglobin is 10.8 today.   She had a laboratory study on 8/25/2017 reported an enormously elevated ferritin at 3550.  Her vitamin B12 and folic acid level were both supratherapeutic on 9/1/2017.       5.  Pain related to cancer.  She has Norco available for as needed use.     6.  Malignant ascites fluid.  Patient has no distention.  Her last paracentesis was from 9/3/2017.  She had large quantity of ascites fluid, required repeated paracentesis totaling 16 L in the first 3 weeks in August 2017.  She then had paracentesis on 9/3/2017 with 2600 mL ascites removed.  Since that time patient reports no worsening distention of abdomen.  We'll continue to monitor.    7.  Poor appetite.  Patient was on low-dose prednisone 10 mg daily as appetite stimulator.  Since  we are starting her on dexamethasone, I inserted patient to discontinue low-dose prednisone for now.     PLAN:   1.  Cancel chemotherapy today, patient does not feel well enough for chemotherapy today.    2.  MRI for the orbit with and without contrast as soon as possible.    3.  Start the dexamethasone 4 mg oral every 12 hours.  She will discontinue low-dose prednisone.   4.  Start Protonix 40 mg oral once daily for GI prophylaxis.    5.  Refer to radiation oncology as soon as possible, for evaluation of radiation to the right orbital   6.  Patient will return for M.D. visit in 2 week.     More than 45 min, over half of that time were for counseling and coordinating her care.  I spoke to radiologist Dr. Cohen today.       ELVIN FONG M.D., Ph.D.    11/16/2017

## 2017-11-20 NOTE — PROGRESS NOTES
"The pt was seen today at [place] by Dr. Henriquez for an initial radiation consultation for metastatic breast cancer. The pt completed the Distress Questionnaire and scored 6/10, marking practical concerns and \"fevers\".  OSW reached out to Rosario Bell, Psych NP, who has been engaged in supportive care services with the pt. Rosario will reach out to the pt to maximize continuity of care.    Currently, the pt does not have an advanced care directive scanned into her medical record.    Maribel Collins [facility]  Oncology Social Worker  "

## 2017-11-20 NOTE — PROGRESS NOTES
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 year old female who has a history of metastatic breast cancer dating back to her primary diagnosis in 2005, treated with mastectomy and then biopsied proven involvement of the bone in October, 2016.  She has been treated with Xeloda and Zometa.    She was admitted in August, 2017 with peritoneal carcinomatosis with malignant ascites and bilateral hydronephrosis.  Her treatment was switched to single agent Taxol on August 18, 2017.  She developed acute renal failure and neutropenia after 2 doses and upon discharge, was referred home with supportive care.  Her performance status improved and she was started back on single agent Gemzar in October, 27, 2017.    She presented in early November, 2017 with complaints of a headache and blurred vision.  This prompted an MRI of the brain which was completed on November 16, 2017.  It showed diffuse hypointensity involving the calvarium, skull base and upper cervical spine from C1 to C5 which was felt to be possible red marrow conversion or more extensive metastatic disease, abnormal appearance of the orbits with an ejection and stranding throughout the orbital fat bilaterally and circumferential rind of enhancement along the margins of the optic nerves bilaterally, some thickening and distortion of the medial rectus muscles bilaterally and enlargement, thickening of the right superior  "rectus muscle, generalized enhancement in the intraorbital fat. The possibility of metastatic disease to the orbits was raised or pseudotumor involving the orbits.    She went on to an MRI of the orbits on November, 20, 2017 which showed abnormal appearance of the orbits that was of uncertain etiology but could certainly be seen with metastatic disease, lymphoma or pseudotumor with injection and stranding of the orbital fat, most prominently involving the intraconal fat, circumferentially, circumscribing the optic nerves and resulting in thickening of extra-axial ocular muscles as described above.  Additionally hypointense signal was appreciated involving the calvarium, skull base and upper cervical spine attributable to red marrow conversion or metastatic disease.    She was started on Decadron, 4 mg every 12 hours and her chemotherapy was held. I was asked to see the patient at the request of the referring provider noted above for advice and recommendations regarding palliative radiation therapy to the right orbit.     Clinically, she is doing fairly well. She denies any significant pain just a generalized headache and \"fatigue\" of both eyes. She describes her vision as blurry and she has not felt comfortable to drive for the past week.  She has seen the eye doctor who said her vision was still good - she is having field testing tomorrow.    Past Medical History: she  has a past medical history of Anemia; Bone metastases (2016); Breast cancer (2005); Headache around the eyes (11/9/2017); HL (hearing loss); Hyperlipidemia; Hypertension; and Pneumonia (8/1/2017).    Past Surgical History:  she has a past surgical history that includes Breast biopsy (Left, 2005); Mastectomy (Left, 2005); Cystoscopy w/ ureteral stent placement (Bilateral, 8/3/2017); Colonoscopy (2006); [place] Node Biopsy (07/2005); and Cataract extraction, extracapsular w/ intraocular lens implant.    Meds:    Current Outpatient Prescriptions:   •  " Acetaminophen (TYLENOL PO), Take 1 tablet by mouth Every 6 (Six) Hours As Needed., Disp: , Rfl:   •  Armodafinil 50 MG tablet, Take 50 mg by mouth Daily for 30 days., Disp: 30 tablet, Rfl: 1  •  aspirin 81 MG EC tablet, Take 81 mg by mouth Daily., Disp: , Rfl:   •  calcium carbonate-vitamin d (CALCIUM 600+D HIGH POTENCY) 600-400 MG-UNIT per tablet, Take 1 tablet by mouth Daily., Disp: , Rfl:   •  dexamethasone (DECADRON) 4 MG tablet, Take 1 tablet by mouth 2 (Two) Times a Day With Meals., Disp: 60 tablet, Rfl: 1  •  diphenoxylate-atropine (LOMOTIL) 2.5-0.025 MG per tablet, Take 1-2 tabs 4 times daily PRN for diarrhea, Disp: 60 tablet, Rfl: 0  •  HYDROcodone-acetaminophen (NORCO) 5-325 MG per tablet, Take 1 tablet by mouth Every 6 (Six) Hours As Needed for Moderate Pain  or Severe Pain ., Disp: 90 tablet, Rfl: 0  •  Multiple Vitamin (MULTIVITAMIN) tablet, Take 1 tablet by mouth Daily., Disp: , Rfl:   •  Omega-3 Fatty Acids (FISH OIL PO), Take 300 mg by mouth Daily., Disp: , Rfl:   •  ondansetron (ZOFRAN) 4 MG tablet, Take 1 tablet by mouth Every 8 (Eight) Hours As Needed for Nausea or Vomiting., Disp: 30 tablet, Rfl: 2  •  pantoprazole (PROTONIX) 40 MG EC tablet, Take 1 tablet by mouth Daily., Disp: 30 tablet, Rfl: 5  •  polyethylene glycol (MIRALAX) packet, Take 17 g by mouth Daily., Disp: 119 g, Rfl: 3  •  predniSONE (DELTASONE) 10 MG tablet, Take 5 mg by mouth Daily., Disp: , Rfl:   •  prochlorperazine (COMPAZINE) 10 MG tablet, Take 1 tablet by mouth Every 6 (Six) Hours As Needed for Nausea or Vomiting., Disp: 30 tablet, Rfl: 3  •  sennosides-docusate sodium (SENOKOT-S) 8.6-50 MG tablet, Take 2 tablets by mouth 2 (Two) Times a Day., Disp: 120 tablet, Rfl: 2    Allergies:  No Known Allergies    Family History:  her family history includes Diabetes in her sister; Heart disease in her brother, father, and mother; Heart failure in her sister; Hypertension in her sister; Lung disease in her sister; No Known Problems in  her cousin, daughter, maternal grandfather, maternal grandmother, paternal grandfather, paternal grandmother, and son; Ovarian cancer (age of onset: 84) in her sister; Throat cancer in her sister.    Social History:  she  reports that she quit smoking about 50 years ago. Her smoking use included Cigarettes. She has a 2.00 pack-year smoking history. She has never used smokeless tobacco. She reports that she does not drink alcohol or use illicit drugs.    Pertinent Findings on   Review of Systems   Constitutional: Positive for appetite change, fatigue and unexpected weight change. Negative for chills, diaphoresis and fever.   HENT:   Positive for hearing loss. Negative for lump/mass, mouth sores, nosebleeds, sore throat, tinnitus, trouble swallowing and voice change.    Eyes: Positive for eye problems. Negative for icterus.   Respiratory: Negative for chest tightness, cough, dizziness on exertion, hemoptysis, shortness of breath and wheezing.    Cardiovascular: Positive for leg swelling. Negative for chest pain and palpitations.   Gastrointestinal: Negative for abdominal distention, abdominal pain, blood in stool, constipation, diarrhea, nausea, rectal pain and vomiting.   Endocrine: Negative for hot flashes.   Genitourinary: Negative for bladder incontinence, difficulty urinating, dyspareunia, dysuria, frequency, hematuria, menstrual problem, nocturia, pelvic pain, vaginal bleeding and vaginal discharge.    Musculoskeletal: Negative for arthralgias, back pain, flank pain, gait problem, myalgias, neck pain and neck stiffness.   Skin: Negative for itching, rash and wound.   Neurological: Positive for dizziness. Negative for extremity weakness, gait problem, headaches, light-headedness, numbness, seizures and speech difficulty.   Hematological: Negative for adenopathy. Bruises/bleeds easily.   Psychiatric/Behavioral: Negative for confusion, decreased concentration, depression, sleep disturbance and suicidal ideas. The  "patient is not nervous/anxious.    :  There were no vitals filed for this visit.    Performance Status: (2) Ambulatory and capable of self care, unable to carry out work activity, up and about > 50% or waking hours    Pertinent Findings on:  Physical Exam   Constitutional: She is oriented to person, place, and time. She is active and cooperative. No distress.   Thin   HENT:   Head: Normocephalic and atraumatic.   Nose: Nose normal.   Mouth/Throat: Mucous membranes are normal. Normal dentition.   Eyes: Conjunctivae and EOM are normal. Pupils are equal, round, and reactive to light.   Able to read without difficulty - near and far vision 20/20 and 20/30 but she describes it as \"blurry\"   Neck: Normal range of motion.   Abdominal: Normal appearance. There is no hepatosplenomegaly.   Musculoskeletal: Normal range of motion.       Vascular Status -  Her exam exhibits no right foot edema. Her exam exhibits no left foot edema.  Neurological: She is alert and oriented to person, place, and time.   Skin: Skin is warm and dry.   Psychiatric: She has a normal mood and affect. Her behavior is normal. Judgment and thought content normal.       Assessment:   1. 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    Plan:   We reviewed today the progression of the diagnostic imaging studies, the role of the systemic treatment and also the role of the radiation therapy in palliating symptomatic lesions or those with impending consequences. I let them know that without a biopsy, we could not say for sure what the orbital infiltrate is; however, given the bilateral nature and the clinical symptoms, I would recommend that we assume it is metastatic and pursue treatment and she was agreeable as was her son. SHe is interested in " maintaining the vision and wishes to pursue palliative treatment to both orbits.     We discussed a course of radiation therapy aimed at the bilateral orbits to a total dose of 3000 cGy in 10 treatments over 2 weeks. We reviewed the logistics and goals of the course of treatment. We then discussed acute side effects which are expected be minimal but might include slight erythema of the skin, hair loss in the treated areas, dryness and irritation of the eyes and possible fatigue. We also discussed the long-term effect of the radiation therapy on the bone and the eyes and I believe all questions were answered to their satisfaction.      I did encourage her to complete the field testing as a baseline so we can continue to monitor her response and or progression. She was agreeable to that as well.    We were able to take our treatment planning films today and I anticipate we will be getting the treatments underway within the next several days.    I spent greater than 35 minutes in face-to-face time with the patient and 20 minutes of that time was spent in counseling and coordination of care, including review of imaging and pathology; prognosis and differential diagnosis; indications, goals, logistics, benefits and risks of treatment as well as alternatives and surveillance options.

## 2017-11-22 PROBLEM — Z45.2 ENCOUNTER FOR ADJUSTMENT OR MANAGEMENT OF VASCULAR ACCESS DEVICE: Status: ACTIVE | Noted: 2017-01-01

## 2017-11-22 PROBLEM — R11.2 NAUSEA AND VOMITING: Status: RESOLVED | Noted: 2017-01-01 | Resolved: 2017-01-01

## 2017-11-22 PROBLEM — R00.0 TACHYCARDIA: Status: ACTIVE | Noted: 2017-01-01

## 2017-11-22 NOTE — TELEPHONE ENCOUNTER
Supportive Oncology Services    Called pt regarding eleveated distress noted by OSW Maribel Collins. Pt states she has discontinued services through [facility] and is continuing with some treatment, including radiation for recent vision changes. Denies needing any assistance and desires to follow up in clinic when sx aletha, but not sooner. Permission granted to speak with Toby schmitt.    Called sonToby, to touch base. LM regarding continuation of services available. Support offered and contact information left.

## 2017-11-22 NOTE — PROGRESS NOTES
Procedure     ECG 12 Lead  Date/Time: 11/22/2017 3:28 PM  Performed by: STELLA HUGHES  Authorized by: STELLA HUGHES   Comparison: not compared with previous ECG   Previous ECG: no previous ECG available  Rhythm: sinus rhythm  Rate: normal  Conduction: conduction normal  ST Segments: ST segments normal  T Waves: T waves normal  QRS axis: normal  Other: no other findings  Clinical impression: normal ECG

## 2017-11-29 NOTE — PROGRESS NOTES
"Maribel Collins LCSW and Psychosocial Oncology Social Work Intern met with pt and her daughter-in-law, Leyla, at [place], on 11/29/17 after pt received radiation treatment.  Pt is having ten XRT treatments. OSW inquired about Advanced Care Planning. OSW and Social Work Intern provided education on the purpose and process of an Advanced Directive. Pt expressed interest in completing an Advanced Directive; however, stated she would like to talk with her son, first Toby, who is her POA. OSW provided an offer to facilitate a conversation and sent pt home with an \"Informed Decisions packet\" and provided contact information for questions as they arise.    JOSEFINA Dyosn.  Psychosocial Oncology Social Work Intern    Maribel Collins LCSW  Oncology Social Work  "

## 2017-11-30 NOTE — PROGRESS NOTES
Subjective   Erin Vora is a 82 y.o. female.   She is here today for carcinoma of left breast metastatic to the bone along with hyperlipidemia hypertension chronic kidney disease stage III tachycardia today as well  History of Present Illness   Carcinoma left breast metastatic to bone follow with hematology oncology  Hyperlipidemia we will check labs on a regular basis and apparently stable on current medication  Hypertension normally well controlled on current medication and we will leave that at as it is  Tachycardia EKG today shows mildly we will not make changes at this time  Chronic kidney disease stage III follow with nephrology and check labs on a regular basis    The following portions of the patient's history were reviewed and updated as appropriate: allergies, current medications, past family history, past medical history, past social history, past surgical history and problem list.    Review of Systems   Psychiatric/Behavioral: Positive for dysphoric mood. The patient is nervous/anxious.    All other systems reviewed and are negative.      Objective   Physical Exam   Constitutional: She is oriented to person, place, and time. She appears well-developed and well-nourished. She is cooperative.   HENT:   Head: Normocephalic and atraumatic.   Right Ear: Hearing, tympanic membrane, external ear and ear canal normal.   Left Ear: Hearing, tympanic membrane, external ear and ear canal normal.   Nose: Nose normal.   Mouth/Throat: Uvula is midline, oropharynx is clear and moist and mucous membranes are normal.   Eyes: Conjunctivae, EOM and lids are normal. Pupils are equal, round, and reactive to light.   Neck: Phonation normal. Neck supple. Carotid bruit is not present.   Cardiovascular: Regular rhythm and normal heart sounds.  Tachycardia present.  Exam reveals no gallop and no friction rub.    No murmur heard.  Pulmonary/Chest: Effort normal and breath sounds normal. No respiratory distress.   Abdominal:  Soft. Bowel sounds are normal. She exhibits no distension and no mass. There is no hepatosplenomegaly. There is no tenderness. There is no rebound and no guarding. No hernia.   Musculoskeletal: She exhibits no edema.   Neurological: She is alert and oriented to person, place, and time. Coordination and gait normal.   Skin: Skin is warm and dry.   Psychiatric: Her speech is normal and behavior is normal. Judgment and thought content normal. Her mood appears anxious. She exhibits a depressed mood.   Nursing note and vitals reviewed.      Assessment/Plan   Diagnoses and all orders for this visit:    Carcinoma of left breast metastatic to bone    Other hyperlipidemia    Essential hypertension    Chronic kidney disease, stage 3    Tachycardia  -     ECG 12 Lead    Other orders  -     buPROPion (WELLBUTRIN) 75 MG tablet; Take 1 tablet by mouth 2 (Two) Times a Day.      Carcinoma left breast metastatic to the bone follow with hematology oncology  Hyperlipidemia stable on current medication we will check labs on a regular basis  Hypertension normally well controlled and we will follow closely  Tachycardia EKG did not show up today but I didn't hear it and check vitals  Chronic kidney disease stage III follow with nephrology and avoid medications which make it worse

## 2017-12-01 NOTE — PROGRESS NOTES
REASON FOR FOLLOW-UP:    1. Metastatic breast cancer, predominantly bone disease, biopsy confirmed triple negative in late October 2016. History of left breast cancer in 2005, had mastectomy, without adjuvant chemotherapy. She was followed by Dr. Romo at [facility], treated on Arimidex for 5 years, finished in 2011.   2. Anemia, secondary to her metastatic disease, with significantly elevated ferritin level. No evidence of deficiency for B12 or folic acid. She was given 3 units packed RBC in late October 2016 during hospitalization.    3. Bone biopsy on 10/25/2016 confirmed metastatic breast cancer, triple negative.  She was started on Xeloda 1500 mg twice a day on 11/14/2016 for 14 days on and 7 days off. Patient develops significant fatigue, Xeloda was decreased to 1000 mg twice a day from week #2. On 12/05/2016 schedule was changed to 7 days on, 7 days off.   4. Metastatic bone disease, Zometa was started in November 2016, repeat every 4 weeks.   5. Cancer-related pain, taking Lortab 5/325 mg prn.   6. 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   7.  Xeloda increased to 1500 mg in the morning and 1000 mg in the evening 7 days on and 7 days off, started on 4/21/2017. 8.  Bone scan on 6/7/2017 reported stable disease.  However her tumor marker was trending up at 68.7 on 6/16/2017.  Xeloda was increased to 1500 mg twice a day.    9.  Anemia secondary to chemotherapy.  She required 2 units PRBC transfusion on 7/2/2017.  Patient will be continued on Procrit injection as needed.   10.  Disease progress, required hospitalization in early August 2017.  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  Worsening ascites fluids, required repeated paracentesis on 8/09/2017. Xeloda  was discontinued.  She had paracentesis total of 16 L within a 3-week period in August 2017  11.  Patient had a right arm PICC line placement on 8/15/2017. Patient will be started on single agent Taxol weekly on 8/18/2017.  Plan is 3 weeks on 1 week off repeat every 4 weeks.   12.  Patient was not tolerating chemotherapy after 2 doses of weekly Taxol, developed acute renal failure and neutropenia, required hospitalization from 9/1/2017-9/11/2017.  She was discharged to home hospice care.    13.  Patient has improved performance status, and then restarted back on single agent chemotherapy Gemzar on 10/27/2017, with 20% dose reduction, for plan to 2 weeks on and one week off.   14.  Patient complains of headache and vision blurriness in early November 2017.  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  Patient was seen by radiation oncologist Dr. Henriquez and is started Radiation therapy on 12/28/17.        HISTORY OF PRESENT ILLNESS:   The patient is an 82-year-old female with the above-mentioned history returning today for reevaluation. Patient is accompanied by her son.      Patient was started first dose of Gemzar on 10/27/2017.  She tolerated therapy well.  She was also given 2 units PRBC transfusion because her hemoglobin was 7.9 at that time.  She got second dose on 11/9/2017 after delay due to feeling ill.     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  Patient was seen by radiation oncologist Dr. Lloyd and is started reading therapy on 11/28/2017.      Patient reports there is no apparent improvement of her vision.  She still has achiness in the orbits.  Denies nausea vomiting.  She gained a  couple pounds in the past several weeks.  She has good appetite.  No diarrhea no constipation.  Patient reports that she performance status, ECOG 2.  She denies bone pains.      Laboratory study today showed hemoglobin at 9.5.  She has normal platelets and WBC.  Her creatinine is 1.51, at baseline level.          Medical History            Past Medical History   Diagnosis Date   • Anemia      • Bone metastases      • Breast cancer 2005   • HL (hearing loss)      • Hyperlipidemia      • Hypertension                Surgical History                Past Surgical History   Procedure Laterality Date   • Breast biopsy Left 2005         malignant   • Mastectomy Left 2005               HEMATOLOGIC/ONCOLOGIC HISTORY: Ms. Vora is a 81-year-old  female who presented to the emergency room because of worsening lower back pain. She started having this about a week ago. Severity was 8 out of 10 but then subsided. In this past weekend, she had worsening pain again and was sent to the ER for further evaluation. Prior to this, she was mowing her own grass, lives by herself, with good performance status.      This patient was found to have significant anemia, with hemoglobin 7.0 in the ER. Of note, in the ER there was attempt for transfusion of packed RBC, however she had a fever and the transfusion was abandoned. Yesterday hemoglobin was down to 6.6 and she was given 1 unit packed RBC with no incident. She was given Benadryl 50 mg. This patient has hemoglobin 7.6 this morning. She is scheduled to receive 2 more units packed RBC.        This patient had CT scan of the abdomen and pelvis examination during ER evaluation, which reported diffuse metastatic bone disease involving the spine, in the thoracic spine, lumbar spine and pelvic bones. The patient reports no constipation, no diarrhea, no urinary incontinence.        This patient had history of left breast cancer back in 2005 for which she had left mastectomy. No lymph  node involvement. No adjuvant chemotherapy, no radiation therapy. The patient was followed by Dr. Romo at the [facility], and was on Arimidex for 5 years, which stopped in 2011. The patient was dismissed by Dr. Romo afterwards.       The patient reports weight loss of about 10 pounds since early September 2016. She has decreased appetite. No nausea/vomiting. No vision changes, no hearing changes. No fever, no sweating. Denies melena or hematochezia.        Patient had MRI examination with and without IV contrast for brain, cervical, thoracic and lumbar spin on 10/24/2016. There was no evidence of metastatic brain disease. There are metastatic disease involving the entire spine, no fresh compression fracture.       Patient had a CT-guided bone biopsy on 10/25/2016. G evaluation reported a primary breast cancer. Further testing showed triple negative, ER 0%, MI 0%, HER-2 IHC 2+, however FISH study was negative.      Was started on IV Zometa in early November 2016. She was started on Xeloda 1500 mg twice a day on 11/14/2016 for 14 days on and 7 days off. Patient develops significant fatigue, Xeloda was decreased to 1000 mg twice a day from week #2.        Laboratory study on 12/02/16 showed significant  anemia with hemoglobin 7.8, but a normal WBC 7000 including neutrophils 4800. Her platelets is 367,000. She was given 2 units packed RBC transfusion. Because of poor tolerance, Xeloda was decreased to 1000 mg twice a day 7 days on and 7 days off.  was 30.5 on 12/16/16.       Patient has better tolerance to decrease the Xeloda. She is more energetic. However she has worsening anemia, with a hemoglobin in the 9 gram range. Patient was started on low-dose Procrit 20,000 units every 2 weeks by an of December 2016.     CA 15-3 at 53.4 U/ml on 5/19/17.  Despite increased Xeloda dose, her CA 15-3 continue to increase.      Her bone scan on 6/7/2017 showed a stable disease.  However laboratory study  showed a worsening CA-15-3 at 68.7 on 6/16/2017.  Xeloda was increased to 1500 mg twice a day.      Because of anemia secondary to chemotherapy, and that she was symptomatic with hemoglobin 8.7, dizziness and fatigue, patient was given 2 units PRBC transfusion on 7/2/2017, and posttransfusion hemoglobin input to 10.9 on 7/02/17.  Patient reports significant improvement in energy level afterwards.    Patient was recently admitted to the hospital from 8/1/2017 through 8/5/2017 because of weakness and abdomen distention.  She was found having significant ascites associated with peritoneal carcinomatosis and the tumor deposits at the bilateral adnexal region as well as pelvic mass measuring 3.5×3.5 cm by CT scan on 8/3/2017.  She also had moderate to severe bilateral hydronephrosis, required bilateral ureter stenting by Dr. Jolley on 8/3/2017.  Subsequently she had ultrasound-guided paracentesis on 8/4/2017, with total 4700 mL ascites removed.  Cytology study was positive for metastatic breast cancer.  CT scan of the chest on 8/4/2017 reported a moderate bilateral pleural effusion.      She already stopped Xeloda due to disease progress.  Her tumor marker CA-15-3 was 79.3 on 8/2/2017.    Patient was switched to weekly Taxol.  She received a 2 doses and was not able to tolerate, developed acute renal failure requiring hospitalization.  She was hospitalized for 11 days because of acute renal failure with a creatinine 3.3, and neutropenia with ANC 1300.  Both of her renal function and neutrophil counts have improved.  She was also given 2 units PRBC transfusion when she had a hemoglobin 7.3 on 9/7/2017.  Patient also had paracentesis with 2600 ml ascites fluid removed on 9/3/2017.  She relies on walker at home.  Patient reports she has improved appetite, performance status is ECOG 3.  Her diarrhea has resolved.     Because of her worsening clinical condition, and unable to tolerate weekly Taxol after only 2 doses, with poor  "appetite and severe diarrhea and dehydration leading to kidney failure, and also marrow suppression with mild neutropenia only after 2 doses Taxol, we discussed with the patient and advised her for home hospice care.  This was arranged before her recent discharge from hospital.    Patient has improved condition in October 2017.  After evaluation, she was restarted back on single agent Gemzar with 20% dose reduction, weekly for 2 weeks on 1 week off.  The treatment was started on 10/27/2017.   Her tumor marker CA-15-3 was 62.8 on 10/27/2017.   She was also given 2 units PRBC transfusion because her hemoglobin was 7.9 at that time.  She denies bleeding.  She got second dose on 11/9/2017 after delay due to feeling ill.     MEDICATIONS: see EMR       ALLERGIES:  No Known Allergies      Past Medical History, Past Surgical History, Social History, Family History have been reviewed and are without significant changes except as mentioned.        REVIEW OF SYSTEMS:  GENERAL: see HPI;    SKIN: No nonhealing lesions.  No rashes.   HEME/LYMPH: No easy bruising, bleeding.  No swollen nodes.    EYES: No vision changes or diplopia.    ENT: No tinnitus, hearing loss, gum bleeding, epistaxis, hoarseness or dysphagia.   RESPIRATORY: No cough,, hemoptysis or wheezing.   CVS: No chest pain, palpitations, orthopnea,   GI: See history of present illness .  No melena or hematochezia.   No nausea, vomiting, constipation, diarrhea  : No lower tract obstructive symptoms, dysuria or hematuria.    MUSCULOSKELETAL: No bone pain. No joint stiffness.  Patient has edema in both legs 1+.   NEUROLOGICAL: has improved global weakness, but no loss of consciousness or seizures.    PSYCHIATRIC: No increased nervousness, mood changes or depression.        Objective   Vitals:    12/01/17 1044   BP: 146/72   Pulse: 105   Resp: 16   Temp: 98.2 °F (36.8 °C)   TempSrc: Oral   SpO2: 96%   Weight: 118 lb (53.5 kg)   Height: 62.2\" (158 cm)   PainSc:   2 "   PainLoc: Eye   ECOG 2         PHYSICAL EXAM:    GENERAL: Well-developed, chronically ill-looking elder and thin female in no acute distress.    SKIN: Warm, dry without rashes, purpura or petechiae.   HEAD: Normocephalic. Very thin hair.   EYES: Pupils equal, round. EOMs intact. Conjunctivae normal.  MOUTH: No oral candida infection.  CHEST: Decreased breathing sounds in bilateral lung fields.    CARDIAC: Regular rate and rhythm without murmurs, rubs or gallops. Normal S1,S2.  ABDOMEN: Soft,  mild distention, no tender, guarding, with no organomegaly or masses. Bowel sounds present.  EXTREMITIES: No clubbing, cyanosis. There are trace edema in both legs.    NEUROLOGICAL: Cranial Nerves II-XII grossly intact. No focal neurological deficits.  PSYCHIATRIC: Normal affect and mood.        RECENT LABS:     Lab Results   Component Value Date    WBC 7.62 12/01/2017    HGB 9.5 (L) 12/01/2017    HCT 31.9 (L) 12/01/2017    MCV 99.1 (H) 12/01/2017     12/01/2017     Lab Results   Component Value Date    NEUTROABS 6.13 12/01/2017     Lab Results   Component Value Date    GLUCOSE 143 (H) 12/01/2017    BUN 26 (H) 12/01/2017    CREATININE 1.51 (H) 12/01/2017    EGFRIFNONA 33 (L) 12/01/2017    EGFRIFAFRI 65 10/22/2016    BCR 17.2 12/01/2017    K 5.0 12/01/2017    CO2 24.9 12/01/2017    CALCIUM 9.0 12/01/2017    PROTENTOTREF 6.8 10/22/2016    PROTENTOTREF 6.9 10/22/2016    ALBUMIN 3.00 (L) 12/01/2017    LABIL2 1.0 12/01/2017    AST 59 (H) 12/01/2017    ALT 12 12/01/2017     Sodium   Date Value Ref Range Status   12/01/2017 141 136 - 145 mmol/L Final     Potassium   Date Value Ref Range Status   12/01/2017 5.0 3.5 - 5.2 mmol/L Final     Total Bilirubin   Date Value Ref Range Status   12/01/2017 0.3 0.1 - 1.2 mg/dL Final     Alkaline Phosphatase   Date Value Ref Range Status   12/01/2017 104 39 - 117 U/L Final   ]      IMAGE STUDY:  MRI OF THE ORBITS WITH AND WITHOUT CONTRAST 11/17/2017        HISTORY: Eye pain and tenderness.  History of metastatic disease. Recent  MRI of the brain demonstrated bilateral orbital abnormalities.      MRI of the orbits was obtained with thin cut axial and coronal pre and  postgadolinium fat saturated T1-weighted images. Additionally, there are  axial T1, axial diffusion, and axial fat-saturated T2-weighted images  through the orbits.      Comparison is made to prior MRI of the brain dated 11/15/2017.      FINDINGS:      Again noted is a very abnormal appearance of the orbits with injection  and stranding of the orbital fat bilaterally. This is most prominently  identified within the intraconal fat, and these findings result in  circumferential enhancement circumscribing the optic nerves resulting in  some thickening and distortion of both medial rectus muscles.  Additionally, there is enlargement of the inferior rectus muscles and  lateral rectus muscles bilaterally. Also, the right superior rectus  muscle is prominently enlarged. As stated in the prior report, the  findings could be related to schirrous metastatic disease to the orbits  although lymphoma or pseudotumor could certainly have this  manifestation.      Again noted is extensive abnormal T1 hypointense signal involving the  calvarium, skull base, and upper cervical spine which could be due to  red marrow conversion although the findings could also represent osseous  metastatic disease.      Overall, when compared to the prior MRI of the brain, given differences  in modalities, there is no convincing interval change in the appearance  of the orbits or the visualized brain parenchyma.  Incidentally noted  are some new changes of inflammatory paranasal sinus disease within the  right maxillary sinus and mucosal thickening.      IMPRESSION:  1.  When compared to the prior study of 11/15/2017, there is no  convincing interval change, given differences in modalities. Again noted  are extensive orbital abnormalities that are of uncertain  precise  etiology. However, these findings can certainly be seen in the setting  of schirrous metastatic disease. Other considerations would be lymphoma  or pseudotumor. Again, the orbits are remarkable for injection and  stranding of the orbital fat most prominently involving the intraconal  fat circumferentially circumscribing the optic nerves and resulting in  thickening of extraocular muscles as discussed in detail above.  2.  Again noted are extensive abnormal areas of T1 hypointense signal  involving the calvarium, skull base, and upper cervical spine which  could be due to red marrow conversion although osseous metastatic  disease could also appear in this fashion.         Assessment/Plan   1. Metastatic breast cancer, originally with predominantly bone disease, but progressed after first line single agent Xeloda, with peritoneal carcinomatosis with large ascites fluids, pelvic mass and bilateral hydronephrosis required bilateral ureter stenting in early August 2017.  She also has moderate bilateral pleural effusion.      The patient was initiated single agent Taxol on 8/18/2017.  She was not able to tolerate Taxol after 2 doses, with significant fatigue, diarrhea and not able to eat or drink properly, leading to acute renal failure with creatinine 3.3 and neutropenia with ANC 1300, required hospitalization for more than 10 days in early September 2017.  She also required 2 units PRBC transfusion during hospitalization.  She does have improved renal function to baseline level, and also resolution of neutropenia, and improved hemoglobin after transfusion, however she was still very weak, with poor performance status is ECOG 3.     Patient had improved her functionality in October 2017.  Since patient prefers to have active treatment, we started her on weekly Gemzar at a 20% dose reduction, planned for 2 weeks on 1 week off.  She started therapy on 10/20/17 with good tolerance.  Nevertheless she was sick and  second dose Gemzar was delayed and was delivered on 11/9/2017.      Patient again complaining of new onset headache and there poorly of vision.  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  She was started on palliative radiation therapy on 11/28/2017.      Because ongoing reading therapy, scheduled to finish on 12/11/2017.  I recommended postpone systematic chemotherapy until she finished radiation therapy.     2. Metastatic bone disease.  Patient was given monthly Zometa treatment.  It has being on hold because of hospice care. Since she is getting active treatment for her breast cancer, I recommended to resume Zometa today and repeat every 4 weeks.  Patient and her son both voiced understanding and consent.    3.  Anemia, multifactorial, secondary to chemotherapy as well as the her malignancy, also stage III chronic renal insufficiency.   She was given 2 units PRBC transfusion because she was symptomatic with Hb 7.9 on 10/27/2017.   Patient feels more energetic with transfusion.  She had a laboratory study on 8/25/2017 reported an enormously elevated ferritin at 3550.  Her vitamin B12 and folic acid level were both supratherapeutic on 9/1/2017.   We'll continue to monitor.     4.  Pain related to cancer.  This is not a major issue at this point.  She has Norco available for as needed use.     5.  Malignant ascites fluid.  Patient has no distention.  Her last paracentesis was from 9/3/2017.  She had large quantity of ascites fluid, required repeated paracentesis totaling 16 L in the first 3 weeks in August 2017.  She then had paracentesis on 9/3/2017 with 2600 mL ascites removed.  Since that time patient reports no worsening distention of abdomen.  We'll continue to monitor.    6.  Poor appetite.  This actually improved a recently.  She gained a few pounds.  I think this is probably related to dexamethasone she's been taking for metastatic disease involving the orbits.  We'll  continue for now.  Patient was previously on low-dose prednisone 10 mg daily as appetite stimulator.        PLAN:   1.  Continue radiation therapy to bilateral orbital area.    2.  Postpone chemo with Gemzar.  3.  Resume Zometa treatment today, 3 mg intravenous infusion.  We'll repeat every 4 weeks.    4.  Continue dexamethasone 4 mg oral every 12 hours.    5.  Continue Protonix 40 mg oral once daily for GI prophylaxis.    6.  Patient will return on 12/14/2017, with labs and M.D. visit, and resume chemotherapy with Gemzar.       More than 25 min, over half of that time were for counseling and coordinating her care.       ELVIN FONG M.D., Ph.D.    12/01/2017

## 2017-12-06 NOTE — PROGRESS NOTES
"  Physician Weekly Management Note    Diagnosis:   Breast cancer metastasized to bone and bilateral orbits     Clinical Stage IV (TX, NX, M1)    Reason for Visit: Radiation (7/10)    Concurrent Chemo:       Notes on Treatment course, Films, Medical progress and Plan:  Doing well. No problems with eyes and vision already slightly better. Discussed follow up with Dr. Gutierres and probable MRI in 4-6 weeks. Will see his plan ext week when she sees him and see back/schedule as needed. No other questions, cont on.    ROS -  Other than those listed above, as follows:  Constitutional - Normal - no complaints of fatigue, denies lack of appetite, fever, night sweats or change in weight.  Neck - Normal - denies neck masses, muscle weakness, neck pain, decreased range of motion or swelling.  Cardiovascular - Normal - denies arrhythmias, chest pain, dyspnea, edema, orthopnea or palpitations.  Respiratory - Normal - denies cough, dyspnea, hemoptysis, hiccoughs, pleuritic chest pain or wheezing.  Gastrointestinal - Normal - no complaints of constipation, abdominal pain, diarrhea, heartburn/dyspepsia, hematemesis, hemorrhoids, melena or GI bleeding, nausea, pain or cramping or vomiting.  Neuro - Normal - no new complaints of vision change, headache, nausea, cranial nerve deficit, gait abnormality, syncope, seizure.    PHYSICAL EXAM - Other than changes listed above, as follows:  Vitals:    12/06/17 1053   BP: 153/75   Pulse: 110   Temp: 97.7 °F (36.5 °C)   TempSrc: Oral   SpO2: 97%   Weight: 54 kg (119 lb)   Height: 62.2 cm (24.49\")   PainSc: 0-No pain       Constitutional - Normal - no evidence of impaired alertness, inadequate appearance, premature or advanced chronologic age, uncooperativeness, altered mood, affect or disorientation.  Neck - Normal - no evidence of tender or enlarged lymph nodes, neck abnormalities, restricted range of motion or enlarged thyroid.  Chest - Normal - no evidence of chest abnormalities, tender or " "enlarged lymph nodes.  Respiratory - Normal - no evidence of abnormal breat sounds, chest abnormalities on palpation and chest abnormalities on percussion and normal breath sounds.  Hematologic/Lymphatic - Normal - no evidence of tender or enlarged axillary lymph nodes nor tender or enlarged neck nodes.  Neuro - Normal - no evidence of cranial nerve deficit, gait abnormality.    Performance Status:  (2) Ambulatory and capable of self care, unable to carry out work activity, up and about > 50% or waking hours    Problem added:  No problems updated.  Medications added: No orders of the defined types were placed in this encounter.    Ancillary referrals made: No orders of the defined types were placed in this encounter.      Technical aspects reviewed:  Weekly OBI approved if applicable? Yes   Weekly port films approved?  Yes  Change requests noted if applicable?  Yes   Patient setup and plan reviewed?  Yes     Chart Reviewed:  Continue current treatment plan?  Yes  Treatment plan change requested? No    I have reviewed and marked as \"reviewed\" the current medications, allergies and problem list in the patients EMR.    I have reviewed the patient's medical, surgical  history in detail, reviewed any pertinent lab work  and updated the computerized patient record if needed.    Patient's Care Team:  Patient Care Team:  Shivam Llanes MD as PCP - General (Internal Medicine)  Masha Gutierres MD PhD as Consulting Physician (Hematology and Oncology)  Cuba Mabry MD as Referring Physician (Internal Medicine)    Seen and approved by:  Oralia Henriquez MD, 12/01/2017  "

## 2017-12-11 NOTE — TELEPHONE ENCOUNTER
Patients son called to get a paracentesis done for ascites.  Ok per Dr. Gutierres.  Message sent to [facility] desk.

## 2017-12-11 NOTE — TELEPHONE ENCOUNTER
----- Message from Raven Horton RN sent at 12/11/2017  9:06 AM EST -----  Patient needs paracentesis done kaylynn Phoenix placing orders    Son Bill [phone]

## 2017-12-13 NOTE — NURSING NOTE
Verbal and written D/C instructions given to patient and daughter.  They voice understanding and are able to teach back D/C instructions.

## 2017-12-13 NOTE — DISCHARGE INSTRUCTIONS
EDUCATION /DISCHARGE INSTRUCTIONS  Paracentesis:  A needle is inserted into the space between your abdominal organs and the membrane that surrounds them (peritoneal space).  It is done for the diagnosis and treatment of fluid that is resistant to other therapies.  It helps determine the cause of the fluid and at the relieves pressure created by the fluid.  A sample is obtained and sent to the laboratory for study.    During the procedure:  You will lie on a bed on your back with your legs drawn up.  Your abdomen will be exposed from the chest to the pelvis.  You will otherwise be covered to maintain comfort.  A physician will clean your abdomen with antiseptic soap, place a sterile towel around the site and administer a local anesthetic to numb the area.  The physician will insert a needle into your abdominal wall.  There may be a popping sound which signifies the needle has pierced the abdominal wall. Next, the physician will attach tubing to transfer a sample into a collection bottle.  After the fluid is obtained the needle will be removed.  A pressure dressing is applied to the site.    Risks of the procedure include but are not limited to:   *  Bleeding    *  Wound infection   *  Low blood pressure   *  Decreased urination   *  Low sodium if a large amount of fluid is removed   *  Puncture of abdominal organs by the needle    Benefits of the procedure:  Benefits include the removal of fluid from the abdomen, relief of abdominal pressure and facilitation of a diagnosis.    Alternatives to the procedure:  Possible alternatives are diuretic drug therapy or surgery to place a shunt to drain fluid.  Risks of diuretic drug therapy include possible dehydration and renal failure.  The benefit of drug therapy is that it can be done at home under physician supervision.  Risks of shunt placement include exposure to anesthesia, infection, excessive bleeding and injury to abdominal organs.  The benefit of a shunt is that it  can be used to drain fluid over a longer period of time.  THIS EDUCATION INFORMATION WAS REVIEWED PRIOR TO THE PROCEDURE AND CONSENT. Patient initials__________________Time_________________    Post procedure:    *  Weigh yourself daily.   *  Follow your doctors dietary instructions.   *  Rest today (no pushing pulling, straining or heavy lifting).   *  Slowly increase activity tomorow.   *  If you received sedation do not drive for 24 hours.              * Skin affix applied to puncture site. Do not try to remove, scratch or apply lotion   * Skin affix will fall off on it's own   *  You may shower tomorrow    Call your doctor if experiencing:   *  Signs of infection such as redness, swelling, excessive pain and / or foul       smelling drainage from the puncture site.   *  Chills or fever over 101 degrees (by mouth).   *  Fainting.   *  Rapid weight gain / loss.   *  Unrelieved pain.   *  Any new or severe symptoms.    Following the procedure:      Follow-up with the ordering physician as directed.   Continue to take other medications as directed by your physician unless    otherwise instructed.   If applicable, resume taking your blood thinners or Aspirin in 24 hours.    If you have any concerns please call the Radiology Nurses Desk at [phone].  You are the most important factor in your recovery.  Follow the above instructions carefully.

## 2017-12-18 PROBLEM — K56.600 PARTIAL SMALL BOWEL OBSTRUCTION (HCC): Status: ACTIVE | Noted: 2017-01-01

## 2017-12-18 NOTE — ED NOTES
Pt arrives with c/o NV x4 days. Pt has mets currently and gets paracentesis frequently, last one was last Wednesday. Pt's abdomen appears distended. Last BM x2 days ago.      Madelin Gutiérrez RN  12/18/17 3175

## 2017-12-18 NOTE — ED PROVIDER NOTES
EMERGENCY DEPARTMENT ENCOUNTER    CHIEF COMPLAINT  Chief Complaint: N/V  History given by: Pt  History limited by: Nothing  Room Number: 08/08  PMD: Shiavm Llanes MD      HPI:  Pt is a 82 y.o. female who presents complaining of intermittent N/V, onset 5 days ago. Pt also c/o generalized weakness, decreased appetite, and L shoulder pain. Pt denies cough, fever, chills, difficulty urinating. Family states that the pt was placed on zofran for her nausea, which has not alleviated her nausea. Family reports the pt has a hx of abd distension due to fluid collection and had a paracentesis last week. Pt has a hx of cancer with bony metastasis.    Duration:  5 days  Onset: gradual  Timing: intermittent  Quality: N/V  Intensity/Severity: moderate  Progression: unchanged  Associated Symptoms: generalized weakness, decreased appetite, L shoulder pain  Aggravating Factors: none  Alleviating Factors: none  Previous Episodes: Pt reports a hx of cancer with bony metastasis  Treatment before arrival: Son reports that the pt has been taking zofran without relief of his sx.     PAST MEDICAL HISTORY  Active Ambulatory Problems     Diagnosis Date Noted   • Breast cancer metastasized to bone 11/04/2016   • Anemia in neoplastic disease 11/04/2016   • Cancer associated pain 11/04/2016   • Anemia associated with chemotherapy 12/02/2016   • Weight loss due to medication 12/02/2016   • Poor appetite 12/02/2016   • Hypokalemia 12/02/2016   • Hyperlipidemia 12/04/2016   • Hearing loss 12/04/2016   • Medicare annual wellness visit, initial 12/22/2016   • Anemia of chronic renal failure, stage 3 (moderate) 12/30/2016   • Post-menopause 01/10/2017   • Encounter for medication review and counseling 03/24/2017   • Fever 03/24/2017   • Essential hypertension 03/28/2017   • Pneumonia 08/01/2017   • Chronic kidney disease, stage 3 08/01/2017   • Distended abdomen 08/02/2017   • Acute kidney injury 08/02/2017   • Abdominal carcinomatosis  08/03/2017   • Ascites, malignant 08/07/2017   • Dehydration 08/29/2017   • Chemotherapy induced neutropenia 09/01/2017   • Hypernatremia 09/01/2017   • Diarrhea 09/01/2017   • Encounter for fitting and adjustment of vascular catheter 09/18/2017   • Headache around the eyes 11/09/2017   • Blurry vision, right eye 11/16/2017   • Encounter for adjustment or management of vascular access device 11/22/2017   • Tachycardia 11/22/2017     Resolved Ambulatory Problems     Diagnosis Date Noted   • Metastasis to bone 10/23/2016   • Nausea and vomiting 03/24/2017   • Anemia 08/01/2017     Past Medical History:   Diagnosis Date   • Anemia    • Bone metastases 2016   • Breast cancer 2005   • H/O therapeutic radiation    • Headache around the eyes 11/9/2017   • HL (hearing loss)    • Hyperlipidemia    • Hypertension    • Pneumonia 8/1/2017       PAST SURGICAL HISTORY  Past Surgical History:   Procedure Laterality Date   • BREAST BIOPSY Left 2005    malignant   • CATARACT EXTRACTION EXTRACAPSULAR W/ INTRAOCULAR LENS IMPLANTATION     • COLONOSCOPY  2006   • CYSTOSCOPY W/ URETERAL STENT PLACEMENT Bilateral 8/3/2017    Procedure: WITH BILATERAL URETERAL STENT INSERTION;  Surgeon: Albino Jolley MD;  Location: [facility];  Service:    • MASTECTOMY Left 2005   • SENTINEL NODE BIOPSY  07/2005    sentinel node sampling       FAMILY HISTORY  Family History   Problem Relation Age of Onset   • Ovarian cancer Sister 84   • Heart failure Sister    • Hypertension Sister    • Diabetes Sister    • Heart disease Mother    • Heart disease Father    • Heart disease Brother    • No Known Problems Son    • No Known Problems Daughter    • No Known Problems Maternal Grandmother    • No Known Problems Maternal Grandfather    • No Known Problems Paternal Grandmother    • No Known Problems Paternal Grandfather    • No Known Problems Cousin    • Throat cancer Sister    • Lung disease Sister        SOCIAL HISTORY  Social History     Social  History   • Marital status:      Spouse name: N/A   • Number of children: 1   • Years of education: College     Occupational History   • OR Nurse Retired     Social History Main Topics   • Smoking status: Former Smoker     Packs/day: 1.00     Years: 2.00     Types: Cigarettes     Quit date: 1967   • Smokeless tobacco: Never Used      Comment: About 50 years ago   • Alcohol use No   • Drug use: No   • Sexual activity: Defer     Other Topics Concern   • Not on file     Social History Narrative       ALLERGIES  Review of patient's allergies indicates no known allergies.    REVIEW OF SYSTEMS  Review of Systems   Constitutional: Positive for appetite change (decreased). Negative for fever.   HENT: Negative for sore throat.    Eyes: Negative.    Respiratory: Negative for cough and shortness of breath.    Cardiovascular: Negative for chest pain.   Gastrointestinal: Positive for nausea and vomiting. Negative for abdominal pain and diarrhea.   Genitourinary: Negative for dysuria.   Musculoskeletal: Positive for arthralgias (L shoulder pain). Negative for neck pain.   Skin: Negative for rash.   Allergic/Immunologic: Negative.    Neurological: Positive for weakness (generalized). Negative for numbness and headaches.   Hematological: Negative.    Psychiatric/Behavioral: Negative.    All other systems reviewed and are negative.      PHYSICAL EXAM  ED Triage Vitals   Temp Pulse Resp BP SpO2   12/18/17 1223 -- 12/18/17 1223 -- --   98.2 °F (36.8 °C)  16        Temp src Heart Rate Source Patient Position BP Location FiO2 (%)   12/18/17 1223 12/18/17 1223 -- -- --   Tympanic Monitor          Physical Exam   Constitutional: She is oriented to person, place, and time and well-developed, well-nourished, and in no distress. No distress.   Thin, chronically-ill appearing   HENT:   Head: Normocephalic and atraumatic.   Mouth/Throat: Mucous membranes are dry.   Eyes: EOM are normal. Pupils are equal, round, and reactive to light.    Neck: Normal range of motion. Neck supple. No JVD present.   Cardiovascular: Normal rate, regular rhythm, normal heart sounds and intact distal pulses.    Pulmonary/Chest: Effort normal and breath sounds normal. No respiratory distress.   Abdominal: Soft. Bowel sounds are normal. She exhibits distension and fluid wave. She exhibits no mass. There is no tenderness. There is no rebound and no guarding.   Musculoskeletal: She exhibits edema (BLE, chronic).        Left shoulder: She exhibits decreased range of motion and tenderness. She exhibits no deformity.   No step-off of L shoulder   Neurological: She is alert and oriented to person, place, and time. She has normal sensation and normal strength.   Skin: Skin is warm and dry. No rash noted. There is pallor.   Psychiatric: Mood and affect normal.   Nursing note and vitals reviewed.      LAB RESULTS  Lab Results (last 24 hours)     Procedure Component Value Units Date/Time    Urinalysis With / Culture If Indicated - Urine, Catheter [[id number]]  (Abnormal) Collected:  12/18/17 1315    Specimen:  Urine from Urine, Catheter Updated:  12/18/17 1347     Color, UA Yellow     Appearance, UA Cloudy (A)     pH, UA 6.0     Specific Gravity, UA 1.009     Glucose, UA Negative     Ketones, UA Negative     Bilirubin, UA Negative     Blood, UA Moderate (2+) (A)     Protein, UA 30 mg/dL (1+) (A)     Leuk Esterase, UA Small (1+) (A)     Nitrite, UA Negative     Urobilinogen, UA 0.2 E.U./dL    Urinalysis, Microscopic Only - Urine, Clean Catch [[id number]]  (Abnormal) Collected:  12/18/17 1315    Specimen:  Urine from Urine, Catheter Updated:  12/18/17 1356     RBC, UA 3-5 (A) /HPF      WBC, UA 3-5 (A) /HPF      Bacteria, UA Trace (A) /HPF      Squamous Epithelial Cells, UA 0-2 /HPF      Hyaline Casts, UA None Seen /LPF      Amorphous Crystals, UA Small/1+ /HPF      Methodology Manual Light Microscopy    Urine Culture - Urine, Urine, Clean Catch [[id number]] Collected:  12/18/17 1315     Specimen:  Urine from Urine, Catheter Updated:  12/18/17 1330    CBC & Differential [[id number]] Collected:  12/18/17 1335    Specimen:  Blood Updated:  12/18/17 1349    Narrative:       The following orders were created for panel order CBC & Differential.  Procedure                               Abnormality         Status                     ---------                               -----------         ------                     CBC Auto Differential[[id number]]        Abnormal            Final result                 Please view results for these tests on the individual orders.    Comprehensive Metabolic Panel [[id number]]  (Abnormal) Collected:  12/18/17 1335    Specimen:  Blood Updated:  12/18/17 1413     Glucose 100 (H) mg/dL      BUN 40 (H) mg/dL      Creatinine 2.29 (H) mg/dL      Sodium 139 mmol/L      Potassium 4.9 mmol/L      Chloride 99 mmol/L      CO2 25.8 mmol/L      Calcium 8.2 (L) mg/dL      Total Protein 5.4 (L) g/dL      Albumin 2.50 (L) g/dL      ALT (SGPT) 10 U/L      AST (SGOT) 65 (H) U/L      Alkaline Phosphatase 72 U/L      Total Bilirubin 0.3 mg/dL      eGFR Non African Amer 21 (L) mL/min/1.73      Globulin 2.9 gm/dL      A/G Ratio 0.9 g/dL      BUN/Creatinine Ratio 17.5     Anion Gap 14.2 mmol/L     Narrative:       The MDRD GFR formula is only valid for adults with stable renal function between ages 18 and 70.    Lipase [[id number]]  (Normal) Collected:  12/18/17 1335    Specimen:  Blood Updated:  12/18/17 1410     Lipase 19 U/L     Protime-INR [[id number]]  (Abnormal) Collected:  12/18/17 1335    Specimen:  Blood Updated:  12/18/17 1359     Protime 14.2 Seconds      INR 1.14 (H)    aPTT [[id number]]  (Normal) Collected:  12/18/17 1335    Specimen:  Blood Updated:  12/18/17 1359     PTT 26.3 seconds     Blood Culture - Blood, [[id number]] Collected:  12/18/17 1335    Specimen:  Blood from Arm, Right Updated:  12/18/17 1343    Blood Culture - Blood, [[id number]] Collected:  12/18/17 1335    Specimen:   Blood from Arm, Right Updated:  12/18/17 1341    Lactic Acid, Plasma [[id number]]  (Normal) Collected:  12/18/17 1335    Specimen:  Blood Updated:  12/18/17 1402     Lactate 1.3 mmol/L     CBC Auto Differential [[id number]]  (Abnormal) Collected:  12/18/17 1335    Specimen:  Blood Updated:  12/18/17 1349     WBC 5.09 10*3/mm3      RBC 2.87 (L) 10*6/mm3      Hemoglobin 8.6 (L) g/dL      Hematocrit 28.5 (L) %      MCV 99.3 (H) fL      MCH 30.0 pg      MCHC 30.2 (L) g/dL      RDW 20.6 (H) %      RDW-SD 73.5 (H) fl      MPV 9.5 fL      Platelets 330 10*3/mm3      Neutrophil % 70.3 %      Lymphocyte % 14.7 (L) %      Monocyte % 12.6 (H) %      Eosinophil % 0.8 %      Basophil % 0.2 %      Immature Grans % 1.4 (H) %      Neutrophils, Absolute 3.58 10*3/mm3      Lymphocytes, Absolute 0.75 (L) 10*3/mm3      Monocytes, Absolute 0.64 10*3/mm3      Eosinophils, Absolute 0.04 10*3/mm3      Basophils, Absolute 0.01 10*3/mm3      Immature Grans, Absolute 0.07 (H) 10*3/mm3           I ordered the above labs and reviewed the results    RADIOLOGY  CT Abdomen Pelvis Without Contrast   Final Result   1.  Progression of omental caking and peritoneal carcinomatosis when   compared to exam 08/03/2017. Diffuse intra-abdominal ascites.   2.  Dilated left upper quadrant small bowel loops with air-fluid levels.   This could be due to localized ileus though a mild or partial small   bowel obstruction should also be considered. Distal small bowel loops   are decompressed. There is gas and stool throughout the colon.   3.  Bilateral ureteral stents and hydronephrosis.        Radiation dose reduction techniques were utilized, including automated   exposure control and exposure modulation based on body size.       This report was finalized on 12/18/2017 2:32 PM by Dr. Sebastien Darden MD.          XR Shoulder 2+ View Left   Final Result       As described.       This report was finalized on 12/18/2017 1:04 PM by Dr. Shar Goins MD.           XR Chest 2 View   Final Result   No focal pulmonary consolidation. Tortuous aorta. Follow-up   as clinically indicated.       This report was finalized on 12/18/2017 1:02 PM by Dr. Shar Goins MD.               I ordered the above noted radiological studies. Interpreted by radiologist. Discussed with radiologist (Dr. Cortes). Reviewed by me in PACS.       PROCEDURES  Procedures      PROGRESS AND CONSULTS  ED Course     1238 - IVF and zofran ordered. Lab work, CT abd/pelvis, CXR, and L shoulder ordered for further evaluation.     1320 - Consult placed with hematology.     1333 - Consulted with Dr. Mcarthur (oncologist/hematologist) who recommends the pt be admitted through medicine secondary to the small bowel obstruction.    1445 - Rechecked pt. Pt is resting comfortably. Informed pt of the consult with Dr. Mcarthur and the CT abd/pelvis which shows a partial small bowel obstruction. D/w pt the plan for admission for further evaluation.     1447 - Consult placed with [facility].     1514 - Consulted with Dr. Dumont ([facility]) who agrees to admit the pt .    MEDICAL DECISION MAKING  Results were reviewed/discussed with the patient and they were also made aware of online access. Pt also made aware that some labs, such as cultures, will not be resulted during ER visit and follow up with PMD is necessary.     MDM  Number of Diagnoses or Management Options     Amount and/or Complexity of Data Reviewed  Clinical lab tests: ordered and reviewed (BUN - 40  Creatinine - 2.29  Lactate - 1.3  Lipase - 19  INR - 1.14)  Tests in the radiology section of CPT®: ordered and reviewed (CT abd/pelvis - Omental caking and peritoneal carcinomatosis, diffuse intra-abdominal ascites, dilated left upper quadrant small bowel loops with air-fluid levels.  CXR - negative acute  XR L shoulder - no acute fracture  )  Discussion of test results with the performing providers: yes (Dr. Cortes)  Decide to obtain previous medical records or to obtain history  from someone other than the patient: yes  Review and summarize past medical records: yes (Pt had an US paracentesis on 12/13 to drain fluid, and MRI brain on 11/15 that showed bony metastasis.)  Discuss the patient with other providers: yes (Dr. Mcarthur (oncology/hematology)  Dr. Dumont ([facility]))           DIAGNOSIS  Final diagnoses:   Partial small bowel obstruction   Metastatic breast cancer   Carcinomatosis   Stage 3 chronic kidney disease   Malignant ascites       DISPOSITION  ADMISSION    Discussed treatment plan and reason for admission with pt/family and admitting physician.  Pt/family voiced understanding of the plan for admission for further testing/treatment as needed.     Latest Documented Vital Signs:  As of 2:53 PM  BP- 138/75 HR- 97 Temp- 98.2 °F (36.8 °C) (Tympanic) O2 sat- 94%    --  Documentation assistance provided by sohan Perez for Dr. Montiel.  Information recorded by the scribe was done at my direction and has been verified and validated by me.       Macho Perez  12/18/17 1163       Quincy Montiel MD  12/18/17 5433

## 2017-12-18 NOTE — PLAN OF CARE
Problem: Patient Care Overview (Adult)  Goal: Plan of Care Review  Outcome: Ongoing (interventions implemented as appropriate)    12/18/17 9421   Coping/Psychosocial Response Interventions   Plan Of Care Reviewed With patient;son   Outcome Evaluation   Outcome Summary/Follow up Plan vss, abd distended, offered meds for nausea , pt refuses at this time, iv fluids started, hi risk for pressure ulcer due to wt. and age, acuumax pump applied. dejon ice chips, no vomiting at this time.       Goal: Adult Individualization and Mutuality  Outcome: Ongoing (interventions implemented as appropriate)  Goal: Discharge Needs Assessment  Outcome: Ongoing (interventions implemented as appropriate)    Problem: Pain, Acute (Adult)  Goal: Identify Related Risk Factors and Signs and Symptoms  Outcome: Ongoing (interventions implemented as appropriate)  Goal: Acceptable Pain Control/Comfort Level  Outcome: Ongoing (interventions implemented as appropriate)    Problem: Pressure Ulcer Risk (Oscar Scale) (Adult,Obstetrics,Pediatric)  Goal: Identify Related Risk Factors and Signs and Symptoms  Outcome: Ongoing (interventions implemented as appropriate)  Goal: Skin Integrity  Outcome: Ongoing (interventions implemented as appropriate)

## 2017-12-18 NOTE — PROGRESS NOTES
Clinical Pharmacy Services: Medication History    Erin Vora is a 82 y.o. female presenting to [facility] for   Chief Complaint   Patient presents with   • Vomiting     X 2-3 days - feels weak   • Nausea       She  has a past medical history of Anemia; Bone metastases (2016); Breast cancer (2005); H/O therapeutic radiation; Headache around the eyes (11/9/2017); HL (hearing loss); Hyperlipidemia; Hypertension; and Pneumonia (8/1/2017).    Allergies as of 12/18/2017   • (No Known Allergies)       Medication information was obtained from: Pharmacy, patient  Pharmacy and Phone Number: [phone]    Prior to Admission Medications     Prescriptions Last Dose Informant Patient Reported? Taking?    acetaminophen (TYLENOL) 500 MG tablet  Self Yes Yes    Take 1,000 mg by mouth Every 6 (Six) Hours As Needed for Mild Pain .    aspirin 81 MG EC tablet  Self Yes Yes    Take 81 mg by mouth Daily.    buPROPion (WELLBUTRIN) 75 MG tablet  Pharmacy No Yes    Take 1 tablet by mouth 2 (Two) Times a Day.    calcium carbonate-vitamin d (CALCIUM 600+D HIGH POTENCY) 600-400 MG-UNIT per tablet  Self Yes Yes    Take 1 tablet by mouth Daily.    dexamethasone (DECADRON) 4 MG tablet  Pharmacy No Yes    Take 1 tablet by mouth 2 (Two) Times a Day With Meals.    HYDROcodone-acetaminophen (NORCO) 5-325 MG per tablet  Pharmacy No Yes    Take 1 tablet by mouth Every 6 (Six) Hours As Needed for Moderate Pain  or Severe Pain .    losartan (COZAAR) 100 MG tablet  Pharmacy Yes Yes    Take 100 mg by mouth Daily.    Multiple Vitamin (MULTIVITAMIN) tablet  Self Yes Yes    Take 1 tablet by mouth Daily.    Omega-3 Fatty Acids (FISH OIL) 1000 MG capsule capsule  Self Yes Yes    Take 1,000 mg by mouth Daily.    ondansetron (ZOFRAN) 4 MG tablet  Self No Yes    Take 1 tablet by mouth Every 8 (Eight) Hours As Needed for Nausea or Vomiting.    pantoprazole (PROTONIX) 40 MG EC tablet  Pharmacy No Yes    Take 1 tablet by mouth Daily.     prochlorperazine (COMPAZINE) 10 MG tablet  Self No Yes    Take 1 tablet by mouth Every 6 (Six) Hours As Needed for Nausea or Vomiting.    sennosides-docusate sodium (SENOKOT-S) 8.6-50 MG tablet  Self No Yes    Take 2 tablets by mouth 2 (Two) Times a Day.            Medication notes: Prescription medications verified with pharmacy, OTC meds verified with patient. Patient says Zofran and Compazine are prn. Stated she took morning medications but not able to provide names for what was taken.    This medication list is complete to the best of my knowledge as of 12/18/2017    Please call if questions.    Jhoana Sánchez, Medication History Technician  12/18/2017 3:54 PM

## 2017-12-18 NOTE — H&P
Name: Erin Vora ADMIT: 2017   : 1935  PCP: Shivam Llanes MD    MRN: [id number] LOS: 0 days   AGE/SEX: 82 y.o. female  ROOM: [facility]/     Chief Complaint   Patient presents with   • Vomiting     X 2-3 days - feels weak   • Nausea       Subjective   Ms. Vora is a 82 y.o. unfortunate female with a history of widely metastatic breast cancer who presents to [facility] complaining of nausea and vomiting. Symptoms have been present for about 4 days. He has abdominal carcinomatosis and malignant ascites and had a paracentesis done on  removing 3700 cc. He was that evening that she started having nausea. She's had intermittent vomiting since that time. No vomiting today. No hematemesis. She does report some increased generalized abdominal discomfort. Ascites is reaccumulating but not to the point it was on the . She denies fever or chills. No diarrhea. Last bowel movement was approximately 2 days ago. She denies history of abdominal surgery. No history of bowel obstruction.      History of Present Illness    Past Medical History:   Diagnosis Date   • Anemia    • Bone metastases 2016   • Breast cancer     left   • H/O therapeutic radiation    • Headache around the eyes 2017   • HL (hearing loss)    • Hyperlipidemia    • Hypertension    • Pneumonia 2017     Past Surgical History:   Procedure Laterality Date   • BREAST BIOPSY Left     malignant   • CATARACT EXTRACTION EXTRACAPSULAR W/ INTRAOCULAR LENS IMPLANTATION     • COLONOSCOPY     • CYSTOSCOPY W/ URETERAL STENT PLACEMENT Bilateral 8/3/2017    Procedure: WITH BILATERAL URETERAL STENT INSERTION;  Surgeon: Albino Jolley MD;  Location: [facility] OR;  Service:    • MASTECTOMY Left    • SENTINEL NODE BIOPSY  2005    sentinel node sampling     Family History   Problem Relation Age of Onset   • Ovarian cancer Sister 84   • Heart failure Sister    • Hypertension Sister    • Diabetes Sister    •  Heart disease Mother    • Heart disease Father    • Heart disease Brother    • No Known Problems Son    • No Known Problems Daughter    • No Known Problems Maternal Grandmother    • No Known Problems Maternal Grandfather    • No Known Problems Paternal Grandmother    • No Known Problems Paternal Grandfather    • No Known Problems Cousin    • Throat cancer Sister    • Lung disease Sister      Social History   Substance Use Topics   • Smoking status: Former Smoker     Packs/day: 1.00     Years: 2.00     Types: Cigarettes     Quit date: 1967   • Smokeless tobacco: Never Used      Comment: About 50 years ago   • Alcohol use No     Prescriptions Prior to Admission   Medication Sig Dispense Refill Last Dose   • acetaminophen (TYLENOL) 500 MG tablet Take 1,000 mg by mouth Every 6 (Six) Hours As Needed for Mild Pain .   12/17/2017 at Unknown time   • aspirin 81 MG EC tablet Take 81 mg by mouth Daily.   12/18/2017 at Unknown time   • buPROPion (WELLBUTRIN) 75 MG tablet Take 1 tablet by mouth 2 (Two) Times a Day. 180 tablet 1 12/18/2017 at Unknown time   • calcium carbonate-vitamin d (CALCIUM 600+D HIGH POTENCY) 600-400 MG-UNIT per tablet Take 1 tablet by mouth Daily.   12/18/2017 at Unknown time   • dexamethasone (DECADRON) 4 MG tablet Take 1 tablet by mouth 2 (Two) Times a Day With Meals. 60 tablet 1 12/18/2017 at Unknown time   • HYDROcodone-acetaminophen (NORCO) 5-325 MG per tablet Take 1 tablet by mouth Every 6 (Six) Hours As Needed for Moderate Pain  or Severe Pain . 90 tablet 0 12/18/2017 at Unknown time   • losartan (COZAAR) 100 MG tablet Take 100 mg by mouth Daily.   12/18/2017 at Unknown time   • Multiple Vitamin (MULTIVITAMIN) tablet Take 1 tablet by mouth Daily.   12/18/2017 at Unknown time   • Omega-3 Fatty Acids (FISH OIL) 1000 MG capsule capsule Take 1,000 mg by mouth Daily.   12/18/2017 at Unknown time   • ondansetron (ZOFRAN) 4 MG tablet Take 1 tablet by mouth Every 8 (Eight) Hours As Needed for Nausea or  Vomiting. 30 tablet 2 12/18/2017 at Unknown time   • pantoprazole (PROTONIX) 40 MG EC tablet Take 1 tablet by mouth Daily. 30 tablet 5 12/18/2017 at Unknown time   • prochlorperazine (COMPAZINE) 10 MG tablet Take 1 tablet by mouth Every 6 (Six) Hours As Needed for Nausea or Vomiting. 30 tablet 3 Past Week at Unknown time   • sennosides-docusate sodium (SENOKOT-S) 8.6-50 MG tablet Take 2 tablets by mouth 2 (Two) Times a Day. 120 tablet 2 12/17/2017 at Unknown time     Allergies:  No Known Allergies    Review of Systems   Constitutional: Positive for activity change, appetite change and fatigue. Negative for chills and fever.   HENT: Negative.    Eyes: Negative.    Respiratory: Negative.  Negative for shortness of breath.    Cardiovascular: Negative.  Negative for chest pain.   Gastrointestinal: Negative.         See above   Endocrine: Negative.    Genitourinary: Negative.    Musculoskeletal: Negative.    Skin: Negative.    Neurological: Positive for weakness and light-headedness (With change in position).   Hematological: Negative.    Psychiatric/Behavioral: Negative.         Objective    Vital Signs  Temp:  [97.3 °F (36.3 °C)-98.2 °F (36.8 °C)] 97.3 °F (36.3 °C)  Heart Rate:  [] 103  Resp:  [16] 16  BP: (120-138)/(65-75) 125/70  SpO2:  [94 %-96 %] 94 %  on   ;   O2 Device: room air  Body mass index is 20.08 kg/(m^2).    Physical Exam   Constitutional: She is oriented to person, place, and time. She appears cachectic. No distress.   Poor muscle mass   HENT:   Head: Normocephalic and atraumatic.   Eyes: Conjunctivae and EOM are normal. No scleral icterus.   Neck: Normal range of motion. Neck supple. No tracheal deviation present.   Cardiovascular: Normal rate and regular rhythm.    No murmur heard.  Pulmonary/Chest: Effort normal and breath sounds normal. No respiratory distress. She has no wheezes. She has no rales.   Abdominal: Soft. She exhibits distension and ascites. Bowel sounds are decreased. There is  generalized tenderness. There is no rebound and no guarding.   Musculoskeletal: Normal range of motion. She exhibits edema (1+ BLE). She exhibits no deformity.   Neurological: She is alert and oriented to person, place, and time. No cranial nerve deficit.   Skin: Skin is warm and dry. No rash noted. No erythema.   Psychiatric: She has a normal mood and affect. Her behavior is normal. Judgment and thought content normal.   Nursing note and vitals reviewed.      Results Review:   I reviewed the patient's new clinical results.    Lab Results (last 24 hours)     Procedure Component Value Units Date/Time    Urinalysis With / Culture If Indicated - Urine, Catheter [[id number]]  (Abnormal) Collected:  12/18/17 1315    Specimen:  Urine from Urine, Catheter Updated:  12/18/17 1347     Color, UA Yellow     Appearance, UA Cloudy (A)     pH, UA 6.0     Specific Gravity, UA 1.009     Glucose, UA Negative     Ketones, UA Negative     Bilirubin, UA Negative     Blood, UA Moderate (2+) (A)     Protein, UA 30 mg/dL (1+) (A)     Leuk Esterase, UA Small (1+) (A)     Nitrite, UA Negative     Urobilinogen, UA 0.2 E.U./dL    Urinalysis, Microscopic Only - Urine, Clean Catch [[id number]]  (Abnormal) Collected:  12/18/17 1315    Specimen:  Urine from Urine, Catheter Updated:  12/18/17 1356     RBC, UA 3-5 (A) /HPF      WBC, UA 3-5 (A) /HPF      Bacteria, UA Trace (A) /HPF      Squamous Epithelial Cells, UA 0-2 /HPF      Hyaline Casts, UA None Seen /LPF      Amorphous Crystals, UA Small/1+ /HPF      Methodology Manual Light Microscopy    Urine Culture - Urine, Urine, Clean Catch [[id number]] Collected:  12/18/17 1315    Specimen:  Urine from Urine, Catheter Updated:  12/18/17 1330    CBC & Differential [[id number]] Collected:  12/18/17 1335    Specimen:  Blood Updated:  12/18/17 1349    Narrative:       The following orders were created for panel order CBC & Differential.  Procedure                               Abnormality         Status                      ---------                               -----------         ------                     CBC Auto Differential[[id number]]        Abnormal            Final result                 Please view results for these tests on the individual orders.    Comprehensive Metabolic Panel [[id number]]  (Abnormal) Collected:  12/18/17 1335    Specimen:  Blood Updated:  12/18/17 1413     Glucose 100 (H) mg/dL      BUN 40 (H) mg/dL      Creatinine 2.29 (H) mg/dL      Sodium 139 mmol/L      Potassium 4.9 mmol/L      Chloride 99 mmol/L      CO2 25.8 mmol/L      Calcium 8.2 (L) mg/dL      Total Protein 5.4 (L) g/dL      Albumin 2.50 (L) g/dL      ALT (SGPT) 10 U/L      AST (SGOT) 65 (H) U/L      Alkaline Phosphatase 72 U/L      Total Bilirubin 0.3 mg/dL      eGFR Non African Amer 21 (L) mL/min/1.73      Globulin 2.9 gm/dL      A/G Ratio 0.9 g/dL      BUN/Creatinine Ratio 17.5     Anion Gap 14.2 mmol/L     Narrative:       The MDRD GFR formula is only valid for adults with stable renal function between ages 18 and 70.    Lipase [[id number]]  (Normal) Collected:  12/18/17 1335    Specimen:  Blood Updated:  12/18/17 1410     Lipase 19 U/L     Protime-INR [[id number]]  (Abnormal) Collected:  12/18/17 1335    Specimen:  Blood Updated:  12/18/17 1359     Protime 14.2 Seconds      INR 1.14 (H)    aPTT [[id number]]  (Normal) Collected:  12/18/17 1335    Specimen:  Blood Updated:  12/18/17 1359     PTT 26.3 seconds     Blood Culture - Blood, [[id number]] Collected:  12/18/17 1335    Specimen:  Blood from Arm, Right Updated:  12/18/17 1343    Blood Culture - Blood, [[id number]] Collected:  12/18/17 1335    Specimen:  Blood from Arm, Right Updated:  12/18/17 1341    Lactic Acid, Plasma [[id number]]  (Normal) Collected:  12/18/17 1335    Specimen:  Blood Updated:  12/18/17 1402     Lactate 1.3 mmol/L     CBC Auto Differential [[id number]]  (Abnormal) Collected:  12/18/17 1335    Specimen:  Blood Updated:  12/18/17 1349     WBC 5.09 10*3/mm3       RBC 2.87 (L) 10*6/mm3      Hemoglobin 8.6 (L) g/dL      Hematocrit 28.5 (L) %      MCV 99.3 (H) fL      MCH 30.0 pg      MCHC 30.2 (L) g/dL      RDW 20.6 (H) %      RDW-SD 73.5 (H) fl      MPV 9.5 fL      Platelets 330 10*3/mm3      Neutrophil % 70.3 %      Lymphocyte % 14.7 (L) %      Monocyte % 12.6 (H) %      Eosinophil % 0.8 %      Basophil % 0.2 %      Immature Grans % 1.4 (H) %      Neutrophils, Absolute 3.58 10*3/mm3      Lymphocytes, Absolute 0.75 (L) 10*3/mm3      Monocytes, Absolute 0.64 10*3/mm3      Eosinophils, Absolute 0.04 10*3/mm3      Basophils, Absolute 0.01 10*3/mm3      Immature Grans, Absolute 0.07 (H) 10*3/mm3           CT Abdomen Pelvis Without Contrast   Final Result   1.  Progression of omental caking and peritoneal carcinomatosis when   compared to exam 08/03/2017. Diffuse intra-abdominal ascites.   2.  Dilated left upper quadrant small bowel loops with air-fluid levels.   This could be due to localized ileus though a mild or partial small   bowel obstruction should also be considered. Distal small bowel loops   are decompressed. There is gas and stool throughout the colon.   3.  Bilateral ureteral stents and hydronephrosis.        Radiation dose reduction techniques were utilized, including automated   exposure control and exposure modulation based on body size.       This report was finalized on 12/18/2017 2:32 PM by Dr. Sebastien Darden MD.          XR Shoulder 2+ View Left   Final Result       As described.       This report was finalized on 12/18/2017 1:04 PM by Dr. Shar Goins MD.          XR Chest 2 View   Final Result   No focal pulmonary consolidation. Tortuous aorta. Follow-up   as clinically indicated.       This report was finalized on 12/18/2017 1:02 PM by Dr. Shar Goins MD.            Assessment/Plan   Active Hospital Problems (** Indicates Principal Problem)    Diagnosis Date Noted   • **Partial small bowel obstruction [K56.600] 12/18/2017   • Dehydration  [E86.0] 08/29/2017   • Ascites, malignant [R18.0] 08/07/2017   • Abdominal carcinomatosis [C76.2] 08/03/2017   • Acute kidney injury [N17.9] 08/02/2017   • Chronic kidney disease, stage 3 [N18.3] 08/01/2017   • Essential hypertension [I10] 03/28/2017   • Anemia associated with chemotherapy [D64.81, T45.1X5A] 12/02/2016   • Breast cancer metastasized to bone [C50.919, C79.51] 11/04/2016      Resolved Hospital Problems    Diagnosis Date Noted Date Resolved   No resolved problems to display.         Ms. Vora is a 82 y.o. female with a history of metastatic breast cancer including abdominal carcinomatosis and malignant ascites. She has persistent nausea and vomiting, abdominal distention, decreased oral intake and dehydration. CT scan concerning for ileus versus small bowel obstruction.    · Will offer supportive care in the form of IV fluids, IV analgesics and antiemetics. For now will just allow ice chips and sips.  · We will ask general surgery to evaluate the patient and the CT scan. No emesis today therefore will defer on NGT. Will order IV Pepcid.  · Will ask her oncologist to follow.  · Monitor renal function with IV fluids.  · She is a conditional code.      I discussed the patients findings and my recommendations with patient, family and nursing staff.      Dago Dumont MD  [facility]  12/18/17  5:15 PM

## 2017-12-19 NOTE — THERAPY EVALUATION
Acute Care - Physical Therapy Initial Evaluation  [facility]     Patient Name: Erin Vora  : 1935  MRN: [id number]  Today's Date: 2017   Onset of Illness/Injury or Date of Surgery Date: 17  Date of Referral to PT: 17  Referring Physician: Tim      Admit Date: 2017     Visit Dx:    ICD-10-CM ICD-9-CM   1. Partial small bowel obstruction K56.600 560.9   2. Metastatic breast cancer C50.919 174.9   3. Carcinomatosis C80.0 199.0   4. Stage 3 chronic kidney disease N18.3 585.3   5. Malignant ascites R18.0 789.51     Patient Active Problem List   Diagnosis   • Breast cancer metastasized to bone   • Anemia in neoplastic disease   • Cancer associated pain   • Anemia associated with chemotherapy   • Weight loss due to medication   • Poor appetite   • Hypokalemia   • Hyperlipidemia   • Hearing loss   • Medicare annual wellness visit, initial   • Anemia of chronic renal failure, stage 3 (moderate)   • Post-menopause   • Encounter for medication review and counseling   • Fever   • Essential hypertension   • Pneumonia   • Chronic kidney disease, stage 3   • Distended abdomen   • Acute kidney injury   • Abdominal carcinomatosis   • Ascites, malignant   • Dehydration   • Chemotherapy induced neutropenia   • Hypernatremia   • Diarrhea   • Encounter for fitting and adjustment of vascular catheter   • Headache around the eyes   • Blurry vision, right eye   • Encounter for adjustment or management of vascular access device   • Tachycardia   • Partial small bowel obstruction     Past Medical History:   Diagnosis Date   • Anemia    • Bone metastases 2016   • Breast cancer 2005    left   • H/O therapeutic radiation    • Headache around the eyes 2017   • HL (hearing loss)    • Hyperlipidemia    • Hypertension    • Pneumonia 2017     Past Surgical History:   Procedure Laterality Date   • BREAST BIOPSY Left     malignant   • CATARACT EXTRACTION EXTRACAPSULAR W/ INTRAOCULAR LENS IMPLANTATION      • COLONOSCOPY  2006   • CYSTOSCOPY W/ URETERAL STENT PLACEMENT Bilateral 8/3/2017    Procedure: WITH BILATERAL URETERAL STENT INSERTION;  Surgeon: Albino Jolley MD;  Location: [facility] OR;  Service:    • MASTECTOMY Left 2005   • SENTINEL NODE BIOPSY  07/2005    sentinel node sampling          PT ASSESSMENT (last 72 hours)      PT Evaluation       12/19/17 1435 12/19/17 1111    Rehab Evaluation    Document Type evaluation  -     Subjective Information agree to therapy;complains of;pain  -     Patient Effort, Rehab Treatment good  -     Symptoms Noted During/After Treatment none  -     Symptoms Noted Comment c/o L shoulder discomfort  -     General Information    Patient Profile Review yes  -     Onset of Illness/Injury or Date of Surgery Date 12/18/17  -     Referring Physician Ray  -     General Observations supine in bed no acute distress, frail thin elderly female with distended abdomen  -     Pertinent History Of Current Problem met breast Ca  -     Precautions/Limitations fall precautions  -     Prior Level of Function independent:  -     Equipment Currently Used at Home cane, straight  - cane, straight  -    Plans/Goals Discussed With patient  -     Risks Reviewed patient:  -     Benefits Reviewed patient:  -     Living Environment    Lives With  alone  -    Living Arrangements  house  -    Home Accessibility  stairs to enter home;stairs within home  -    Number of Stairs to Enter Home  2  -JM    Number of Stairs Within Home  12  -    Type of Financial/Environmental Concern  none  -    Transportation Available  car;family or friend will provide  -    Clinical Impression    Date of Referral to PT 12/19/17  -     Criteria for Skilled Therapeutic Interventions Met yes  -     Pathology/Pathophysiology Noted (Describe Specifically for Each System) musculoskeletal;neuromuscular  -     Impairments Found (describe specific impairments) aerobic  capacity/endurance;gait, locomotion, and balance;joint integrity and mobility  -     Functional Limitations in Following Categories (Describe Specific Limitations) self-care;home management  -     Rehab Potential good, to achieve stated therapy goals  -     Pain Assessment    Pain Assessment Gonzalez-Salvador FACES  -     Gonzalez-Salvador FACES Pain Rating 2  -     Pain Type Acute pain  -     Pain Location Shoulder  -     Pain Orientation Left  -     Pain Frequency Intermittent  -     Pain Intervention(s) Repositioned;Ambulation/increased activity  -     Response to Interventions unchanged  -     Cognitive Assessment/Intervention    Current Cognitive/Communication Assessment functional  -     Orientation Status oriented to;person;situation  -     Follows Commands/Answers Questions 100% of the time;able to follow single-step instructions;needs cueing  -     Personal Safety WNL/WFL  -     ROM (Range of Motion)    General ROM no range of motion deficits identified  -     General ROM Detail BLEs WFL  -     MMT (Manual Muscle Testing)    General MMT Assessment no strength deficits identified  -     General MMT Assessment Detail BLEs grossly 3/5  -     Bed Mobility, Assessment/Treatment    Bed Mob, Supine to Sit, [place] contact guard assist  -     Bed Mob, Sit to Supine, [place] contact guard assist  -     Transfer Assessment/Treatment    Transfers, Sit-Stand [place] contact guard assist  -     Transfers, Stand-Sit [place] contact guard assist  -     Gait Assessment/Treatment    Gait, [place] Level contact guard assist;minimum assist (75% patient effort)  -     Gait, Assistive Device rolling walker  -     Gait, Distance (Feet) 75  -     Gait, Gait Pattern Analysis swing-through gait  -     Gait, Gait Deviations bilateral:;maida decreased;decreased heel strike;forward flexed posture  -     Gait, Safety Issues step length decreased;sequencing ability  decreased;weight-shifting ability decreased  -     Gait, Impairments strength decreased   dec endurance  -     Positioning and Restraints    Pre-Treatment Position in bed  -     Post Treatment Position bed  -     In Bed supine;call light within reach;encouraged to call for assist;exit alarm on  -       12/18/17 1835       General Information    Equipment Currently Used at Home cane, straight  -     Living Environment    Lives With alone  -     Living Arrangements house  -WF     Home Accessibility stairs to enter home  -     Number of Stairs to Enter Home 3  -WF     Stair Railings at Home none  -     Type of Financial/Environmental Concern none  -WF     Transportation Available family or friend will provide;car  -       User Key  (r) = Recorded By, (t) = Taken By, (c) = Cosigned By    Initials Name Provider Type     Rosa Bradley, PT Physical Therapist     Milagros Carrera, RN Registered Nurse    COSME McgregorW           Physical Therapy Education     Title: PT OT SLP Therapies (Done)     Topic: Physical Therapy (Done)     Point: Mobility training (Done)    Learning Progress Summary    Learner Readiness Method Response Comment Documented by Status   Patient Acceptance E NR,[facility] 12/19/17 1451 Done               Point: Home exercise program (Done)    Learning Progress Summary    Learner Readiness Method Response Comment Documented by Status   Patient Acceptance E NR,[facility] 12/19/17 1451 Done               Point: Body mechanics (Done)    Learning Progress Summary    Learner Readiness Method Response Comment Documented by Status   Patient Acceptance E NR,[facility] 12/19/17 1451 Done               Point: Precautions (Done)    Learning Progress Summary    Learner Readiness Method Response Comment Documented by Status   Patient Acceptance E NR,[facility] 12/19/17 1451 Done                      User Key     Initials Effective Dates Name Provider Type [facility] 02/07/17 -  Rosa HAYNES  Kirk PT Physical Therapist PT                PT Recommendation and Plan  Anticipated Discharge Disposition: home with home health  Planned Therapy Interventions: balance training, bed mobility training, gait training, home exercise program, ROM (Range of Motion), stair training, strengthening, stretching, transfer training  PT Frequency: daily  Plan of Care Review  Plan Of Care Reviewed With: patient  Outcome Summary/Follow up Plan: pt presents w, generalized weakness 2' immobility related to med status, fair tolerance to household gait rwx, c/o L shoulder discomfort - please order OT as deemed appropriate. may benefit from skilled PT acutely to address functional deficits and assist with DC planning.           IP PT Goals       12/19/17 1451          Bed Mobility PT LTG    Bed Mobility PT LTG, Date Established 12/19/17  -      Bed Mobility PT LTG, Time to Achieve 2 wks  -LH      Bed Mobility PT LTG, Activity Type all bed mobility  -      Bed Mobility PT LTG, [place] Level independent  -LH      Transfer Training PT LTG    Transfer Training PT LTG, Date Established 12/19/17  -      Transfer Training PT LTG, Time to Achieve 2 wks  -LH      Transfer Training PT LTG, Activity Type all transfers  -      Transfer Training PT LTG, [place] Level conditional independence  -      Gait Training PT LTG    Gait Training Goal PT LTG, Date Established 12/19/17  -      Gait Training Goal PT LTG, Time to Achieve 2 wks  -LH      Gait Training Goal PT LTG, [place] Level conditional independence  -LH      Gait Training Goal PT LTG, Assist Device walker, rolling  -LH      Gait Training Goal PT LTG, Distance to Achieve 150  -LH        User Key  (r) = Recorded By, (t) = Taken By, (c) = Cosigned By    Initials Name Provider Type     Rosa Bradley PT Physical Therapist                Outcome Measures       12/19/17 1400          How much help from another person do you currently need...    Turning from your  back to your side while in flat bed without using bedrails? 3  -LH      Moving from lying on back to sitting on the side of a flat bed without bedrails? 3  -LH      Moving to and from a bed to a chair (including a wheelchair)? 3  -LH      Standing up from a chair using your arms (e.g., wheelchair, bedside chair)? 3  -LH      Climbing 3-5 steps with a railing? 2  -LH      To walk in hospital room? 3  -      AM-PAC 6 Clicks Score 17  -      Functional Assessment    Outcome Measure Options AM-PAC 6 Clicks Basic Mobility (PT)  -        User Key  (r) = Recorded By, (t) = Taken By, (c) = Cosigned By    Initials Name Provider Type     Rosa Bradley PT Physical Therapist           Time Calculation:         PT Charges       12/19/17 1454          Time Calculation    Start Time 1421  -      Stop Time 1434  -      Time Calculation (min) 13 min  -      PT Received On 12/19/17  -      PT - Next Appointment 12/20/17  -      PT Goal Re-Cert Due Date 01/02/18  -        User Key  (r) = Recorded By, (t) = Taken By, (c) = Cosigned By    Initials Name Provider Type     Rosa Bradley, PT Physical Therapist          Therapy Charges for Today     Code Description Service Date Service Provider Modifiers Qty    57843854673 HC PT EVAL MOD COMPLEXITY 2 12/19/2017 Rosa Bradley, PT GP 1          PT G-Codes  Outcome Measure Options: AM-PAC 6 Clicks Basic Mobility (PT)      Rosa Bradley PT  12/19/2017

## 2017-12-19 NOTE — PROGRESS NOTES
Discharge Planning Assessment  [facility]     Patient Name: Erin Vora  MRN: [id number]  Today's Date: 12/19/2017    Admit Date: 12/18/2017          Discharge Needs Assessment       12/19/17 1111    Living Environment    Lives With alone    Living Arrangements house    Home Accessibility stairs to enter home;stairs within home    Number of Stairs to Enter Home 2    Number of Stairs Within Home 12    Type of Financial/Environmental Concern none    Transportation Available car;family or friend will provide    Living Environment    Provides Primary Care For no one    Quality Of Family Relationships supportive;helpful;involved    Able to Return to Prior Living Arrangements yes    Discharge Needs Assessment    Concerns To Be Addressed discharge planning concerns    Equipment Currently Used at Home cane, straight    Discharge Disposition home or self-care    Discharge Planning Comments Undetermined            Discharge Plan       12/19/17 1112    Case Management/Social Work Plan    Plan Undetermined    Patient/Family In Agreement With Plan yes    Additional Comments IMM letter checked. CCP met with pt to discuss d/c planning. Facesheet verified. CCP role explained. Pt resides alone in a two level home with interior steps to access basement and two exterior steps. Pt reports occasional use of a cane when needed, and denies past home health or sub-acute rehab. Pt previously had home [facility] services prior to initiating chemotherapy in late October 2017. Pt confirms pharmacy is [facility] [address]. Pt uncertain of d/c needs pending treatment course. CCP to follow to assist should d/c needs arise. Alena Maldonado LCSW        Discharge Placement     No information found                Demographic Summary       12/19/17 1110    Referral Information    Admission Type inpatient    Arrived From admitted as an inpatient    Referral Source nursing;physician    Reason For Consult discharge planning    Record Reviewed  medical record    Primary Care Physician Information    Name Shivam Llanes MD            Functional Status       12/19/17 [facility]    Functional Status Current    Ambulation 2-->assistive person    Transferring 2-->assistive person    Toileting 2-->assistive person    Bathing 2-->assistive person    Dressing 0-->independent    Eating 0-->independent    Communication 0-->understands/communicates without difficulty    Swallowing (if score 2 or more for any item, consult Rehab Services) 0-->swallows foods/liquids without difficulty    Functional Status Prior    Ambulation 1-->assistive equipment    Transferring 1-->assistive equipment    Toileting 0-->independent    Bathing 0-->independent    Dressing 0-->independent    Eating 0-->independent    Communication 0-->understands/communicates without difficulty    Swallowing 0-->swallows foods/liquids without difficulty    Cognitive/Perceptual/Developmental    Current Mental Status/Cognitive Functioning no deficits noted            Psychosocial     None            Abuse/Neglect     None            Legal     None            Substance Abuse     None            Patient Forms     None          Yoanna Maldonado LCSW

## 2017-12-19 NOTE — PLAN OF CARE
Problem: Patient Care Overview (Adult)  Goal: Plan of Care Review  Outcome: Ongoing (interventions implemented as appropriate)    12/19/17 7799   Coping/Psychosocial Response Interventions   Plan Of Care Reviewed With patient   Outcome Evaluation   Outcome Summary/Follow up Plan VSS. IVF infusing through left PICC. Medicated for pain and nausea throughout shift. Sent for paracentesis and ultrasound. Up with assist and tolerating clear liquids. Passing some flatus but no BM. Possible hospice consult per oncology.    Patient Care Overview   Progress improving       Goal: Adult Individualization and Mutuality  Outcome: Ongoing (interventions implemented as appropriate)  Goal: Discharge Needs Assessment  Outcome: Ongoing (interventions implemented as appropriate)    Problem: Pain, Acute (Adult)  Goal: Acceptable Pain Control/Comfort Level  Outcome: Ongoing (interventions implemented as appropriate)    Problem: Pressure Ulcer Risk (Oscar Scale) (Adult,Obstetrics,Pediatric)  Goal: Skin Integrity  Outcome: Ongoing (interventions implemented as appropriate)    Problem: Fluid Volume Excess (Adult,Obstetrics,Pediatric)  Goal: Stable Weight  Outcome: Ongoing (interventions implemented as appropriate)  Goal: Balanced Intake/Output  Outcome: Ongoing (interventions implemented as appropriate)    Problem: Fall Risk (Adult)  Goal: Absence of Falls  Outcome: Ongoing (interventions implemented as appropriate)

## 2017-12-19 NOTE — CONSULTS
Inpatient Consult to General Surgery  Consult performed by: LUCRETIA BAILEY JR  Consult ordered by: JEREMY AVENDAÑO          Patient Care Team:  Shivam Llanes MD as PCP - General (Internal Medicine)  Masha Gutierres MD PhD as Consulting Physician (Hematology and Oncology)  Cuba Mabry MD as Referring Physician (Internal Medicine)    Chief complaint: Partial small bowel obstruction    Subjective     History of Present Illness     The patient is a very pleasant 82-year-old white female who has widely metastatic breast cancer with abdominal carcinomatosis and malignant ascites.  She has required paracentesis to remove ascitic fluid related to the carcinomatosis.  She developed increasing abdominal distention with constipation and nausea and vomiting over the past 5 days or so.  She had several episodes of vomiting that consisted of a thick green material.  She did have some mild associated abdominal pain.  Her symptoms did not improve and she presented to the emergency room where a CT scan of the abdomen and pelvis showed no significant carcinomatosis, ascites, and dilated loops of small bowel consistent with partial small bowel obstruction.  She has not had any nausea or vomiting since admission.  She is currently hungry.    Review of Systems   Constitutional: Positive for activity change, appetite change and fatigue. Negative for fever.   HENT: Negative for trouble swallowing and voice change.    Respiratory: Negative for chest tightness and shortness of breath.    Cardiovascular: Negative for chest pain and palpitations.   Gastrointestinal: Positive for abdominal pain, constipation, nausea and vomiting. Negative for blood in stool and diarrhea.   Endocrine: Negative for cold intolerance and heat intolerance.   Genitourinary: Negative for dysuria and flank pain.   Neurological: Negative for dizziness and light-headedness.   Hematological: Negative for adenopathy. Does not bruise/bleed easily.    Psychiatric/Behavioral: Negative for agitation and confusion.        Past Medical History:   Diagnosis Date   • Anemia    • Bone metastases 2016   • Breast cancer 2005    left   • H/O therapeutic radiation    • Headache around the eyes 11/9/2017   • HL (hearing loss)    • Hyperlipidemia    • Hypertension    • Pneumonia 8/1/2017   ,   Past Surgical History:   Procedure Laterality Date   • BREAST BIOPSY Left 2005    malignant   • CATARACT EXTRACTION EXTRACAPSULAR W/ INTRAOCULAR LENS IMPLANTATION     • COLONOSCOPY  2006   • CYSTOSCOPY W/ URETERAL STENT PLACEMENT Bilateral 8/3/2017    Procedure: WITH BILATERAL URETERAL STENT INSERTION;  Surgeon: Albino Jolley MD;  Location: [facility];  Service:    • MASTECTOMY Left 2005   • SENTINEL NODE BIOPSY  07/2005    sentinel node sampling   ,   Family History   Problem Relation Age of Onset   • Ovarian cancer Sister 84   • Heart failure Sister    • Hypertension Sister    • Diabetes Sister    • Heart disease Mother    • Heart disease Father    • Heart disease Brother    • No Known Problems Son    • No Known Problems Daughter    • No Known Problems Maternal Grandmother    • No Known Problems Maternal Grandfather    • No Known Problems Paternal Grandmother    • No Known Problems Paternal Grandfather    • No Known Problems Cousin    • Throat cancer Sister    • Lung disease Sister    ,   Social History   Substance Use Topics   • Smoking status: Former Smoker     Packs/day: 1.00     Years: 2.00     Types: Cigarettes     Quit date: 1967   • Smokeless tobacco: Never Used      Comment: About 50 years ago   • Alcohol use No    and Allergies:  Review of patient's allergies indicates no known allergies.    Objective      Vital Signs  Temp:  [97.1 °F (36.2 °C)-98.2 °F (36.8 °C)] 97.2 °F (36.2 °C)  Heart Rate:  [] 102  Resp:  [16] 16  BP: (113-138)/(62-75) 130/73    Physical Exam   Constitutional: She is oriented to person, place, and time. She appears well-developed and  well-nourished.  Non-toxic appearance. No distress.   HENT:   Head: Normocephalic and atraumatic.   Eyes: EOM are normal. No scleral icterus. Right eye exhibits normal extraocular motion. Left eye exhibits normal extraocular motion.   Neck: Normal range of motion. Neck supple. No JVD present. No tracheal deviation present.   Cardiovascular: Normal rate and regular rhythm.    Pulmonary/Chest: Effort normal and breath sounds normal. No respiratory distress. She exhibits no tenderness.   Abdominal: Soft. Normal appearance and bowel sounds are normal. She exhibits distension. There is no hepatosplenomegaly. There is generalized tenderness (Mildly tender). There is no rebound and no guarding. No hernia.   Neurological: She is alert and oriented to person, place, and time.   Skin: Skin is warm and dry.   Psychiatric: She has a normal mood and affect. Her behavior is normal. Judgment and thought content normal.       Results Review:    I reviewed the patient's new clinical results.        Assessment/Plan     Principal Problem:    Partial small bowel obstruction  Active Problems:    Breast cancer metastasized to bone    Anemia associated with chemotherapy    Essential hypertension    Chronic kidney disease, stage 3    Acute kidney injury    Abdominal carcinomatosis    Ascites, malignant    Dehydration      Assessment & Plan     1.  Abdominal carcinomatosis with partial small bowel obstruction: The patient has extensive abdominal carcinomatosis with a partial small bowel obstruction.  She is feeling better now and is currently hungry.  She will be started on a clear liquid diet.  The situation was discussed at length with the patient and her son.  She is not a surgical candidate.  She will be managed conservatively.    I discussed the patients findings and my recommendations with patient and family    Wallace Myles Jr., MD  12/19/17  10:35 AM

## 2017-12-19 NOTE — PLAN OF CARE
Problem: Patient Care Overview (Adult)  Goal: Plan of Care Review    12/19/17 1451   Coping/Psychosocial Response Interventions   Plan Of Care Reviewed With patient   Outcome Evaluation   Outcome Summary/Follow up Plan pt presents w, generalized weakness 2' immobility related to med status, fair tolerance to household gait rwx, c/o L shoulder discomfort - please order OT as deemed appropriate. may benefit from skilled PT acutely to address functional deficits and assist with DC planning.          Problem: Inpatient Physical Therapy  Goal: Bed Mobility Goal LTG- PT    12/19/17 1451   Bed Mobility PT LTG   Bed Mobility PT LTG, Date Established 12/19/17   Bed Mobility PT LTG, Time to Achieve 2 wks   Bed Mobility PT LTG, Activity Type all bed mobility   Bed Mobility PT LTG, [place] Level independent       Goal: Transfer Training Goal 1 LTG- PT    12/19/17 1451   Transfer Training PT LTG   Transfer Training PT LTG, Date Established 12/19/17   Transfer Training PT LTG, Time to Achieve 2 wks   Transfer Training PT LTG, Activity Type all transfers   Transfer Training PT LTG, [place] Level conditional independence       Goal: Gait Training Goal LTG- PT    12/19/17 1451   Gait Training PT LTG   Gait Training Goal PT LTG, Date Established 12/19/17   Gait Training Goal PT LTG, Time to Achieve 2 wks   Gait Training Goal PT LTG, [place] Level conditional independence   Gait Training Goal PT LTG, Assist Device walker, rolling   Gait Training Goal PT LTG, Distance to Achieve 150

## 2017-12-19 NOTE — PLAN OF CARE
Problem: Patient Care Overview (Adult)  Goal: Plan of Care Review  Outcome: Ongoing (interventions implemented as appropriate)    12/19/17 0444   Coping/Psychosocial Response Interventions   Plan Of Care Reviewed With patient   Outcome Evaluation   Outcome Summary/Follow up Plan vss. nausea med given x1, abdomen distended. ice chips only.    Patient Care Overview   Progress no change       Goal: Adult Individualization and Mutuality  Outcome: Ongoing (interventions implemented as appropriate)  Goal: Discharge Needs Assessment  Outcome: Ongoing (interventions implemented as appropriate)    Problem: Pain, Acute (Adult)  Goal: Identify Related Risk Factors and Signs and Symptoms  Outcome: Outcome(s) achieved Date Met:  12/19/17  Goal: Acceptable Pain Control/Comfort Level  Outcome: Ongoing (interventions implemented as appropriate)    Problem: Pressure Ulcer Risk (Oscar Scale) (Adult,Obstetrics,Pediatric)  Goal: Identify Related Risk Factors and Signs and Symptoms  Outcome: Outcome(s) achieved Date Met:  12/19/17  Goal: Skin Integrity  Outcome: Ongoing (interventions implemented as appropriate)    Problem: Fluid Volume Excess (Adult,Obstetrics,Pediatric)  Goal: Identify Related Risk Factors and Signs and Symptoms  Outcome: Outcome(s) achieved Date Met:  12/19/17  Goal: Stable Weight  Outcome: Ongoing (interventions implemented as appropriate)  Goal: Balanced Intake/Output  Outcome: Ongoing (interventions implemented as appropriate)    Problem: Fall Risk (Adult)  Goal: Identify Related Risk Factors and Signs and Symptoms  Outcome: Outcome(s) achieved Date Met:  12/19/17  Goal: Absence of Falls  Outcome: Ongoing (interventions implemented as appropriate)

## 2017-12-19 NOTE — CONSULTS
[facility] INITIAL INPATIENT CONSULTATION NOTE    REASON FOR CONSULTATION:    . Metastatic breast cancer, predominantly bone disease, biopsy confirmed triple negative in late October 2016. History of left breast cancer in 2005, had mastectomy, without adjuvant chemotherapy. She was followed by Dr. Romo at [facility], treated on Arimidex for 5 years, finished in 2011.   2. Anemia, secondary to her metastatic disease, with significantly elevated ferritin level. No evidence of deficiency for B12 or folic acid. She was given 3 units packed RBC in late October 2016 during hospitalization.    3. Bone biopsy on 10/25/2016 confirmed metastatic breast cancer, triple negative.  She was started on Xeloda 1500 mg twice a day on 11/14/2016 for 14 days on and 7 days off. Patient develops significant fatigue, Xeloda was decreased to 1000 mg twice a day from week #2. On 12/05/2016 schedule was changed to 7 days on, 7 days off.   4. Metastatic bone disease, Zometa was started in November 2016, repeat every 4 weeks.   5. Cancer-related pain, taking Lortab 5/325 mg prn.   6. 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   7.  Xeloda increased to 1500 mg in the morning and 1000 mg in the evening 7 days on and 7 days off, started on 4/21/2017. 8.  Bone scan on 6/7/2017 reported stable disease.  However her tumor marker was trending up at 68.7 on 6/16/2017.  Xeloda was increased to 1500 mg twice a day.    9.  Anemia secondary to chemotherapy.  She required 2 units PRBC transfusion on 7/2/2017.  Patient will be continued on Procrit injection as needed.   10.  Disease progress, required hospitalization in early August 2017.  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  Worsening  ascites fluids, required repeated paracentesis on 8/09/2017. Xeloda was discontinued.  She had paracentesis total of 16 L within a 3-week period in August 2017  11.  Patient had a right arm PICC line placement on 8/15/2017. Patient will be started on single agent Taxol weekly on 8/18/2017.  Plan is 3 weeks on 1 week off repeat every 4 weeks.   12.  Patient was not tolerating chemotherapy after 2 doses of weekly Taxol, developed acute renal failure and neutropenia, required hospitalization from 9/1/2017-9/11/2017.  She was discharged to home hospice care.    13.  Patient has improved performance status, and then restarted back on single agent chemotherapy Gemzar on 10/27/2017, with 20% dose reduction, for plan to 2 weeks on and one week off.   14.  Patient complains of headache and vision blurriness in early November 2017.  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  Patient was seen by radiation oncologist Dr. Henriquez and is started Radiation therapy on 12/28/17.  15. Patient admitted December 18, 2017 with nausea and vomiting, evidence of versus small bowel obstruction, now discussing end-of-life issues        HISTORY OF PRESENT ILLNESS:  Erin Vora is a 82 y.o. female who we are asked to see today in consultation for metastatic breast cancer.  She had last been seen December 1 accompanied by her son.Patient was started first dose of Gemzar on 10/27/2017.  She tolerated therapy well.  She was also given 2 units PRBC transfusion because her hemoglobin was 7.9 at that time.  She got second dose on 11/9/2017 after delay due to feeling ill.      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  Patient was  seen by radiation oncologist Dr. Lloyd and is started radiation therapy on 11/28/2017.    Her performance status on the first was approximate the same with an ECOG of 2         She presented to [facility] 12/ 18/17 complaining of nausea and vomiting. Symptoms have been present for about 4 days. He has abdominal carcinomatosis and malignant ascites and had a paracentesis done on 12/14 removing 3700 cc. It was that evening that she started having nausea. She's had intermittent vomiting since that time, no hematemesis. She didreport some increased generalized abdominal discomfort. Ascites is reaccumulating but not to the point it was on the 13th. She denies fever or chills. No diarrhea. Last bowel movement was approximately 2 days prior to admission.  Her subsequent CT scan (listed below) demonstrated progression of disease with omental caking and peritoneal carcinomatosis, diffuse ascites, dilated left upper quadrant small bowel loops with air-fluid levels-ileus versus small bowel obstruction.  Past Medical   Past Medical History:   Diagnosis Date   • Anemia    • Bone metastases 2016   • Breast cancer 2005    left   • H/O therapeutic radiation    • Headache around the eyes 11/9/2017   • HL (hearing loss)    • Hyperlipidemia    • Hypertension    • Pneumonia 8/1/2017     Social History   Social History     Social History   • Marital status:      Spouse name: N/A   • Number of children: 1   • Years of education: College     Occupational History   • OR Nurse Retired     Social History Main Topics   • Smoking status: Former Smoker     Packs/day: 1.00     Years: 2.00     Types: Cigarettes     Quit date: 1967   • Smokeless tobacco: Never Used      Comment: About 50 years ago   • Alcohol use No   • Drug use: No   • Sexual activity: Defer     Other Topics Concern   • Not on file     Social History Narrative     Family History  Family History   Problem Relation Age of Onset   • Ovarian cancer Sister 84    • Heart failure Sister    • Hypertension Sister    • Diabetes Sister    • Heart disease Mother    • Heart disease Father    • Heart disease Brother    • No Known Problems Son    • No Known Problems Daughter    • No Known Problems Maternal Grandmother    • No Known Problems Maternal Grandfather    • No Known Problems Paternal Grandmother    • No Known Problems Paternal Grandfather    • No Known Problems Cousin    • Throat cancer Sister    • Lung disease Sister      Medications    Current Facility-Administered Medications:   •  acetaminophen (TYLENOL) tablet 650 mg, 650 mg, Oral, Q6H PRN, Dago Dumont MD  •  buPROPion (WELLBUTRIN) tablet 75 mg, 75 mg, Oral, BID, Dago Dumont MD, 75 mg at 12/19/17 0812  •  dexamethasone (DECADRON) injection 4 mg, 4 mg, Intravenous, Q12H, Dago Dumont MD, 4 mg at 12/19/17 0701  •  famotidine (PEPCID) injection 10 mg, 10 mg, Intravenous, Q12H, Dago Dumont MD  •  HYDROcodone-acetaminophen (NORCO) 5-325 MG per tablet 1 tablet, 1 tablet, Oral, Q6H PRN, Dago Dumont MD  •  HYDROmorphone (DILAUDID) injection 0.5 mg, 0.5 mg, Intravenous, Q2H PRN **AND** naloxone (NARCAN) injection 0.4 mg, 0.4 mg, Intravenous, Q5 Min PRN, Dago Dumont MD  •  ondansetron (ZOFRAN) injection 4 mg, 4 mg, Intravenous, Q4H PRN, Dago Dumont MD, 4 mg at 12/19/17 1003  •  sodium chloride 0.9 % infusion, 100 mL/hr, Intravenous, Continuous, Dago Dumont MD, Last Rate: 100 mL/hr at 12/19/17 0450, 100 mL/hr at 12/19/17 0450  Allergies  No Known Allergies  Review of Systems  Fourteen systems have been reviewed with the patient and are positive per HPI only.     Objective:     Vitals:    12/19/17 1156   BP: 133/71   Pulse: 106   Resp: 16   Temp: 97 °F (36.1 °C)   SpO2: 97%     GENERAL:  Well-developed, well-nourished in no acute distress.   SKIN:  Warm, dry without rashes, purpura or petechiae.  HEAD:  Normocephalic.  EYES:  Pupils equal, round and reactive to light.  EOMs intact.  Conjunctivae normal.  EARS:   Hearing intact.  NOSE:  Septum midline.  No excoriations or nasal discharge.  MOUTH:  Tongue is well-papillated; no stomatitis or ulcers.  Lips normal.  THROAT:  Oropharynx without lesions or exudates.  NECK:  Supple with good range of motion; no thyromegaly or masses, no JVD.  LYMPHATICS:  No cervical, supraclavicular, axillary or inguinal adenopathy.  CHEST:  Lungs clear to percussion and auscultation. Good airflow.  CARDIAC:  Regular rate and rhythm without murmurs, rubs or gallops. Normal S1,S2.  ABDOMEN:  Markedly distended him a positive ascites with fluid wave and shifting dullness, decreased bowel sounds  EXTREMITIES:  No clubbing, cyanosis or edema.  NEUROLOGICAL:  Cranial Nerves II-XII grossly intact.  No focal neurological deficits.  PSYCHIATRIC:  Normal affect and mood.        DIAGNOSTIC DATA:    Results from last 7 days  Lab Units 12/19/17  0410 12/18/17  1335   WBC 10*3/mm3 5.26 5.09   HEMOGLOBIN g/dL 8.1* 8.6*   HEMATOCRIT % 27.9* 28.5*   PLATELETS 10*3/mm3 297 330       Results from last 7 days  Lab Units 12/19/17  0410 12/18/17  1335   SODIUM mmol/L 141 139   POTASSIUM mmol/L 5.6* 4.9   CHLORIDE mmol/L 103 99   CO2 mmol/L 22.4 25.8   BUN mg/dL 40* 40*   CREATININE mg/dL 2.21* 2.29*   CALCIUM mg/dL 7.1* 8.2*   BILIRUBIN mg/dL 0.3 0.3   ALK PHOS U/L 66 72   ALT (SGPT) U/L 13 10   AST (SGOT) U/L 80* 65*   GLUCOSE mg/dL 54* 100*       IMAGING:  CT ABDOMEN PELVIS WITHOUT CONTRAST December 18, 2017  IMPRESSION:  1.  Progression of omental caking and peritoneal carcinomatosis when  compared to exam 08/03/2017. Diffuse intra-abdominal ascites.  2.  Dilated left upper quadrant small bowel loops with air-fluid levels.  This could be due to localized ileus though a mild or partial small  bowel obstruction should also be considered. Distal small bowel loops  are decompressed. There is gas and stool throughout the colon.  3.  Bilateral ureteral stents and hydronephrosis.         Assessment/Plan    Assessment/Plan:   This is a 82 y.o. female with:  1. Metastatic breast cancer, originally with predominantly bone disease, but progressed after first line single agent Xeloda, with peritoneal carcinomatosis with large ascites fluids, pelvic mass and bilateral hydronephrosis required bilateral ureter stenting in early August 2017.  She also has moderate bilateral pleural effusion.       The patient was initiated single agent Taxol on 8/18/2017.  She was not able to tolerate Taxol after 2 doses, with significant fatigue, diarrhea and not able to eat or drink properly, leading to acute renal failure with creatinine 3.3 and neutropenia with ANC 1300, required hospitalization for more than 10 days in early September 2017.  She also required 2 units PRBC transfusion during hospitalization.  She does have improved renal function to baseline level, and also resolution of neutropenia, and improved hemoglobin after transfusion, however she was still very weak, with poor performance status is ECOG 3.      Patient had improved her functionality in October 2017.  Since patient prefers to have active treatment, we started her on weekly Gemzar at a 20% dose reduction, planned for 2 weeks on 1 week off.  She started therapy on 10/20/17 with good tolerance.  Nevertheless she was sick and second dose Gemzar was delayed and was delivered on 11/9/2017.       Patient again complaining of new onset headache and there poorly of vision.  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  She was started on palliative radiation therapy on 11/28/2017.       Because ongoing reading therapy, scheduled to finish on 12/11/2017 it was recommended postpone systematic chemotherapy until she finished radiation therapy.      2. Metastatic bone disease.  Patient was given monthly Zometa treatment.  It has being on hold because of hospice care. Since she is getting active treatment for her breast cancer, I recommended  to resume Zometa today and repeat every 4 weeks.  Patient and her son both voiced understanding and consent.     3.  Anemia, multifactorial, secondary to chemotherapy as well as the her malignancy, also stage III chronic renal insufficiency.   She was given 2 units PRBC transfusion because she was symptomatic with Hb 7.9 on 10/27/2017.   Patient feels more energetic with transfusion.  She had a laboratory study on 8/25/2017 reported an enormously elevated ferritin at 3550.  Her vitamin B12 and folic acid level were both supratherapeutic on 9/1/2017.   We'll continue to monitor.      4.  Pain related to cancer.  This is not a major issue at this point.  She has Norco available for as needed use.      5.  Malignant ascites fluid.  Patient has had multiple recent paracenteses with the last on December 13.  This is now reaccumulating and should becoming further uncomfortable.  We will plan to try to obtain a repeat paracentesis this afternoon .   6.  Poor appetite.  This actually improved a recently.  She gained a few pounds.  I think this is probably related to dexamethasone she's been taking for metastatic disease involving the orbits.  We'll continue for now.  Patient was previously on low-dose prednisone 10 mg daily as appetite stimulator.    7.  End-of-life discussion-was held with the patient this afternoon with it being clear that her current chemotherapy treatment is not been effective particularly with advancement of her abdominal signs and symptoms.  The patient, herself, understands that she is not doing well in that the disease is progressing.  We have discussed that her death would be expected within the next month.  I discussed hospice care and she was, evidently, under hospice previously and would appreciate their services once again.  I will endeavor to call her son who is not answering at present the telephone number listed and a message has been left for him.  We will proceed to paracentesis hopefully  this afternoon for continued end-of-life discussions likely with reinstitution of hospice care.               Tejas Camarena MD

## 2017-12-19 NOTE — PROGRESS NOTES
Name: Erin Vora ADMIT: 2017   : 1935  PCP: Shivam Llanes MD    MRN: [id number] LOS: 1 days   AGE/SEX: 82 y.o. female  ROOM: [facility]   Subjective   Subjective:  Symptoms:  Improved.  No shortness of breath or chest pain.    Diet:  Poor intake.  She reports  nausea.  No vomiting.    Activity level: Impaired due to weakness.    Pain:  She reports no pain.        Objective   Vital Signs  Temp:  [97.1 °F (36.2 °C)-98.2 °F (36.8 °C)] 97.1 °F (36.2 °C)  Heart Rate:  [] 98  Resp:  [16] 16  BP: (113-138)/(62-75) 135/69  SpO2:  [94 %-96 %] 96 %  on   ;   O2 Device: room air  Body mass index is 20.08 kg/(m^2).    Physical Exam  Constitutional: She is oriented to person, place, and time. She appears cachectic. No distress. Poor muscle mass   Cardiovascular: Normal rate and regular rhythm.  No murmur heard.  Pulmonary/Chest: Effort normal and breath sounds normal. No respiratory distress. She has no wheezes. She has no rales.   Abdominal: Soft. She exhibits distension and ascites. Bowel sounds are decreased. There is generalized tenderness. There is no rebound and no guarding.   Musculoskeletal: Normal range of motion. She exhibits edema (1+ BLE). She exhibits no deformity.   Neurological: She is alert and oriented to person, place, and time. No cranial nerve deficit.   Skin: Skin is warm and dry. No rash noted. No erythema.   Psychiatric: She has a normal mood and affect. Her behavior is normal. Judgment and thought content normal.       Results Review:       I reviewed the patient's new clinical results.    Results from last 7 days  Lab Units 17  0410 17  1335   WBC 10*3/mm3 5.26 5.09   HEMOGLOBIN g/dL 8.1* 8.6*   PLATELETS 10*3/mm3 297 330     Results from last 7 days  Lab Units 17  0410 17  1335   SODIUM mmol/L 141 139   POTASSIUM mmol/L 5.6* 4.9   CHLORIDE mmol/L 103 99   CO2 mmol/L 22.4 25.8   BUN mg/dL 40* 40*   CREATININE mg/dL 2.21* 2.29*   GLUCOSE mg/dL 54* 100*    Estimated Creatinine Clearance: 16.8 mL/min (by C-G formula based on Cr of 2.21).  Results from last 7 days  Lab Units 12/19/17  0410 12/18/17  1335   CALCIUM mg/dL 7.1* 8.2*   ALBUMIN g/dL 2.40* 2.50*   MAGNESIUM mg/dL 2.1  --          buPROPion 75 mg Oral BID   dexamethasone 4 mg Intravenous Q12H   famotidine 20 mg Intravenous Q12H       sodium chloride 100 mL/hr Last Rate: 100 mL/hr (12/19/17 0450)   NPO Diet Ice Chips, Sips With Meds      Assessment/Plan   Active Hospital Problems (** Indicates Principal Problem)    Diagnosis Date Noted   • **Partial small bowel obstruction [K56.600] 12/18/2017   • Dehydration [E86.0] 08/29/2017   • Ascites, malignant [R18.0] 08/07/2017   • Abdominal carcinomatosis [C76.2] 08/03/2017   • Acute kidney injury [N17.9] 08/02/2017   • Chronic kidney disease, stage 3 [N18.3] 08/01/2017   • Essential hypertension [I10] 03/28/2017   • Anemia associated with chemotherapy [D64.81, T45.1X5A] 12/02/2016   • Breast cancer metastasized to bone [C50.919, C79.51] 11/04/2016      Resolved Hospital Problems    Diagnosis Date Noted Date Resolved   No resolved problems to display.       · Seems to be responding to conservative treatment. Tolerating liquid diet.  · Renal function relatively stable.  · Feels better after paracentesis today.  · Appreciate oncology assistance. She has advancing metastatic breast cancer and is near terminal. Hospice has been consulted.        Dago Dumont MD  [facility]  12/19/17  6:42 AM

## 2017-12-20 NOTE — PLAN OF CARE
Problem: Patient Care Overview (Adult)  Goal: Plan of Care Review  Outcome: Ongoing (interventions implemented as appropriate)   12/20/17 0421   Coping/Psychosocial Response Interventions   Plan Of Care Reviewed With patient   Outcome Evaluation   Outcome Summary/Follow up Plan pt medicated for pain and nausea, voiding w/out diff, HR elevated and monitored, restoril x 1 adm for sleep per pt request, slept for long intervals throughout the night, placed on 1L O2 NC for sat 91%, VSS, no distress noted    Patient Care Overview   Progress no change     Goal: Adult Individualization and Mutuality  Outcome: Ongoing (interventions implemented as appropriate)    Goal: Discharge Needs Assessment  Outcome: Ongoing (interventions implemented as appropriate)      Problem: Pain, Acute (Adult)  Goal: Acceptable Pain Control/Comfort Level  Outcome: Ongoing (interventions implemented as appropriate)      Problem: Pressure Ulcer Risk (Oscar Scale) (Adult,Obstetrics,Pediatric)  Goal: Skin Integrity  Outcome: Ongoing (interventions implemented as appropriate)      Problem: Fluid Volume Excess (Adult,Obstetrics,Pediatric)  Goal: Stable Weight  Outcome: Ongoing (interventions implemented as appropriate)    Goal: Balanced Intake/Output  Outcome: Ongoing (interventions implemented as appropriate)      Problem: Fall Risk (Adult)  Goal: Absence of Falls  Outcome: Ongoing (interventions implemented as appropriate)

## 2017-12-20 NOTE — PLAN OF CARE
Problem: Patient Care Overview (Adult)  Goal: Plan of Care Review  Outcome: Ongoing (interventions implemented as appropriate)   12/20/17 2318   Coping/Psychosocial Response Interventions   Plan Of Care Reviewed With patient;son   Outcome Evaluation   Outcome Summary/Follow up Plan Resting well. Med with pain and nausea meds x2, ativan x1. Currently sleeping. Emesis x2, offered NG tube. Pt declining at this time, knows it is avail at any time. Repostioned often for comfort. Pulsox off to allow pt to sleep.    Patient Care Overview   Progress no change     Goal: Adult Individualization and Mutuality  Outcome: Ongoing (interventions implemented as appropriate)      Problem: Pain, Acute (Adult)  Goal: Acceptable Pain Control/Comfort Level  Outcome: Ongoing (interventions implemented as appropriate)      Problem: Pressure Ulcer Risk (Oscar Scale) (Adult,Obstetrics,Pediatric)  Goal: Skin Integrity  Outcome: Ongoing (interventions implemented as appropriate)      Problem: Fluid Volume Excess (Adult,Obstetrics,Pediatric)  Goal: Stable Weight  Outcome: Ongoing (interventions implemented as appropriate)    Goal: Balanced Intake/Output  Outcome: Ongoing (interventions implemented as appropriate)      Problem: Fall Risk (Adult)  Goal: Absence of Falls  Outcome: Ongoing (interventions implemented as appropriate)

## 2017-12-20 NOTE — PROGRESS NOTES
[facility] GROUP INPATIENT PROGRESS NOTE  Subjective     CC:   1. Metastatic breast cancer, predominantly bone disease, biopsy confirmed triple negative in late October 2016. History of left breast cancer in 2005, had mastectomy, without adjuvant chemotherapy. She was followed by Dr. Romo at [facility], treated on Arimidex for 5 years, finished in 2011.   2. Anemia, secondary to her metastatic disease, with significantly elevated ferritin level. No evidence of deficiency for B12 or folic acid. She was given 3 units packed RBC in late October 2016 during hospitalization.    3. Bone biopsy on 10/25/2016 confirmed metastatic breast cancer, triple negative.  She was started on Xeloda 1500 mg twice a day on 11/14/2016 for 14 days on and 7 days off. Patient develops significant fatigue, Xeloda was decreased to 1000 mg twice a day from week #2. On 12/05/2016 schedule was changed to 7 days on, 7 days off.   4. Metastatic bone disease, Zometa was started in November 2016, repeat every 4 weeks.   5. Cancer-related pain, taking Lortab 5/325 mg prn.   6. 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   7.  Xeloda increased to 1500 mg in the morning and 1000 mg in the evening 7 days on and 7 days off, started on 4/21/2017. 8.  Bone scan on 6/7/2017 reported stable disease.  However her tumor marker was trending up at 68.7 on 6/16/2017.  Xeloda was increased to 1500 mg twice a day.    9.  Anemia secondary to chemotherapy.  She required 2 units PRBC transfusion on 7/2/2017.  Patient will be continued on Procrit injection as needed.   10.  Disease progress, required hospitalization in early August 2017.  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  Worsening ascites fluids,  required repeated paracentesis on 8/09/2017. Xeloda was discontinued.  She had paracentesis total of 16 L within a 3-week period in August 2017  11.  Patient had a right arm PICC line placement on 8/15/2017. Patient will be started on single agent Taxol weekly on 8/18/2017.  Plan is 3 weeks on 1 week off repeat every 4 weeks.   12.  Patient was not tolerating chemotherapy after 2 doses of weekly Taxol, developed acute renal failure and neutropenia, required hospitalization from 9/1/2017-9/11/2017.  She was discharged to home hospice care.    13.  Patient has improved performance status, and then restarted back on single agent chemotherapy Gemzar on 10/27/2017, with 20% dose reduction, for plan to 2 weeks on and one week off.   14.  Patient complains of headache and vision blurriness in early November 2017.  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  Patient was seen by radiation oncologist Dr. Henriquez and is started Radiation therapy on 12/28/17.  15. Patient admitted December 18, 2017 with nausea and vomiting, evidence of versus small bowel obstruction, now discussing end-of-life issues          Interval history:     Erin Vora is a 82 y.o. female who we are asked to see today in consultation for metastatic breast cancer.  She had last been seen December 1 accompanied by her son.Patient was started first dose of Gemzar on 10/27/2017.  She tolerated therapy well.  She was also given 2 units PRBC transfusion because her hemoglobin was 7.9 at that time.  She got second dose on 11/9/2017 after delay due to feeling ill.       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  Patient was seen by radiation  oncologist Dr. Lloyd and is started radiation therapy on 11/28/2017.    Her performance status on the first was approximate the same with an ECOG of 2          She presented to [facility] 12/ 18/17 complaining of nausea and vomiting. Symptoms have been present for about 4 days. He has abdominal carcinomatosis and malignant ascites and had a paracentesis done on 12/14 removing 3700 cc. It was that evening that she started having nausea. She's had intermittent vomiting since that time, no hematemesis. She didreport some increased generalized abdominal discomfort. Ascites is reaccumulating but not to the point it was on the 13th. She denies fever or chills. No diarrhea. Last bowel movement was approximately 2 days prior to admission.  Her subsequent CT scan (listed below) demonstrated progression of disease with omental caking and peritoneal carcinomatosis, diffuse ascites, dilated left upper quadrant small bowel loops with air-fluid levels-ileus versus small bowel obstruction.    As a comfort measure the patient underwent paracentesis December 19 with over 3 L removal.  The patient's circumstances were discussed with her son as well as the patient and comfort measures instituted.  She had presented on hospice care and they'll be asked to be involved in her care once again.       Medications:  The current medication list was reviewed in the EMR.    Allergies:  No Known Allergies    Objective      Vitals:    12/20/17 1100   BP: (!) 89/55   Pulse: (!) 126   Resp: 16   Temp:    SpO2: 95%        Physical exam:   GENERAL:  Well-developed, well-nourished in no acute distress.   SKIN:  Warm, dry without rashes, purpura or petechiae.  HEAD:  Normocephalic.  EYES:  Pupils equal, round and reactive to light.  EOMs intact.  Conjunctivae normal.  EARS:  Hearing intact.  NOSE:  Septum midline.  No excoriations or nasal discharge.  MOUTH:  Tongue is well-papillated; no stomatitis or ulcers.  Lips normal.  THROAT:   Oropharynx without lesions or exudates.  NECK:  Supple with good range of motion; no thyromegaly or masses, no JVD.  LYMPHATICS:  No cervical, supraclavicular, axillary or inguinal adenopathy.  CHEST:  Lungs clear to percussion and auscultation. Good airflow.  CARDIAC:  Regular rate and rhythm without murmurs, rubs or gallops. Normal S1,S2.  ABDOMEN:   soft, less distended, bowel sounds reduced  EXTREMITIES:  No clubbing, cyanosis or edema.  NEUROLOGICAL:  Cranial Nerves II-XII grossly intact.  No focal neurological deficits.  PSYCHIATRIC:  Normal affect and mood.        RECENT LABS:      Results from last 7 days  Lab Units 12/19/17  0410 12/18/17  1335   WBC 10*3/mm3 5.26 5.09   HEMOGLOBIN g/dL 8.1* 8.6*   HEMATOCRIT % 27.9* 28.5*   PLATELETS 10*3/mm3 297 330       Results from last 7 days  Lab Units 12/20/17  0531 12/19/17  0410 12/18/17  1335   SODIUM mmol/L 139 141 139   POTASSIUM mmol/L 4.9 5.6* 4.9   CHLORIDE mmol/L 105 103 99   CO2 mmol/L 25.4 22.4 25.8   BUN mg/dL 40* 40* 40*   CREATININE mg/dL 2.30* 2.21* 2.29*   CALCIUM mg/dL 6.9* 7.1* 8.2*   BILIRUBIN mg/dL  --  0.3 0.3   ALK PHOS U/L  --  66 72   ALT (SGPT) U/L  --  13 10   AST (SGOT) U/L  --  80* 65*   GLUCOSE mg/dL 81 54* 100*       Results from last 7 days  Lab Units 12/19/17  0410   MAGNESIUM mg/dL 2.1       Assessment/Plan   This is a 82 y.o. female with:  1. Metastatic breast cancer, originally with predominantly bone disease, but progressed after first line single agent Xeloda, with peritoneal carcinomatosis with large ascites fluids, pelvic mass and bilateral hydronephrosis required bilateral ureter stenting in early August 2017.  She also has moderate bilateral pleural effusion.        The patient was initiated single agent Taxol on 8/18/2017.  She was not able to tolerate Taxol after 2 doses, with significant fatigue, diarrhea and not able to eat or drink properly, leading to acute renal failure with creatinine 3.3 and neutropenia with ANC  1300, required hospitalization for more than 10 days in early September 2017.  She also required 2 units PRBC transfusion during hospitalization.  She does have improved renal function to baseline level, and also resolution of neutropenia, and improved hemoglobin after transfusion, however she was still very weak, with poor performance status is ECOG 3.       Patient had improved her functionality in October 2017.  Since patient prefers to have active treatment, we started her on weekly Gemzar at a 20% dose reduction, planned for 2 weeks on 1 week off.  She started therapy on 10/20/17 with good tolerance.  Nevertheless she was sick and second dose Gemzar was delayed and was delivered on 11/9/2017.        Patient again complaining of new onset headache and there poorly of vision.  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  She was started on palliative radiation therapy on 11/28/2017.        Because ongoing reading therapy, scheduled to finish on 12/11/2017 it was recommended postpone systematic chemotherapy until she finished radiation therapy.       2. Metastatic bone disease.  Patient was given monthly Zometa treatment.  It has being on hold because of hospice care. Since she is getting active treatment for her breast cancer, I recommended to resume Zometa today and repeat every 4 weeks.  Patient and her son both voiced understanding and consent.      3.  Anemia, multifactorial, secondary to chemotherapy as well as the her malignancy, also stage III chronic renal insufficiency.   She was given 2 units PRBC transfusion because she was symptomatic with Hb 7.9 on 10/27/2017.   Patient feels more energetic with transfusion.  She had a laboratory study on 8/25/2017 reported an enormously elevated ferritin at 3550.  Her vitamin B12 and folic acid level were both supratherapeutic on 9/1/2017.   We'll continue to monitor.       4.  Pain related to cancer.  This is not a major issue  at this point.  She has Norco available for as needed use.       5.  Malignant ascites fluid.  Patient has had multiple recent paracenteses with the last on December 13.  This is now reaccumulating and should becoming further uncomfortable.  We obtained additional paracentesis December 19 with good results  6.  Poor appetite.  This actually improved a recently.  She gained a few pounds.  I think this is probably related to dexamethasone she's been taking for metastatic disease involving the orbits.  We'll continue for now.  Patient was previously on low-dose prednisone 10 mg daily as appetite stimulator.    7.  End-of-life discussion-was held with the patient this afternoon with it being clear that her current chemotherapy treatment is not been effective particularly with advancement of her abdominal signs and symptoms.  The patient, herself, understands that she is not doing well in that the disease is progressing.  We have discussed that her death would be expected within the next month.  I discussed hospice care and she was, evidently, under hospice previously and would appreciate their services once again.  I will endeavor to call her son who is not answering at present the telephone number listed and a message has been left for him. I was later able to discuss her circumstances in detail with him.   We proceeded  to paracentesis and will ask hospice to see the patient again in consultation.                              Tejas Camarena MD  12/20/2017  11:59 AM

## 2017-12-20 NOTE — PROGRESS NOTES
"   LOS: 2 days   Patient Care Team:  Shivam Llanes MD as PCP - General (Internal Medicine)  Masha Gutierres MD PhD as Consulting Physician (Hematology and Oncology)  Cuba Mabry MD as Referring Physician (Internal Medicine)    Chief Complaint: Malignant SBO    Subjective     History of Present Illness    Subjective    The patient is not feeling well with nausea but no vomiting.    Objective     Vital Signs  Temp:  [97 °F (36.1 °C)-98.2 °F (36.8 °C)] 97.9 °F (36.6 °C)  Heart Rate:  [105-132] 132  Resp:  [16] 16  BP: ()/(50-79) 96/52    Objective:  General Appearance:  Comfortable and in no acute distress.    Vital signs: (most recent): Blood pressure 96/52, pulse (!) 132, temperature 97.9 °F (36.6 °C), temperature source Oral, resp. rate 16, height 161.5 cm (63.6\"), weight 52.4 kg (115 lb 8 oz), SpO2 92 %, not currently breastfeeding.    Abdomen: Abdomen is soft and distended.              Results Review:     I reviewed the patient's new clinical results.    Medication Review: Reviewed.    Assessment/Plan     Principal Problem:    Partial small bowel obstruction  Active Problems:    Breast cancer metastasized to bone    Anemia associated with chemotherapy    Essential hypertension    Chronic kidney disease, stage 3    Acute kidney injury    Abdominal carcinomatosis    Ascites, malignant    Dehydration      Assessment & Plan     The patient has a malignant obstruction with no surgical options.    Continue present management.    She may need a palliative NG tube.    Wallace Myles Jr., MD  12/20/17  10:59 AM          "

## 2017-12-20 NOTE — PROGRESS NOTES
Name: Erin Vora ADMIT: 2017   : 1935  PCP: Shivam Llanes MD    MRN: [id number] LOS: 2 days   AGE/SEX: 82 y.o. female  ROOM: [facility]   Subjective   Subjective:  Symptoms:  Worsening.  No shortness of breath or chest pain.    Diet:  Poor intake.  She reports  nausea.  No vomiting.    Activity level: Impaired due to weakness.    Pain:  She complains of pain that is mild.  She reports pain is unchanged.  Pain is requiring pain medication.        Objective   Vital Signs  Temp:  [97 °F (36.1 °C)-98.2 °F (36.8 °C)] 97.9 °F (36.6 °C)  Heart Rate:  [105-132] 132  Resp:  [16] 16  BP: ()/(50-79) 96/52  SpO2:  [91 %-97 %] 92 %  on   ;   O2 Device: nasal cannula  Body mass index is 20.08 kg/(m^2).    Physical Exam  Constitutional: She is oriented to person, place, and time. She appears cachectic. No distress. Poor muscle mass   Cardiovascular: Normal rate and regular rhythm.  No murmur heard.  Pulmonary/Chest: Effort normal and breath sounds normal. No respiratory distress. She has no wheezes. She has no rales.   Abdominal: Soft. She exhibits distension and ascites. Bowel sounds are decreased. There is generalized tenderness. There is no rebound and no guarding.   Musculoskeletal: Normal range of motion. She exhibits edema (1+ BLE). She exhibits no deformity.   Neurological: She is alert and oriented to person, place, and time. No cranial nerve deficit.   Skin: Skin is warm and dry. No rash noted. No erythema.   Psychiatric: She has a normal mood and affect. Her behavior is normal. Judgment and thought content normal.       Results Review:       I reviewed the patient's new clinical results.    Results from last 7 days  Lab Units 17  0410 17  1335   WBC 10*3/mm3 5.26 5.09   HEMOGLOBIN g/dL 8.1* 8.6*   PLATELETS 10*3/mm3 297 330       Results from last 7 days  Lab Units 17  0531 17  0410 17  1335   SODIUM mmol/L 139 141 139   POTASSIUM mmol/L 4.9 5.6* 4.9   CHLORIDE  mmol/L 105 103 99   CO2 mmol/L 25.4 22.4 25.8   BUN mg/dL 40* 40* 40*   CREATININE mg/dL 2.30* 2.21* 2.29*   GLUCOSE mg/dL 81 54* 100*   Estimated Creatinine Clearance: 16.1 mL/min (by C-G formula based on Cr of 2.3).    Results from last 7 days  Lab Units 12/20/17  0531 12/19/17  0410 12/18/17  1335   CALCIUM mg/dL 6.9* 7.1* 8.2*   ALBUMIN g/dL  --  2.40* 2.50*   MAGNESIUM mg/dL  --  2.1  --          buPROPion 75 mg Oral BID   dexamethasone 4 mg Intravenous Q12H   famotidine 10 mg Intravenous Q12H       sodium chloride 75 mL/hr Last Rate: 75 mL/hr (12/19/17 1813)          Assessment/Plan   Active Hospital Problems (** Indicates Principal Problem)    Diagnosis Date Noted   • **Partial small bowel obstruction [K56.600] 12/18/2017   • Dehydration [E86.0] 08/29/2017   • Ascites, malignant [R18.0] 08/07/2017   • Abdominal carcinomatosis [C76.2] 08/03/2017   • Acute kidney injury [N17.9] 08/02/2017   • Chronic kidney disease, stage 3 [N18.3] 08/01/2017   • Essential hypertension [I10] 03/28/2017   • Anemia associated with chemotherapy [D64.81, T45.1X5A] 12/02/2016   • Breast cancer metastasized to bone [C50.919, C79.51] 11/04/2016      Resolved Hospital Problems    Diagnosis Date Noted Date Resolved   No resolved problems to display.       · Worse today. Offered NGT but wants to wait. Discussed role of palliative care. Patient seems agreeable but son wants to give another 24 hours. Patient okay with this. Offered ativan for anxiety. She says pain okay on dilaudid.  · Renal function not improving.  · Appreciate oncology assistance. She has advancing metastatic breast cancer and is near terminal. Hospice has been consulted.        Dago Dumont MD  [facility]  12/20/17  9:45 AM

## 2017-12-21 NOTE — NURSING NOTE
Patient's son (Aden Vora) has been called twice with no answer and a full voicemail box. Attempting to notify son of pt's necklace. This has been placed on patient and taped to chest.

## 2017-12-21 NOTE — PROGRESS NOTES
"Adult Nutrition  Assessment/PES    Patient Name:  Erin Vora  YOB: 1935  MRN: [id number]  Admit Date:  12/18/2017    Assessment Date:  12/21/2017    Comments:  Plans for palliative care/[facility] noted. RD available if needed.          Reason for Assessment       12/21/17 1046    Reason for Assessment    Reason For Assessment/Visit NPO/Clr Policy    Diagnosis Diagnosis    Gastrointestinal SBO                Anthropometrics       12/21/17 1046    Anthropometrics    RD Documented Weight on Admission 52.4 kg (115 lb 8.3 oz)    Body Mass Index (BMI)    BMI Grade 19.1 - 24.9 - normal            Labs/Tests/Procedures/Meds       12/21/17 1046    Labs/Tests/Procedures/Meds    Diagnostic Test/Procedure Review reviewed    Labs/Tests Review Reviewed;BUN;Creat;Glucose;K+    Medication Review Reviewed, pertinent;Antacid;Steroid    Significant Vitals reviewed            Physical Findings       12/21/17 1047    Physical Findings/Assessment    Additional Documentation Physical Appearance (Group)    Physical Appearance    Skin other (see comments)   intact, Oscar score 15            Estimated/Assessed Needs       12/21/17 1047    Calculation Measurements    Weight Used For Calculations 52.4 kg (115 lb 8.3 oz)    Height Used for Calculations 1.615 m (5' 3.58\")    Estimated/Assessed Energy Needs    Energy Need Method [place]-St Jeor    Age 82    RMR ([place]-St. Jeor Equation) 972.38    Activity Factors ([place] St Jeor)  Out of bed, ambulatory  1.3    Total estimated needs ([place] St. Jeor) 1264    Estimated/Assessed Protein Needs    Weight Used for Protein Calculation 52.4 kg (115 lb 8.3 oz)    Protein (gm/kg) 1.0    1.0 Gm Protein (gm) 52.4    Estimated/Assessed Fluid Needs    Fluid Need Method Other (comment)   3729-8392 ml/24 hr d/t h/o CHF            Nutrition Prescription Ordered       12/21/17 1048    Nutrition Prescription PO    Current PO Diet Other (comment)   none            Evaluation of Received " Nutrient/Fluid Intake       12/21/17 1049    Calculation Measurements    Weight Used For Calculations 52.4 kg (115 lb 8.3 oz)    Evaluation of Received Nutrient/Fluid Intake    Number of Days Evaluated 1 day    Nutrition Delivered Fluid Evaluation    Fluid Intake Evaluation    IV Fluid (mL) 1800    Total Fluid Intake (mL) 1800    Total Fluid Intake (mL/kg) 34.35 mL/kg    % Fluid Needs 100            Problem/Interventions:        Problem 1       12/21/17 1049    Nutrition Diagnoses Problem 1    Problem 1 Nutrition Appropriate for Condition at this Time    Etiology (related to) Goals of Care    Signs/Symptoms (evidenced by) Report/Observation    Reported/Observed By RN;MD                    Intervention Goal       12/21/17 1050    Intervention Goal    General Palliative Care            Nutrition Intervention       12/21/17 1050    Nutrition Intervention    RD/Tech Action Care plan reviewd              Education/Evaluation       12/21/17 1050    Monitor/Evaluation    Monitor Per protocol        Electronically signed by:  Julia Rowell RD  12/21/17 10:50 AM

## 2017-12-21 NOTE — DISCHARGE SUMMARY
Name: Erin Vora  Age: 82 y.o.  Sex: female  :  1935  MRN: [id number]         Primary Care Physician: Shivam Llanes MD      Date of Admission: 2017  Date of Death:  2017  Time of Death:  12:20 pm      Principle Cause of Death:   Complications related to breast cancer    Secondary Diagnoses:   Principal Problem:    Partial small bowel obstruction  Active Problems:    Breast cancer metastasized to bone    Anemia associated with chemotherapy    Essential hypertension    Chronic kidney disease, stage 3    Acute kidney injury    Abdominal carcinomatosis    Ascites, malignant    Dehydration        Hospital Course  Patient was a 82 y.o. female who presented to [facility] with abdominal pain, nausea and vomiting.    Please see the admitting history and physical for further details. She has a history of very advanced breast cancer with abdominal carcinomatosis and malignant ascites. CT imaging in the emergency department consistent with small bowel obstruction. We attempted to treat her conservatively with hopes that the obstruction would relieve itself however she showed a continued decline. Discussions were made with her and her son regarding goals of care. She understood she had reached the terminal stage of her cancer and opted to be kept comfortable. She was seen by [facility] and the palliative care service. She was administered IV pain medication as well as Ativan for anxiety. She continue to show a rapid decline and passed away at the above-mentioned date and time.      Dago Dumont MD  [facility]  17  12:38 PM

## 2017-12-21 NOTE — CONSULTS
was paged to a patient who was actively dying. Family was present and bedside.  talked with the family. When the patient  the  prayed for the patient. Family was very appreciative and we talked about the next steps after the patient .

## 2017-12-21 NOTE — PROGRESS NOTES
[facility] INPATIENT PROGRESS NOTE  Subjective Patient significantly worsened, not arousable,  Tachypneic    CC:   1. Metastatic breast cancer, predominantly bone disease, biopsy confirmed triple negative in late October 2016. History of left breast cancer in 2005, had mastectomy, without adjuvant chemotherapy. She was followed by Dr. Romo at [facility], treated on Arimidex for 5 years, finished in 2011.   2. Anemia, secondary to her metastatic disease, with significantly elevated ferritin level. No evidence of deficiency for B12 or folic acid. She was given 3 units packed RBC in late October 2016 during hospitalization.    3. Bone biopsy on 10/25/2016 confirmed metastatic breast cancer, triple negative.  She was started on Xeloda 1500 mg twice a day on 11/14/2016 for 14 days on and 7 days off. Patient develops significant fatigue, Xeloda was decreased to 1000 mg twice a day from week #2. On 12/05/2016 schedule was changed to 7 days on, 7 days off.   4. Metastatic bone disease, Zometa was started in November 2016, repeat every 4 weeks.   5. Cancer-related pain, taking Lortab 5/325 mg prn.   6. 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   7.  Xeloda increased to 1500 mg in the morning and 1000 mg in the evening 7 days on and 7 days off, started on 4/21/2017. 8.  Bone scan on 6/7/2017 reported stable disease.  However her tumor marker was trending up at 68.7 on 6/16/2017.  Xeloda was increased to 1500 mg twice a day.    9.  Anemia secondary to chemotherapy.  She required 2 units PRBC transfusion on 7/2/2017.  Patient will be continued on Procrit injection as needed.   10.  Disease progress, required hospitalization in early August 2017.  Patient has peritoneal carcinomatosis with large quantity of malignant ascites fluids, required paracentesis on 8/4/2017, also had bilateral hydronephrosis and required bilateral  ureter stenting on 8/3/2017.  Worsening ascites fluids, required repeated paracentesis on 8/09/2017. Xeloda was discontinued.  She had paracentesis total of 16 L within a 3-week period in August 2017  11.  Patient had a right arm PICC line placement on 8/15/2017. Patient will be started on single agent Taxol weekly on 8/18/2017.  Plan is 3 weeks on 1 week off repeat every 4 weeks.   12.  Patient was not tolerating chemotherapy after 2 doses of weekly Taxol, developed acute renal failure and neutropenia, required hospitalization from 9/1/2017-9/11/2017.  She was discharged to home hospice care.    13.  Patient has improved performance status, and then restarted back on single agent chemotherapy Gemzar on 10/27/2017, with 20% dose reduction, for plan to 2 weeks on and one week off.   14.  Patient complains of headache and vision blurriness in early November 2017.  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  Patient was seen by radiation oncologist Dr. Henriquez and is started Radiation therapy on 12/28/17.  15. Patient admitted December 18, 2017 with nausea and vomiting, evidence of versus small bowel obstruction, now discussing end-of-life issues   16.  She reviewed December 21, actively dying       Interval history:     Erin Vora is a 82 y.o. female who we are asked to see today in consultation for metastatic breast cancer.  She had last been seen December 1 accompanied by her son.Patient was started first dose of Gemzar on 10/27/2017.  She tolerated therapy well.  She was also given 2 units PRBC transfusion because her hemoglobin was 7.9 at that time.  She got second dose on 11/9/2017 after delay due to feeling ill.       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  Patient was seen by radiation oncologist Dr. Lloyd and is started radiation therapy on 11/28/2017.    Her performance status on the first was approximate the same with an ECOG of 2          She presented to [facility] 12/ 18/17 complaining of nausea and vomiting. Symptoms have been present for about 4 days. He has abdominal carcinomatosis and malignant ascites and had a paracentesis done on 12/14 removing 3700 cc. It was that evening that she started having nausea. She's had intermittent vomiting since that time, no hematemesis. She didreport some increased generalized abdominal discomfort. Ascites is reaccumulating but not to the point it was on the 13th. She denies fever or chills. No diarrhea. Last bowel movement was approximately 2 days prior to admission.  Her subsequent CT scan (listed below) demonstrated progression of disease with omental caking and peritoneal carcinomatosis, diffuse ascites, dilated left upper quadrant small bowel loops with air-fluid levels-ileus versus small bowel obstruction.    As a comfort measure the patient underwent paracentesis December 19 with over 3 L removal.  The patient's circumstances were discussed with her son as well as the patient and comfort measures instituted.  She had presented on hospice care and they'll be asked to be involved in her care once again.    Patient seen December 21 she, unfortunately, has worsened substantially and is beginning to actively die.  She is not alert, breathing approximate 30 breaths per minute on a rebreather mask.  We have discussed deterioration and the patient is expected to die possibly today.  She will be transferred after talking with the nurses on the floor.     Medications:  The current medication list was reviewed in the EMR.    Allergies:  No Known Allergies    Objective      Vitals:    12/21/17 0737   BP: (!) 72/50   Pulse: 110   Resp: 18    Temp: 99.5 °F (37.5 °C)   SpO2:         Physical exam:   GENERAL:  Well-developed, well-nourished in no acute distress.   SKIN:  Warm, dry without rashes, purpura or petechiae.  HEAD:  Normocephalic.  EYES:  Pupils equal, round and reactive to light.  EOMs intact.  Conjunctivae normal.  EARS:  Hearing intact.  NOSE:  Septum midline.  No excoriations or nasal discharge.  MOUTH:  Tongue is well-papillated; no stomatitis or ulcers.  Lips normal.  THROAT:  Oropharynx without lesions or exudates.  NECK:  Supple with good range of motion; no thyromegaly or masses, no JVD.  LYMPHATICS:  No cervical, supraclavicular, axillary or inguinal adenopathy.  CHEST:  Lungs clear to percussion and auscultation. Good airflow.  CARDIAC:  Regular rate and rhythm without murmurs, rubs or gallops. Normal S1,S2.  ABDOMEN:   soft, less distended, bowel sounds reduced  EXTREMITIES:  No clubbing, cyanosis or edema.  NEUROLOGICAL:  Cranial Nerves II-XII grossly intact.  No focal neurological deficits.  PSYCHIATRIC:  Normal affect and mood.        RECENT LABS:      Results from last 7 days  Lab Units 12/19/17  0410 12/18/17  1335   WBC 10*3/mm3 5.26 5.09   HEMOGLOBIN g/dL 8.1* 8.6*   HEMATOCRIT % 27.9* 28.5*   PLATELETS 10*3/mm3 297 330       Results from last 7 days  Lab Units 12/21/17  0503 12/20/17  0531 12/19/17  0410 12/18/17  1335   SODIUM mmol/L 143 139 141 139   POTASSIUM mmol/L 6.7* 4.9 5.6* 4.9   CHLORIDE mmol/L 109* 105 103 99   CO2 mmol/L 18.4* 25.4 22.4 25.8   BUN mg/dL 53* 40* 40* 40*   CREATININE mg/dL 3.13* 2.30* 2.21* 2.29*   CALCIUM mg/dL 6.7* 6.9* 7.1* 8.2*   BILIRUBIN mg/dL  --   --  0.3 0.3   ALK PHOS U/L  --   --  66 72   ALT (SGPT) U/L  --   --  13 10   AST (SGOT) U/L  --   --  80* 65*   GLUCOSE mg/dL 102* 81 54* 100*       Results from last 7 days  Lab Units 12/19/17  0410   MAGNESIUM mg/dL 2.1       Assessment/Plan   This is a 82 y.o. female with:  1. Metastatic breast cancer, originally with predominantly bone  disease, but progressed after first line single agent Xeloda, with peritoneal carcinomatosis with large ascites fluids, pelvic mass and bilateral hydronephrosis required bilateral ureter stenting in early August 2017.  She also has moderate bilateral pleural effusion.        The patient was initiated single agent Taxol on 8/18/2017.  She was not able to tolerate Taxol after 2 doses, with significant fatigue, diarrhea and not able to eat or drink properly, leading to acute renal failure with creatinine 3.3 and neutropenia with ANC 1300, required hospitalization for more than 10 days in early September 2017.  She also required 2 units PRBC transfusion during hospitalization.  She does have improved renal function to baseline level, and also resolution of neutropenia, and improved hemoglobin after transfusion, however she was still very weak, with poor performance status is ECOG 3.       Patient had improved her functionality in October 2017.  Since patient prefers to have active treatment, we started her on weekly Gemzar at a 20% dose reduction, planned for 2 weeks on 1 week off.  She started therapy on 10/20/17 with good tolerance.  Nevertheless she was sick and second dose Gemzar was delayed and was delivered on 11/9/2017.        Patient again complaining of new onset headache and there poorly of vision.  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  She was started on palliative radiation therapy on 11/28/2017.        Because ongoing reading therapy, scheduled to finish on 12/11/2017 it was recommended postpone systematic chemotherapy until she finished radiation therapy.       2. Metastatic bone disease.  Patient was given monthly Zometa treatment.  It has being on hold because of hospice care. Since she is getting active treatment for her breast cancer, I recommended to resume Zometa today and repeat every 4 weeks.  Patient and her son both voiced understanding and  consent.      3.  Anemia, multifactorial, secondary to chemotherapy as well as the her malignancy, also stage III chronic renal insufficiency.   She was given 2 units PRBC transfusion because she was symptomatic with Hb 7.9 on 10/27/2017.   Patient feels more energetic with transfusion.  She had a laboratory study on 8/25/2017 reported an enormously elevated ferritin at 3550.  Her vitamin B12 and folic acid level were both supratherapeutic on 9/1/2017.   We'll continue to monitor.       4.  Pain related to cancer.  This is not a major issue at this point.  She has Norco available for as needed use.       5.  Malignant ascites fluid.  Patient has had multiple recent paracenteses with the last on December 13.  This is now reaccumulating and should becoming further uncomfortable.  We obtained additional paracentesis December 19 with good results  6.  Poor appetite.  This actually improved a recently.  She gained a few pounds.  I think this is probably related to dexamethasone she's been taking for metastatic disease involving the orbits.  We'll continue for now.  Patient was previously on low-dose prednisone 10 mg daily as appetite stimulator.    7.  End-of-life discussion-was held with the patient this afternoon with it being clear that her current chemotherapy treatment is not been effective particularly with advancement of her abdominal signs and symptoms.  The patient, herself, understands that she is not doing well in that the disease is progressing.  We have discussed that her death would be expected within the next month.  I discussed hospice care and she was, evidently, under hospice previously and would appreciate their services once again.  I will endeavor to call her son who is not answering at present the telephone number listed and a message has been left for him. I was later able to discuss her circumstances in detail with him.   We proceeded  to paracentesis and will ask hospice to see the patient again  in consultation.    The patient seen December 21 she is deteriorating quickly and is expected to die possibly today.  We will continue palliative care while on her current floor after discussion with the nursing staff.                              Tejas Camarena MD  12/21/2017  10:40 AM

## 2017-12-21 NOTE — PLAN OF CARE
Problem: Patient Care Overview (Adult)  Goal: Plan of Care Review   12/21/17 0534   Coping/Psychosocial Response Interventions   Plan Of Care Reviewed With patient;son   Outcome Evaluation   Outcome Summary/Follow up Plan pt resting well, prn dilaudid , ativan, and zofran iv adm for comfort and nausea, emesis x 1, pt and family refused ngt, turned and repositioned for comfort, mavis care provided, incontinent of urine x 2, oral care provided, son at bedside    Patient Care Overview   Progress declining

## 2017-12-22 ENCOUNTER — RESULTS ENCOUNTER (OUTPATIENT)
Dept: ONCOLOGY | Facility: CLINIC | Age: 82
End: 2017-12-22

## 2017-12-22 DIAGNOSIS — R18.0 ASCITES, MALIGNANT: ICD-10-CM

## 2017-12-22 DIAGNOSIS — C50.919 CARCINOMA OF BREAST METASTATIC TO BONE, UNSPECIFIED LATERALITY (HCC): ICD-10-CM

## 2017-12-22 DIAGNOSIS — C79.51 CARCINOMA OF BREAST METASTATIC TO BONE, UNSPECIFIED LATERALITY (HCC): ICD-10-CM

## 2017-12-22 DIAGNOSIS — C76.2 ABDOMINAL CARCINOMATOSIS (HCC): ICD-10-CM

## 2017-12-23 LAB
BACTERIA SPEC AEROBE CULT: NORMAL
BACTERIA SPEC AEROBE CULT: NORMAL

## 2017-12-29 ENCOUNTER — RESULTS ENCOUNTER (OUTPATIENT)
Dept: ONCOLOGY | Facility: CLINIC | Age: 82
End: 2017-12-29

## 2017-12-29 DIAGNOSIS — C50.919 CARCINOMA OF BREAST METASTATIC TO BONE, UNSPECIFIED LATERALITY (HCC): ICD-10-CM

## 2017-12-29 DIAGNOSIS — C79.51 CARCINOMA OF BREAST METASTATIC TO BONE, UNSPECIFIED LATERALITY (HCC): ICD-10-CM

## 2017-12-29 DIAGNOSIS — R18.0 ASCITES, MALIGNANT: ICD-10-CM

## 2017-12-29 DIAGNOSIS — C76.2 ABDOMINAL CARCINOMATOSIS (HCC): ICD-10-CM

## 2018-01-05 ENCOUNTER — RESULTS ENCOUNTER (OUTPATIENT)
Dept: ONCOLOGY | Facility: CLINIC | Age: 83
End: 2018-01-05

## 2018-01-05 DIAGNOSIS — C76.2 ABDOMINAL CARCINOMATOSIS (HCC): ICD-10-CM

## 2018-01-05 DIAGNOSIS — C50.919 CARCINOMA OF BREAST METASTATIC TO BONE, UNSPECIFIED LATERALITY (HCC): ICD-10-CM

## 2018-01-05 DIAGNOSIS — R18.0 ASCITES, MALIGNANT: ICD-10-CM

## 2018-01-05 DIAGNOSIS — C79.51 CARCINOMA OF BREAST METASTATIC TO BONE, UNSPECIFIED LATERALITY (HCC): ICD-10-CM

## 2018-01-12 ENCOUNTER — RESULTS ENCOUNTER (OUTPATIENT)
Dept: ONCOLOGY | Facility: CLINIC | Age: 83
End: 2018-01-12

## 2018-01-12 DIAGNOSIS — C79.51 CARCINOMA OF BREAST METASTATIC TO BONE, UNSPECIFIED LATERALITY (HCC): ICD-10-CM

## 2018-01-12 DIAGNOSIS — C76.2 ABDOMINAL CARCINOMATOSIS (HCC): ICD-10-CM

## 2018-01-12 DIAGNOSIS — C50.919 CARCINOMA OF BREAST METASTATIC TO BONE, UNSPECIFIED LATERALITY (HCC): ICD-10-CM

## 2018-01-12 DIAGNOSIS — R18.0 ASCITES, MALIGNANT: ICD-10-CM

## 2018-01-19 ENCOUNTER — RESULTS ENCOUNTER (OUTPATIENT)
Dept: ONCOLOGY | Facility: CLINIC | Age: 83
End: 2018-01-19

## 2018-01-19 DIAGNOSIS — R18.0 ASCITES, MALIGNANT: ICD-10-CM

## 2018-01-19 DIAGNOSIS — C79.51 CARCINOMA OF BREAST METASTATIC TO BONE, UNSPECIFIED LATERALITY (HCC): ICD-10-CM

## 2018-01-19 DIAGNOSIS — C50.919 CARCINOMA OF BREAST METASTATIC TO BONE, UNSPECIFIED LATERALITY (HCC): ICD-10-CM

## 2018-01-19 DIAGNOSIS — C76.2 ABDOMINAL CARCINOMATOSIS (HCC): ICD-10-CM

## 2018-01-26 ENCOUNTER — RESULTS ENCOUNTER (OUTPATIENT)
Dept: ONCOLOGY | Facility: CLINIC | Age: 83
End: 2018-01-26

## 2018-01-26 DIAGNOSIS — C79.51 CARCINOMA OF BREAST METASTATIC TO BONE, UNSPECIFIED LATERALITY (HCC): ICD-10-CM

## 2018-01-26 DIAGNOSIS — C50.919 CARCINOMA OF BREAST METASTATIC TO BONE, UNSPECIFIED LATERALITY (HCC): ICD-10-CM

## 2018-01-26 DIAGNOSIS — C76.2 ABDOMINAL CARCINOMATOSIS (HCC): ICD-10-CM

## 2018-01-26 DIAGNOSIS — R18.0 ASCITES, MALIGNANT: ICD-10-CM

## 2018-02-02 ENCOUNTER — RESULTS ENCOUNTER (OUTPATIENT)
Dept: ONCOLOGY | Facility: CLINIC | Age: 83
End: 2018-02-02

## 2018-02-02 DIAGNOSIS — C50.919 CARCINOMA OF BREAST METASTATIC TO BONE, UNSPECIFIED LATERALITY (HCC): ICD-10-CM

## 2018-02-02 DIAGNOSIS — C76.2 ABDOMINAL CARCINOMATOSIS (HCC): ICD-10-CM

## 2018-02-02 DIAGNOSIS — C79.51 CARCINOMA OF BREAST METASTATIC TO BONE, UNSPECIFIED LATERALITY (HCC): ICD-10-CM

## 2018-02-02 DIAGNOSIS — R18.0 ASCITES, MALIGNANT: ICD-10-CM

## 2018-02-09 ENCOUNTER — RESULTS ENCOUNTER (OUTPATIENT)
Dept: ONCOLOGY | Facility: CLINIC | Age: 83
End: 2018-02-09

## 2018-02-09 DIAGNOSIS — C76.2 ABDOMINAL CARCINOMATOSIS (HCC): ICD-10-CM

## 2018-02-09 DIAGNOSIS — R18.0 ASCITES, MALIGNANT: ICD-10-CM

## 2018-02-09 DIAGNOSIS — C79.51 CARCINOMA OF BREAST METASTATIC TO BONE, UNSPECIFIED LATERALITY (HCC): ICD-10-CM

## 2018-02-09 DIAGNOSIS — C50.919 CARCINOMA OF BREAST METASTATIC TO BONE, UNSPECIFIED LATERALITY (HCC): ICD-10-CM

## 2018-02-16 ENCOUNTER — RESULTS ENCOUNTER (OUTPATIENT)
Dept: ONCOLOGY | Facility: CLINIC | Age: 83
End: 2018-02-16

## 2018-02-16 DIAGNOSIS — C76.2 ABDOMINAL CARCINOMATOSIS (HCC): ICD-10-CM

## 2018-02-16 DIAGNOSIS — R18.0 ASCITES, MALIGNANT: ICD-10-CM

## 2018-02-16 DIAGNOSIS — C50.919 CARCINOMA OF BREAST METASTATIC TO BONE, UNSPECIFIED LATERALITY (HCC): ICD-10-CM

## 2018-02-16 DIAGNOSIS — C79.51 CARCINOMA OF BREAST METASTATIC TO BONE, UNSPECIFIED LATERALITY (HCC): ICD-10-CM

## 2020-02-13 NOTE — PROGRESS NOTES
Physician Weekly Management Note    Diagnosis:   Breast cancer metastasized to bone and bilateral orbits     Clinical Stage IV (TX, NX, M1)    Reason for Visit: Radiation (4/10)    Concurrent Chemo:       Notes on Treatment course, Films, Medical progress and Plan:  Doing well. Slight fatigue but no problems with eyes, vision. No questions, cont on.    ROS -  Other than those listed above, as follows:  Constitutional - Normal - no complaints of fatigue, denies lack of appetite, fever, night sweats or change in weight.  Neck - Normal - denies neck masses, muscle weakness, neck pain, decreased range of motion or swelling.  Cardiovascular - Normal - denies arrhythmias, chest pain, dyspnea, edema, orthopnea or palpitations.  Respiratory - Normal - denies cough, dyspnea, hemoptysis, hiccoughs, pleuritic chest pain or wheezing.  Gastrointestinal - Normal - no complaints of constipation, abdominal pain, diarrhea, heartburn/dyspepsia, hematemesis, hemorrhoids, melena or GI bleeding, nausea, pain or cramping or vomiting.  Neuro - Normal - no new complaints of vision change, headache, nausea, cranial nerve deficit, gait abnormality, syncope, seizure.    PHYSICAL EXAM - Other than changes listed above, as follows:  Vitals:    12/01/17 0919   BP: 162/71   Pulse: 102   Temp: 97.4 °F (36.3 °C)   TempSrc: Oral   SpO2: 96%   Weight: 114 lb (51.7 kg)   PainSc: 0-No pain       Constitutional - Normal - no evidence of impaired alertness, inadequate appearance, premature or advanced chronologic age, uncooperativeness, altered mood, affect or disorientation.  Neck - Normal - no evidence of tender or enlarged lymph nodes, neck abnormalities, restricted range of motion or enlarged thyroid.  Chest - Normal - no evidence of chest abnormalities, tender or enlarged lymph nodes.  Respiratory - Normal - no evidence of abnormal breat sounds, chest abnormalities on palpation and chest abnormalities on percussion and normal breath  "sounds.  Hematologic/Lymphatic - Normal - no evidence of tender or enlarged axillary lymph nodes nor tender or enlarged neck nodes.  Neuro - Normal - no evidence of cranial nerve deficit, gait abnormality.    Performance Status:  (2) Ambulatory and capable of self care, unable to carry out work activity, up and about > 50% or waking hours    Problem added:  No problems updated.  Medications added: No orders of the defined types were placed in this encounter.    Ancillary referrals made: No orders of the defined types were placed in this encounter.      Technical aspects reviewed:  Weekly OBI approved if applicable? Yes   Weekly port films approved?  Yes  Change requests noted if applicable?  Yes   Patient setup and plan reviewed?  Yes     Chart Reviewed:  Continue current treatment plan?  Yes  Treatment plan change requested? No    I have reviewed and marked as \"reviewed\" the current medications, allergies and problem list in the patients EMR.    I have reviewed the patient's medical, surgical  history in detail, reviewed any pertinent lab work  and updated the computerized patient record if needed.    Patient's Care Team:  Patient Care Team:  Shivam Llanes MD as PCP - General (Internal Medicine)  Masha Gutierres MD PhD as Consulting Physician (Hematology and Oncology)  Cuba Mabry MD as Referring Physician (Internal Medicine)    Seen and approved by:  Oralia Henriquez MD, 12/01/2017  " no

## 2021-08-06 NOTE — THERAPY PROGRESS REPORT/RE-CERT
Acute Care - Physical Therapy Treatment Note  [facility]     Patient Name: Erin Vora  : 1935  MRN: [id number]  Today's Date: 2017  Onset of Illness/Injury or Date of Surgery Date: 17  Date of Referral to PT: 17  Referring Physician: Alvaro    Admit Date: 2017    Visit Dx:    ICD-10-CM ICD-9-CM   1. Anemia associated with chemotherapy D64.81 285.3    T45.1X5A E933.1   2. Mild dehydration E86.0 276.51   3. Anemia, unspecified type D64.9 285.9   4. Pneumonia of both lower lobes due to infectious organism J18.9 483.8   5. Metastatic breast cancer - On chemotherapy C50.919 174.9    C79.9 199.1   6. Renal failure (ARF), acute on chronic N17.9 584.9    N18.9 585.9   7. Generalized edema R60.1 782.3   8. Anemia of chronic renal failure, stage 3 (moderate) N18.3 285.21    D63.1 585.3   9. Breast cancer metastasized to bone, unspecified laterality C50.919 174.9    C79.51 198.5   10. Anemia in neoplastic disease D63.0 285.22     Patient Active Problem List   Diagnosis   • Breast cancer metastasized to bone   • Anemia in neoplastic disease   • Cancer associated pain   • Anemia associated with chemotherapy   • Weight loss due to medication   • Poor appetite   • Hypokalemia   • Hyperlipidemia   • Hearing loss   • Medicare annual wellness visit, initial   • Anemia of chronic renal failure, stage 3 (moderate)   • Post-menopause   • Encounter for medication review and counseling   • Fever   • Nausea and vomiting   • Essential hypertension   • Anemia   • Pneumonia   • Chronic kidney disease, stage 3   • Distended abdomen   • Acute kidney injury   • Abdominal carcinomatosis   • Ascites, malignant   • Dehydration   • Chemotherapy induced neutropenia   • Hypernatremia   • Diarrhea               Adult Rehabilitation Note       17 1000          Rehab Assessment/Intervention    Discipline physical therapist  -CK      Document Type therapy note (daily note)  -CK      Subjective Information agree  [employer] has been canceled can [facility] to [facility]    to therapy;no complaints  -CK      Precautions/Limitations fall precautions  -CK      Recorded by [CK] Eldon Mercado, PT      Pain Assessment    Pain Assessment No/denies pain  -CK      Recorded by [CK] Eldon Mercado, PT      Bed Mobility, Assessment/Treatment    Bed Mobility, Comment deferred up walking in room  -CK      Recorded by [CK] Eldon Mercado, PT      Transfer Assessment/Treatment    Transfers, Sit-Stand Warren hand held assist  -CK      Transfers, Stand-Sit Warren hand held assist  -CK      Transfers, Sit-Stand-Sit, Assist Device --   pushed IV pole  -CK      Recorded by [CK] Eldon Mercado, PT      Gait Assessment/Treatment    Gait, Warren Level hand held assist  -CK      Gait, Assistive Device --   pushed IV pole  -CK      Gait, Distance (Feet) 300  -CK      Gait, Gait Deviations maida decreased;decreased heel strike;step length decreased  -CK      Recorded by [CK] Eldon Mercado, PT      Positioning and Restraints    Pre-Treatment Position in bed  -CK      Post Treatment Position chair  -CK      In Chair sitting;reclined;call light within reach;with family/caregiver  -CK      Recorded by [CK] Eldon Mercado, PT        User Key  (r) = Recorded By, (t) = Taken By, (c) = Cosigned By    Initials Name Effective Dates    CK Eldon Mercado, PT 04/24/15 -                 IP PT Goals       09/03/17 1647          Transfer Training PT LTG    Transfer Training PT LTG, Date Established 09/03/17  -LC      Transfer Training PT LTG, Time to Achieve 1 wk  -LC      Transfer Training PT LTG, Warren Level conditional independence  -LC      Transfer Training PT LTG, Assist Device walker, rolling  -LC      Gait Training PT LTG    Gait Training Goal PT LTG, Date Established 09/03/17  -LC      Gait Training Goal PT LTG, Time to Achieve 1 wk  -LC      Gait Training Goal PT LTG, Warren Level supervision required  -LC      Gait Training Goal PT LTG, Assist Device walker, rolling  -LC      Gait  Training Goal PT LTG, Distance to Achieve 400  -LC        User Key  (r) = Recorded By, (t) = Taken By, (c) = Cosigned By    Initials Name Provider Type     Bo Rivera, PT DPT Physical Therapist          Physical Therapy Education     Title: PT OT SLP Therapies (Active)     Topic: Physical Therapy (Active)     Point: Mobility training (Done)    Learning Progress Summary    Learner Readiness Method Response Comment Documented by Status   Patient Acceptance E VU   09/04/17 1021 Done    Acceptance E,ЕЛЕНА GASTON safety during transfers  09/03/17 1646 Done               Point: Precautions (Done)    Learning Progress Summary    Learner Readiness Method Response Comment Documented by Status   Patient Acceptance E VU   09/04/17 1021 Done                      User Key     Initials Effective Dates Name Provider Type Discipline     04/24/15 -  Eldon Mercado, PT Physical Therapist PT     08/02/16 -  Bo Rivera, PT DPT Physical Therapist PT                    PT Recommendation and Plan  Anticipated Discharge Disposition: home  PT Frequency: daily  Plan of Care Review  Plan Of Care Reviewed With: patient  Progress: improving  Outcome Summary/Follow up Plan: Decreased assistance required for all functional mobility tasks. Hand held assist and patient pushed her own IV pole. Increased distance/tolerance to activity noted today. Remains appropriate for skilled therapy to return to prior level of function.           Outcome Measures       09/04/17 1000          How much help from another person do you currently need...    Turning from your back to your side while in flat bed without using bedrails? 4  -CK      Moving from lying on back to sitting on the side of a flat bed without bedrails? 3  -CK      Moving to and from a bed to a chair (including a wheelchair)? 3  -CK      Standing up from a chair using your arms (e.g., wheelchair, bedside chair)? 3  -CK      Climbing 3-5 steps with a railing? 3  -CK      To walk in  hospital room? 3  -CK      AM-PAC 6 Clicks Score 19  -CK      Functional Assessment    Outcome Measure Options AM-PAC 6 Clicks Basic Mobility (PT)  -CK        User Key  (r) = Recorded By, (t) = Taken By, (c) = Cosigned By    Initials Name Provider Type    NIKKI Mercado PT Physical Therapist           Time Calculation:         PT Charges       09/04/17 1023          Time Calculation    Start Time 1005  -CK      Stop Time 1020  -CK      Time Calculation (min) 15 min  -CK      PT Received On 09/04/17  -CK      PT - Next Appointment 09/05/17  -CK        User Key  (r) = Recorded By, (t) = Taken By, (c) = Cosigned By    Initials Name Provider Type    NIKKI Mercado PT Physical Therapist          Therapy Charges for Today     Code Description Service Date Service Provider Modifiers Qty    [id number] HC PT THER PROC EA 15 MIN 9/4/2017 Eldon Mercado, PT GP 1          PT G-Codes  Outcome Measure Options: AM-PAC 6 Clicks Basic Mobility (PT)    Eldon Mercado PT  9/4/2017

## 2023-11-16 NOTE — PROGRESS NOTES
REASON FOR FOLLOW-UP:    1. Metastatic breast cancer, predominantly bone disease, biopsy confirmed triple negative in late October 2016. History of left breast cancer in 2005, had mastectomy, without adjuvant chemotherapy. She was followed by Dr. Romo at [facility], treated on Arimidex for 5 years, finished in 2011.   2. Anemia, secondary to her metastatic disease and chemotherapy, with significantly elevated ferritin level. No evidence of deficiency for B12 or folic acid. She was given 3 units packed RBC in late October 2016 during hospitalization.   3. She was started on Xeloda 1500 mg twice a day on 11/14/2016 for 14 days on and 7 days off. Patient develops significant fatigue, Xeloda was decreased to 1000 mg twice a day from week #2. On 12/05/2016 schedule was changed to 7 days on, 7 days off.   4.  Metastatic bone disease, Zometa was started in November 2016, repeat every 4 weeks.   5.  Cancer-related pain, taking Lortab 5/325 mg prn.   6.  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       HISTORY OF PRESENT ILLNESS: The patient is an 82 y.o. year old female returning today for 4-week reevaluation.  Patient is accompanied by her son today.  According to patient and her son, she is more energetic, performance status is ECOG 1.  She continues live by herself, she does her own house chores.  She does report a dry skin, with very mild peeling of skin at the tip of 2 fingers.  She has no such phenomena on her feet.  Patient denies oral mucositis, known nausea vomiting.  Her appetite is reasonable, maintains stable weight.  She also denies pain, despite not using pain medication.     We started her on Procrit at 4 weeks ago, about 3 study showed a hemoglobin at or slightly above 9 g.  She maintains normal WBC and platelet counts.                           Medical History                   Past Medical History     Diagnosis   Date      •   Anemia        •   Breast cancer   2005     •   Hyperlipidemia        •   Hypertension                               Surgical History                         Past Surgical History     Procedure   Laterality   Date     •   Breast biopsy   Left   2005           malignant     •   Mastectomy   Left   2005                  HEMATOLOGIC/ONCOLOGIC HISTORY: Ms. Vora is a 81-year-old  female who presented to the emergency room because of worsening lower back pain. She started having this about a week ago. Severity was 8 out of 10 but then subsided. In this past weekend, she had worsening pain again and was sent to the ER for further evaluation. Prior to this, she was mowing her own grass, lives by herself, with good performance status.      Since admission, she was given Lortab about every 6 hours. Her pain currently is 4 out of 10.      This patient was found to have significant anemia, with hemoglobin 7.0 in the ER. Of note, in the ER there was attempt for transfusion of packed RBC, however she had a fever and the transfusion was abandoned. Yesterday hemoglobin was down to 6.6 and she was given 1 unit packed RBC with no incident. She was given Benadryl 50 mg. This patient has hemoglobin 7.6 this morning. She is scheduled to receive 2 more units packed RBC.        This patient had CT scan of the abdomen and pelvis examination during ER evaluation, which reported diffuse metastatic bone disease involving the spine, in the thoracic spine, lumbar spine and pelvic bones. The patient reports no constipation, no diarrhea, no urinary incontinence.        This patient had history of left breast cancer back in 2005 for which she had left mastectomy. No lymph node involvement. No adjuvant chemotherapy, no radiation therapy. The patient was followed by Dr. Romo at the [facility], and was on Arimidex for 5 years, which stopped in 2011. The patient was dismissed by Dr. Romo afterwards.        The  patient will stay with us this time for her oncologic care, as she told me today.      The patient reports weight loss of about 10 pounds since early September 2016. She has decreased appetite. No nausea/vomiting. No vision changes, no hearing changes. No fever, no sweating. Denies melena or hematochezia.        Patient had MRI examination with and without IV contrast for brain, cervical, thoracic and lumbar spin on 10/24/2016. There was no evidence of metastatic brain disease. There are metastatic disease involving the entire spine, no fresh compression fracture.       Patient had a CT-guided bone biopsy on 10/25/2016. G evaluation reported a primary breast cancer. Further testing showed triple negative, ER 0%, CT 0%, HER-2 IHC 2+, however FISH study was negative.      Was started on IV Zometa in early November 2016. She was started on Xeloda 1500 mg twice a day on 11/14/2016 for 14 days on and 7 days off. Patient develops significant fatigue, Xeloda was decreased to 1000 mg twice a day from week #2.        Laboratory study on 12/02/16 showed significant  anemia with hemoglobin 7.8, but a normal WBC 7000 including neutrophils 4800. Her platelets is 367,000. She was given 2 units packed RBC transfusion. Because of poor tolerance, Xeloda was decreased to 1000 mg twice a day 7 days on and 7 days off.  was 30.5 on 12/16/16.      Patient has better tolerance to decrease the Xeloda.  She is more energetic.  However she has worsening anemia, with a hemoglobin in the 9 gram range.  Patient was started on low-dose Procrit 20,000 units every 2 weeks by an of December 2016.       MEDICATIONS: see EMR       ALLERGIES:  No Known Allergies      Past Medical History, Past Surgical History, Social History, Family History have been reviewed and are without significant changes except as mentioned.     REVIEW OF SYSTEMS:  GENERAL: see HPI;    SKIN: No nonhealing lesions.  No rashes.    HEME/LYMPH: No easy bruising, bleeding.  " No swollen nodes.    EYES: No vision changes or diplopia.    ENT: No tinnitus, hearing loss, gum bleeding, epistaxis, hoarseness or dysphagia.    RESPIRATORY: No cough, shortness of breath, hemoptysis or wheezing.    CVS: No chest pain, palpitations, orthopnea, dyspnea on exertion.    GI: No melena or hematochezia.  No abdominal pain.  No nausea, vomiting, constipation, diarrhea  : No lower tract obstructive symptoms, dysuria or hematuria.    MUSCULOSKELETAL: No bone pain. No joint stiffness.    NEUROLOGICAL: No global weakness, loss of consciousness or seizures.    PSYCHIATRIC: No increased nervousness, mood changes or depression.        Objective   Vitals:    01/27/17 1055   BP: 131/68   Pulse: 92   Resp: 16   Temp: 98.3 °F (36.8 °C)   TempSrc: Oral   SpO2: 96%   Weight: 133 lb 11.2 oz (60.6 kg)   Height: 63\" (160 cm)   PainSc: 0-No pain   ECOG 1        PHYSICAL EXAM:    GENERAL: Well-developed, thin female in no acute distress. Back pain see HPI.    SKIN: Warm, dry without rashes, purpura or petechiae.  She has mild peeling of skin at the tip of her right index finger, and the left hand ring finger.   HEAD: Normocephalic.  EYES: Pupils equal, round and reactive to light. EOMs intact. Conjunctivae normal.  EARS: Hearing intact.  NOSE: No excoriations or nasal discharge.  MOUTH: Tongue is well-papillated; no stomatitis or ulcers. Lips normal.  THROAT: Oropharynx without lesions or exudates.  NECK: Supple with good range of motion; no thyromegaly or masses.  LYMPHATICS: No cervical, supraclavicular, axillary or inguinal adenopathy.  CHEST: Lungs clear to auscultation. Good airflow.  CARDIAC: Regular rate and rhythm without murmurs, rubs or gallops. Normal S1,S2.  ABDOMEN: Soft, nontender with no organomegaly or masses.  EXTREMITIES: No clubbing, cyanosis or edema.    NEUROLOGICAL: Cranial Nerves II-XII grossly intact. No focal neurological deficits.  PSYCHIATRIC: Normal affect and mood.        RECENT LABS:  Lab " Results   Component Value Date    WBC 6.40 01/27/2017    HGB 9.0 (L) 01/27/2017    HCT 27.5 (L) 01/27/2017    .0 (H) 01/27/2017     01/27/2017     Lab Results   Component Value Date    NEUTROABS 4.74 01/27/2017     Lab Results   Component Value Date    GLUCOSE 110 (H) 01/13/2017    BUN 30 (H) 01/13/2017    CREATININE 1.07 (H) 01/13/2017    EGFRIFNONA 49 (L) 01/13/2017    EGFRIFAFRI 65 10/22/2016    BCR 28.0 (H) 01/13/2017    CO2 26.7 01/13/2017    CALCIUM 8.8 01/13/2017    PROTENTOTREF 6.8 10/22/2016    PROTENTOTREF 6.9 10/22/2016    ALBUMIN 4.10 01/13/2017    LABIL2 1.6 01/13/2017    AST 41 (H) 01/13/2017    ALT 12 01/13/2017     SODIUM   Date Value Ref Range Status   01/13/2017 142 136 - 145 mmol/L Final   10/22/2016 143 136 - 144 mmol/L Final     Comment:                   **Please note reference interval change**     POTASSIUM   Date Value Ref Range Status   01/13/2017 3.8 3.5 - 5.2 mmol/L Final   10/22/2016 4.1 3.5 - 5.2 mmol/L Final     Comment:                   **Please note reference interval change**     TOTAL BILIRUBIN   Date Value Ref Range Status   01/13/2017 0.5 0.1 - 1.2 mg/dL Final   10/22/2016 0.3 0.0 - 1.2 mg/dL Final     ALKALINE PHOSPHATASE   Date Value Ref Range Status   01/13/2017 322 (H) 39 - 117 U/L Final   10/22/2016 304 (H) 39 - 117 IU/L Final   10/22/2016 293 (H) 39 - 117 IU/L Final   ]          Assessment/Plan           1. Metastatic breast cancer, predominantly bone disease, biopsy confirmed triple negative in late October 2016. She was started on Xeloda 1500 mg b.i.d.,on 11/14/16, about 25% dose reduction from calculated dose 2000 mg b.i.d. She was not able to tolerate, one week into the therapy, patient called reporting significant fatigue associated with treatment. We decreased her Xeloda to 1000 mg twice a day and she finished her first cycle (2 weeks) on November 27. Patient continues to have significant fatigue, and laboratory study showed significant worsened  hemoglobin is 7.8. She does have lightheadedness although no syncope episodes. We further decreased her dose to 1000 mg oral twice a day, 7 days on and 7 days off. She has since tolerated better with less fatigue. We'll continue treatment for now.       2. Anemia secondary to chemotherapy, with a background of anemia related to her metastatic breast cancer. Earlier in December 2016 she required 2 units packed RBC transfusion. Improving hemoglobin at 10.5 on December 16, however on 12/30/16 it dropped down to 9.2. After discussion,  We started her on Procrit injection every 2 weeks.  Her hemoglobin now stays at the 9 g, and we'll continue Procrit.      There is no evidence of deficiency of iron, B12, or folic acid. As a matter of fact, she has significantly elevated ferritin level, which is reactive to her metastatic disease.      3. Metastatic bone disease. She is due for monthly Zometa today and repeat every 4 weeks.  Patient is responding to treatment, as evidenced by improved alk phosphatase.      4. Pain associated with metastatic bone disease. Patient reports she has no pain, even not using Tylenol and Lortab.       5. Hypokalemia. Improved and normalized with oral KCL supplement.      6. Poor oral intake and weight loss.  She has stable weight since we started on oral prednisone 10 Milligan daily in early December 2016, and adjusted her chemotherapy dose.  Will continue.              PLAN:   1. Proceed ahead with iv. Zometa today in the clinic and repeat every 4 weeks.   2. Continue Xeloda, 1000 mg b.i.d. for 7days on and 7 days off.  The necks week therapy on Monday, 01/30/2017.  3.  Continue Procrit at 20,000 units today and repeat every 2 weeks, with CBC monitoring.  4. Continue oral potassium 20 Meq daily.   5. Continue Prednisone 10 mg oral daily.   6. Continue Lortab 5/325 mg every 6 hours as needed for pain control.   7. She will return in 4 weeks with NP visit, we will obtain bone scan in 7 weeks, and  she will return in 8 weeks to have M.D. visit, and the laboratory study CBC, CMP, Ca15-3.             More than 30 minutes, over half of that time were used for counseling.          ELVIN FONG M.D., Ph.D.    01/27/2017            CC: Verna Leavitt M.D.           Orlin disclaimer:  Much of this encounter note is an electronic transcription/translation of spoken language to printed text. The electronic translation of spoken language may permit erroneous, or at times, nonsensical words or phrases to be inadvertently transcribed; Although I have reviewed the note for such errors, some may still exist.     165.1

## (undated) DEVICE — TIDISHIELD UROLOGY DRAIN BAGS FROSTY VINYL STERILE FITS SIEMENS UROSKOP ACCESS 20 PER CASE: Brand: TIDISHIELD

## (undated) DEVICE — NITINOL WIRE WITH HYDROPHILIC TIP: Brand: SENSOR

## (undated) DEVICE — SYR LUERLOK 20CC

## (undated) DEVICE — GLV SURG BIOGEL LTX PF 7

## (undated) DEVICE — LOU CYSTO: Brand: MEDLINE INDUSTRIES, INC.

## (undated) DEVICE — CATH URETRL FLXITP POLLACK STD 5F 70CM